# Patient Record
(demographics unavailable — no encounter records)

---

## 2017-02-17 NOTE — HP
ADMISSION HISTORY AND PHYSICAL:

 

DATE OF ADMISSION:  02/17/17

 

REASON FOR ADMISSION:  STEMI; status post 4-vessel bypass.

 

HISTORY OF PRESENT ILLNESS:  Carmelo Vazquez is an 84-year-old white male.  He came 
to [facility] acutely on 02/04/17, complaining of persistent chest 
pain. He had an EKG showing ST elevations in the inferior leads.  A STEMI alert 
was called.  The patient was given heparin and Brilinta and started on IV 
nitroglycerin.  He underwent emergent cardiac catheterization, which showed 
severe triple-vessel disease.  The patient was transferred to [facility]. The patient was taken to the operating room on 02/07/17 and underwent 
a coronary artery bypass graft.  He had a left internal mammary artery to the 
LAD, a left saphenous vein graft to the obtuse marginal 1, and saphenous vein 
graft to obtuse marginal 3.  The postop course was notable for atrial 
fibrillation.  He was anticoagulated with Coumadin.  He also had a persistently 
high white blood cell count.  He was started on doxycycline empirically for 
bronchitis.  Chest x-rays were done, which did not reveal pneumonia.  The 
patient, otherwise seemed to be doing well; however, he had physical therapy 
and occupational therapy needs.  He is now being admitted for inpatient rehab 
so that he might return to independent living.

 

PAST MEDICAL HISTORY:  Significant for arthritis as well as coronary artery 
disease and hypertension.  He had a right inguinal hernia repair.

 

CURRENT MEDICATIONS:  Include:

1.  Aspirin.

2.  Lipitor.

3.  Digoxin.

4.  Doxycycline.

5.  Lasix.

6.  Cozaar.

7.  Magnesium oxide.

8.  Lopressor.

9.  Flomax.

10.  Coumadin.

 

ALLERGIES:  The patient has no known drug allergies.

 

SOCIAL HISTORY:  He is a nonsmoker, nondrinker.  He lives with his wife in a [place] 
[address] [place].  He has a son living near by.

 

REVIEW OF SYSTEMS:  The patient reports no current shortness of breath or chest 
pain.

 

                               PHYSICAL EXAMINATION

 

VITAL SIGNS:  The patient's temperature is 99.6, blood pressure is 133/65, 
pulse is 89, respirations 18.

 

HEENT:  His extraocular movements were intact.  Tongue is midline.

 

NECK:  Supple.

 

LUNGS:  Sound clear to auscultation bilaterally.

 

HEART:  Sounds are irregular.  S1 and S2 are audible.

 

ABDOMEN:  Soft and nontender.

 

EXTREMITIES:  His left vein graft site looks clean.  He has 1+ edema 
bilaterally.

 

NEUROLOGIC:  He is awake, alert, oriented.  Muscle strength appears to be 5/5 
in both upper and lower extremities.

 

FUNCTIONAL EXAM:  He transfers with min assist.

 

 ASSESSMENT:  ST elevation myocardial infarction; status post 4-vessel CABG.

 

PLAN:  Integrate him into a comprehensive and therapeutic rehab program with 
the following goals:

 

1.  Physical Therapy will work with the patient.  They are going to work on 
functional transfer training, ambulation training with a walker.

2.  Occupational Therapy will see the patient.  They are going to work on his 
activities of daily living including toileting and toilet transfers.

3.  Coumadin for his atrial fibrillation and for DVT prophylaxis.

4.  Continue digoxin and Coumadin for atrial fibrillation.

5.  Continue aspirin, Lipitor, and beta blocker for coronary artery disease.

6.  For prostatic hypertrophy, continue Flomax.

7.  Continue Lasix and monitor diuresis.  Weights q. Monday, Wednesday, and 
Friday.

8.  Continue doxycycline empirically.  Follow white blood cell count.  Follow 
wound on left leg.

9.  Advance directives:  The patient is a full code.

10.   will be closely involved to make sure that any services 
and equipment that the patient requires are in place prior to discharge.

11.  Home with appropriate services.

 

ESTIMATED LENGTH OF STAY:  Ten days.

 

 

 

[id number]/[facility] #: [id number]

MARIO

## 2017-02-27 NOTE — RAD
Indication: Sternal pain post CABG.



Comparison: No relevant prior exams available [place] [facility] [place]



Technique: PA and lateral chest radiographs.



Report: Cardiomegaly. Median sternotomy wires and mediastinal vascular clips. Unremarkable

central pulmonary vasculature. Mild prominence of the interstitial markings. Obscuration

of the LEFT heart margin which may reflect atelectasis or pneumonia at the lingula. Small

bilateral pleural effusions. Negative for pneumothorax.



IMPRESSION: Cardiomegaly with probable mild interstitial edema with associated small

pleural effusions. Pericardial effusion not excluded. Obscuration of the LEFT heart margin

which may reflect atelectasis or pneumonia at the lingula. Correlate with clinical

assessment.

## 2017-02-28 NOTE — RAD
Indication: Right knee pain.



2 views of the right knee demonstrates joint space narrowing in the medial compartment of

the right knee with osteophyte formation and varus deformity. Degenerative changes of the

patellofemoral joint is noted.



IMPRESSION: Degenerative changes medial compartment of the right knee.

## 2017-02-28 NOTE — RAD
Indication: Left knee pain.



2 views left knee demonstrates joint space narrowing in the medial compartment with a

moderate degree of varus deformity of the left tibia. Complete loss of the joint space

medially with subchondral sclerosis and osteophyte formation is noted. Osteophyte

formation is noted in the lateral compartment as well as the patellofemoral joint.



IMPRESSION: Degenerative changes medial compartment of the left knee with moderate degree

of varus deformity. More moderate degenerative changes are noted in the lateral

compartment and patellofemoral joint.

## 2017-03-09 NOTE — DS
DISCHARGE SUMMARY:

 

DATE OF ADMISSION:  02/17/17

 

DATE OF DISCHARGE:  03/08/17

 

DISCHARGE DIAGNOSES:

1.  ST elevation myocardial infarction.

2.  Status post 4-vessel coronary artery bypass graft.

3.  Atrial fibrillation.

4.  Osteoarthritis, both knees.

 

HISTORY OF ILLNESS AND HOSPITAL COURSE:  For complete history of events leading 
up to his rehab stay, please see the history and physical dictated by me on 02/
17/17. While on the rehab unit, the patient initially converted back to sinus 
rhythm.  He was maintained on Coumadin as well as Lopressor, digoxin and 
aspirin. The patient was noted to be hypokalemic and potassium supplementation 
was ordered.  He was maintained on Lasix.  BUN and creatinine steadily improved 
while on the rehab unit but at the time of discharge still BUN was 21 and 
creatinine 1.24.  The patient's heart rate was noted to be irregular and 
followup EKG showed he had gone back into atrial fibrillation.  As he was 
anticoagulated and rate controlled, there was not much to be done.  The patient 
had some difficulty ambulating because of pain in his knees.  X-rays show 
significant osteoarthritis in both knees.  He could no longer take Naprosyn 
because of his recent open heart surgery.  He was having difficulty putting 
weight on his arms because of his recent sternotomy.  The patient had to limit 
his distance in ambulation as a result.  The patient was seen by Physical 
Therapy and Occupational Therapy and made good gains with all disciplines.  
With physical therapy at the time of admission, the patient required max assist 
for a transfer of 2 people.  He was max assist to ambulate about 3 feet.  With 
occupational therapy, he was max assist for toileting and toilet transfers.  By 
the time of discharge, he was independent in ambulating and he was able to 
ambulate about 50 feet.  He was able to go up and down 5 steps with contact 
guard.  He was independent in dressing, independent in toileting and toilet 
transfers.  He was discharged home on 03/08/17.

 

DISCHARGE DIET:  Cardiac.  Decaffeinated beverages like coffee were okay.

 

DISCHARGE MEDICATIONS:

1.  Aspirin 81 mg daily.

2.  Lipitor 10 mg daily.

3.  Digoxin 0.125 mg daily.

4.  Lasix 40 mg daily.

5.  Norco 5/325 one tablet every 6 hours as needed.

6.  Cozaar 50 mg daily.

7.  Lopressor 37.5 mg twice a day.

8.  Omeprazole 20 mg daily.

9.  Potassium chloride 20 mEq twice daily.

10.  Coumadin 1 mg daily at 5 p.m.

11.  Vitamin D 1000 units daily.

 

SERVICES AFTER DISCHARGE:  Through visiting nurse service, he will have home 
nursing, home physical therapy, and a home health aide.

 

FOLLOWUP:  The patient will follow up with Dr. Corcoran in 1 to 2 weeks.  He will 
also follow up with Dr. Cholo Boo, his primary care doctor.  He also will 
follow up with  ______, his cardiothoracic surgeon.

 

CC:  Cholo Boo MD*



 [id number]/[facility] #: [id number]

MTDD

## 2017-11-13 NOTE — PMRUTEAM
PMRU: Goals


Current Status: 


 Nursing: Current Status











Skin Deviations [R Posterior   Other





Forearm]                       


 


Skin Deviations [Bilateral     Other





Heel]                          


 


Skin Deviations [Coccyx]       Other


 


Skin Deviations [Right Groin]  Rash


 


Skin Deviations [Left Leg]     Incision


 


Skin Deviations [Midline Chest Incision





]                              


 


Skin Deviation Description [R  bump





Posterior Forearm]             


 


Skin Deviation Description [   lotion in place





Bilateral Heel]                


 


Skin Deviation Description [   redness- skin protectent applied





Coccyx]                        


 


Skin Deviation Description [   Nystatin powder applied





Right Groin]                   


 


Skin Deviation Description [   healing





Left Leg]                      


 


Skin Deviation Description [   Healing





Midline Chest]                 


 


Bladder Current Status         uses urinal and br to void


 


Bowel Current Status           metamucil.


 


Nutrition Current Status       appetite good


 


Medication Current Status      needs reinforcement











 Physical Therapy: Current Status











Bed Mobility Assistance        Supervision


 


Transfer Moblility Assistance  Supervision


 


Transfer/Bed Mobility          Rolling Walker





Recommended Devices            


 


Transfer Mobility Comment      Mod A x 1 sit to stand.


 


Ambulation Assistance          Supervision


 


Ambulation Assistive Devices   Rolling Walker


 


Number of Feet Patient         40'





Ambulated                      


 


Ambulation Comment             Slow and cautious short step reciprocal gait.


 


Stairs Assistance              Supervision,Contact Guard Assist


 


Stairs Recommended Devices     Two Rails


 


Number of Stairs               2


 


Curb                           Not Tested














 Occupational Therapy: Current Status











Upper Body Dressing            Independent


 


Lower Body Dressing            Supervision


 


Bathing                        Supervision


 


Toileting                      Supervision


 


Toilet Transfer                Supervision


 


Shower Transfer                Supervision


 


Eating                         Independent














 Rec Therapy: Current Status











Summary of Assessment and      RT assessment complete and pt. is aware of RT





Clinical Impression            services.  Pt. is open to continued leisure 

visits





 .


 


Treatment Goals                Pt. will engage in leisure activities while on 

the





 unit.


 


Treatment Plan                 Provide RT services and encourage involvement.














 Social Work: Current Status











Discharge Plan                 return home with home care svs and family support


 


Potential for Family Training  pt's wife is attending family training today


 


Anticipated Discharge          Home





Destination                    


 


Discharge With                 VNS and family support














 Nutrition: Current Status











Monitoring                     Continues to eat well/meeting needs.  Regular





 bowel pattern. Na improving (130 vs previous 127).





 BUN/Cr improved (21/1.24).  K 3.2 and KCl





 ordered by MD.  No specific further nutrition





 intervention anticipated.














Goals: 


 Physical Therapy: Initial Goals











Bed Mobility Assistance        Independent


 


Transfer Mobility Assistance   Independent


 


Transfer/Bed Mobility          Rolling Walker





Recommended Devices            


 


Ambulation                     Independent


 


Ambulation Recommended Devices Rolling Walker


 


Ambulation Distance            150


 


Stairs Assistance              Independent


 


Stair Recommended Devices      Two Rails


 


Number of Stairs               5














 Physical Therapy: Updated Goals











Bed Mobility Assistance        Independent


 


Transfer Mobility Assistance   Independent


 


Transfer/Bed Mobility          Rolling Walker





Recommended Devices            


 


Ambulation Assistance          Independent


 


Ambulation Assistive Devices   Rolling Walker


 


Ambulation Distance (ft)       150'


 


Stairs Assistance              Independent


 


Stairs Recommended Devices     One Rail,Two Rails


 


Number of Stairs               5














 Occupational Therapy: Initial Goals











Goals to be Completed in (Days 10-14





)                              


 


Upper Body Bathing Routine     Independent


 


Lower Body Bathing Routine     Modified Independent with


 


Upper Body Dressing Routine    Independent


 


Lower Body Dressing Routine    Modified Independent with


 


Toilet Hygeine and Clothing    Modified Independent with





Management Routine             


 


Toilet Transfer Routine        Modified Independent with


 


Step-In Shower Transfer        Modified Independent with





Routine                        


 


Functional Transfers for ADL   Modified Independent with


 


Grooming Routine               Independent


 


Feeding Routine                Independent














 Nursing: Goals











Bladder Goal                   independent


 


Bowel Goal                     independent


 


Nutrition Goal                 100% of all meals eaten


 


Medication Goal                independent














 Nutrition: Goals











Intervention Goals             1. Intake will remain adequate to maintain stable





 wt





 2. K+ will normalize with repletion; Na will





 return to WNL





 3. Pt will maintain regular bowel pattern without





 diarrhea/constipation





 4. Pt will choose appopriate items from menu to





 ensure ease chewing











 Social Work: Goals











Discharge Plan                 return home with home care svs and family support


 


Potential for Family Training  pt's wife is attending family training today


 


Anticipated Discharge          Home





Destination                    


 


Discharge With                 VNS and family support

















Care Plan: 


 Care Plan





ADL's - Improve/Maintain                Start:  02/18/17 12:12              


Freq:   DAILY                           Status: Active      Target:         








Activity Type Activity Date Activity User E-Sign Co-Sign Detail





Recorded Client Recorded Date Recorded By   


 


Document 03/06/17 15:00 [id number]   





[id number]-C04 03/06/17 15:00 [id number]   














  03/06/17





  15:00


 


PMRU Outcome: ADL's/ADL Transfers 


 


Orders/Interventions Occupational





 Therapy





 Evaluation &





 Treatment


 


Patient to receive OT 5x/wk for  Therex





min/day Self Care





 Management





 Group Therapy


 


UE/LE ADL's with Assist Yes


 


ADL Transfers with Assist Yes


 


Toileting: Transfers,Clothing Management Yes





,Hygeine w/Assist 


 


Progression Toward Outcome/Goals Progressing








Cardiovascular- Improve/Maintain        Start:  02/17/17 17:00              


Freq:   DAILY                           Status: Complete    Target:         








Activity Type Activity Date Activity User E-Sign Co-Sign Detail





Recorded Client Recorded Date Recorded By   


 


Document 02/27/17 18:56 [id number]   





[id number]-M01 02/27/17 18:57 [id number]   














  02/27/17





  18:56


 


PMRU Outcome: Cardiovascular 


 


Vital Signs q Shift for 48hrs Then BID Yes


 


Daily Weight Ordered No


 


Current Cardiovascular Outcome/Goal Maintain/





 Achieve





 Baseline HR, BP





 , Perfusion





 Maintain/





 Achieve





 Hemodynamic





 Stability





 Free of





 Abnormal





 Cardiac





 Symptoms


 


Outcomes/Goals Met Maintain/





 Achieve





 Baseline HR, BP





 , Perfusion





 Maintain/





 Improve





 Perfusion





 Free of





 Abnormal





 Cardiac





 Symptoms








Coping/Psych-Improve/Maintain           Start:  02/17/17 17:00              


Freq:   DAILY                           Status: Complete    Target:         








Activity Type Activity Date Activity User E-Sign Co-Sign Detail





Recorded Client Recorded Date Recorded By   


 


Document 02/27/17 18:56 [id number]   





[id number]-M01 02/27/17 18:57 [id number]   














  02/27/17





  18:56


 


PMRU Outcome: Coping/Psychosocial 


 


Coping Outcome/Goals Verbalization





 of Sense of





 Control Over





 Health Status





 Utilization of





 Appropriate





 Problem Solving





 Techniques





 Willingness to





 Participate in





 Treatment Plan





 and Basic Needs


 


Psychosocial Outcome/Goals Maintain/





 Improve





 Emotional





 Health





 Demonstrates





 Knowledge of





 Healthy Coping





 Mechanisms





 Available





 Cooperate/





 Participate in





 Plan


 


Coping Outcome/Goals Met Demonstrates





 Understanding





 of Rehab Admit





 and Goal





 Setting Process





 Performs





 Actions to





 Reduce Pain and





 Prevent





 Complications





 Recognizes/Uses





 Appropriate





 Resources That





 Can Assist With





 Adjustment








DVT Prophylaxis- Improve/Maintain       Start:  02/17/17 17:00              


Freq:   DAILY                           Status: Complete    Target:         








Activity Type Activity Date Activity User E-Sign Co-Sign Detail





Recorded Client Recorded Date Recorded By   


 


Document 03/02/17 19:43 [id number]   





PMRU-M10 03/02/17 19:43 [id number]   














  03/02/17





  19:43


 


PMRU Outcome: DVT Prophylaxis 


 


Outcome/Goals Remains Free of





 DVT





 Complies with





 DVT Prophylaxis





 /Treatment





 Demonstrates





 Knowledge of





 DVT Prevention/





 Treatment





 TEDS Stockings





 on Every AM,





 Off at HS


 


Other Outcome/Goals ACE wraps





 bilateral


 


Outcome/Goals Met Remains Free of





 DVT





 TEDS Stockings





 on Every AM,





 Off at HS








Discharge Planning - Improve/Maintain   Start:  02/17/17 17:00              


Freq:   DAILY                           Status: Active      Target:         








Activity Type Activity Date Activity User E-Sign Co-Sign Detail





Recorded Client Recorded Date Recorded By   


 


Document 03/07/17 10:03 [id number]   





PMRU-M01 03/07/17 10:07 [id number]   














  03/07/17





  10:03


 


PMRU Outcome: Discharge Planning 


 


Identify Patient Needs yes


 


Update Patient Family No


 


Outcome/Goals Demonstrates





 Understanding





 of Discharge





 Plan


 


Progression Toward Outcome/Goals Progressing








Education-Improve/Maintain              Start:  02/17/17 17:00              


Freq:   DAILY                           Status: Active      Target:         








Activity Type Activity Date Activity User E-Sign Co-Sign Detail





Recorded Client Recorded Date Recorded By   


 


Document 03/07/17 10:03 [id number]   





PMRU-M01 03/07/17 10:07 [id number]   














  03/07/17





  10:03


 


PMRU Outcome: Education 


 


Outcome/Goals Demonstrate/





 Verbalize





 Understanding





 of Written





 Discharge





 Instructions





 Demonstrates





 Skills





 Encourage





 Questions


 


Progression Toward Outcome/Goals Progressing








/GI-Improve/Maintain                  Start:  02/17/17 17:00              


Freq:   DAILY                           Status: Active      Target:         








Activity Type Activity Date Activity User E-Sign Co-Sign Detail





Recorded Client Recorded Date Recorded By   


 


Document 03/07/17 10:03 [id number]   





PMRU-M01 03/07/17 10:07 [id number]   














  03/07/17





  10:03


 


PMRU Outcome: Genitourinary/ 





Gastrointestinal 


 


Genitourinary- Outcome/Goals Maintain/





 Achieve Urinary





 Continence


 


Gastrointestinal-Outcome/Goals Maintain/





 Achieve Bowel





 Regularity in





 Accordance with





 Pt's Baseline





 Remain Free of





 Emesis





 Prevent





 Constipation





 Laxatives as





 Ordered


 


Progression Toward Outcome/Goals -  Progressing


 


Progression Toward Outcome/Goals - GI Progressing








Medication Administration               Start:  02/17/17 17:00              


Freq:   DAILY                           Status: Active      Target:         








Activity Type Activity Date Activity User E-Sign Co-Sign Detail





Recorded Client Recorded Date Recorded By   


 


Document 03/07/17 10:03 [id number]   





PMRU-M01 03/07/17 10:07 [id number]   














  03/07/17





  10:03


 


PMRU Outcome: Medication Administration 


 


Assess Patient Knowledge/Teach Med Yes





Education for all Meds 


 


Outcome/Goals Demonstrates





 Understanding


 


Progression Towards Outcome/Goals Progressing


 


Is Patient Going Home on Lovenox? No








Mobility- Improve/Maintain              Start:  02/24/17 12:37              


Freq:   DAILY                           Status: Active      Target:         








Activity Type Activity Date Activity User E-Sign Co-Sign Detail





Recorded Client Recorded Date Recorded By   


 


Document 03/07/17 12:15 [id number]   





PMRU-C08 03/07/17 12:15 [id number]   














  03/07/17





  12:15


 


PMRU Outcome: Mobility 


 


Physical Therapy Evaluation and Yes





Treatment 


 


Activity OOB with Assistance Yes


 


Assistance Yes


 


Patient to be seen 5x/wk for  min/ Therex





day for: Mobility





 Training





 Balance


 


Outcome/Goals Maintain/





 Achieve





 Baseline





 Mobility Status





 Improve





 Mobility Status





 Demonstrates





 Proper Use of





 Assistive





 Devices





 Free from





 Complications





 of Immobility


 


Progression Toward Outcome/Goals Progressing


 


Bed Mobility Yes:





 independent


 


Transfers Yes:





 independent





 with RW


 


Gait x ft Yes:





 independent 150





 ' with RW


 


Up/Down Stairs Yes:





 independent up/





 down 5 with





 Bilateral rails








Pain/Comfort- Improve/Maintain          Start:  02/17/17 17:00              


Freq:   DAILY                           Status: Active      Target:         








Activity Type Activity Date Activity User E-Sign Co-Sign Detail





Recorded Client Recorded Date Recorded By   


 


Document 03/07/17 10:03 [id number]   





PMRU-M01 03/07/17 10:07 [id number]   














  03/07/17





  10:03


 


PMRU Outcome: Pain/Comfort 


 


Outcome/Goals Demonstrates





 Knowledge and





 Use of





 Available





 Comfort





 Measures





 Achieves





 Acceptable





 Comfort/Pain





 Level as





 Determined by





 Patient/Condit





 Maintain





 Comfort Level





 Allowing





 Patient to





 Fully





 Participate in





 Rehab


 


Progression Toward Outcome/Goals Progressing


 


Outcome/Goals Met Demonstrates





 Knowledge and





 Use of





 Available





 Comfort





 Measures








Respiratory - Improve/Maintain          Start:  02/17/17 17:00              


Freq:   DAILY                           Status: Complete    Target:         








Activity Type Activity Date Activity User E-Sign Co-Sign Detail





Recorded Client Recorded Date Recorded By   


 


Document 03/03/17 19:21 [id number]   





PMRU-M08 03/03/17 19:21 [id number]   














  03/03/17





  19:21


 


PMRU Outcome: Respiratory 


 


Does Patient Have a Trach No


 


Outcome/Goals Maintain/





 Improve O2 Sat





 per MD Order


 


Other Outcome/Goals Pt encouragd to





 use IS Q1H





 while awake.





 Patient





 demonstrates





 effective use





 of IS.


 


Progression Toward Outcome/Goals Progressing


 


Outcome/Goals Met Maintain/





 Improve O2 Sat





 per MD Order





 Maintain/





 Improve





 Activity





 Tolerance








Safety- Improve/Maintain                Start:  02/17/17 17:00              


Freq:   DAILY                           Status: Complete    Target:         








Activity Type Activity Date Activity User E-Sign Co-Sign Detail





Recorded Client Recorded Date Recorded By   


 


Document 02/22/17 08:00 [id number]   





PMRU-C14 02/22/17 12:58 [id number]   














  02/22/17





  08:00


 


PMRU Outcome: Safety 


 


Outcome/Goals Remain Free of





 Injury or Harm





 Cooperates with





 Safety





 Measures for





 Least





 Restrictive





 Environment





 Prevent Falls/





 Injury


 


Progression Toward Outcome/Goals Progressing


 


Outcome/Goals Met Remain Free of





 Injury or Harm





 Cooperates with





 Safety





 Measures for





 Least





 Restrictive





 Environment








Skin- Improve/Maintain                  Start:  02/17/17 17:00              


Freq:   DAILY                           Status: Active      Target:         








Activity Type Activity Date Activity User E-Sign Co-Sign Detail





Recorded Client Recorded Date Recorded By   


 


Document 03/07/17 10:03 [id number]   





PMRU-M01 03/07/17 10:07 [id number]   














  03/07/17





  10:03


 


PMRU Outcome: Skin 


 


Skin Risk Level Medium


 


Skin Orders Air Mattress


 


Outcome/Goals Maintain/





 Improve Skin





 Intergrity





 Surgical





 Incisions





 Healing


 


Outcome/Goals Comment large rash on





 both sides of





 groin-nystatin


 


Progression Toward Outcome/Goals Progressing














Medicine Note: 








Length of Stay:  1 day





Anticipated Discharge Destination: Home





Tentative Discharge Date:  03/08/17





Discharged to:  home
PMRU: Goals


Current Status: 


 Nursing: Current Status











Skin Deviations [R Posterior   Other





Forearm]                       


 


Skin Deviations [Right Groin]  Rash


 


Skin Deviations [Left Leg]     Incision


 


Skin Deviations [Midline Chest Incision





]                              


 


Skin Deviation Description [R  bump





Posterior Forearm]             


 


Skin Deviation Description [   intact.





Right Groin]                   


 


Skin Deviation Description [   Steri strips in place.  Well approximated. No





Left Leg]                      redness or drainage noted.


 


Skin Deviation Description [   JOSE DANIEL





Midline Chest]                 


 


Bladder Current Status         uses urinal and br to void


 


Bowel Current Status           metamucil.


 


Nutrition Current Status       appetite good


 


Medication Current Status      needs reinforcement











 Physical Therapy: Current Status











Bed Mobility Assistance        Min Assist,Mod Assist,Not Tested


 


Transfer Moblility Assistance  Min Assist,Mod Assist


 


Transfer/Bed Mobility          Rolling Walker





Recommended Devices            


 


Transfer Mobility Comment      Mod A x 1 sit to stand.


 


Ambulation Assistance          Contact Guard Assist


 


Ambulation Assistive Devices   Rolling Walker


 


Number of Feet Patient         20' x 2





Ambulated                      


 


Ambulation Comment             Slow and cautious short step reciprocal gait.


 


Stairs Assistance              Not Tested


 


Stairs Recommended Devices     Two Rails


 


Number of Stairs               2  steps with effort


 


Curb                           Not Tested














 Occupational Therapy: Current Status











Upper Body Dressing            Supervision


 


Lower Body Dressing            Min Assist


 


Bathing                        Mod Assist


 


Toileting                      Min Assist,Mod Assist


 


Toilet Transfer                Min Assist


 


Shower Transfer                Min Assist


 


Eating                         Independent














 Rec Therapy: Current Status











Summary of Assessment and      RT assessment complete and pt. is aware of RT





Clinical Impression            services.  Pt. is open to continued leisure 

visits





 .


 


Treatment Goals                Pt. will engage in leisure activities while on 

the





 unit.


 


Treatment Plan                 Provide RT services and encourage involvement.














 Social Work: Current Status











Discharge Plan                 return home with home care svs and family support


 


Potential for Family Training  pt's family is involved and supportive


 


Anticipated Discharge          Home





Destination                    


 


Discharge With                 home care svs and family support














 Nutrition: Current Status











Monitoring                     pt continues to eat well (100% of most of the 

past





 few meals, though occ 25% or 60%) w/heart healthy





 diet; declines softer texture diet, but choosing





 menu appropriately for ease of chewing.  Pt eating





 independently at past few meals. BMs 2/26, 2/27,





 2/28. K+ 4.1 2/27; Na+ 127; LFTs stable. No





 changes to suggest at this time.














Goals: 


 Physical Therapy: Initial Goals











Bed Mobility Assistance        Independent


 


Transfer Mobility Assistance   Independent


 


Transfer/Bed Mobility          Rolling Walker





Recommended Devices            


 


Ambulation                     Independent


 


Ambulation Recommended Devices Rolling Walker


 


Ambulation Distance            150


 


Stairs Assistance              Independent


 


Stair Recommended Devices      Two Rails


 


Number of Stairs               5














 Physical Therapy: Updated Goals











Bed Mobility Assistance        Independent


 


Transfer Mobility Assistance   Independent


 


Transfer/Bed Mobility          Rolling Walker





Recommended Devices            


 


Ambulation Assistance          Independent


 


Ambulation Assistive Devices   Rolling Walker


 


Ambulation Distance (ft)       150'


 


Stairs Assistance              Independent


 


Stairs Recommended Devices     One Rail,Two Rails


 


Number of Stairs               5














 Occupational Therapy: Initial Goals











Goals to be Completed in (Days 10-14





)                              


 


Upper Body Bathing Routine     Independent


 


Lower Body Bathing Routine     Modified Independent with


 


Upper Body Dressing Routine    Independent


 


Lower Body Dressing Routine    Modified Independent with


 


Toilet Hygeine and Clothing    Modified Independent with





Management Routine             


 


Toilet Transfer Routine        Modified Independent with


 


Step-In Shower Transfer        Modified Independent with





Routine                        


 


Functional Transfers for ADL   Modified Independent with


 


Grooming Routine               Independent


 


Feeding Routine                Independent














 Nursing: Goals











Bladder Goal                   independent


 


Bowel Goal                     independent


 


Nutrition Goal                 100% of all meals eaten


 


Medication Goal                independent














 Nutrition: Goals











Intervention Goals             1. Intake will remain adequate to maintain stable





 wt





 2. K+ will normalize with repletion; na will





 remain wnl





 3. Pt will maintain regular bowel pattern without





 diarrhea/constipation





 4. Pt will choose appopriate items from menu to





 ensure ease chewing











 Social Work: Goals











Discharge Plan                 return home with home care svs and family support


 


Potential for Family Training  pt's family is involved and supportive


 


Anticipated Discharge          Home





Destination                    


 


Discharge With                 home care svs and family support

















Care Plan: 


 Care Plan





ADL's - Improve/Maintain                Start:  02/18/17 12:12              


Freq:   DAILY                           Status: Active      Target:         








Activity Type Activity Date Activity User E-Sign Co-Sign Detail





Recorded Client Recorded Date Recorded By   


 


Document 02/23/17 14:00 [id number]   





PMRU-C09 02/23/17 14:00 [id number]   














  02/23/17





  14:00


 


PMRU Outcome: ADL's/ADL Transfers 


 


Orders/Interventions Occupational





 Therapy





 Evaluation &





 Treatment


 


Patient to receive OT 5x/wk for  Therex





min/day Self Care





 Management





 Group Therapy


 


UE/LE ADL's with Assist Yes


 


ADL Transfers with Assist Yes


 


Toileting: Transfers,Clothing Management Yes





,Hygeine w/Assist 


 


Progression Toward Outcome/Goals Progressing








Cardiovascular- Improve/Maintain        Start:  02/17/17 17:00              


Freq:   DAILY                           Status: Complete    Target:         








Activity Type Activity Date Activity User E-Sign Co-Sign Detail





Recorded Client Recorded Date Recorded By   


 


Document 02/27/17 18:56 [id number]   





PMRU-M01 02/27/17 18:57 [id number]   














  02/27/17





  18:56


 


PMRU Outcome: Cardiovascular 


 


Vital Signs q Shift for 48hrs Then BID Yes


 


Daily Weight Ordered No


 


Current Cardiovascular Outcome/Goal Maintain/





 Achieve





 Baseline HR, BP





 , Perfusion





 Maintain/





 Achieve





 Hemodynamic





 Stability





 Free of





 Abnormal





 Cardiac





 Symptoms


 


Outcomes/Goals Met Maintain/





 Achieve





 Baseline HR, BP





 , Perfusion





 Maintain/





 Improve





 Perfusion





 Free of





 Abnormal





 Cardiac





 Symptoms








Coping/Psych-Improve/Maintain           Start:  02/17/17 17:00              


Freq:   DAILY                           Status: Complete    Target:         








Activity Type Activity Date Activity User E-Sign Co-Sign Detail





Recorded Client Recorded Date Recorded By   


 


Document 02/27/17 18:56 [id number]   





PMRU-M01 02/27/17 18:57 [id number]   














  02/27/17





  18:56


 


PMRU Outcome: Coping/Psychosocial 


 


Coping Outcome/Goals Verbalization





 of Sense of





 Control Over





 Health Status





 Utilization of





 Appropriate





 Problem Solving





 Techniques





 Willingness to





 Participate in





 Treatment Plan





 and Basic Needs


 


Psychosocial Outcome/Goals Maintain/





 Improve





 Emotional





 Health





 Demonstrates





 Knowledge of





 Healthy Coping





 Mechanisms





 Available





 Cooperate/





 Participate in





 Plan


 


Coping Outcome/Goals Met Demonstrates





 Understanding





 of Rehab Admit





 and Goal





 Setting Process





 Performs





 Actions to





 Reduce Pain and





 Prevent





 Complications





 Recognizes/Uses





 Appropriate





 Resources That





 Can Assist With





 Adjustment








DVT Prophylaxis- Improve/Maintain       Start:  02/17/17 17:00              


Freq:   DAILY                           Status: Active      Target:         








Activity Type Activity Date Activity User E-Sign Co-Sign Detail





Recorded Client Recorded Date Recorded By   


 


Document 02/28/17 00:20 [id number]   





PMRU-M10 02/28/17 00:58 [id number]   














  02/28/17





  00:20


 


PMRU Outcome: DVT Prophylaxis 


 


Outcome/Goals Remains Free of





 DVT





 Complies with





 DVT Prophylaxis





 /Treatment





 Demonstrates





 Knowledge of





 DVT Prevention/





 Treatment





 TEDS Stockings





 on Every AM,





 Off at HS


 


Other Outcome/Goals ACE wraps





 bilateral


 


Progression Toward Outcome/Goals Progressing








Discharge Planning - Improve/Maintain   Start:  02/17/17 17:00              


Freq:   DAILY                           Status: Active      Target:         








Activity Type Activity Date Activity User E-Sign Co-Sign Detail





Recorded Client Recorded Date Recorded By   


 


Document 02/28/17 00:20 [id number]   





PMRU-M10 02/28/17 00:58 [id number]   














  02/28/17





  00:20


 


PMRU Outcome: Discharge Planning 


 


Identify Patient Needs yes


 


Update Patient Family No


 


Outcome/Goals Demonstrates





 Understanding





 of Discharge





 Plan


 


Progression Toward Outcome/Goals Progressing








Education-Improve/Maintain              Start:  02/17/17 17:00              


Freq:   DAILY                           Status: Active      Target:         








Activity Type Activity Date Activity User E-Sign Co-Sign Detail





Recorded Client Recorded Date Recorded By   


 


Document 02/28/17 00:20 [id number]   





PMRU-M10 02/28/17 00:58 [id number]   














  02/28/17





  00:20


 


PMRU Outcome: Education 


 


Outcome/Goals Demonstrate/





 Verbalize





 Understanding





 of Written





 Discharge





 Instructions





 Demonstrates





 Skills





 Encourage





 Questions


 


Progression Toward Outcome/Goals Progressing








/GI-Improve/Maintain                  Start:  02/17/17 17:00              


Freq:   DAILY                           Status: Active      Target:         








Activity Type Activity Date Activity User E-Sign Co-Sign Detail





Recorded Client Recorded Date Recorded By   


 


Document 02/28/17 00:20 [id number]   





PMRU-M10 02/28/17 00:58 [id number]   














  02/28/17





  00:20


 


PMRU Outcome: Genitourinary/ 





Gastrointestinal 


 


Genitourinary- Outcome/Goals Maintain/





 Achieve Urinary





 Continence


 


Gastrointestinal-Outcome/Goals Maintain/





 Achieve Bowel





 Regularity in





 Accordance with





 Pt's Baseline


 


Progression Toward Outcome/Goals -  Progressing


 


Progression Toward Outcome/Goals - GI Progressing








Medication Administration               Start:  02/17/17 17:00              


Freq:   DAILY                           Status: Active      Target:         








Activity Type Activity Date Activity User E-Sign Co-Sign Detail





Recorded Client Recorded Date Recorded By   


 


Document 02/28/17 00:20 [id number]   





PMRU-M10 02/28/17 00:58 [id number]   














  02/28/17





  00:20


 


PMRU Outcome: Medication Administration 


 


Assess Patient Knowledge/Teach Med Yes





Education for all Meds 


 


Outcome/Goals Demonstrates





 Understanding


 


Progression Towards Outcome/Goals Progressing


 


Is Patient Going Home on Lovenox? No








Mobility- Improve/Maintain              Start:  02/24/17 12:37              


Freq:   DAILY                           Status: Active      Target:         








Activity Type Activity Date Activity User E-Sign Co-Sign Detail





Recorded Client Recorded Date Recorded By   


 


Document 02/24/17 14:17 [id number]   





PMRU-C08 02/24/17 14:17 [id number]   














  02/24/17





  14:17


 


PMRU Outcome: Mobility 


 


Physical Therapy Evaluation and Yes





Treatment 


 


Activity OOB with Assistance Yes


 


Assistance Yes


 


Patient to be seen 5x/wk for  min/ Therex





day for: Mobility





 Training





 Balance


 


Outcome/Goals Maintain/





 Achieve





 Baseline





 Mobility Status





 Improve





 Mobility Status





 Demonstrates





 Proper Use of





 Assistive





 Devices





 Free from





 Complications





 of Immobility


 


Progression Toward Outcome/Goals Progressing


 


Bed Mobility Yes:





 independent


 


Transfers Yes:





 independent





 with RW


 


Gait x ft Yes:





 independent 150





 ' with RW


 


Up/Down Stairs Yes:





 independent up/





 down 5 with





 Bilateral rails








Pain/Comfort- Improve/Maintain          Start:  02/17/17 17:00              


Freq:   DAILY                           Status: Active      Target:         








Activity Type Activity Date Activity User E-Sign Co-Sign Detail





Recorded Client Recorded Date Recorded By   


 


Document 02/28/17 00:20 [id number]   





PMRU-M10 02/28/17 00:58 [id number]   














  02/28/17





  00:20


 


PMRU Outcome: Pain/Comfort 


 


Outcome/Goals Demonstrates





 Knowledge and





 Use of





 Available





 Comfort





 Measures





 Achieves





 Acceptable





 Comfort/Pain





 Level as





 Determined by





 Patient/Condit


 


Progression Toward Outcome/Goals Progressing


 


Outcome/Goals Met Demonstrates





 Knowledge and





 Use of





 Available





 Comfort





 Measures


 


Outcome/Goals Met Comment Pt did not want





 any medication





 at this time.








Respiratory - Improve/Maintain          Start:  02/17/17 17:00              


Freq:   DAILY                           Status: Complete    Target:         








Activity Type Activity Date Activity User E-Sign Co-Sign Detail





Recorded Client Recorded Date Recorded By   


 


Document 02/27/17 18:56 [id number]   





PMRU-M01 02/27/17 18:57 [id number]   














  02/27/17





  18:56


 


PMRU Outcome: Respiratory 


 


Does Patient Have a Trach No


 


Outcome/Goals Maintain/





 Improve O2 Sat





 per MD Order


 


Outcome/Goals Met Maintain/





 Improve O2 Sat





 per MD Order





 Maintain/





 Improve





 Activity





 Tolerance








Safety- Improve/Maintain                Start:  02/17/17 17:00              


Freq:   DAILY                           Status: Complete    Target:         








Activity Type Activity Date Activity User E-Sign Co-Sign Detail





Recorded Client Recorded Date Recorded By   


 


Document 02/22/17 08:00 [id number]   





PMRU-C14 02/22/17 12:58 [id number]   














  02/22/17





  08:00


 


PMRU Outcome: Safety 


 


Outcome/Goals Remain Free of





 Injury or Harm





 Cooperates with





 Safety





 Measures for





 Least





 Restrictive





 Environment





 Prevent Falls/





 Injury


 


Progression Toward Outcome/Goals Progressing


 


Outcome/Goals Met Remain Free of





 Injury or Harm





 Cooperates with





 Safety





 Measures for





 Least





 Restrictive





 Environment








Skin- Improve/Maintain                  Start:  02/17/17 17:00              


Freq:   DAILY                           Status: Active      Target:         








Activity Type Activity Date Activity User E-Sign Co-Sign Detail





Recorded Client Recorded Date Recorded By   


 


Document 02/28/17 00:20 [id number]   





PMRU-M10 02/28/17 00:58 [id number]   














  02/28/17





  00:20


 


PMRU Outcome: Skin 


 


Skin Risk Level Medium


 


Skin Orders Air Mattress


 


Outcome/Goals Maintain/





 Improve Skin





 Intergrity





 Surgical





 Incisions





 Healing


 


Progression Toward Outcome/Goals Progressing














Medicine Note: 








Length of Stay:  8 days





Anticipated Discharge Destination: Home





Tentative Discharge Date:  3/8/17





Discharged to:  home
PMRU: Goals


Current Status: 


 Nursing: Current Status











Skin Deviations [Right Groin]  Bruise


 


Skin Deviations [Left Leg]     Incision


 


Skin Deviations [Midline Chest Incision





]                              


 


Skin Deviation Description [   intact.





Right Groin]                   


 


Skin Deviation Description [   steri strips in place.





Left Leg]                      


 


Skin Deviation Description [   dressing intact





Midline Chest]                 











 Physical Therapy: Current Status











Bed Mobility Assistance        Min Assist,Mod Assist


 


Transfer Moblility Assistance  Contact Guard Assist,Min Assist


 


Transfer/Bed Mobility          Rolling Walker





Recommended Devices            


 


Ambulation Assistance          Contact Guard Assist,Min Assist


 


Ambulation Assistive Devices   Platform Walker


 


Number of Feet Patient         90'





Ambulated                      


 


Ambulation Comment             Pt. continues to be flexed at hips, knees and





 ankles, but amb is improving.


 


Stairs Assistance              Supervision,Contact Guard Assist,Not Tested


 


Stairs Recommended Devices     Two Rails


 


Number of Stairs               2


 


Curb                           Not Tested














 Occupational Therapy: Current Status











Upper Body Dressing            Supervision


 


Lower Body Dressing            Min Assist


 


Bathing                        Min Assist,Mod Assist


 


Toileting                      Min Assist


 


Toilet Transfer                Min Assist


 


Shower Transfer                Min Assist


 


Eating                         Independent,Ind with Adaptive Equip














 Rec Therapy: Current Status











Summary of Assessment and      RT assessment complete and pt. is aware of RT





Clinical Impression            services.  Pt. is open to continued leisure 

visits





 .


 


Treatment Goals                Pt. will engage in leisure activities while on 

the





 unit.


 


Treatment Plan                 Provide RT services and encourage involvement.














 Social Work: Current Status











Discharge Plan                 Return home with home care svs and family support


 


Potential for Family Training  pt's family is involved and supportive


 


Anticipated Discharge          Home





Destination                    


 


Discharge With                 return home with home care svs and family support














 Nutrition: Current Status











Monitoring                     pt eating well (%) w/heart healthy diet;





 declines softer texture diet, but choosing menu





 appropriately for ease of chewing.  Heart healthy





 diet and Coumadin/Vit K education provided 2/20.





 K (3.1 on adm); KCl given daily (20mg).  Daily BMs





 per nsg documentation.  Appears to be meeting





 goals as outlined below.














Goals: 


 Physical Therapy: Initial Goals











Bed Mobility Assistance        Independent


 


Transfer Mobility Assistance   Independent


 


Transfer/Bed Mobility          Rolling Walker





Recommended Devices            


 


Ambulation                     Independent


 


Ambulation Recommended Devices Rolling Walker


 


Ambulation Distance            150


 


Stairs Assistance              Independent


 


Stair Recommended Devices      Two Rails


 


Number of Stairs               5














 Physical Therapy: Updated Goals











Bed Mobility Assistance        Independent


 


Transfer Mobility Assistance   Independent


 


Transfer/Bed Mobility          Rolling Walker





Recommended Devices            


 


Ambulation Assistance          Independent


 


Ambulation Assistive Devices   Rolling Walker


 


Ambulation Distance (ft)       150'


 


Stairs Assistance              Independent


 


Stairs Recommended Devices     One Rail,Two Rails


 


Number of Stairs               5














 Occupational Therapy: Initial Goals











Goals to be Completed in (Days 10-14





)                              


 


Upper Body Bathing Routine     Independent


 


Lower Body Bathing Routine     Modified Independent with


 


Upper Body Dressing Routine    Independent


 


Lower Body Dressing Routine    Modified Independent with


 


Toilet Hygeine and Clothing    Modified Independent with





Management Routine             


 


Toilet Transfer Routine        Modified Independent with


 


Step-In Shower Transfer        Modified Independent with





Routine                        


 


Functional Transfers for ADL   Modified Independent with


 


Grooming Routine               Independent


 


Feeding Routine                Independent














 Nutrition: Goals











Intervention Goals             1. Intake will remain adequate to maintain stable





 wt





 2. K+ will normalize with repletion





 3. Pt will maintain regular bowel pattern without





 diarrhea/constipation





 4. Pt will choose appopriate items from menu to





 ensure ease chewing











 Social Work: Goals











Discharge Plan                 Return home with home care svs and family support


 


Potential for Family Training  pt's family is involved and supportive


 


Anticipated Discharge          Home





Destination                    


 


Discharge With                 return home with home care svs and family support

















Care Plan: 


 Care Plan





ADL's - Improve/Maintain                Start:  02/18/17 12:12              


Freq:   DAILY                           Status: Active      Target:         








Activity Type Activity Date Activity User E-Sign Co-Sign Detail





Recorded Client Recorded Date Recorded By   


 


Document 02/18/17 12:12 [id number]   





PMRU-M10 02/18/17 12:12 [id number]   














  02/18/17





  12:12


 


PMRU Outcome: ADL's/ADL Transfers 


 


Orders/Interventions Occupational





 Therapy





 Evaluation &





 Treatment


 


Patient to receive OT 5x/wk for  Therex





min/day Self Care





 Management





 Group Therapy


 


UE/LE ADL's with Assist Yes


 


ADL Transfers with Assist Yes


 


Toileting: Transfers,Clothing Management Yes





,Hygeine w/Assist 


 


Progression Toward Outcome/Goals Progressing








Cardiovascular- Improve/Maintain        Start:  02/17/17 17:00              


Freq:   DAILY                           Status: Active      Target:         








Activity Type Activity Date Activity User E-Sign Co-Sign Detail





Recorded Client Recorded Date Recorded By   


 


Document 02/21/17 03:22 [id number]   





PMRU-M10 02/21/17 03:32 [id number]   














  02/21/17





  03:22


 


PMRU Outcome: Cardiovascular 


 


Vital Signs q Shift for 48hrs Then BID Yes


 


Daily Weight Ordered No


 


Current Cardiovascular Outcome/Goal Maintain/





 Achieve





 Baseline HR, BP





 , Perfusion





 Free of





 Abnormal





 Cardiac





 Symptoms


 


Progression Toward Outcome/Goal Progressing








Coping/Psych-Improve/Maintain           Start:  02/17/17 17:00              


Freq:   DAILY                           Status: Active      Target:         








Activity Type Activity Date Activity User E-Sign Co-Sign Detail





Recorded Client Recorded Date Recorded By   


 


Document 02/21/17 03:22 [id number]   





PMRU-M10 02/21/17 03:32 [id number] 02/21/17





  03:22


 


PMRU Outcome: Coping/Psychosocial 


 


Coping Outcome/Goals Verbalization





 of Acceptance





 of Rehab Admit





 Verbalization





 of Sense of





 Control Over





 Health Status





 Utilization of





 Appropriate





 Problem Solving





 Techniques





 Willingness to





 Participate in





 Treatment Plan





 and Basic Needs





 Utilization of





 Available





 Support Systems





 Absence of





 Destructive





 Behavior to





 Self/Others


 


Psychosocial Outcome/Goals Maintain/





 Improve





 Emotional





 Health





 Demonstrates





 Knowledge of





 Healthy Coping





 Mechanisms





 Available





 Cooperate/





 Participate in





 Plan


 


Progression Toward Outcome/Goals - Progressing





Coping 


 


Progression Toward Outcome/Goals - Progressing





Psychosocial 








DVT Prophylaxis- Improve/Maintain       Start:  02/17/17 17:00              


Freq:   DAILY                           Status: Active      Target:         








Activity Type Activity Date Activity User E-Sign Co-Sign Detail





Recorded Client Recorded Date Recorded By   


 


Document 02/21/17 03:22 [id number]   





PMRU-M10 02/21/17 03:32 [id number] 02/21/17





  03:22


 


PMRU Outcome: DVT Prophylaxis 


 


Outcome/Goals Remains Free of





 DVT





 Complies with





 DVT Prophylaxis





 /Treatment





 Demonstrates





 Knowledge of





 DVT Prevention/





 Treatment


 


Other Outcome/Goals ACE wraps


 


Progression Toward Outcome/Goals Progressing








Discharge Planning - Improve/Maintain   Start:  02/17/17 17:00              


Freq:   DAILY                           Status: Active      Target:         








Activity Type Activity Date Activity User E-Sign Co-Sign Detail





Recorded Client Recorded Date Recorded By   


 


Document 02/21/17 03:22 [id number]   





PMRU-M10 02/21/17 03:32 [id number]   














  02/21/17





  03:22


 


PMRU Outcome: Discharge Planning 


 


Identify Patient Needs yes


 


Update Patient Family No


 


Outcome/Goals Demonstrates





 Understanding





 of Discharge





 Plan


 


Progression Toward Outcome/Goals Progressing








Education-Improve/Maintain              Start:  02/17/17 17:00              


Freq:   DAILY                           Status: Active      Target:         








Activity Type Activity Date Activity User E-Sign Co-Sign Detail





Recorded Client Recorded Date Recorded By   


 


Document 02/21/17 03:22 [id number]   





PMRU-M10 02/21/17 03:32 [id number]   














  02/21/17





  03:22


 


PMRU Outcome: Education 


 


Outcome/Goals Demonstrate/





 Verbalize





 Understanding





 of Written





 Discharge





 Instructions





 Demonstrates





 Skills





 Encourage





 Questions


 


Progression Toward Outcome/Goals Progressing








/GI-Improve/Maintain                  Start:  02/17/17 17:00              


Freq:   DAILY                           Status: Active      Target:         








Activity Type Activity Date Activity User E-Sign Co-Sign Detail





Recorded Client Recorded Date Recorded By   


 


Document 02/21/17 03:22 [id number]   





PMRU-M10 02/21/17 03:32 [id number] 02/21/17





  03:22


 


PMRU Outcome: Genitourinary/ 





Gastrointestinal 


 


Genitourinary- Outcome/Goals Maintain/





 Achieve Urinary





 Continence





 Maintain/





 Achieve





 Adequate





 Urinary Output





 Remain Free of





 Hospital-





 Acquired UTI


 


Gastrointestinal-Outcome/Goals Remain Free of





 Emesis





 Prevent





 Constipation





 Bowel





 Regularity at





 Home





 Laxatives as





 Ordered


 


Progression Toward Outcome/Goals -  Progressing


 


Progression Toward Outcome/Goals - GI Progressing








Medication Administration               Start:  02/17/17 17:00              


Freq:   DAILY                           Status: Active      Target:         








Activity Type Activity Date Activity User E-Sign Co-Sign Detail





Recorded Client Recorded Date Recorded By   


 


Document 02/21/17 03:22 [id number]   





PMRU-M10 02/21/17 03:32 [id number] 02/21/17





  03:22


 


PMRU Outcome: Medication Administration 


 


Assess Patient Knowledge/Teach Med Yes





Education for all Meds 


 


Outcome/Goals Patient





 Independent





 with Medication





 Administration





 at Home





 Demonstrates





 Understanding


 


Progression Towards Outcome/Goals Progressing


 


Is Patient Going Home on Lovenox? No








Pain/Comfort- Improve/Maintain          Start:  02/17/17 17:00              


Freq:   DAILY                           Status: Active      Target:         








Activity Type Activity Date Activity User E-Sign Co-Sign Detail





Recorded Client Recorded Date Recorded By   


 


Document 02/21/17 03:22 [id number]   





PMRU-M10 02/21/17 03:32 [id number] 02/21/17





  03:22


 


PMRU Outcome: Pain/Comfort 


 


Outcome/Goals Demonstrates





 Knowledge and





 Use of





 Available





 Comfort





 Measures





 Achieves





 Acceptable





 Comfort/Pain





 Level as





 Determined by





 Patient/Condit





 Maintain





 Comfort Level





 Allowing





 Patient to





 Fully





 Participate in





 Rehab


 


Progression Toward Outcome/Goals Progressing


 


Outcome/Goals Met Comment Pt declined





 tylenol








Respiratory - Improve/Maintain          Start:  02/17/17 17:00              


Freq:   DAILY                           Status: Active      Target:         








Activity Type Activity Date Activity User E-Sign Co-Sign Detail





Recorded Client Recorded Date Recorded By   


 


Document 02/21/17 03:22 [id number]   





PMRU-M10 02/21/17 03:32 [id number] 02/21/17





  03:22


 


PMRU Outcome: Respiratory 


 


Does Patient Have a Trach No


 


Outcome/Goals Maintain/





 Improve O2 Sat





 per MD Order





 Maintain/





 Improve





 Baseline





 Respiratory





 Status





 Maintain/





 Improve





 Activity





 Tolerance





 Prevent





 Pneumonia/





 Atelectasis





 Remain





 Aspiration Free


 


Progression Toward Outcome/Goals Progressing








Safety- Improve/Maintain                Start:  02/17/17 17:00              


Freq:   DAILY                           Status: Active      Target:         








Activity Type Activity Date Activity User E-Sign Co-Sign Detail





Recorded Client Recorded Date Recorded By   


 


Document 02/21/17 03:22 [id number]   





PMRU-M10 02/21/17 03:32 [id number]   














  02/21/17





  03:22


 


PMRU Outcome: Safety 


 


Outcome/Goals Remain Free of





 Injury or Harm





 Cooperates with





 Safety





 Measures for





 Least





 Restrictive





 Environment





 Prevent Falls/





 Injury


 


Progression Toward Outcome/Goals Progressing








Skin- Improve/Maintain                  Start:  02/17/17 17:00              


Freq:   DAILY                           Status: Active      Target:         








Activity Type Activity Date Activity User E-Sign Co-Sign Detail





Recorded Client Recorded Date Recorded By   


 


Document 02/21/17 03:22 [id number]   





PMRU-M10 02/21/17 03:32 [id number]   














  02/21/17





  03:22


 


PMRU Outcome: Skin 


 


Skin Risk Level Medium


 


Skin Orders Dressing Change





 Heels Off Bed


 


Outcome/Goals Maintain/





 Improve Skin





 Intergrity





 Free from





 Decubitus





 Surgical





 Incisions





 Healing


 


Progression Toward Outcome/Goals Progressing














Medicine Note: 








Length of Stay:  10 days





Anticipated Discharge Destination: Home





Tentative Discharge Date:  03/03/17





Discharged to:  Home
PMRU: Goals


Current Status: 


 Nursing: Current Status











Skin Deviations [Right Groin]  Rash


 


Skin Deviations [Left Leg]     Incision


 


Skin Deviations [Midline Chest Incision





]                              


 


Skin Deviation Description [   intact.





Right Groin]                   


 


Skin Deviation Description [   steri strips in place.





Left Leg]                      


 


Skin Deviation Description [   dressing intact





Midline Chest]                 











 Physical Therapy: Current Status











Bed Mobility Assistance        Min Assist,Mod Assist


 


Transfer Moblility Assistance  Contact Guard Assist,Min Assist


 


Transfer/Bed Mobility          Rolling Walker





Recommended Devices            


 


Ambulation Assistance          Contact Guard Assist,Min Assist


 


Ambulation Assistive Devices   Platform Walker


 


Number of Feet Patient         18' and 90'





Ambulated                      


 


Stairs Assistance              Not Tested


 


Curb                           Not Tested














 Occupational Therapy: Current Status











Upper Body Dressing            Supervision


 


Lower Body Dressing            Supervision


 


Bathing                        Min Assist,Mod Assist


 


Toileting                      Min Assist,Mod Assist


 


Toilet Transfer                2 Person Assist


 


Shower Transfer                Min Assist


 


Eating                         Supervision














 Rec Therapy: Current Status











Summary of Assessment and      Pt. was open to conversation - interactive and





Clinical Impression            pleasant.  Pt. states he enjoys is life and is





 active in his leisure interests.  Pt. was open to





 continued leisure visits on the unit.


 


Treatment Goals                Pt. will engage in leisure activities while on 

the





 unit.


 


Treatment Plan                 Provide RT services and encourage involvement.














 Social Work: Current Status











Discharge Plan                 return home with home care svs and family support


 


Potential for Family Training  pt's wife is involved and supportive


 


Anticipated Discharge          Home





Destination                    


 


Discharge With                 home care svs and family support

















Goals: 


 Physical Therapy: Initial Goals











Bed Mobility Assistance        Independent


 


Transfer Mobility Assistance   Independent


 


Transfer/Bed Mobility          Rolling Walker





Recommended Devices            


 


Ambulation                     Independent


 


Ambulation Recommended Devices Rolling Walker


 


Ambulation Distance            150


 


Stairs Assistance              Independent


 


Stair Recommended Devices      Two Rails


 


Number of Stairs               5














 Physical Therapy: Updated Goals











Transfer/Bed Mobility          Rolling Walker





Recommended Devices            














 Occupational Therapy: Initial Goals











Goals to be Completed in (Days 10-14





)                              


 


Upper Body Bathing Routine     Independent


 


Lower Body Bathing Routine     Modified Independent with


 


Upper Body Dressing Routine    Independent


 


Lower Body Dressing Routine    Modified Independent with


 


Toilet Hygeine and Clothing    Modified Independent with





Management Routine             


 


Toilet Transfer Routine        Modified Independent with


 


Step-In Shower Transfer        Modified Independent with





Routine                        


 


Functional Transfers for ADL   Modified Independent with


 


Grooming Routine               Independent


 


Feeding Routine                Independent














 Social Work: Goals











Discharge Plan                 return home with home care svs and family support


 


Potential for Family Training  pt's wife is involved and supportive


 


Anticipated Discharge          Home





Destination                    


 


Discharge With                 home care svs and family support

















Care Plan: 


 Care Plan





ADL's - Improve/Maintain                Start:  02/18/17 12:12              


Freq:   DAILY                           Status: Active      Target:         








Activity Type Activity Date Activity User E-Sign Co-Sign Detail





Recorded Client Recorded Date Recorded By   


 


Document 02/18/17 12:12 [id number]   





PMRU-M10 02/18/17 12:12 [id number]   














  02/18/17





  12:12


 


PMRU Outcome: ADL's/ADL Transfers 


 


Orders/Interventions Occupational





 Therapy





 Evaluation &





 Treatment


 


Patient to receive OT 5x/wk for  Therex





min/day Self Care





 Management





 Group Therapy


 


UE/LE ADL's with Assist Yes


 


ADL Transfers with Assist Yes


 


Toileting: Transfers,Clothing Management Yes





,Hygeine w/Assist 


 


Progression Toward Outcome/Goals Progressing








Cardiovascular- Improve/Maintain        Start:  02/17/17 17:00              


Freq:   DAILY                           Status: Active      Target:         








Activity Type Activity Date Activity User E-Sign Co-Sign Detail





Recorded Client Recorded Date Recorded By   


 


Document 02/20/17 12:22 [id number]   





PMRU-C06 02/20/17 12:23 [id number]   














  02/20/17





  12:22


 


PMRU Outcome: Cardiovascular 


 


Vital Signs q Shift for 48hrs Then BID Yes


 


Daily Weight Ordered No


 


Current Cardiovascular Outcome/Goal Maintain/





 Achieve





 Baseline HR, BP





 , Perfusion





 Free of





 Abnormal





 Cardiac





 Symptoms


 


Progression Toward Outcome/Goal Progressing








Coping/Psych-Improve/Maintain           Start:  02/17/17 17:00              


Freq:   DAILY                           Status: Active      Target:         








Activity Type Activity Date Activity User E-Sign Co-Sign Detail





Recorded Client Recorded Date Recorded By   


 


Document 02/20/17 12:22 [id number]   





PMRU-C06 02/20/17 12:23 [id number]   














  02/20/17





  12:22


 


PMRU Outcome: Coping/Psychosocial 


 


Coping Outcome/Goals Verbalization





 of Acceptance





 of Rehab Admit





 Verbalization





 of Sense of





 Control Over





 Health Status





 Utilization of





 Appropriate





 Problem Solving





 Techniques





 Willingness to





 Participate in





 Treatment Plan





 and Basic Needs





 Utilization of





 Available





 Support Systems





 Absence of





 Destructive





 Behavior to





 Self/Others


 


Psychosocial Outcome/Goals Maintain/





 Improve





 Emotional





 Health





 Demonstrates





 Knowledge of





 Healthy Coping





 Mechanisms





 Available





 Cooperate/





 Participate in





 Plan


 


Progression Toward Outcome/Goals - Progressing





Coping 


 


Progression Toward Outcome/Goals - Progressing





Psychosocial 








DVT Prophylaxis- Improve/Maintain       Start:  02/17/17 17:00              


Freq:   DAILY                           Status: Active      Target:         








Activity Type Activity Date Activity User E-Sign Co-Sign Detail





Recorded Client Recorded Date Recorded By   


 


Document 02/20/17 12:22 [id number]   





PMRU-C06 02/20/17 12:23 [id number]   














  02/20/17





  12:22


 


PMRU Outcome: DVT Prophylaxis 


 


Outcome/Goals Remains Free of





 DVT





 Complies with





 DVT Prophylaxis





 /Treatment





 Demonstrates





 Knowledge of





 DVT Prevention/





 Treatment





 TEDS Stockings





 on Every AM,





 Off at HS


 


Other Outcome/Goals ACE wraps


 


Progression Toward Outcome/Goals Progressing








Discharge Planning - Improve/Maintain   Start:  02/17/17 17:00              


Freq:   DAILY                           Status: Active      Target:         








Activity Type Activity Date Activity User E-Sign Co-Sign Detail





Recorded Client Recorded Date Recorded By   


 


Document 02/20/17 00:14 [id number]   





PMRU-M01 02/20/17 00:14 [id number]   














  02/20/17





  00:14


 


PMRU Outcome: Discharge Planning 


 


Identify Patient Needs yes


 


Update Patient Family No


 


Outcome/Goals Demonstrates





 Understanding





 of Discharge





 Plan


 


Progression Toward Outcome/Goals Progressing








Education-Improve/Maintain              Start:  02/17/17 17:00              


Freq:   DAILY                           Status: Active      Target:         








Activity Type Activity Date Activity User E-Sign Co-Sign Detail





Recorded Client Recorded Date Recorded By   


 


Document 02/20/17 12:22 [id number]   





PMRU-C06 02/20/17 12:23 [id number]   














  02/20/17





  12:22


 


PMRU Outcome: Education 


 


Outcome/Goals Demonstrate/





 Verbalize





 Understanding





 of Written





 Discharge





 Instructions





 Demonstrates





 Skills





 Encourage





 Questions


 


Progression Toward Outcome/Goals Progressing








/GI-Improve/Maintain                  Start:  02/17/17 17:00              


Freq:   DAILY                           Status: Active      Target:         








Activity Type Activity Date Activity User E-Sign Co-Sign Detail





Recorded Client Recorded Date Recorded By   


 


Document 02/20/17 12:22 [id number]   





PMRU-C06 02/20/17 12:23 [id number]   














  02/20/17





  12:22


 


PMRU Outcome: Genitourinary/ 





Gastrointestinal 


 


Genitourinary- Outcome/Goals Maintain/





 Achieve Urinary





 Continence





 Maintain/





 Achieve





 Adequate





 Urinary Output





 Remain Free of





 Hospital-





 Acquired UTI


 


Gastrointestinal-Outcome/Goals Remain Free of





 Emesis





 Prevent





 Constipation





 Bowel





 Regularity at





 Home





 Laxatives as





 Ordered


 


Progression Toward Outcome/Goals -  Progressing


 


Progression Toward Outcome/Goals - GI Progressing


 


Outcome/Goals Met Comment pt used urinal








Medication Administration               Start:  02/17/17 17:00              


Freq:   DAILY                           Status: Active      Target:         








Activity Type Activity Date Activity User E-Sign Co-Sign Detail





Recorded Client Recorded Date Recorded By   


 


Document 02/20/17 12:22 [id number]   





PMRU-C06 02/20/17 12:23 [id number]   














  02/20/17





  12:22


 


PMRU Outcome: Medication Administration 


 


Assess Patient Knowledge/Teach Med Yes





Education for all Meds 


 


Outcome/Goals Patient





 Independent





 with Medication





 Administration





 at Home





 Demonstrates





 Understanding


 


Progression Towards Outcome/Goals Progressing


 


Is Patient Going Home on Lovenox? No








Pain/Comfort- Improve/Maintain          Start:  02/17/17 17:00              


Freq:   DAILY                           Status: Active      Target:         








Activity Type Activity Date Activity User E-Sign Co-Sign Detail





Recorded Client Recorded Date Recorded By   


 


Document 02/20/17 12:22 [id number]   





PMRU-C06 02/20/17 12:23 [id number]   














  02/20/17





  12:22


 


PMRU Outcome: Pain/Comfort 


 


Outcome/Goals Demonstrates





 Knowledge and





 Use of





 Available





 Comfort





 Measures





 Achieves





 Acceptable





 Comfort/Pain





 Level as





 Determined by





 Patient/Condit





 Maintain





 Comfort Level





 Allowing





 Patient to





 Fully





 Participate in





 Rehab


 


Progression Toward Outcome/Goals Progressing


 


Outcome/Goals Met Comment Pt declined





 tylenol








Respiratory - Improve/Maintain          Start:  02/17/17 17:00              


Freq:   DAILY                           Status: Active      Target:         








Activity Type Activity Date Activity User E-Sign Co-Sign Detail





Recorded Client Recorded Date Recorded By   


 


Document 02/20/17 12:22 [id number]   





PMRU-C06 02/20/17 12:23 [id number]   














  02/20/17





  12:22


 


PMRU Outcome: Respiratory 


 


Does Patient Have a Trach No


 


Outcome/Goals Maintain/





 Improve O2 Sat





 per MD Order





 Maintain/





 Improve





 Baseline





 Respiratory





 Status





 Maintain/





 Improve





 Activity





 Tolerance





 Prevent





 Pneumonia/





 Atelectasis





 Remain





 Aspiration Free


 


Progression Toward Outcome/Goals Progressing








Safety- Improve/Maintain                Start:  02/17/17 17:00              


Freq:   DAILY                           Status: Active      Target:         








Activity Type Activity Date Activity User E-Sign Co-Sign Detail





Recorded Client Recorded Date Recorded By   


 


Document 02/20/17 12:22 [id number]   





PMRU-C06 02/20/17 12:23 [id number]   














  02/20/17





  12:22


 


PMRU Outcome: Safety 


 


Outcome/Goals Remain Free of





 Injury or Harm





 Cooperates with





 Safety





 Measures for





 Least





 Restrictive





 Environment





 Prevent Falls/





 Injury


 


Progression Toward Outcome/Goals Progressing








Skin- Improve/Maintain                  Start:  02/17/17 17:00              


Freq:   DAILY                           Status: Active      Target:         








Activity Type Activity Date Activity User E-Sign Co-Sign Detail





Recorded Client Recorded Date Recorded By   


 


Document 02/20/17 12:22 [id number]   





PMRU-C06 02/20/17 12:23 [id number]   














  02/20/17





  12:22


 


PMRU Outcome: Skin 


 


Skin Risk Level Medium


 


Skin Orders Dressing Change





 Heels Off Bed


 


Outcome/Goals Maintain/





 Improve Skin





 Intergrity





 Free from





 Decubitus





 Surgical





 Incisions





 Healing


 


Progression Toward Outcome/Goals Progressing














Medicine Note: 








Length of Stay:  11 days





Anticipated Discharge Destination: Home





Tentative Discharge Date:  March 3, 2017





Discharged to:  Home
72 year old woman with a history of tobacco use, DM, HTN, poor historian presents for dyspnea and NSTEMI. 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  No PRBC given   hi flow o2- weaned off- now on NC  11/10 milrinone d/c- tx sdu   hypervolemia - diuresis  initiate low dose b-blocker this evening  discharge planning- rehab today - [facility]-discussed with DR. Mejia in interdisciplinary rounds

## 2018-01-15 NOTE — ED
Galina GAMBLE Gabriel, scribed for Fortunato Yoo MD on 01/15/18 at 2129 .





Shortness of Breath





- HPI Summary


HPI Summary: 





This patient is a 85 year old M presenting to [facility] accompanied by his wife 

with a chief complaint of SOB since 2 weeks ago. Patient reports cough and 

difficulty breathing. Patient denies CP. Patient was seen at his PCP who 

diagnosed him with acute bronchitis, pulmonary edema, and URI. Additionally he 

suggested the patient should drive himself him and the get a ride to the ED for 

pulmonary edema. Hx of coronary artery disease. 





- History of Current Complaint


Chief Complaint: EDShortnessOfBreath


Time Seen by Provider: 01/15/18 21:14


Hx Obtained From: Patient


Onset/Duration: Lasting Weeks - 2, Still Present


Timing: Constant


Current Severity: Moderate


Associated Signs & Symptoms: Cough (Nonproductive)





- Allergy/Home Medications


Allergies/Adverse Reactions: 


 Allergies











Allergy/AdvReac Type Severity Reaction Status Date / Time


 


No Known Allergies Allergy   Verified 01/15/18 19:16














PMH/Surg Hx/FS Hx/Imm Hx


Endocrine/Hematology History: 


   Denies: Hx Diabetes


Cardiovascular History: Reports: Hx Coronary Artery Disease, Hx 

Hypercholesterolemia, Hx Hypertension


   Denies: Hx Pacemaker/ICD


 History: 


   Denies: Hx Renal Disease


Musculoskeletal History: Reports: Hx Arthritis, Hx Back Problems - spinal 

stenosis


Sensory History: Reports: Hx Cataracts


   Denies: Hx Hearing Aid


Opthamlomology History: Reports: Hx Cataracts


Psychiatric History: 


   Denies: Hx Panic Disorder





- Surgical History


Surgery Procedure, Year, and Place: HERNIA SURGERY 1965-CATARACTS BILATERAL 

EYES 2011


Infectious Disease History: No


Infectious Disease History: 


   Denies: Traveled Outside the [place] in Last 30 Days





- Family History


Known Family History: 


   Negative: Cardiac Disease





- Social History


Alcohol Use: Rare


Substance Use Type: Reports: None


Smoking Status (MU): Never Smoked Tobacco


Have You Smoked in the Last Year: No





Review of Systems


Negative: Fever, Chills


Negative: Erythema


Negative: Sore Throat


Negative: Chest Pain


Positive: Shortness Of Breath, Cough


Negative: Abdominal Pain, Vomiting, Nausea


Negative: dysuria, hematuria


Negative: Myalgia, Edema


Negative: Rash


Neurological: Negative - dizziness


All Other Systems Reviewed And Are Negative: Yes





Physical Exam





- Summary


Physical Exam Summary: 





Constitutional: Well-developed, Well-nourished, Alert. (-) Distressed


Skin: Warm, Dry


HENT: Normocephalic; Atraumatic


Eyes: Conjunctiva normal


Neck: Musculoskeletal ROM normal neck. (-) JVD, (-) Stridor, (-) Tracheal 

deviation


Cardio: Rhythm regular, rate normal, Heart sounds normal; Intact distal pulses; 

The pedal pulses are 2+ and symmetric. Radial pulses are 2+ and symmetric. (-) 

Murmur


Pulmonary/Chest wall: Effort normal. (-) Respiratory distress, (-) Wheezes, (+) 

Rales


Abd: Soft, (-) Tenderness, (-) Distension, (-) Guarding, (-) Rebound


Musculoskeletal: (-) Edema


Lymph: (-) Cervical adenopathy


Neuro: Alert, Oriented x3


Psych: Mood and affect Normal


 





Triage Information Reviewed: Yes


Vital Signs On Initial Exam: 


 Initial Vitals











Temp Pulse Resp BP Pulse Ox


 


 99.6 F   85   20   138/74   96 


 


 01/15/18 19:05  01/15/18 19:05  01/15/18 19:05  01/15/18 19:05  01/15/18 19:05











Vital Signs Reviewed: Yes





- Shea Coma Scale


Coma Scale Total: 15





Diagnostics





- Vital Signs


 Vital Signs











  Temp Pulse Resp BP Pulse Ox


 


 01/15/18 19:05  99.6 F  85  20  138/74  96














- Laboratory


Lab Results: 


 Lab Results











  01/15/18 01/15/18 01/15/18 Range/Units





  21:39 21:39 21:39 


 


WBC    11.5 H  (3.5-10.8)  10^3/ul


 


RBC    3.55 L  (4.0-5.4)  10^6/ul


 


Hgb    11.3 L  (14.0-18.0)  g/dl


 


Hct    34 L  (42-52)  %


 


MCV    97 H  (80-94)  fL


 


MCH    32 H  (27-31)  pg


 


MCHC    33  (31-36)  g/dl


 


RDW    14  (10.5-15)  %


 


Plt Count    204  (150-450)  10^3/ul


 


MPV    10  (7.4-10.4)  um3


 


Neut % (Auto)    78.9  (38-83)  %


 


Lymph % (Auto)    11.2 L  (25-47)  %


 


Mono % (Auto)    8.9  (1-9)  %


 


Eos % (Auto)    0.5  (0-6)  %


 


Baso % (Auto)    0.5  (0-2)  %


 


Absolute Neuts (auto)    9.1 H  (1.5-7.7)  10^3/ul


 


Absolute Lymphs (auto)    1.3  (1.0-4.8)  10^3/ul


 


Absolute Monos (auto)    1.0 H  (0-0.8)  10^3/ul


 


Absolute Eos (auto)    0.1  (0-0.6)  10^3/ul


 


Absolute Basos (auto)    0.1  (0-0.2)  10^3/ul


 


Absolute Nucleated RBC    0  10^3/ul


 


Nucleated RBC %    0  


 


Sodium   134   (133-145)  mmol/L


 


Potassium   3.1 L   (3.5-5.0)  mmol/L


 


Chloride   102   (101-111)  mmol/L


 


Carbon Dioxide   26   (22-32)  mmol/L


 


Anion Gap   6   (2-11)  mmol/L


 


BUN   22   (6-24)  mg/dL


 


Creatinine   1.28 H   (0.67-1.17)  mg/dL


 


Est GFR ( Amer)   68.7   (>60)  


 


Est GFR (Non-Af Amer)   53.4   (>60)  


 


BUN/Creatinine Ratio   17.2   (8-20)  


 


Glucose   122 H   ()  mg/dL


 


Lactic Acid     (0.5-2.0)  mmol/L


 


Calcium   9.0   (8.6-10.3)  mg/dL


 


Total Bilirubin   0.50   (0.2-1.0)  mg/dL


 


AST   26   (13-39)  U/L


 


ALT   20   (7-52)  U/L


 


Alkaline Phosphatase   150 H   ()  U/L


 


C-Reactive Protein   28.35 H   (< 5.00)  mg/L


 


B-Natriuretic Peptide  1125 H   ( - 100) pg/mL


 


Total Protein   7.2   (6.4-8.9)  g/dL


 


Albumin   3.3   (3.2-5.2)  g/dL


 


Globulin   3.9   (2-4)  g/dL


 


Albumin/Globulin Ratio   0.8 L   (1-3)  


 


Lipase   54   (11.0-82.0)  U/L














  01/15/18 Range/Units





  21:39 


 


WBC   (3.5-10.8)  10^3/ul


 


RBC   (4.0-5.4)  10^6/ul


 


Hgb   (14.0-18.0)  g/dl


 


Hct   (42-52)  %


 


MCV   (80-94)  fL


 


MCH   (27-31)  pg


 


MCHC   (31-36)  g/dl


 


RDW   (10.5-15)  %


 


Plt Count   (150-450)  10^3/ul


 


MPV   (7.4-10.4)  um3


 


Neut % (Auto)   (38-83)  %


 


Lymph % (Auto)   (25-47)  %


 


Mono % (Auto)   (1-9)  %


 


Eos % (Auto)   (0-6)  %


 


Baso % (Auto)   (0-2)  %


 


Absolute Neuts (auto)   (1.5-7.7)  10^3/ul


 


Absolute Lymphs (auto)   (1.0-4.8)  10^3/ul


 


Absolute Monos (auto)   (0-0.8)  10^3/ul


 


Absolute Eos (auto)   (0-0.6)  10^3/ul


 


Absolute Basos (auto)   (0-0.2)  10^3/ul


 


Absolute Nucleated RBC   10^3/ul


 


Nucleated RBC %   


 


Sodium   (133-145)  mmol/L


 


Potassium   (3.5-5.0)  mmol/L


 


Chloride   (101-111)  mmol/L


 


Carbon Dioxide   (22-32)  mmol/L


 


Anion Gap   (2-11)  mmol/L


 


BUN   (6-24)  mg/dL


 


Creatinine   (0.67-1.17)  mg/dL


 


Est GFR ( Amer)   (>60)  


 


Est GFR (Non-Af Amer)   (>60)  


 


BUN/Creatinine Ratio   (8-20)  


 


Glucose   ()  mg/dL


 


Lactic Acid  0.9  (0.5-2.0)  mmol/L


 


Calcium   (8.6-10.3)  mg/dL


 


Total Bilirubin   (0.2-1.0)  mg/dL


 


AST   (13-39)  U/L


 


ALT   (7-52)  U/L


 


Alkaline Phosphatase   ()  U/L


 


C-Reactive Protein   (< 5.00)  mg/L


 


B-Natriuretic Peptide  ( - 100) pg/mL


 


Total Protein   (6.4-8.9)  g/dL


 


Albumin   (3.2-5.2)  g/dL


 


Globulin   (2-4)  g/dL


 


Albumin/Globulin Ratio   (1-3)  


 


Lipase   (11.0-82.0)  U/L











Result Diagrams: 


 01/15/18 21:39





 01/15/18 21:39


Lab Statement: Any lab studies that have been ordered have been reviewed, and 

results considered in the medical decision making process.





Re-Evaluation





- Re-Evaluation


  ** First Eval


Change: Unchanged





Course/Dx





- Course


Course Of Treatment: admitted for diuresis and topical nitrates to tx acute chf 

exacerbation and pulmonary edema





- Diagnoses


Provider Diagnoses: 


 CHF exacerbation, Pulmonary edema








- Critical Care Time


Critical Care Time: 30-74 min





Discharge





- Discharge Plan


Condition: Good


Disposition: ADMITTED TO [facility]


Referrals: 


Cholo Boo MD [Primary Care Provider] - 





The documentation as recorded by the Galina aleman Gabriel accurately reflects 

the service I personally performed and the decisions made by Naila brown Jerry, MD.

## 2018-01-16 NOTE — PN
Subjective


Date of Service: 01/16/18


Interval History: 





Pt stated that he uses low salt diet. not sure about his Lasix doses changed 

recently





Objective


Active Medications: 








Albuterol (Ventolin Hfa Inhaler*)  1 puff INH Q6H PRN


   PRN Reason: SOB/WHEEZING


Aspirin (Aspirin Low Dose Tab*)  81 mg PO DAILY [facility]


   Last Admin: 01/16/18 08:19 Dose:  81 mg


Atorvastatin Calcium (Lipitor*)  10 mg PO 1700 [facility]


Bumetanide (Bumex Tab*)  2 mg PO BID [facility]


   Last Admin: 01/16/18 08:19 Dose:  2 mg


Cholecalciferol (Vitamin D Tab*)  1,000 units PO DAILY [facility]


   Last Admin: 01/16/18 08:19 Dose:  1,000 units


Furosemide (Lasix Iv*)  40 mg IV 0800,1700 [facility]


Losartan Potassium (Cozaar Tab*)  25 mg PO BID [facility]


   Last Admin: 01/16/18 08:20 Dose:  25 mg


Metoprolol Tartrate (Lopressor Tab*)  37.5 mg PO BID [facility]


   Last Admin: 01/16/18 08:20 Dose:  37.5 mg


Omeprazole (Prilosec Cap*)  20 mg PO DAILY@0730 [facility]


   Last Admin: 01/16/18 08:20 Dose:  20 mg








Oxygen Devices in Use Now: None


Appearance: 84 yo M in nAD, aAOx3


Eyes: No Scleral Icterus, PERRLA


Ears/Nose/Mouth/Throat: NL Teeth, Lips, Gums, Mucous Membranes Moist


Neck: NL Appearance and Movements; NL JVP, Trachea Midline


Respiratory: Symmetrical Chest Expansion and Respiratory Effort, - - crackles 

at b/l bases


Cardiovascular: RRR, - - 2/6 ADRIENNE


Abdominal: NL Sounds; No Tenderness; No Distention, No Hepatosplenomegaly


Lymphatic: No Cervical Adenopathy


Extremities: No Clubbing, Cyanosis, - - +1 pitting edema b/l ankles


Skin: No Rash or Ulcers, No Nodules or Sclerosis


Neurological: Alert and Oriented x 3, NL Muscle Strength and Tone


Result Diagrams: 


 01/16/18 06:15





 01/16/18 06:15


Additional Lab and Data: 


 Lab Results











  01/15/18 01/15/18 01/15/18 Range/Units





  21:39 21:39 21:39 


 


WBC    11.5 H  (3.5-10.8)  10^3/ul


 


RBC    3.55 L  (4.0-5.4)  10^6/ul


 


Hgb    11.3 L  (14.0-18.0)  g/dl


 


Hct    34 L  (42-52)  %


 


MCV    97 H  (80-94)  fL


 


MCH    32 H  (27-31)  pg


 


MCHC    33  (31-36)  g/dl


 


RDW    14  (10.5-15)  %


 


Plt Count    204  (150-450)  10^3/ul


 


MPV    10  (7.4-10.4)  um3


 


Neut % (Auto)    78.9  (38-83)  %


 


Lymph % (Auto)    11.2 L  (25-47)  %


 


Mono % (Auto)    8.9  (1-9)  %


 


Eos % (Auto)    0.5  (0-6)  %


 


Baso % (Auto)    0.5  (0-2)  %


 


Absolute Neuts (auto)    9.1 H  (1.5-7.7)  10^3/ul


 


Absolute Lymphs (auto)    1.3  (1.0-4.8)  10^3/ul


 


Absolute Monos (auto)    1.0 H  (0-0.8)  10^3/ul


 


Absolute Eos (auto)    0.1  (0-0.6)  10^3/ul


 


Absolute Basos (auto)    0.1  (0-0.2)  10^3/ul


 


Absolute Nucleated RBC    0  10^3/ul


 


Nucleated RBC %    0  


 


Sodium   134   (133-145)  mmol/L


 


Potassium   3.1 L   (3.5-5.0)  mmol/L


 


Chloride   102   (101-111)  mmol/L


 


Carbon Dioxide   26   (22-32)  mmol/L


 


Anion Gap   6   (2-11)  mmol/L


 


BUN   22   (6-24)  mg/dL


 


Creatinine   1.28 H   (0.67-1.17)  mg/dL


 


Est GFR ( Amer)   68.7   (>60)  


 


Est GFR (Non-Af Amer)   53.4   (>60)  


 


BUN/Creatinine Ratio   17.2   (8-20)  


 


Glucose   122 H   ()  mg/dL


 


Lactic Acid     (0.5-2.0)  mmol/L


 


Calcium   9.0   (8.6-10.3)  mg/dL


 


Total Bilirubin   0.50   (0.2-1.0)  mg/dL


 


AST   26   (13-39)  U/L


 


ALT   20   (7-52)  U/L


 


Alkaline Phosphatase   150 H   ()  U/L


 


C-Reactive Protein   28.35 H   (< 5.00)  mg/L


 


B-Natriuretic Peptide  1125 H   ( - 100) pg/mL


 


Total Protein   7.2   (6.4-8.9)  g/dL


 


Albumin   3.3   (3.2-5.2)  g/dL


 


Globulin   3.9   (2-4)  g/dL


 


Albumin/Globulin Ratio   0.8 L   (1-3)  


 


Lipase   54   (11.0-82.0)  U/L














  01/15/18 Range/Units





  21:39 


 


WBC   (3.5-10.8)  10^3/ul


 


RBC   (4.0-5.4)  10^6/ul


 


Hgb   (14.0-18.0)  g/dl


 


Hct   (42-52)  %


 


MCV   (80-94)  fL


 


MCH   (27-31)  pg


 


MCHC   (31-36)  g/dl


 


RDW   (10.5-15)  %


 


Plt Count   (150-450)  10^3/ul


 


MPV   (7.4-10.4)  um3


 


Neut % (Auto)   (38-83)  %


 


Lymph % (Auto)   (25-47)  %


 


Mono % (Auto)   (1-9)  %


 


Eos % (Auto)   (0-6)  %


 


Baso % (Auto)   (0-2)  %


 


Absolute Neuts (auto)   (1.5-7.7)  10^3/ul


 


Absolute Lymphs (auto)   (1.0-4.8)  10^3/ul


 


Absolute Monos (auto)   (0-0.8)  10^3/ul


 


Absolute Eos (auto)   (0-0.6)  10^3/ul


 


Absolute Basos (auto)   (0-0.2)  10^3/ul


 


Absolute Nucleated RBC   10^3/ul


 


Nucleated RBC %   


 


Sodium   (133-145)  mmol/L


 


Potassium   (3.5-5.0)  mmol/L


 


Chloride   (101-111)  mmol/L


 


Carbon Dioxide   (22-32)  mmol/L


 


Anion Gap   (2-11)  mmol/L


 


BUN   (6-24)  mg/dL


 


Creatinine   (0.67-1.17)  mg/dL


 


Est GFR ( Amer)   (>60)  


 


Est GFR (Non-Af Amer)   (>60)  


 


BUN/Creatinine Ratio   (8-20)  


 


Glucose   ()  mg/dL


 


Lactic Acid  0.9  (0.5-2.0)  mmol/L


 


Calcium   (8.6-10.3)  mg/dL


 


Total Bilirubin   (0.2-1.0)  mg/dL


 


AST   (13-39)  U/L


 


ALT   (7-52)  U/L


 


Alkaline Phosphatase   ()  U/L


 


C-Reactive Protein   (< 5.00)  mg/L


 


B-Natriuretic Peptide  ( - 100) pg/mL


 


Total Protein   (6.4-8.9)  g/dL


 


Albumin   (3.2-5.2)  g/dL


 


Globulin   (2-4)  g/dL


 


Albumin/Globulin Ratio   (1-3)  


 


Lipase   (11.0-82.0)  U/L














Assess/Plan/Problems-Billing


Assessment: 84 yo M with h/o diastolic CHF, post CABG(02/2017) a. fib(stopped 

Coumadin 08/2017 no recurrence a. fib) presents in CHF











- Patient Problems


(1) Acute on chronic diastolic (congestive) heart failure


Comment: EF 55% and mod MR on Echo


cont Lasix IV, daily weights   





(2) CKD (chronic kidney disease) stage 3, GFR 30-59 ml/min


Comment: creat at baseline, monitor   





(3) Troponin level elevated


Comment: due to demand ischemia and CHF, trend   





(4) Dyslipidemia


Comment: cont statin   





(5) DVT prophylaxis


Comment: HSQ   


Status and Disposition: 


inpatient

## 2018-01-17 NOTE — PN
Subjective


Date of Service: 01/17/18


Interval History: 





Feels back to nl.  He and his wife did not think he took Lasix, furosemide, or 

any fluid pill. 





Objective


Active Medications: 








Albuterol (Ventolin Hfa Inhaler*)  1 puff INH Q6H PRN


   PRN Reason: SOB/WHEEZING


Aspirin (Aspirin Low Dose Tab*)  81 mg PO DAILY [facility]


   Last Admin: 01/17/18 08:29 Dose:  81 mg


Atorvastatin Calcium (Lipitor*)  10 mg PO 1700 [facility]


   Last Admin: 01/16/18 17:52 Dose:  10 mg


Cholecalciferol (Vitamin D Tab*)  1,000 units PO DAILY [facility]


   Last Admin: 01/17/18 08:29 Dose:  1,000 units


Heparin Sodium (Porcine) (Heparin Vial(*))  5,000 units SUBCUT Q8HR [facility]


   Last Admin: 01/17/18 13:47 Dose:  5,000 units


Losartan Potassium (Cozaar Tab*)  25 mg PO BID [facility]


   Last Admin: 01/17/18 08:29 Dose:  25 mg


Metoprolol Tartrate (Lopressor Tab*)  37.5 mg PO BID [facility]


   Last Admin: 01/17/18 08:29 Dose:  37.5 mg


Omeprazole (Prilosec Cap*)  20 mg PO DAILY@0730 [facility]


   Last Admin: 01/17/18 08:30 Dose:  20 mg








Oxygen Devices in Use Now: None


Appearance: Alert, in a chair.  In good spirits.  Looks comfortable.


Eyes: No Scleral Icterus


Neck: NL Appearance and Movements; NL JVP, No Thyroid Enlargement, Masses


Respiratory: Symmetrical Chest Expansion and Respiratory Effort, Clear to 

Auscultation, Clear to Percussion


Cardiovascular: NL Sounds; No Murmurs; No JVD, RRR, No Edema, -


Extremities: No Clubbing, Cyanosis, - - 2+ edema L leg, 1-2+ edema R leg


Skin: No Rash or Ulcers, No Nodules or Sclerosis, -


Neurological: Alert and Oriented x 3, NL Sensation


Result Diagrams: 


 01/17/18 08:27





 01/17/18 08:25


Additional Lab and Data: 


 Lab Results











  01/15/18 01/15/18 01/15/18 Range/Units





  21:39 21:39 21:39 


 


WBC    11.5 H  (3.5-10.8)  10^3/ul


 


RBC    3.55 L  (4.0-5.4)  10^6/ul


 


Hgb    11.3 L  (14.0-18.0)  g/dl


 


Hct    34 L  (42-52)  %


 


MCV    97 H  (80-94)  fL


 


MCH    32 H  (27-31)  pg


 


MCHC    33  (31-36)  g/dl


 


RDW    14  (10.5-15)  %


 


Plt Count    204  (150-450)  10^3/ul


 


MPV    10  (7.4-10.4)  um3


 


Neut % (Auto)    78.9  (38-83)  %


 


Lymph % (Auto)    11.2 L  (25-47)  %


 


Mono % (Auto)    8.9  (1-9)  %


 


Eos % (Auto)    0.5  (0-6)  %


 


Baso % (Auto)    0.5  (0-2)  %


 


Absolute Neuts (auto)    9.1 H  (1.5-7.7)  10^3/ul


 


Absolute Lymphs (auto)    1.3  (1.0-4.8)  10^3/ul


 


Absolute Monos (auto)    1.0 H  (0-0.8)  10^3/ul


 


Absolute Eos (auto)    0.1  (0-0.6)  10^3/ul


 


Absolute Basos (auto)    0.1  (0-0.2)  10^3/ul


 


Absolute Nucleated RBC    0  10^3/ul


 


Nucleated RBC %    0  


 


Sodium   134   (133-145)  mmol/L


 


Potassium   3.1 L   (3.5-5.0)  mmol/L


 


Chloride   102   (101-111)  mmol/L


 


Carbon Dioxide   26   (22-32)  mmol/L


 


Anion Gap   6   (2-11)  mmol/L


 


BUN   22   (6-24)  mg/dL


 


Creatinine   1.28 H   (0.67-1.17)  mg/dL


 


Est GFR ( Amer)   68.7   (>60)  


 


Est GFR (Non-Af Amer)   53.4   (>60)  


 


BUN/Creatinine Ratio   17.2   (8-20)  


 


Glucose   122 H   ()  mg/dL


 


Lactic Acid     (0.5-2.0)  mmol/L


 


Calcium   9.0   (8.6-10.3)  mg/dL


 


Total Bilirubin   0.50   (0.2-1.0)  mg/dL


 


AST   26   (13-39)  U/L


 


ALT   20   (7-52)  U/L


 


Alkaline Phosphatase   150 H   ()  U/L


 


C-Reactive Protein   28.35 H   (< 5.00)  mg/L


 


B-Natriuretic Peptide  1125 H   ( - 100) pg/mL


 


Total Protein   7.2   (6.4-8.9)  g/dL


 


Albumin   3.3   (3.2-5.2)  g/dL


 


Globulin   3.9   (2-4)  g/dL


 


Albumin/Globulin Ratio   0.8 L   (1-3)  


 


Lipase   54   (11.0-82.0)  U/L














  01/15/18 Range/Units





  21:39 


 


WBC   (3.5-10.8)  10^3/ul


 


RBC   (4.0-5.4)  10^6/ul


 


Hgb   (14.0-18.0)  g/dl


 


Hct   (42-52)  %


 


MCV   (80-94)  fL


 


MCH   (27-31)  pg


 


MCHC   (31-36)  g/dl


 


RDW   (10.5-15)  %


 


Plt Count   (150-450)  10^3/ul


 


MPV   (7.4-10.4)  um3


 


Neut % (Auto)   (38-83)  %


 


Lymph % (Auto)   (25-47)  %


 


Mono % (Auto)   (1-9)  %


 


Eos % (Auto)   (0-6)  %


 


Baso % (Auto)   (0-2)  %


 


Absolute Neuts (auto)   (1.5-7.7)  10^3/ul


 


Absolute Lymphs (auto)   (1.0-4.8)  10^3/ul


 


Absolute Monos (auto)   (0-0.8)  10^3/ul


 


Absolute Eos (auto)   (0-0.6)  10^3/ul


 


Absolute Basos (auto)   (0-0.2)  10^3/ul


 


Absolute Nucleated RBC   10^3/ul


 


Nucleated RBC %   


 


Sodium   (133-145)  mmol/L


 


Potassium   (3.5-5.0)  mmol/L


 


Chloride   (101-111)  mmol/L


 


Carbon Dioxide   (22-32)  mmol/L


 


Anion Gap   (2-11)  mmol/L


 


BUN   (6-24)  mg/dL


 


Creatinine   (0.67-1.17)  mg/dL


 


Est GFR ( Amer)   (>60)  


 


Est GFR (Non-Af Amer)   (>60)  


 


BUN/Creatinine Ratio   (8-20)  


 


Glucose   ()  mg/dL


 


Lactic Acid  0.9  (0.5-2.0)  mmol/L


 


Calcium   (8.6-10.3)  mg/dL


 


Total Bilirubin   (0.2-1.0)  mg/dL


 


AST   (13-39)  U/L


 


ALT   (7-52)  U/L


 


Alkaline Phosphatase   ()  U/L


 


C-Reactive Protein   (< 5.00)  mg/L


 


B-Natriuretic Peptide  ( - 100) pg/mL


 


Total Protein   (6.4-8.9)  g/dL


 


Albumin   (3.2-5.2)  g/dL


 


Globulin   (2-4)  g/dL


 


Albumin/Globulin Ratio   (1-3)  


 


Lipase   (11.0-82.0)  U/L














Assess/Plan/Problems-Billing


Assessment: 84 yo M with h/o diastolic CHF, post CABG(02/2017) a. fib(stopped 

Coumadin 08/2017 no recurrence a. fib) presents in CHF











- Patient Problems


(1) Acute on chronic diastolic (congestive) heart failure


Current Visit: Yes   Status: Acute   Code(s): I50.33 - ACUTE ON CHRONIC 

DIASTOLIC (CONGESTIVE) HEART FAILURE   SNOMED Code(s): 336428066


   Comment: EF 55% and mod MR on Echo


cont Lasix IV, daily weights


No record of outside pharmacy dispensing furosemide to him recently.  Try 

furosemide 20 mg dialy, nutrition consult, daily weights at home, close lab 

monitoring.   





(2) CKD (chronic kidney disease) stage 3, GFR 30-59 ml/min


Current Visit: Yes   Status: Acute   Code(s): N18.3 - CHRONIC KIDNEY DISEASE, 

STAGE 3 (MODERATE)   SNOMED Code(s): 818209131


   Comment: creat up somewhat 1/17/18.  BMP 1/18/18.   





(3) Dyslipidemia


Current Visit: Yes   Status: Acute   Code(s): E78.5 - HYPERLIPIDEMIA, 

UNSPECIFIED   SNOMED Code(s): 690804427


   Comment: cont statin   


Status and Disposition: 


inpatient

## 2018-01-18 NOTE — DS
CC:  Dr. Boo *

 

DATE OF ADMISSION:  01/15/2018.

 

DATE OF DISCHARGE:  01/18/2018.

 

HISTORY:  This 85-year-old man presented with shortness of breath.  He was 
thought to be in congestive heart failure.  He was given intravenous Furosemide 
40 mg. He received several doses of this during his hospital stay. All his 
symptoms resolved with this treatment.  He underwent a transthoracic 
echocardiogram which showed low normal ejection fraction at 50 to 55 percent.  
There was left ventricular hypertrophy.  It was thought he was technically 
suboptimal.

 

I note his BNP was 1,125.  In February of last year it had been 46.  His 
creatinine was 1.28 and on the day of discharge it had increased to 1.66.  I 
think most of the increase was related to his intravenous Furosemide.  He was 
changed to Furosemide 20 mg daily, a significantly smaller dose.  He will have 
a basic metabolic profile on 01/22/2018 with a report to Dr. Boo.  His 
potassium was low in the hospital. On the day of discharge it was 3.3.  He was 
not given potassium for much of this hospital stay, but potassium 20 mEq b.i.d. 
was restarted on the morning of discharge.

 

He is to stop his combination Losartan Hydrochlorothiazide.  He is on a smaller 
dose of the Losartan 25 mg b.i.d. for a total daily dose of 50 mg, half his 
previous daily dose.  Furosemide will replace the Hydrochlorothiazide.  He was 
also to have a nutrition consultation for a low salt diet before discharge, 
although I do not think he was very excessive in his salt intake at all.  He 
was instructed to weigh himself daily, write down the weights every day and 
call his PCP if his weight up or down by more than three pounds in any seven 
day period.

 

FINAL DIAGNOSES:

1.  Congestive heart failure.

2.  Chronic kidney disease.

3.  Dyslipidemia.

 

DISCHARGE MEDICATIONS:

1.  Furosemide 20 mg daily.

2.  Vitamin D 1,000 units daily.

3.  Atorvastatin 10 mg daily.

4.  Psyllium one tablespoon b.i.d.

5.  Aspirin 81 mg daily.

6.  Hydrocortisone/acetaminophen 5/325 one every 6 hours prn.

7.  Metoprolol 37.5 mg b.i.d.

8.  Omeprazole 20 mg daily.

9.  Potassium 20 mEq b.i.d.

10. Albuterol inhaler powder by [employer] one inhalation every 6 hours 
prn.

11. Losartan 25 mg daily.

 

 [id number]/CPS #: [id number]

MTDD

## 2018-01-18 NOTE — PN
Progress Note





- Progress Note


Date of Service: 01/18/18


Note: 





Time spent on discharge 45 minutes.

## 2018-01-18 NOTE — PN
Subjective


Date of Service: 01/18/18


Interval History: 





No c/o.





Objective


Active Medications: 








Albuterol (Ventolin Hfa Inhaler*)  1 puff INH Q6H PRN


   PRN Reason: SOB/WHEEZING


Aspirin (Aspirin Low Dose Tab*)  81 mg PO DAILY [facility]


   Last Admin: 01/17/18 08:29 Dose:  81 mg


Atorvastatin Calcium (Lipitor*)  10 mg PO 1700 [facility]


   Last Admin: 01/17/18 16:47 Dose:  10 mg


Cholecalciferol (Vitamin D Tab*)  1,000 units PO DAILY [facility]


   Last Admin: 01/17/18 08:29 Dose:  1,000 units


Furosemide (Lasix Tab*)  20 mg PO DAILY [facility]


Heparin Sodium (Porcine) (Heparin Vial(*))  5,000 units SUBCUT Q8HR [facility]


   Last Admin: 01/18/18 05:40 Dose:  5,000 units


Losartan Potassium (Cozaar Tab*)  25 mg PO BID [facility]


   Last Admin: 01/17/18 22:09 Dose:  25 mg


Metoprolol Tartrate (Lopressor Tab*)  37.5 mg PO BID [facility]


   Last Admin: 01/17/18 22:09 Dose:  37.5 mg


Omeprazole (Prilosec Cap*)  20 mg PO DAILY@0730 [facility]


   Last Admin: 01/17/18 08:30 Dose:  20 mg








 Vital Signs - 8 hr











  01/18/18





  03:43


 


Temperature 98.5 F


 


Pulse Rate 78


 


Respiratory 17





Rate 


 


Blood Pressure 125/60





(mmHg) 


 


O2 Sat by Pulse 99





Oximetry 











Oxygen Devices in Use Now: None


Appearance: Alert, partly up in bed.  In good spirits, looks comfortable.


Eyes: No Scleral Icterus


Respiratory: Symmetrical Chest Expansion and Respiratory Effort, Clear to 

Auscultation, Clear to Percussion


Cardiovascular: NL Sounds; No Murmurs; No JVD, RRR, No Edema, -


Extremities: No Clubbing, Cyanosis, - - 1+ edema BL


Skin: No Rash or Ulcers, No Nodules or Sclerosis, -


Neurological: Alert and Oriented x 3, NL Sensation


Result Diagrams: 


 01/17/18 08:27





 01/18/18 05:52


Additional Lab and Data: 


 Lab Results











  01/15/18 01/15/18 01/15/18 Range/Units





  21:39 21:39 21:39 


 


WBC    11.5 H  (3.5-10.8)  10^3/ul


 


RBC    3.55 L  (4.0-5.4)  10^6/ul


 


Hgb    11.3 L  (14.0-18.0)  g/dl


 


Hct    34 L  (42-52)  %


 


MCV    97 H  (80-94)  fL


 


MCH    32 H  (27-31)  pg


 


MCHC    33  (31-36)  g/dl


 


RDW    14  (10.5-15)  %


 


Plt Count    204  (150-450)  10^3/ul


 


MPV    10  (7.4-10.4)  um3


 


Neut % (Auto)    78.9  (38-83)  %


 


Lymph % (Auto)    11.2 L  (25-47)  %


 


Mono % (Auto)    8.9  (1-9)  %


 


Eos % (Auto)    0.5  (0-6)  %


 


Baso % (Auto)    0.5  (0-2)  %


 


Absolute Neuts (auto)    9.1 H  (1.5-7.7)  10^3/ul


 


Absolute Lymphs (auto)    1.3  (1.0-4.8)  10^3/ul


 


Absolute Monos (auto)    1.0 H  (0-0.8)  10^3/ul


 


Absolute Eos (auto)    0.1  (0-0.6)  10^3/ul


 


Absolute Basos (auto)    0.1  (0-0.2)  10^3/ul


 


Absolute Nucleated RBC    0  10^3/ul


 


Nucleated RBC %    0  


 


Sodium   134   (133-145)  mmol/L


 


Potassium   3.1 L   (3.5-5.0)  mmol/L


 


Chloride   102   (101-111)  mmol/L


 


Carbon Dioxide   26   (22-32)  mmol/L


 


Anion Gap   6   (2-11)  mmol/L


 


BUN   22   (6-24)  mg/dL


 


Creatinine   1.28 H   (0.67-1.17)  mg/dL


 


Est GFR ( Amer)   68.7   (>60)  


 


Est GFR (Non-Af Amer)   53.4   (>60)  


 


BUN/Creatinine Ratio   17.2   (8-20)  


 


Glucose   122 H   ()  mg/dL


 


Lactic Acid     (0.5-2.0)  mmol/L


 


Calcium   9.0   (8.6-10.3)  mg/dL


 


Total Bilirubin   0.50   (0.2-1.0)  mg/dL


 


AST   26   (13-39)  U/L


 


ALT   20   (7-52)  U/L


 


Alkaline Phosphatase   150 H   ()  U/L


 


C-Reactive Protein   28.35 H   (< 5.00)  mg/L


 


B-Natriuretic Peptide  1125 H   ( - 100) pg/mL


 


Total Protein   7.2   (6.4-8.9)  g/dL


 


Albumin   3.3   (3.2-5.2)  g/dL


 


Globulin   3.9   (2-4)  g/dL


 


Albumin/Globulin Ratio   0.8 L   (1-3)  


 


Lipase   54   (11.0-82.0)  U/L














  01/15/18 Range/Units





  21:39 


 


WBC   (3.5-10.8)  10^3/ul


 


RBC   (4.0-5.4)  10^6/ul


 


Hgb   (14.0-18.0)  g/dl


 


Hct   (42-52)  %


 


MCV   (80-94)  fL


 


MCH   (27-31)  pg


 


MCHC   (31-36)  g/dl


 


RDW   (10.5-15)  %


 


Plt Count   (150-450)  10^3/ul


 


MPV   (7.4-10.4)  um3


 


Neut % (Auto)   (38-83)  %


 


Lymph % (Auto)   (25-47)  %


 


Mono % (Auto)   (1-9)  %


 


Eos % (Auto)   (0-6)  %


 


Baso % (Auto)   (0-2)  %


 


Absolute Neuts (auto)   (1.5-7.7)  10^3/ul


 


Absolute Lymphs (auto)   (1.0-4.8)  10^3/ul


 


Absolute Monos (auto)   (0-0.8)  10^3/ul


 


Absolute Eos (auto)   (0-0.6)  10^3/ul


 


Absolute Basos (auto)   (0-0.2)  10^3/ul


 


Absolute Nucleated RBC   10^3/ul


 


Nucleated RBC %   


 


Sodium   (133-145)  mmol/L


 


Potassium   (3.5-5.0)  mmol/L


 


Chloride   (101-111)  mmol/L


 


Carbon Dioxide   (22-32)  mmol/L


 


Anion Gap   (2-11)  mmol/L


 


BUN   (6-24)  mg/dL


 


Creatinine   (0.67-1.17)  mg/dL


 


Est GFR ( Amer)   (>60)  


 


Est GFR (Non-Af Amer)   (>60)  


 


BUN/Creatinine Ratio   (8-20)  


 


Glucose   ()  mg/dL


 


Lactic Acid  0.9  (0.5-2.0)  mmol/L


 


Calcium   (8.6-10.3)  mg/dL


 


Total Bilirubin   (0.2-1.0)  mg/dL


 


AST   (13-39)  U/L


 


ALT   (7-52)  U/L


 


Alkaline Phosphatase   ()  U/L


 


C-Reactive Protein   (< 5.00)  mg/L


 


B-Natriuretic Peptide  ( - 100) pg/mL


 


Total Protein   (6.4-8.9)  g/dL


 


Albumin   (3.2-5.2)  g/dL


 


Globulin   (2-4)  g/dL


 


Albumin/Globulin Ratio   (1-3)  


 


Lipase   (11.0-82.0)  U/L














Assess/Plan/Problems-Billing


Assessment: 84 yo M with h/o diastolic CHF, post CABG(02/2017) a. fib(stopped 

Coumadin 08/2017 no recurrence a. fib) presents in CHF











- Patient Problems


(1) Acute on chronic diastolic (congestive) heart failure


Current Visit: Yes   Status: Acute   Code(s): I50.33 - ACUTE ON CHRONIC 

DIASTOLIC (CONGESTIVE) HEART FAILURE   SNOMED Code(s): 573986059


   Comment: EF 55% and mod MR on Echo


cont daily weights


No record of outside pharmacy dispensing furosemide to him recently.  Started 

furosemide 20 mg daily 1/18, nutrition consult, daily weights at home.  BMP 1/22 /18, fup Dr. Boo.   





(2) CKD (chronic kidney disease) stage 3, GFR 30-59 ml/min


Current Visit: Yes   Status: Acute   Code(s): N18.3 - CHRONIC KIDNEY DISEASE, 

STAGE 3 (MODERATE)   SNOMED Code(s): 982880584


   Comment: creat up slightly 1/18/18.  BMP 1/22/18, fup Dr. Boo.   





(3) Dyslipidemia


Current Visit: Yes   Status: Acute   Code(s): E78.5 - HYPERLIPIDEMIA, 

UNSPECIFIED   SNOMED Code(s): 549754026


   Comment: cont statin   


Status and Disposition: 


inpatient

## 2018-02-02 NOTE — CONS
CC:  Hospitalist Service; Dr. Bran; ER physician

 

CARDIOLOGY CONSULTATION:

 

DATE OF CONSULTATION:  02/02/18

 

HISTORY OF PRESENT ILLNESS:  I was asked by ER physician to see this 85-year-old male patient who fol
lowed up from cardiac standpoint with Dr. Corcoran, known history of extensive coronary artery disease 
with coronary artery bypass grafting that was done in February 2017.  The patient was in the hospital
 about 2 weeks ago for treatment for pneumonia and some element of congestive heart failure.  Since zeynep everett left the hospital, he had been having neck pain, arm pains bilaterally.  His main concern, he is fe
eling fatigued, tired, lack of energy.  He had no chest pain, no shortness of breath actually.  He ha
d some pain in his rib cage and tender, and also when he moves up and down his both arms he feels catalina
n.  In the emergency room, he presented, he was found to have an EKG that showed right bundle-branch 
block. It was borderline sinus tachycardia and some element of concern of ST elevation by the ER phys
jaspreetan; however, in comparison to EKGs from 2 weeks ago and from last year, there are no overt signifi
cant changes in the ST elevations; however, the patient is chest pain-free, and he never had actually
 symptoms of chest pain.  He does have known history of systemic arterial hypertension, chronic kidne
y disease, paroxysmal atrial fibrillation, hyperlipidemia, and right bundle-branch block.  He gives n
o fever, no chills, although his temperature is 100.4 in the emergency room. No nausea.  No vomiting.
  No hematochezia.  No skin rash.  No abdominal pain.  No syncope.  No orthopnea.  No tachycardia is 
appreciated.  His review of all other systems essentially is negative.

 

PAST MEDICAL HISTORY:  Includes, coronary artery disease; acute coronary syndrome, 02/04/17; urgent c
ath by Dr. Corcoran; multivessel disease; CABG, Dr. Butterfield" on 02/09/17, LIMA to LAD, saphenous venous
 graft to right PDA, and sequential saphenous venous grafts to OM1 and OM2.  He had an echo 2 weeks a
go that showed his EF to be 50% to 55% that was read and reported by Dr. Corcoran and there is left atr
ium dilated moderately, right ventricle dilated mildly.  There is mild to moderate aortic insufficien
cy, borderline aortic stenosis, moderate mitral insufficiency appreciated.  Trace tricuspid insuffici
ency.

 

MEDICATIONS:  His medications as an outpatient include:

 

1.  Lipitor 10 mg at night.

2.  Cozaar 50 mg 2 of them daily.

3.  Metoprolol 37.5 mg b.i.d.

4.  Aspirin 81 mg daily.

5.  Lasix 40 mg daily.

6.  Omeprazole 20 mg daily.

7.  Coumadin adjusted to PT and INR.

8.  Vitamin D 1000 units daily.

 

ALLERGIES:  No known drug allergies.

 

SOCIAL HISTORY:  He is .  He is retired.  He never smoked.  He drinks alcohol rarely.  No hist
ory of illicit drug use.

 

REVIEW OF SYSTEMS:  His review of all other systems essentially is negative.

 

PHYSICAL EXAMINATION:  On exam, he is awake, alert, and oriented.  He is not in acute distress other 
than the rib pain on the left side and weak, tired, malaise, and lack of energy.  Vitals:  Temperatur
e is 100.4, his blood pressure is 127/52, pulse is 110, sinus tachycardia.  Today on exam, normocepha
lic, atraumatic head. Ears, Nose, and Throat:  Essentially benign.  Neck:  Supple.  JVP is not elevat
ed. No carotid bruits.  No masses in the neck are appreciated.  Chest:  Clear to auscultation.  No ra
les, no wheeze, no added sounds appreciated.  Actually diminished air entry at the bases.  Heart:  No
rmal and regular but tachycardic. S1, S2.  No added sounds.  No gallops, no rubs are appreciated.  Ab
domen:  Benign, soft.  Positive bowel sounds.  Extremities:  No edema, no cyanosis, and no clubbing. 
 Skin exam is normal.  Psych:  Normal affect and mood.  CNS:  No focal deficit is appreciated.

 

DIAGNOSTIC STUDIES/LAB DATA:  His EKG from today showed him to be in sinus tachycardia.  There is bor
derline concave STs in leads V2, V3, V4, V5, V6.  There is right bundle-branch block, left atrial enl
argement, PACs, possible old inferior wall MI is appreciated, left anterior fascicular block.

 

His labs, white blood cell 20.6, hemoglobin 11.1, hematocrit 33, and platelets 168. Chemistry, sodium
 132, potassium 3, chloride 98, BUN 26, creatinine 1.40. Magnesium 1.4, which is low.  LFTs normal.  
CK 80, CK-MB 3.2.  Troponin I 0.07. .  TSH 1.36, T4 9.  Urine is positive for protein, blood, 
rbc's.

 

His chest x-ray that was done today, interstitial edema appears to be improved since the previous exa
m.

 

Of note, his BNP was 1125 on 01/15/18.

 

IMPRESSION:  The patient is an 85-year-old with:

 

1.  Presentation with malaise, fatigue, fever, leukocytosis, concerns for pulmonary infection with pn
eumonia either flu symptoms, to be further evaluated as per ER physician and hospitalist.

2.  Abnormal EKG and tachycardia secondary to his fever, leukocytosis, acute illness.

3.  Chronic right bundle-branch block.

4.  Known history of coronary artery disease and CABG in the past, a year ago as outlined above.

5.  Hyperlipidemia, on medical treatment.

6.  Systemic arterial hypertension.

7.  Hypokalemia and hypomagnesemia.

8.  Chronic kidney disease, stage 3.

9.  History of paroxysmal atrial fibrillation.

10.  Moderate mitral insufficiency.

11.  Low normal ejection fraction, 50% to 55%.

12.  Anemia to be further evaluated.

13.  Mild to moderate aortic insufficiency.

 

PLAN:  The patient is complex with comorbidities, high risk as outlined above with multiorgan abnorma
lities including his kidneys, heart disease, and anemia.  At the present time, I discussed him with marinaa owen ER physician as well as with Dr. Corcoran, his primary cardiologist.  I think attention should be gi
jojo to his acute illness with his fever, leukocytosis, anemia, low potassium, low magnesium should be
 addressed and treated aggressively.  The troponin is 0.07 and in reviewing his previous troponins, t
hey were at the baseline of 0.07 from a year ago.  His BNP actually was much more than the current le
yuval 2 weeks ago.  So, it is going down.  He is not acutely, on physical exam, in congestive heart hector
lure.  He has no chest pain.  He is tachycardic.  This would be helpful to compare when his heart rat
e is less than 100.  Overall, he did have at least anteriorly the same morphology of V1, V2, V3 from 
before.  I answered all his concerns and questions up to his satisfaction.

 

TIME SPENT:  More than at least half of 65+ minutes face-to-face in the education, counseling mode di
scussing him further with ER physician and making a plan for his hospitalization as per the [facility] services at the present time.

 

Thank you very much for asking us to participate in the care of this patient.

 

 [id number]/[facility] #: [id number]

## 2018-02-02 NOTE — RAD
Indication: Chest pain.



Single frontal view of the chest performed at 0932 hours was reviewed.



Comparison is made with previous exam dated January 15, 2018.



No mediastinal shift is noted. Cardiomegaly is noted. Patient is status post transsternal

thoracotomy. Interstitial edema appears to be improved.. Lung fields appear clear.



IMPRESSION: INTERSTITIAL EDEMA APPEARS TO BE IMPROVED SINCE PREVIOUS EXAM.

## 2018-02-02 NOTE — HP
*** AMENDED REPORT NOW INCLUDES COSIGNER DESIGNATION - ESIGNED BEFORE 
ADJUSTMENT ***



ADMISSION HISTORY AND PHYSICAL:

 

DATE OF ADMISSION:  02/02/18

 

PRIMARY CARE PHYSICIAN:  Dr. Boo.

 

PRIMARY CARDIOLOGIST:  Dr. Corcoran.

 

ATTENDING PROVIDER: Dr. David* (DICTATED BY DIVINA OLIVA NP)



CHIEF COMPLAINT:  Shortness of breath and left arm pain.

 

HISTORY OF PRESENT ILLNESS:  This is an 85-year-old male patient who was just 
recently admitted to the hospital on the 15th of January for acute congestive 
heart failure.  The patient is presenting also with some acute shortness of 
breath, but primarily complaining more about some left arm pain.  The patient 
was seen by the emergency physician who notes that he had some numbness also 
and pain in his hand with range of motion, which started this morning.  He said 
he has weakness and cough and right-sided rib pain as well.  Although, he is 
clinically short of breath he said.  When he is at rest, he is not short of 
breath.  He denies any chest pain. He denies any nausea, vomiting or abdominal 
pain at the present time and he is currently on room air.

 

PAST MEDICAL HISTORY:  Significant for:

1.  Coronary artery disease.

2.  Hypertension.

3.  Hyperlipidemia.

 

PAST SURGICAL HISTORY:  Significant for:

1.  Coronary artery bypass graft in 2017.

2.  Cataract surgery.

3.  Hernia repair.

 

MEDICATIONS AT HOME:  Include:

1.  Albuterol 1 to 2 puffs 4 times a day as needed.

2.  Omeprazole 40 mg daily.

3.  Metoprolol tartrate 37.5 mg 2 times a day.

4.  Losartan 25 mg 2 times a day.

5.  Atorvastatin 10 mg daily.

6.  Metamucil daily.

7.  Furosemide 20 mg daily.

8.  Multivitamin/vitamin D 1000 units daily.

9.  Vitamin C 1 tablet daily 500 mg.

10.  Aspirin 81 mg daily.

11.  Voltaren gel topically as needed.

 

ALLERGIES:  He does not have any known drug allergies.

 

FAMILY HISTORY:  Mother with renal disease.

 

SOCIAL HISTORY:  The patient denies any tobacco use or abuse.  Denies any 
alcohol use and denies any illicit drug use.  He lives at home with his wife, 
Javier.  Javier is his surrogate decision maker.  He is a full code.

 

REVIEW OF SYSTEMS:  A 10-point review of systems is negative except as noted in 
HPI.

 

                               PHYSICAL EXAMINATION

 

GENERAL:  The patient is alert, no acute distress.

 

VITAL SIGNS:  Currently, temperature 98.3, heart rate 105, respiratory rate 20, 
satting at 99% on room air, blood pressure is 137/61.

 

HEENT:  The patient is atraumatic, normocephalic.  PERRLA with nonicteric 
sclerae. He has extremely poor dentition.  Dry oral mucosa.

 

NECK:  Supple, nontender.  No carotid bruit auscultated and no JVD noted.

 

LUNGS:  Diminished at the bases with some scattered rhonchi and bibasilar rales.

 

CARDIOVASCULAR:  S1, S2 are present.  He is tachycardic on the monitor.  Sinus 
rhythm, S1 and S2 present.  No murmurs, gallops, or rubs noted.

 

MUSCULOSKELETAL:  There is no clubbing and no cyanosis.  He has bipedal edema, +
2 pitting on the lower extremities.

 

NEUROLOGIC:  He is grossly intact with no focal deficits.

 

PSYCHIATRIC:  He is cooperative and appropriate.

 

 DIAGNOSTIC STUDIES/LAB DATA:  WBCs 20.6, RBCs 3.57, hemoglobin 11.1, 
hematocrit 33, platelets 168.  Sodium 132, potassium 3.0, chloride 98, CO2 24, 
BUN 26, creatinine 1.4, GFR 48.2, glucose 130, magnesium 1.4.  AST 25, ALT 24, 
alk phos 150.  Troponin 0.07 and 0.10.  BNP is 950.  Albumin 3.1, globulin 4.9, 
TSH 1.36 and T4 is 9.03. Urinalysis is 3+ proteins, 1+ blood, negative for 
nitrites, 1+ rbc's.  He has hyaline casts and 1+ urine glucose.  Influenza A 
and B are negative.  APTT is 28.6.

 

Chest x-ray shows interstitial edema which appears to be improving from his 
previous exam, which was January 15th.

 

EKG shows sinus rhythm with some depressions in leads 2, 3 and aVF.  This 
appears to be the same as his EKG from the 15th.

 

IMPRESSION AND PLAN:  This is a frail appearing 85-year-old male patient with a 
history significant for coronary artery disease and probably underlying 
interstitial lung disease likely renal disease as well based on his proteinuria 
and BUN and creatinine today, who presented with some arm pain.  The patient 
also states he has some underlying osteoarthritis.  I had conversation with Dr. Bran.  The patient has been admitted to medical service.  He will get a 
repeat transthoracic echocardiogram to evaluate if there are any changes and 
continue to follow his troponins; however, I evaluated his BNP from his last 
admission, so his BNP is actually a little lower today than it was 2 weeks ago 
and the rest of his labs seem to be at his baseline.  I think with the elevated 
white count at this point, his issues with his lung and his pain are probably 
more likely infectious in nature either a viral or bacterial pneumonia rather 
than a cardiac issue at this time.  Dr. Bran said as much and concurred 
that the patient likely is not having a cardiac event because he is pretty much 
at his baseline and there were not any acute changes from his last workup 2 
weeks ago.  The patient will continue to be diuresed, so it appears his heart 
failure is chronic now, but it does not appear to be in an acute flare based on 
his presentation.  We will continue to give him antibiotics.  He has received 
ceftriaxone in the ER, this will be continued.  He will also have repletion of 
his lytes, his potassium, magnesium.  His sodium is low too.  He will be on 
free water restriction at this point.  We will check his lytes again in the 
morning, follow his daily labs, trend his troponins and continue his home 
medications.  He will remain on telemetry for now and we will follow the 
results of his transthoracic echocardiogram.  The rest of the patient's course 
will be determined by further diagnostics, laboratories and any other input 
from other providers as warranted during this admission.  We will continue to 
monitor the patient closely.  I have discussed this plan of care with Dr. Gerald David who is the attending on this admission and he is in agreement with the 
plan.

 

 ____________________________________ DIVINA OLIVA NP

 

[id number]/CPS #: [id number]

MARIO

## 2018-02-02 NOTE — ED
Julia GAMBLE Nilda, scribed for Cesar Alcantar MD on 02/02/18 at 0925 .





Upper Extremity Pain





- HPI Summary


HPI Summary: 


This patient is an 85 year old M BIBA with a chief complaint of left arm and 

hand numbness and pain with ROM since waking up this morning. The patient rates 

the pain 1/10 in severity. Symptoms aggravated and alleviated by nothing. 

Patient reports weakness, cough, and right side rib pain. Patient denies SOB, CP

, nausea, and abd pain. PMHx includes CAD and HTN and PSHx CABG. No PMHx MI. 





- History of Current Complaint


Chief Complaint: EDGeneral


Stated Complaint: WEAKNESS


Time Seen by Provider: 02/02/18 09:15


Hx Obtained From: Patient


Mechanism Of Injury: Unknown


Onset/Duration: Started Hours Ago


Timing: Constant


Pain Location: Arm - left arm and hand


Aggravating Factor(s): Nothing


Alleviating Factor(s): Nothing


Associated Signs & Symptoms: Positive: Other - weakness, cough, and right side 

rib pain. Patient denies SOB, CP, nausea, and abd pain





- Allergies/Home Medications


Allergies/Adverse Reactions: 


 Allergies











Allergy/AdvReac Type Severity Reaction Status Date / Time


 


No Known Allergies Allergy   Verified 02/02/18 09:01











Home Medications: 


 Home Medications





Benzonatate CAP* [Tessalon 100 MG CAP*] 100 mg PO TID PRN 02/02/18 [History 

Confirmed 02/02/18]


Omeprazole CAP* [Prilosec CAP* 20 MG] 20 mg PO QAM 02/02/18 [History Confirmed 

02/02/18]


Psyllium STEVENSON* [Metamucil STEVENSON*] 1 pkt PO BID 02/02/18 [History Confirmed 02/02/18

]











PMH/Surg Hx/FS Hx/Imm Hx


Endocrine/Hematology History: 


   Denies: Hx Diabetes


Cardiovascular History: Reports: Hx Coronary Artery Disease, Hx 

Hypercholesterolemia, Hx Hypertension


   Denies: Hx Pacemaker/ICD


 History: Reports: Hx Renal Disease - abnormal


   Denies: Hx Dialysis


Musculoskeletal History: Reports: Hx Arthritis, Hx Back Problems - spinal 

stenosis


Sensory History: 


   Denies: Hx Cataracts, Hx Contacts or Glasses, Hx Hearing Aid


Opthamlomology History: 


   Denies: Hx Cataracts, Hx Contacts or Glasses


Psychiatric History: 


   Denies: Hx Panic Disorder





- Surgical History


Surgery Procedure, Year, and Place: HERNIA SURGERY 1965-CATARACTS BILATERAL 

EYES 2011,cardiac bypass


Infectious Disease History: No


Infectious Disease History: 


   Denies: Traveled Outside the [place] in Last 30 Days





- Family History


Known Family History: 


   Negative: Cardiac Disease





- Social History


Alcohol Use: None


Substance Use Type: Reports: None


Smoking Status (MU): Never Smoked Tobacco


Have You Smoked in the Last Year: No





Review of Systems


Negative: Chest Pain


Positive: Cough.  Negative: Shortness Of Breath


Negative: Abdominal Pain, Nausea


Positive: Other - rib pain, left UE and hand pain and numbness with ROM


Positive: Weakness


All Other Systems Reviewed And Are Negative: Yes





Physical Exam





- Summary


Physical Exam Summary: 


VITAL SIGNS: Reviewed.


GENERAL:  Patient is an elderly male who is seems fragile sick, ill-appearing 

and with poor dentition.  Patient is not in any acute respiratory distress.


HEAD AND FACE: No signs of trauma.  No ecchymosis, hematomas or skull 

depressions. No sinus tenderness.


EYES: PERRLA, EOMI x 2, No injected conjunctiva, no nystagmus.


EARS: Hearing grossly intact. Ear canals and tympanic membranes are within 

normal limits.


MOUTH: Oropharynx within normal limits.


NECK: Supple, trachea is midline, no adenopathy, no JVD, no carotid bruit, no c-

spine tenderness, neck with full ROM.


CHEST: Symmetric, no tenderness at palpation


LUNGS: Clear to auscultation bilaterally. No wheezing or crackles.


CVS: Regular rate and rhythm, S1 and S2 present, systolic murmur 2/6


ABDOMEN: Soft, non-tender. No signs of distention. No rebound no guarding, and 

no masses palpated. Bowel sounds are normal.


EXTREMITIES: FROM in all major joints, no edema, no cyanosis or clubbing. 

Multiple bruising in upper and lower extremities.


NEURO: Alert and oriented x 3. No acute neurological deficits. Speech is normal 

and follows commands.


SKIN: Dry and warm


Triage Information Reviewed: Yes


Vital Signs On Initial Exam: 


 Initial Vitals











Temp Pulse Resp BP Pulse Ox


 


 100.4 F   115   24   162/71   96 


 


 02/02/18 08:55  02/02/18 08:55  02/02/18 08:55  02/02/18 08:55  02/02/18 08:55











Vital Signs Reviewed: Yes





Diagnostics





- Vital Signs


 Vital Signs











  Temp Pulse Resp BP Pulse Ox


 


 02/02/18 08:55  100.4 F  115  24  162/71  96














- Laboratory


Lab Results: 


 Lab Results











  02/02/18 02/02/18 02/02/18 Range/Units





  09:22 09:22 09:22 


 


WBC     (3.5-10.8)  10^3/ul


 


RBC     (4.0-5.4)  10^6/ul


 


Hgb     (14.0-18.0)  g/dl


 


Hct     (42-52)  %


 


MCV     (80-94)  fL


 


MCH     (27-31)  pg


 


MCHC     (31-36)  g/dl


 


RDW     (10.5-15)  %


 


Plt Count     (150-450)  10^3/ul


 


MPV     (7.4-10.4)  um3


 


Neut % (Auto)     (38-83)  %


 


Lymph % (Auto)     (25-47)  %


 


Mono % (Auto)     (1-9)  %


 


Eos % (Auto)     (0-6)  %


 


Baso % (Auto)     (0-2)  %


 


Absolute Neuts (auto)     (1.5-7.7)  10^3/ul


 


Absolute Lymphs (auto)     (1.0-4.8)  10^3/ul


 


Absolute Monos (auto)     (0-0.8)  10^3/ul


 


Absolute Eos (auto)     (0-0.6)  10^3/ul


 


Absolute Basos (auto)     (0-0.2)  10^3/ul


 


Absolute Nucleated RBC     10^3/ul


 


Nucleated RBC %     


 


APTT    28.6  (26.0-36.3)  seconds


 


Sodium   132 L   (133-145)  mmol/L


 


Potassium   3.0 L   (3.5-5.0)  mmol/L


 


Chloride   98 L   (101-111)  mmol/L


 


Carbon Dioxide   24   (22-32)  mmol/L


 


Anion Gap   10   (2-11)  mmol/L


 


BUN   26 H   (6-24)  mg/dL


 


Creatinine   1.40 H   (0.67-1.17)  mg/dL


 


Est GFR ( Amer)   61.9   (>60)  


 


Est GFR (Non-Af Amer)   48.2   (>60)  


 


BUN/Creatinine Ratio   18.6   (8-20)  


 


Glucose   130 H   ()  mg/dL


 


Calcium   8.9   (8.6-10.3)  mg/dL


 


Magnesium   1.4 L   (1.9-2.7)  mg/dL


 


Total Bilirubin   0.80   (0.2-1.0)  mg/dL


 


AST   25   (13-39)  U/L


 


ALT   24   (7-52)  U/L


 


Alkaline Phosphatase   150 H   ()  U/L


 


Total Creatine Kinase   80   ()  U/L


 


CK-MB (CK-2)   3.2   (0.6-6.3)  ng/mL


 


Troponin I   0.07 H*   (<0.04)  ng/mL


 


B-Natriuretic Peptide  950 H   ( - 100) pg/mL


 


Total Protein   8.0   (6.4-8.9)  g/dL


 


Albumin   3.1 L   (3.2-5.2)  g/dL


 


Globulin   4.9 H   (2-4)  g/dL


 


Albumin/Globulin Ratio   0.6 L   (1-3)  


 


TSH   1.36   (0.34-5.60)  mcIU/mL


 


Thyroxine (T4)   9.03   (6.09-12.23)  mcg/mL


 


Urine Color     


 


Urine Appearance     


 


Urine pH     (5-9)  


 


Ur Specific Gravity     (1.010-1.030)  


 


Urine Protein     (Negative)  


 


Urine Ketones     (Negative)  


 


Urine Blood     (Negative)  


 


Urine Nitrate     (Negative)  


 


Urine Bilirubin     (Negative)  


 


Urine Urobilinogen     (Negative)  


 


Ur Leukocyte Esterase     (Negative)  


 


Urine WBC (Auto)     (Absent)  


 


Urine RBC (Auto)     (Absent)  


 


Ur Squamous Epith Cells     (Absent)  


 


Urine Bacteria     (Absent)  


 


Hyaline Casts     (Absent)  


 


Urine Glucose     (Negative)  














  02/02/18 02/02/18 Range/Units





  09:22 09:30 


 


WBC  20.6 H   (3.5-10.8)  10^3/ul


 


RBC  3.57 L   (4.0-5.4)  10^6/ul


 


Hgb  11.1 L   (14.0-18.0)  g/dl


 


Hct  33 L   (42-52)  %


 


MCV  92   (80-94)  fL


 


MCH  31   (27-31)  pg


 


MCHC  34   (31-36)  g/dl


 


RDW  14   (10.5-15)  %


 


Plt Count  168   (150-450)  10^3/ul


 


MPV  10   (7.4-10.4)  um3


 


Neut % (Auto)  83.4 H   (38-83)  %


 


Lymph % (Auto)  5.2 L   (25-47)  %


 


Mono % (Auto)  11.0 H   (1-9)  %


 


Eos % (Auto)  0.1   (0-6)  %


 


Baso % (Auto)  0.3   (0-2)  %


 


Absolute Neuts (auto)  17.2 H   (1.5-7.7)  10^3/ul


 


Absolute Lymphs (auto)  1.1   (1.0-4.8)  10^3/ul


 


Absolute Monos (auto)  2.3 H   (0-0.8)  10^3/ul


 


Absolute Eos (auto)  0   (0-0.6)  10^3/ul


 


Absolute Basos (auto)  0.1   (0-0.2)  10^3/ul


 


Absolute Nucleated RBC  0   10^3/ul


 


Nucleated RBC %  0   


 


APTT    (26.0-36.3)  seconds


 


Sodium    (133-145)  mmol/L


 


Potassium    (3.5-5.0)  mmol/L


 


Chloride    (101-111)  mmol/L


 


Carbon Dioxide    (22-32)  mmol/L


 


Anion Gap    (2-11)  mmol/L


 


BUN    (6-24)  mg/dL


 


Creatinine    (0.67-1.17)  mg/dL


 


Est GFR ( Amer)    (>60)  


 


Est GFR (Non-Af Amer)    (>60)  


 


BUN/Creatinine Ratio    (8-20)  


 


Glucose    ()  mg/dL


 


Calcium    (8.6-10.3)  mg/dL


 


Magnesium    (1.9-2.7)  mg/dL


 


Total Bilirubin    (0.2-1.0)  mg/dL


 


AST    (13-39)  U/L


 


ALT    (7-52)  U/L


 


Alkaline Phosphatase    ()  U/L


 


Total Creatine Kinase    ()  U/L


 


CK-MB (CK-2)    (0.6-6.3)  ng/mL


 


Troponin I    (<0.04)  ng/mL


 


B-Natriuretic Peptide   ( - 100) pg/mL


 


Total Protein    (6.4-8.9)  g/dL


 


Albumin    (3.2-5.2)  g/dL


 


Globulin    (2-4)  g/dL


 


Albumin/Globulin Ratio    (1-3)  


 


TSH    (0.34-5.60)  mcIU/mL


 


Thyroxine (T4)    (6.09-12.23)  mcg/mL


 


Urine Color   Yellow  


 


Urine Appearance   Clear  


 


Urine pH   5.0  (5-9)  


 


Ur Specific Gravity   1.018  (1.010-1.030)  


 


Urine Protein   3+(>=500 mg/dl) H  (Negative)  


 


Urine Ketones   Negative  (Negative)  


 


Urine Blood   1+ H  (Negative)  


 


Urine Nitrate   Negative  (Negative)  


 


Urine Bilirubin   Negative  (Negative)  


 


Urine Urobilinogen   Negative  (Negative)  


 


Ur Leukocyte Esterase   Negative  (Negative)  


 


Urine WBC (Auto)   Absent  (Absent)  


 


Urine RBC (Auto)   1+(3-5/hpf) H  (Absent)  


 


Ur Squamous Epith Cells   Present H  (Absent)  


 


Urine Bacteria   Absent  (Absent)  


 


Hyaline Casts   Present H  (Absent)  


 


Urine Glucose   1+(50 mg/dl) H  (Negative)  











Result Diagrams: 


 02/02/18 09:22





 02/02/18 09:22


Lab Statement: Any lab studies that have been ordered have been reviewed, and 

results considered in the medical decision making process.





- EKG


  ** 907


Cardiac Rate: Tachycardia


EKG Rhythm: Sinus Tachycardia - 125 bpm


EKG Interpretation: RBBB


EKG Comparison: No Significant Change - 1/15/18





  ** 920


Cardiac Rate: Tachycardia


EKG Rhythm: Sinus Tachycardia - 110 bpm


EKG Interpretation: RBBB


EKG Comparison: No Significant Change - previous EKG





Course/Dx





- Course


Assessment/Plan: This patient is an 85 year old M BIBA with a chief complaint 

of left arm and hand numbness and pain with ROM since waking up this morning. 

The patient rates the pain 1/10 in severity. Symptoms aggravated and alleviated 

by nothing. Patient reports weakness, cough, and right side rib pain. Patient 

denies SOB, CP, nausea, and abd pain.  PMHx includes CAD and HTN and PSHx CABG. 

No PMHx MI.  Lab tests reveal WBC 20.6, K+3. , Trop I 0.07, Trop II 0.10

, Trop III 0.12, UA Protein 3+, UA blood 1+, UA RBC 1+, Squamous epithelial 

cells, and UA glucose 1+. Influenza A Negative. Influenza B Negative.  An EKG 

reveals sinus tachy, 125 bpm, RBBB, similar to 1/15/18.  A second EKG revealed 

sinus tachy, 110bpm, RBBB, similar to previous EKG.  In the ED course, the 

patient was given acetaminophen, aspirin, Lasix, Magnesium Sulfate, Potassium 

Chloride, and Rocephin.  The pt is hemodynamically stable, alert and oriented 

x3.  [0958] Dr. Bran (cardiologist) recommends admission.  I discuss my 

physical exam, findings and test results with Dr. David  from the hospitalist 

services and she agrees to admit patient to his services. Patient is 

hemodynamically stable alert and oriented x 3.  [1005] Dr. David (hospitalist) 

agrees to admit pt.





- Diagnoses


Provider Diagnoses: 


 CHF (congestive heart failure), Clinical PNA, Increased troponin r/o ACS, 

Chronic renal failure








- Physician Notifications


Discussed Care of Patient With: Qutaybeh Maghaydah - cardiologist


Time Discussed With Above Provider: 09:58


Instructed by Provider To: Other - recommends admission





Discharge





- Discharge Plan


Condition: Stable


Disposition: ADMITTED TO [facility]





The documentation as recorded by the Julia aleman Nilda accurately 

reflects the service I personally performed and the decisions made by me, Cesar Alcantar MD.

## 2018-02-03 NOTE — PN
Subjective


Date of Service: 02/03/18


Interval History: 





Patient c/o difficulty walking, also pain in his knees.  





Objective


Active Medications: 








Aspirin (Aspirin Ec Low Dose*)  81 mg PO DAILY [facility]


   Last Admin: 02/03/18 08:02 Dose:  81 mg


Atorvastatin Calcium (Lipitor*)  10 mg PO DAILY [facility]


   Last Admin: 02/03/18 08:02 Dose:  10 mg


Cholecalciferol (Vitamin D Tab*)  1,000 units PO DAILY [facility]


   Last Admin: 02/03/18 08:01 Dose:  1,000 units


Heparin Sodium (Porcine) (Heparin Vial(*))  5,000 units SUBCUT Q8HR [facility]


   Last Admin: 02/03/18 14:33 Dose:  5,000 units


Ceftriaxone Sodium 1 gm/ (Sodium Chloride)  50 mls @ 200 mls/hr IVPB Q24H [facility]


   Last Admin: 02/03/18 12:52 Dose:  200 mls/hr


Metoprolol Tartrate (Lopressor Tab*)  37.5 mg PO BID [facility]


   Last Admin: 02/03/18 08:01 Dose:  37.5 mg


Omeprazole (Prilosec Cap*)  20 mg PO QAM [facility]


   Last Admin: 02/03/18 08:01 Dose:  20 mg


Psyllium Hydrophilic Mucilloid (Metamucil Oscar*)  1 pkt PO BID [facility]


   Last Admin: 02/03/18 08:02 Dose:  1 pkt


Tramadol HCl (Ultram*)  50 mg PO Q6H PRN


   PRN Reason: PAIN








 Vital Signs - 8 hr











  02/03/18 02/03/18





  12:15 16:07


 


Temperature 97.5 F 97.5 F


 


Pulse Rate 81 82


 


Respiratory 20 20





Rate  


 


Blood Pressure 102/48 124/53





(mmHg)  


 


O2 Sat by Pulse 95 100





Oximetry  











Oxygen Devices in Use Now: None


Appearance: Alert, partly up in bed.  In good spirits.  Looks comfortable.


Eyes: No Scleral Icterus


Respiratory: Symmetrical Chest Expansion and Respiratory Effort, Clear to 

Auscultation, Clear to Percussion


Cardiovascular: RRR, No Edema, - - 2/6 systolic murmur across precordium


Extremities: No Clubbing, Cyanosis, - - Tr edema BL


Skin: No Rash or Ulcers, No Nodules or Sclerosis, -


Neurological: Alert and Oriented x 3, NL Sensation, - - He can lift each leg 

off the bed briefly.  No tremor. 


Result Diagrams: 


 02/03/18 06:49





 02/03/18 06:49


Additional Lab and Data: 


 Lab Results











  02/02/18 02/02/18 02/02/18 Range/Units





  09:22 09:22 09:22 


 


WBC     (3.5-10.8)  10^3/ul


 


RBC     (4.0-5.4)  10^6/ul


 


Hgb     (14.0-18.0)  g/dl


 


Hct     (42-52)  %


 


MCV     (80-94)  fL


 


MCH     (27-31)  pg


 


MCHC     (31-36)  g/dl


 


RDW     (10.5-15)  %


 


Plt Count     (150-450)  10^3/ul


 


MPV     (7.4-10.4)  um3


 


Neut % (Auto)     (38-83)  %


 


Lymph % (Auto)     (25-47)  %


 


Mono % (Auto)     (1-9)  %


 


Eos % (Auto)     (0-6)  %


 


Baso % (Auto)     (0-2)  %


 


Absolute Neuts (auto)     (1.5-7.7)  10^3/ul


 


Absolute Lymphs (auto)     (1.0-4.8)  10^3/ul


 


Absolute Monos (auto)     (0-0.8)  10^3/ul


 


Absolute Eos (auto)     (0-0.6)  10^3/ul


 


Absolute Basos (auto)     (0-0.2)  10^3/ul


 


Absolute Nucleated RBC     10^3/ul


 


Nucleated RBC %     


 


APTT    28.6  (26.0-36.3)  seconds


 


Sodium   132 L   (133-145)  mmol/L


 


Potassium   3.0 L   (3.5-5.0)  mmol/L


 


Chloride   98 L   (101-111)  mmol/L


 


Carbon Dioxide   24   (22-32)  mmol/L


 


Anion Gap   10   (2-11)  mmol/L


 


BUN   26 H   (6-24)  mg/dL


 


Creatinine   1.40 H   (0.67-1.17)  mg/dL


 


Est GFR ( Amer)   61.9   (>60)  


 


Est GFR (Non-Af Amer)   48.2   (>60)  


 


BUN/Creatinine Ratio   18.6   (8-20)  


 


Glucose   130 H   ()  mg/dL


 


Calcium   8.9   (8.6-10.3)  mg/dL


 


Magnesium   1.4 L   (1.9-2.7)  mg/dL


 


Total Bilirubin   0.80   (0.2-1.0)  mg/dL


 


AST   25   (13-39)  U/L


 


ALT   24   (7-52)  U/L


 


Alkaline Phosphatase   150 H   ()  U/L


 


Total Creatine Kinase   80   ()  U/L


 


CK-MB (CK-2)   3.2   (0.6-6.3)  ng/mL


 


Troponin I   0.07 H*   (<0.04)  ng/mL


 


B-Natriuretic Peptide  950 H   ( - 100) pg/mL


 


Total Protein   8.0   (6.4-8.9)  g/dL


 


Albumin   3.1 L   (3.2-5.2)  g/dL


 


Globulin   4.9 H   (2-4)  g/dL


 


Albumin/Globulin Ratio   0.6 L   (1-3)  


 


TSH   1.36   (0.34-5.60)  mcIU/mL


 


Thyroxine (T4)   9.03   (6.09-12.23)  mcg/mL


 


Urine Color     


 


Urine Appearance     


 


Urine pH     (5-9)  


 


Ur Specific Gravity     (1.010-1.030)  


 


Urine Protein     (Negative)  


 


Urine Ketones     (Negative)  


 


Urine Blood     (Negative)  


 


Urine Nitrate     (Negative)  


 


Urine Bilirubin     (Negative)  


 


Urine Urobilinogen     (Negative)  


 


Ur Leukocyte Esterase     (Negative)  


 


Urine WBC (Auto)     (Absent)  


 


Urine RBC (Auto)     (Absent)  


 


Ur Squamous Epith Cells     (Absent)  


 


Urine Bacteria     (Absent)  


 


Hyaline Casts     (Absent)  


 


Urine Glucose     (Negative)  














  02/02/18 02/02/18 Range/Units





  09:22 09:30 


 


WBC  20.6 H   (3.5-10.8)  10^3/ul


 


RBC  3.57 L   (4.0-5.4)  10^6/ul


 


Hgb  11.1 L   (14.0-18.0)  g/dl


 


Hct  33 L   (42-52)  %


 


MCV  92   (80-94)  fL


 


MCH  31   (27-31)  pg


 


MCHC  34   (31-36)  g/dl


 


RDW  14   (10.5-15)  %


 


Plt Count  168   (150-450)  10^3/ul


 


MPV  10   (7.4-10.4)  um3


 


Neut % (Auto)  83.4 H   (38-83)  %


 


Lymph % (Auto)  5.2 L   (25-47)  %


 


Mono % (Auto)  11.0 H   (1-9)  %


 


Eos % (Auto)  0.1   (0-6)  %


 


Baso % (Auto)  0.3   (0-2)  %


 


Absolute Neuts (auto)  17.2 H   (1.5-7.7)  10^3/ul


 


Absolute Lymphs (auto)  1.1   (1.0-4.8)  10^3/ul


 


Absolute Monos (auto)  2.3 H   (0-0.8)  10^3/ul


 


Absolute Eos (auto)  0   (0-0.6)  10^3/ul


 


Absolute Basos (auto)  0.1   (0-0.2)  10^3/ul


 


Absolute Nucleated RBC  0   10^3/ul


 


Nucleated RBC %  0   


 


APTT    (26.0-36.3)  seconds


 


Sodium    (133-145)  mmol/L


 


Potassium    (3.5-5.0)  mmol/L


 


Chloride    (101-111)  mmol/L


 


Carbon Dioxide    (22-32)  mmol/L


 


Anion Gap    (2-11)  mmol/L


 


BUN    (6-24)  mg/dL


 


Creatinine    (0.67-1.17)  mg/dL


 


Est GFR ( Amer)    (>60)  


 


Est GFR (Non-Af Amer)    (>60)  


 


BUN/Creatinine Ratio    (8-20)  


 


Glucose    ()  mg/dL


 


Calcium    (8.6-10.3)  mg/dL


 


Magnesium    (1.9-2.7)  mg/dL


 


Total Bilirubin    (0.2-1.0)  mg/dL


 


AST    (13-39)  U/L


 


ALT    (7-52)  U/L


 


Alkaline Phosphatase    ()  U/L


 


Total Creatine Kinase    ()  U/L


 


CK-MB (CK-2)    (0.6-6.3)  ng/mL


 


Troponin I    (<0.04)  ng/mL


 


B-Natriuretic Peptide   ( - 100) pg/mL


 


Total Protein    (6.4-8.9)  g/dL


 


Albumin    (3.2-5.2)  g/dL


 


Globulin    (2-4)  g/dL


 


Albumin/Globulin Ratio    (1-3)  


 


TSH    (0.34-5.60)  mcIU/mL


 


Thyroxine (T4)    (6.09-12.23)  mcg/mL


 


Urine Color   Yellow  


 


Urine Appearance   Clear  


 


Urine pH   5.0  (5-9)  


 


Ur Specific Gravity   1.018  (1.010-1.030)  


 


Urine Protein   3+(>=500 mg/dl) H  (Negative)  


 


Urine Ketones   Negative  (Negative)  


 


Urine Blood   1+ H  (Negative)  


 


Urine Nitrate   Negative  (Negative)  


 


Urine Bilirubin   Negative  (Negative)  


 


Urine Urobilinogen   Negative  (Negative)  


 


Ur Leukocyte Esterase   Negative  (Negative)  


 


Urine WBC (Auto)   Absent  (Absent)  


 


Urine RBC (Auto)   1+(3-5/hpf) H  (Absent)  


 


Ur Squamous Epith Cells   Present H  (Absent)  


 


Urine Bacteria   Absent  (Absent)  


 


Hyaline Casts   Present H  (Absent)  


 


Urine Glucose   1+(50 mg/dl) H  (Negative)  














Assess/Plan/Problems-Billing


Assessment: 











- Patient Problems


(1) Acute on chronic diastolic (congestive) heart failure


Current Visit: No   Status: Acute   Code(s): I50.33 - ACUTE ON CHRONIC 

DIASTOLIC (CONGESTIVE) HEART FAILURE   SNOMED Code(s): 314190622


   Comment: EF 55% and mod MR on Echo


Daily weights.   KCL tid.  


BMP 2/5/18.  Resume daily furosemide PM 2/3.   





(2) Weakness


Current Visit: Yes   Status: Acute   Code(s): R53.1 - WEAKNESS   SNOMED Code(s)

: 36746817


   Comment: PT/OT eval.  Consider neuro consult.    





(3) CKD (chronic kidney disease) stage 3, GFR 30-59 ml/min


Current Visit: No   Status: Acute   Code(s): N18.3 - CHRONIC KIDNEY DISEASE, 

STAGE 3 (MODERATE)   SNOMED Code(s): 301339331


   Comment: Stable.  BMP 2/5/18.

## 2018-02-04 NOTE — PN
Subjective


Date of Service: 02/04/18


Interval History: 





C/O pain both knees.  This is what is limiting his walking. 





Objective


Active Medications: 








Aspirin (Aspirin Ec Low Dose*)  81 mg PO DAILY [facility]


   Last Admin: 02/04/18 08:28 Dose:  81 mg


Atorvastatin Calcium (Lipitor*)  10 mg PO DAILY [facility]


   Last Admin: 02/04/18 08:28 Dose:  10 mg


Cholecalciferol (Vitamin D Tab*)  1,000 units PO DAILY [facility]


   Last Admin: 02/04/18 08:28 Dose:  1,000 units


Furosemide (Lasix Tab*)  20 mg PO DAILY [facility]


   Last Admin: 02/04/18 08:28 Dose:  20 mg


Heparin Sodium (Porcine) (Heparin Vial(*))  5,000 units SUBCUT Q8HR [facility]


   Last Admin: 02/04/18 04:50 Dose:  5,000 units


Ceftriaxone Sodium 1 gm/ (Sodium Chloride)  50 mls @ 200 mls/hr IVPB Q24H [facility]


   Last Admin: 02/03/18 12:52 Dose:  200 mls/hr


Metoprolol Tartrate (Lopressor Tab*)  25 mg PO BID [facility]


   Last Admin: 02/04/18 08:35 Dose:  25 mg


Omeprazole (Prilosec Cap*)  20 mg PO QAM [facility]


   Last Admin: 02/04/18 08:28 Dose:  20 mg


Potassium Chloride (Klor Con Er Tab*)  20 meq PO TID [facility]


   Last Admin: 02/04/18 08:28 Dose:  20 meq


Psyllium Hydrophilic Mucilloid (Metamucil Oscar*)  1 pkt PO BID [facility]


   Last Admin: 02/04/18 08:28 Dose:  1 pkt


Tramadol HCl (Ultram*)  50 mg PO Q6H PRN


   PRN Reason: PAIN


   Last Admin: 02/03/18 20:18 Dose:  50 mg








 Vital Signs - 8 hr











  02/04/18 02/04/18 02/04/18





  03:28 03:29 07:52


 


Temperature 98.2 F  97.8 F


 


Pulse Rate 84  85


 


Respiratory 20  20





Rate   


 


Blood Pressure 90/42 112/76 112/40





(mmHg)   


 


O2 Sat by Pulse 93  97





Oximetry   














  02/04/18 02/04/18





  08:00 08:17


 


Temperature  


 


Pulse Rate  


 


Respiratory 20 





Rate  


 


Blood Pressure  110/50





(mmHg)  


 


O2 Sat by Pulse  





Oximetry  











Oxygen Devices in Use Now: None


Appearance: Alert, partly up in bed.  In good spirits.  Looks comfortable.


Eyes: No Scleral Icterus


Extremities: No Edema, No Clubbing, Cyanosis, -


Skin: No Rash or Ulcers, No Nodules or Sclerosis, -


Neurological: Alert and Oriented x 3, NL Sensation, - - Gets OOB to walker 

independently but slowly, walked a few steps very slowly, steady.


Result Diagrams: 


 02/04/18 05:22





 02/04/18 05:22


Additional Lab and Data: 


 Lab Results











  02/02/18 02/02/18 02/02/18 Range/Units





  09:22 09:22 09:22 


 


WBC     (3.5-10.8)  10^3/ul


 


RBC     (4.0-5.4)  10^6/ul


 


Hgb     (14.0-18.0)  g/dl


 


Hct     (42-52)  %


 


MCV     (80-94)  fL


 


MCH     (27-31)  pg


 


MCHC     (31-36)  g/dl


 


RDW     (10.5-15)  %


 


Plt Count     (150-450)  10^3/ul


 


MPV     (7.4-10.4)  um3


 


Neut % (Auto)     (38-83)  %


 


Lymph % (Auto)     (25-47)  %


 


Mono % (Auto)     (1-9)  %


 


Eos % (Auto)     (0-6)  %


 


Baso % (Auto)     (0-2)  %


 


Absolute Neuts (auto)     (1.5-7.7)  10^3/ul


 


Absolute Lymphs (auto)     (1.0-4.8)  10^3/ul


 


Absolute Monos (auto)     (0-0.8)  10^3/ul


 


Absolute Eos (auto)     (0-0.6)  10^3/ul


 


Absolute Basos (auto)     (0-0.2)  10^3/ul


 


Absolute Nucleated RBC     10^3/ul


 


Nucleated RBC %     


 


APTT    28.6  (26.0-36.3)  seconds


 


Sodium   132 L   (133-145)  mmol/L


 


Potassium   3.0 L   (3.5-5.0)  mmol/L


 


Chloride   98 L   (101-111)  mmol/L


 


Carbon Dioxide   24   (22-32)  mmol/L


 


Anion Gap   10   (2-11)  mmol/L


 


BUN   26 H   (6-24)  mg/dL


 


Creatinine   1.40 H   (0.67-1.17)  mg/dL


 


Est GFR ( Amer)   61.9   (>60)  


 


Est GFR (Non-Af Amer)   48.2   (>60)  


 


BUN/Creatinine Ratio   18.6   (8-20)  


 


Glucose   130 H   ()  mg/dL


 


Calcium   8.9   (8.6-10.3)  mg/dL


 


Magnesium   1.4 L   (1.9-2.7)  mg/dL


 


Total Bilirubin   0.80   (0.2-1.0)  mg/dL


 


AST   25   (13-39)  U/L


 


ALT   24   (7-52)  U/L


 


Alkaline Phosphatase   150 H   ()  U/L


 


Total Creatine Kinase   80   ()  U/L


 


CK-MB (CK-2)   3.2   (0.6-6.3)  ng/mL


 


Troponin I   0.07 H*   (<0.04)  ng/mL


 


B-Natriuretic Peptide  950 H   ( - 100) pg/mL


 


Total Protein   8.0   (6.4-8.9)  g/dL


 


Albumin   3.1 L   (3.2-5.2)  g/dL


 


Globulin   4.9 H   (2-4)  g/dL


 


Albumin/Globulin Ratio   0.6 L   (1-3)  


 


TSH   1.36   (0.34-5.60)  mcIU/mL


 


Thyroxine (T4)   9.03   (6.09-12.23)  mcg/mL


 


Urine Color     


 


Urine Appearance     


 


Urine pH     (5-9)  


 


Ur Specific Gravity     (1.010-1.030)  


 


Urine Protein     (Negative)  


 


Urine Ketones     (Negative)  


 


Urine Blood     (Negative)  


 


Urine Nitrate     (Negative)  


 


Urine Bilirubin     (Negative)  


 


Urine Urobilinogen     (Negative)  


 


Ur Leukocyte Esterase     (Negative)  


 


Urine WBC (Auto)     (Absent)  


 


Urine RBC (Auto)     (Absent)  


 


Ur Squamous Epith Cells     (Absent)  


 


Urine Bacteria     (Absent)  


 


Hyaline Casts     (Absent)  


 


Urine Glucose     (Negative)  














  02/02/18 02/02/18 Range/Units





  09:22 09:30 


 


WBC  20.6 H   (3.5-10.8)  10^3/ul


 


RBC  3.57 L   (4.0-5.4)  10^6/ul


 


Hgb  11.1 L   (14.0-18.0)  g/dl


 


Hct  33 L   (42-52)  %


 


MCV  92   (80-94)  fL


 


MCH  31   (27-31)  pg


 


MCHC  34   (31-36)  g/dl


 


RDW  14   (10.5-15)  %


 


Plt Count  168   (150-450)  10^3/ul


 


MPV  10   (7.4-10.4)  um3


 


Neut % (Auto)  83.4 H   (38-83)  %


 


Lymph % (Auto)  5.2 L   (25-47)  %


 


Mono % (Auto)  11.0 H   (1-9)  %


 


Eos % (Auto)  0.1   (0-6)  %


 


Baso % (Auto)  0.3   (0-2)  %


 


Absolute Neuts (auto)  17.2 H   (1.5-7.7)  10^3/ul


 


Absolute Lymphs (auto)  1.1   (1.0-4.8)  10^3/ul


 


Absolute Monos (auto)  2.3 H   (0-0.8)  10^3/ul


 


Absolute Eos (auto)  0   (0-0.6)  10^3/ul


 


Absolute Basos (auto)  0.1   (0-0.2)  10^3/ul


 


Absolute Nucleated RBC  0   10^3/ul


 


Nucleated RBC %  0   


 


APTT    (26.0-36.3)  seconds


 


Sodium    (133-145)  mmol/L


 


Potassium    (3.5-5.0)  mmol/L


 


Chloride    (101-111)  mmol/L


 


Carbon Dioxide    (22-32)  mmol/L


 


Anion Gap    (2-11)  mmol/L


 


BUN    (6-24)  mg/dL


 


Creatinine    (0.67-1.17)  mg/dL


 


Est GFR ( Amer)    (>60)  


 


Est GFR (Non-Af Amer)    (>60)  


 


BUN/Creatinine Ratio    (8-20)  


 


Glucose    ()  mg/dL


 


Calcium    (8.6-10.3)  mg/dL


 


Magnesium    (1.9-2.7)  mg/dL


 


Total Bilirubin    (0.2-1.0)  mg/dL


 


AST    (13-39)  U/L


 


ALT    (7-52)  U/L


 


Alkaline Phosphatase    ()  U/L


 


Total Creatine Kinase    ()  U/L


 


CK-MB (CK-2)    (0.6-6.3)  ng/mL


 


Troponin I    (<0.04)  ng/mL


 


B-Natriuretic Peptide   ( - 100) pg/mL


 


Total Protein    (6.4-8.9)  g/dL


 


Albumin    (3.2-5.2)  g/dL


 


Globulin    (2-4)  g/dL


 


Albumin/Globulin Ratio    (1-3)  


 


TSH    (0.34-5.60)  mcIU/mL


 


Thyroxine (T4)    (6.09-12.23)  mcg/mL


 


Urine Color   Yellow  


 


Urine Appearance   Clear  


 


Urine pH   5.0  (5-9)  


 


Ur Specific Gravity   1.018  (1.010-1.030)  


 


Urine Protein   3+(>=500 mg/dl) H  (Negative)  


 


Urine Ketones   Negative  (Negative)  


 


Urine Blood   1+ H  (Negative)  


 


Urine Nitrate   Negative  (Negative)  


 


Urine Bilirubin   Negative  (Negative)  


 


Urine Urobilinogen   Negative  (Negative)  


 


Ur Leukocyte Esterase   Negative  (Negative)  


 


Urine WBC (Auto)   Absent  (Absent)  


 


Urine RBC (Auto)   1+(3-5/hpf) H  (Absent)  


 


Ur Squamous Epith Cells   Present H  (Absent)  


 


Urine Bacteria   Absent  (Absent)  


 


Hyaline Casts   Present H  (Absent)  


 


Urine Glucose   1+(50 mg/dl) H  (Negative)  














Assess/Plan/Problems-Billing


Assessment: 











- Patient Problems


(1) Acute on chronic diastolic (congestive) heart failure


Current Visit: No   Status: Acute   Code(s): I50.33 - ACUTE ON CHRONIC 

DIASTOLIC (CONGESTIVE) HEART FAILURE   SNOMED Code(s): 281818175


   Comment: EF 55% and mod MR on Echo


Daily weights.   KCL tid.  


BMP 2/5/18.  Resumed daily furosemide PM 2/3.   





(2) Weakness


Current Visit: Yes   Status: Acute   Code(s): R53.1 - WEAKNESS   SNOMED Code(s)

: [id number]


   Comment: PT/OT eval.  Start QID APAP 2/4.   





(3) CKD (chronic kidney disease) stage 3, GFR 30-59 ml/min


Current Visit: No   Status: Acute   Code(s): N18.3 - CHRONIC KIDNEY DISEASE, 

STAGE 3 (MODERATE)   SNOMED Code(s): 408038457


   Comment: Stable.  BMP 2/5/18.

## 2018-02-05 NOTE — PN
Subjective


Date of Service: 02/05/18


Interval History: 





Pt states that the APAP "takes the edge off" of his arthritic pain.  Needs 2 

assist to walk.  No new c/o.





Objective


Active Medications: 








Acetaminophen (Tylenol Tab*)  650 mg PO QID [facility]


   Last Admin: 02/05/18 09:23 Dose:  650 mg


Aspirin (Aspirin Ec Low Dose*)  81 mg PO DAILY [facility]


   Last Admin: 02/05/18 09:24 Dose:  81 mg


Atorvastatin Calcium (Lipitor*)  10 mg PO DAILY [facility]


   Last Admin: 02/05/18 09:24 Dose:  10 mg


Cholecalciferol (Vitamin D Tab*)  1,000 units PO DAILY [facility]


   Last Admin: 02/05/18 09:24 Dose:  1,000 units


Furosemide (Lasix Tab*)  20 mg PO DAILY [facility]


   Last Admin: 02/05/18 09:24 Dose:  20 mg


Heparin Sodium (Porcine) (Heparin Vial(*))  5,000 units SUBCUT Q8HR [facility]


   Last Admin: 02/05/18 05:17 Dose:  Not Given


Ceftriaxone Sodium 1 gm/ (Sodium Chloride)  50 mls @ 200 mls/hr IVPB Q24H [facility]


   Last Admin: 02/04/18 15:00 Dose:  200 mls/hr


Metoprolol Tartrate (Lopressor Tab*)  25 mg PO BID [facility]


   Last Admin: 02/05/18 09:25 Dose:  25 mg


Omeprazole (Prilosec Cap*)  20 mg PO QAM [facility]


   Last Admin: 02/05/18 09:24 Dose:  20 mg


Potassium Chloride (Klor Con Er Tab*)  20 meq PO TID [facility]


   Last Admin: 02/05/18 09:22 Dose:  20 meq


Psyllium Hydrophilic Mucilloid (Metamucil Oscar*)  1 pkt PO BID [facility]


   Last Admin: 02/05/18 09:22 Dose:  1 pkt


Throat Lozenges (Chloraseptic Neo*)  1 neo PO Q6H PRN


   PRN Reason: SORE THROAT


   Last Admin: 02/04/18 16:54 Dose:  1 neo


Tramadol HCl (Ultram*)  50 mg PO Q6H PRN


   PRN Reason: PAIN


   Last Admin: 02/03/18 20:18 Dose:  50 mg








 Vital Signs - 8 hr











  02/05/18 02/05/18 02/05/18





  03:45 08:00 08:43


 


Temperature 97.4 F  97.0 F


 


Pulse Rate 70  76


 


Respiratory 18 18 16





Rate   


 


Blood Pressure 115/56  144/59





(mmHg)   


 


O2 Sat by Pulse 98  100





Oximetry   











Oxygen Devices in Use Now: None


Appearance: Alert, partly up in bed.  In good spirits.  Looks comfortable.


Eyes: No Scleral Icterus


Extremities: No Edema, No Clubbing, Cyanosis, -


Skin: No Rash or Ulcers, No Nodules or Sclerosis, -


Neurological: Alert and Oriented x 3, NL Sensation


Result Diagrams: 


 02/04/18 05:22





 02/05/18 05:28


Additional Lab and Data: 


 Lab Results











  02/02/18 02/02/18 02/02/18 Range/Units





  09:22 09:22 09:22 


 


WBC     (3.5-10.8)  10^3/ul


 


RBC     (4.0-5.4)  10^6/ul


 


Hgb     (14.0-18.0)  g/dl


 


Hct     (42-52)  %


 


MCV     (80-94)  fL


 


MCH     (27-31)  pg


 


MCHC     (31-36)  g/dl


 


RDW     (10.5-15)  %


 


Plt Count     (150-450)  10^3/ul


 


MPV     (7.4-10.4)  um3


 


Neut % (Auto)     (38-83)  %


 


Lymph % (Auto)     (25-47)  %


 


Mono % (Auto)     (1-9)  %


 


Eos % (Auto)     (0-6)  %


 


Baso % (Auto)     (0-2)  %


 


Absolute Neuts (auto)     (1.5-7.7)  10^3/ul


 


Absolute Lymphs (auto)     (1.0-4.8)  10^3/ul


 


Absolute Monos (auto)     (0-0.8)  10^3/ul


 


Absolute Eos (auto)     (0-0.6)  10^3/ul


 


Absolute Basos (auto)     (0-0.2)  10^3/ul


 


Absolute Nucleated RBC     10^3/ul


 


Nucleated RBC %     


 


APTT    28.6  (26.0-36.3)  seconds


 


Sodium   132 L   (133-145)  mmol/L


 


Potassium   3.0 L   (3.5-5.0)  mmol/L


 


Chloride   98 L   (101-111)  mmol/L


 


Carbon Dioxide   24   (22-32)  mmol/L


 


Anion Gap   10   (2-11)  mmol/L


 


BUN   26 H   (6-24)  mg/dL


 


Creatinine   1.40 H   (0.67-1.17)  mg/dL


 


Est GFR ( Amer)   61.9   (>60)  


 


Est GFR (Non-Af Amer)   48.2   (>60)  


 


BUN/Creatinine Ratio   18.6   (8-20)  


 


Glucose   130 H   ()  mg/dL


 


Calcium   8.9   (8.6-10.3)  mg/dL


 


Magnesium   1.4 L   (1.9-2.7)  mg/dL


 


Total Bilirubin   0.80   (0.2-1.0)  mg/dL


 


AST   25   (13-39)  U/L


 


ALT   24   (7-52)  U/L


 


Alkaline Phosphatase   150 H   ()  U/L


 


Total Creatine Kinase   80   ()  U/L


 


CK-MB (CK-2)   3.2   (0.6-6.3)  ng/mL


 


Troponin I   0.07 H*   (<0.04)  ng/mL


 


B-Natriuretic Peptide  950 H   ( - 100) pg/mL


 


Total Protein   8.0   (6.4-8.9)  g/dL


 


Albumin   3.1 L   (3.2-5.2)  g/dL


 


Globulin   4.9 H   (2-4)  g/dL


 


Albumin/Globulin Ratio   0.6 L   (1-3)  


 


TSH   1.36   (0.34-5.60)  mcIU/mL


 


Thyroxine (T4)   9.03   (6.09-12.23)  mcg/mL


 


Urine Color     


 


Urine Appearance     


 


Urine pH     (5-9)  


 


Ur Specific Gravity     (1.010-1.030)  


 


Urine Protein     (Negative)  


 


Urine Ketones     (Negative)  


 


Urine Blood     (Negative)  


 


Urine Nitrate     (Negative)  


 


Urine Bilirubin     (Negative)  


 


Urine Urobilinogen     (Negative)  


 


Ur Leukocyte Esterase     (Negative)  


 


Urine WBC (Auto)     (Absent)  


 


Urine RBC (Auto)     (Absent)  


 


Ur Squamous Epith Cells     (Absent)  


 


Urine Bacteria     (Absent)  


 


Hyaline Casts     (Absent)  


 


Urine Glucose     (Negative)  














  02/02/18 02/02/18 Range/Units





  09:22 09:30 


 


WBC  20.6 H   (3.5-10.8)  10^3/ul


 


RBC  3.57 L   (4.0-5.4)  10^6/ul


 


Hgb  11.1 L   (14.0-18.0)  g/dl


 


Hct  33 L   (42-52)  %


 


MCV  92   (80-94)  fL


 


MCH  31   (27-31)  pg


 


MCHC  34   (31-36)  g/dl


 


RDW  14   (10.5-15)  %


 


Plt Count  168   (150-450)  10^3/ul


 


MPV  10   (7.4-10.4)  um3


 


Neut % (Auto)  83.4 H   (38-83)  %


 


Lymph % (Auto)  5.2 L   (25-47)  %


 


Mono % (Auto)  11.0 H   (1-9)  %


 


Eos % (Auto)  0.1   (0-6)  %


 


Baso % (Auto)  0.3   (0-2)  %


 


Absolute Neuts (auto)  17.2 H   (1.5-7.7)  10^3/ul


 


Absolute Lymphs (auto)  1.1   (1.0-4.8)  10^3/ul


 


Absolute Monos (auto)  2.3 H   (0-0.8)  10^3/ul


 


Absolute Eos (auto)  0   (0-0.6)  10^3/ul


 


Absolute Basos (auto)  0.1   (0-0.2)  10^3/ul


 


Absolute Nucleated RBC  0   10^3/ul


 


Nucleated RBC %  0   


 


APTT    (26.0-36.3)  seconds


 


Sodium    (133-145)  mmol/L


 


Potassium    (3.5-5.0)  mmol/L


 


Chloride    (101-111)  mmol/L


 


Carbon Dioxide    (22-32)  mmol/L


 


Anion Gap    (2-11)  mmol/L


 


BUN    (6-24)  mg/dL


 


Creatinine    (0.67-1.17)  mg/dL


 


Est GFR ( Amer)    (>60)  


 


Est GFR (Non-Af Amer)    (>60)  


 


BUN/Creatinine Ratio    (8-20)  


 


Glucose    ()  mg/dL


 


Calcium    (8.6-10.3)  mg/dL


 


Magnesium    (1.9-2.7)  mg/dL


 


Total Bilirubin    (0.2-1.0)  mg/dL


 


AST    (13-39)  U/L


 


ALT    (7-52)  U/L


 


Alkaline Phosphatase    ()  U/L


 


Total Creatine Kinase    ()  U/L


 


CK-MB (CK-2)    (0.6-6.3)  ng/mL


 


Troponin I    (<0.04)  ng/mL


 


B-Natriuretic Peptide   ( - 100) pg/mL


 


Total Protein    (6.4-8.9)  g/dL


 


Albumin    (3.2-5.2)  g/dL


 


Globulin    (2-4)  g/dL


 


Albumin/Globulin Ratio    (1-3)  


 


TSH    (0.34-5.60)  mcIU/mL


 


Thyroxine (T4)    (6.09-12.23)  mcg/mL


 


Urine Color   Yellow  


 


Urine Appearance   Clear  


 


Urine pH   5.0  (5-9)  


 


Ur Specific Gravity   1.018  (1.010-1.030)  


 


Urine Protein   3+(>=500 mg/dl) H  (Negative)  


 


Urine Ketones   Negative  (Negative)  


 


Urine Blood   1+ H  (Negative)  


 


Urine Nitrate   Negative  (Negative)  


 


Urine Bilirubin   Negative  (Negative)  


 


Urine Urobilinogen   Negative  (Negative)  


 


Ur Leukocyte Esterase   Negative  (Negative)  


 


Urine WBC (Auto)   Absent  (Absent)  


 


Urine RBC (Auto)   1+(3-5/hpf) H  (Absent)  


 


Ur Squamous Epith Cells   Present H  (Absent)  


 


Urine Bacteria   Absent  (Absent)  


 


Hyaline Casts   Present H  (Absent)  


 


Urine Glucose   1+(50 mg/dl) H  (Negative)  














Assess/Plan/Problems-Billing


Assessment: 











- Patient Problems


(1) Acute on chronic diastolic (congestive) heart failure


Current Visit: No   Status: Acute   Code(s): I50.33 - ACUTE ON CHRONIC 

DIASTOLIC (CONGESTIVE) HEART FAILURE   SNOMED Code(s): 920991149


   Comment: EF 55% and mod MR on Echo


Daily weights.   KCL tid.  


Resumed daily furosemide PM 2/3. BUN 47 2/5. Repeat BMP in a few days.   





(2) Weakness


Current Visit: Yes   Status: Acute   Code(s): R53.1 - WEAKNESS   SNOMED Code(s)

: 78039054


   Comment: PT/OT eval.  Start QID APAP 2/4.   





(3) CKD (chronic kidney disease) stage 3, GFR 30-59 ml/min


Current Visit: No   Status: Acute   Code(s): N18.3 - CHRONIC KIDNEY DISEASE, 

STAGE 3 (MODERATE)   SNOMED Code(s): 542018141


   Comment: GFR 41.3 2/5/18.  BMP in a few days.

## 2018-02-06 NOTE — PN
Subjective


Date of Service: 02/06/18


Interval History: 





still feeling short of breath, unable to lie flat.  has walked the hallways 

with his walker, has knee pain but otherwise was not limited by sob. 


Family History: Unchanged from Admission


Social History: Unchanged from Admission


Past Medical History: Unchanged from Admission





Objective


Active Medications: 








Acetaminophen (Tylenol Tab*)  650 mg PO QID [facility]


   Last Admin: 02/06/18 17:50 Dose:  650 mg


Aspirin (Aspirin Ec Low Dose*)  81 mg PO DAILY [facility]


   Last Admin: 02/06/18 08:25 Dose:  81 mg


Atorvastatin Calcium (Lipitor*)  10 mg PO DAILY [facility]


   Last Admin: 02/06/18 08:25 Dose:  10 mg


Cholecalciferol (Vitamin D Tab*)  1,000 units PO DAILY [facility]


   Last Admin: 02/06/18 08:25 Dose:  1,000 units


Heparin Sodium (Porcine) (Heparin Vial(*))  5,000 units SUBCUT Q8HR [facility]


   Last Admin: 02/06/18 15:12 Dose:  5,000 units


Ceftriaxone Sodium 1 gm/ (Dextrose)  50 mls @ 200 mls/hr IVPB Q24H [facility]


   Last Admin: 02/06/18 13:17 Dose:  200 mls/hr


Metoprolol Tartrate (Lopressor Tab*)  25 mg PO BID [facility]


   Last Admin: 02/06/18 08:25 Dose:  25 mg


Omeprazole (Prilosec Cap*)  20 mg PO QAM [facility]


   Last Admin: 02/06/18 08:25 Dose:  20 mg


Potassium Chloride (Klor Con Er Tab*)  20 meq PO TID [facility]


   Last Admin: 02/06/18 15:13 Dose:  20 meq


Psyllium Hydrophilic Mucilloid (Metamucil Oscar*)  1 pkt PO BID [facility]


   Last Admin: 02/06/18 08:24 Dose:  1 pkt


Throat Lozenges (Chloraseptic Neo*)  1 neo PO Q6H PRN


   PRN Reason: SORE THROAT


   Last Admin: 02/05/18 14:19 Dose:  1 neo


Tramadol HCl (Ultram*)  50 mg PO Q6H PRN


   PRN Reason: PAIN


   Last Admin: 02/03/18 20:18 Dose:  50 mg








 Vital Signs - 8 hr











  02/06/18 02/06/18 02/06/18





  11:22 15:18 15:20


 


Temperature 98.2 F  97.1 F


 


Pulse Rate 66 71 69


 


Respiratory 16  18





Rate   


 


Blood Pressure 112/50  122/54





(mmHg)   


 


O2 Sat by Pulse 98 98 99





Oximetry   











Oxygen Devices in Use Now: None


Appearance: sitting up in chair, no distress


Eyes: No Scleral Icterus


Ears/Nose/Mouth/Throat: - - poor dentition


Neck: - - no  JVP, sitting up at 90degrees


Respiratory: - - few crackles at the bases


Cardiovascular: NL Sounds; No Murmurs; No JVD


Abdominal: NL Sounds; No Tenderness; No Distention


Lymphatic: No Cervical Adenopathy


Extremities: - - trace le edema


Skin: No Rash or Ulcers


Neurological: Alert and Oriented x 3


Result Diagrams: 


 02/04/18 05:22





 02/05/18 05:28


Additional Lab and Data: 


 Lab Results











  02/02/18 02/02/18 02/02/18 Range/Units





  09:22 09:22 09:22 


 


WBC     (3.5-10.8)  10^3/ul


 


RBC     (4.0-5.4)  10^6/ul


 


Hgb     (14.0-18.0)  g/dl


 


Hct     (42-52)  %


 


MCV     (80-94)  fL


 


MCH     (27-31)  pg


 


MCHC     (31-36)  g/dl


 


RDW     (10.5-15)  %


 


Plt Count     (150-450)  10^3/ul


 


MPV     (7.4-10.4)  um3


 


Neut % (Auto)     (38-83)  %


 


Lymph % (Auto)     (25-47)  %


 


Mono % (Auto)     (1-9)  %


 


Eos % (Auto)     (0-6)  %


 


Baso % (Auto)     (0-2)  %


 


Absolute Neuts (auto)     (1.5-7.7)  10^3/ul


 


Absolute Lymphs (auto)     (1.0-4.8)  10^3/ul


 


Absolute Monos (auto)     (0-0.8)  10^3/ul


 


Absolute Eos (auto)     (0-0.6)  10^3/ul


 


Absolute Basos (auto)     (0-0.2)  10^3/ul


 


Absolute Nucleated RBC     10^3/ul


 


Nucleated RBC %     


 


APTT    28.6  (26.0-36.3)  seconds


 


Sodium   132 L   (133-145)  mmol/L


 


Potassium   3.0 L   (3.5-5.0)  mmol/L


 


Chloride   98 L   (101-111)  mmol/L


 


Carbon Dioxide   24   (22-32)  mmol/L


 


Anion Gap   10   (2-11)  mmol/L


 


BUN   26 H   (6-24)  mg/dL


 


Creatinine   1.40 H   (0.67-1.17)  mg/dL


 


Est GFR ( Amer)   61.9   (>60)  


 


Est GFR (Non-Af Amer)   48.2   (>60)  


 


BUN/Creatinine Ratio   18.6   (8-20)  


 


Glucose   130 H   ()  mg/dL


 


Calcium   8.9   (8.6-10.3)  mg/dL


 


Magnesium   1.4 L   (1.9-2.7)  mg/dL


 


Total Bilirubin   0.80   (0.2-1.0)  mg/dL


 


AST   25   (13-39)  U/L


 


ALT   24   (7-52)  U/L


 


Alkaline Phosphatase   150 H   ()  U/L


 


Total Creatine Kinase   80   ()  U/L


 


CK-MB (CK-2)   3.2   (0.6-6.3)  ng/mL


 


Troponin I   0.07 H*   (<0.04)  ng/mL


 


B-Natriuretic Peptide  950 H   ( - 100) pg/mL


 


Total Protein   8.0   (6.4-8.9)  g/dL


 


Albumin   3.1 L   (3.2-5.2)  g/dL


 


Globulin   4.9 H   (2-4)  g/dL


 


Albumin/Globulin Ratio   0.6 L   (1-3)  


 


TSH   1.36   (0.34-5.60)  mcIU/mL


 


Thyroxine (T4)   9.03   (6.09-12.23)  mcg/mL


 


Urine Color     


 


Urine Appearance     


 


Urine pH     (5-9)  


 


Ur Specific Gravity     (1.010-1.030)  


 


Urine Protein     (Negative)  


 


Urine Ketones     (Negative)  


 


Urine Blood     (Negative)  


 


Urine Nitrate     (Negative)  


 


Urine Bilirubin     (Negative)  


 


Urine Urobilinogen     (Negative)  


 


Ur Leukocyte Esterase     (Negative)  


 


Urine WBC (Auto)     (Absent)  


 


Urine RBC (Auto)     (Absent)  


 


Ur Squamous Epith Cells     (Absent)  


 


Urine Bacteria     (Absent)  


 


Hyaline Casts     (Absent)  


 


Urine Glucose     (Negative)  














  02/02/18 02/02/18 Range/Units





  09:22 09:30 


 


WBC  20.6 H   (3.5-10.8)  10^3/ul


 


RBC  3.57 L   (4.0-5.4)  10^6/ul


 


Hgb  11.1 L   (14.0-18.0)  g/dl


 


Hct  33 L   (42-52)  %


 


MCV  92   (80-94)  fL


 


MCH  31   (27-31)  pg


 


MCHC  34   (31-36)  g/dl


 


RDW  14   (10.5-15)  %


 


Plt Count  168   (150-450)  10^3/ul


 


MPV  10   (7.4-10.4)  um3


 


Neut % (Auto)  83.4 H   (38-83)  %


 


Lymph % (Auto)  5.2 L   (25-47)  %


 


Mono % (Auto)  11.0 H   (1-9)  %


 


Eos % (Auto)  0.1   (0-6)  %


 


Baso % (Auto)  0.3   (0-2)  %


 


Absolute Neuts (auto)  17.2 H   (1.5-7.7)  10^3/ul


 


Absolute Lymphs (auto)  1.1   (1.0-4.8)  10^3/ul


 


Absolute Monos (auto)  2.3 H   (0-0.8)  10^3/ul


 


Absolute Eos (auto)  0   (0-0.6)  10^3/ul


 


Absolute Basos (auto)  0.1   (0-0.2)  10^3/ul


 


Absolute Nucleated RBC  0   10^3/ul


 


Nucleated RBC %  0   


 


APTT    (26.0-36.3)  seconds


 


Sodium    (133-145)  mmol/L


 


Potassium    (3.5-5.0)  mmol/L


 


Chloride    (101-111)  mmol/L


 


Carbon Dioxide    (22-32)  mmol/L


 


Anion Gap    (2-11)  mmol/L


 


BUN    (6-24)  mg/dL


 


Creatinine    (0.67-1.17)  mg/dL


 


Est GFR ( Amer)    (>60)  


 


Est GFR (Non-Af Amer)    (>60)  


 


BUN/Creatinine Ratio    (8-20)  


 


Glucose    ()  mg/dL


 


Calcium    (8.6-10.3)  mg/dL


 


Magnesium    (1.9-2.7)  mg/dL


 


Total Bilirubin    (0.2-1.0)  mg/dL


 


AST    (13-39)  U/L


 


ALT    (7-52)  U/L


 


Alkaline Phosphatase    ()  U/L


 


Total Creatine Kinase    ()  U/L


 


CK-MB (CK-2)    (0.6-6.3)  ng/mL


 


Troponin I    (<0.04)  ng/mL


 


B-Natriuretic Peptide   ( - 100) pg/mL


 


Total Protein    (6.4-8.9)  g/dL


 


Albumin    (3.2-5.2)  g/dL


 


Globulin    (2-4)  g/dL


 


Albumin/Globulin Ratio    (1-3)  


 


TSH    (0.34-5.60)  mcIU/mL


 


Thyroxine (T4)    (6.09-12.23)  mcg/mL


 


Urine Color   Yellow  


 


Urine Appearance   Clear  


 


Urine pH   5.0  (5-9)  


 


Ur Specific Gravity   1.018  (1.010-1.030)  


 


Urine Protein   3+(>=500 mg/dl) H  (Negative)  


 


Urine Ketones   Negative  (Negative)  


 


Urine Blood   1+ H  (Negative)  


 


Urine Nitrate   Negative  (Negative)  


 


Urine Bilirubin   Negative  (Negative)  


 


Urine Urobilinogen   Negative  (Negative)  


 


Ur Leukocyte Esterase   Negative  (Negative)  


 


Urine WBC (Auto)   Absent  (Absent)  


 


Urine RBC (Auto)   1+(3-5/hpf) H  (Absent)  


 


Ur Squamous Epith Cells   Present H  (Absent)  


 


Urine Bacteria   Absent  (Absent)  


 


Hyaline Casts   Present H  (Absent)  


 


Urine Glucose   1+(50 mg/dl) H  (Negative)  














Assess/Plan/Problems-Billing


Assessment: 











- Patient Problems


(1) Acute on chronic diastolic (congestive) heart failure


Current Visit: No   Status: Acute   Code(s): I50.33 - ACUTE ON CHRONIC 

DIASTOLIC (CONGESTIVE) HEART FAILURE   SNOMED Code(s): 469671774


   Comment: EF 55% and mod MR on Echo


Suspect dietary indiscretion. 


Resumed daily furosemide PM 2/3 but I am switching to IV; i believe he still 

needs diuresis   





(2) CKD (chronic kidney disease) stage 3, GFR 30-59 ml/min


Current Visit: No   Status: Acute   Code(s): N18.3 - CHRONIC KIDNEY DISEASE, 

STAGE 3 (MODERATE)   SNOMED Code(s): 368055349


   Comment: stable.    





(3) Troponin level elevated


Current Visit: No   Status: Acute   Code(s): R74.8 - ABNORMAL LEVELS OF OTHER 

SERUM ENZYMES   SNOMED Code(s): 618649051


   Comment: unfortunately troponins were not trended at admission and are 

unlikely to be of use at this point.  he denies chest pain at any time. likely 

demand

## 2018-02-07 NOTE — PN
Subjective


Date of Service: 02/07/18


Interval History: 





Patient seen and examined. No acute overnight events. States SOB improving, 

laying flat is better, still has cough, non-productive. Arthritis pains at 

baseline. No further complaints.


Family History: Unchanged from Admission


Social History: Unchanged from Admission


Past Medical History: Unchanged from Admission





Objective


Active Medications: 








Acetaminophen (Tylenol Tab*)  650 mg PO QID [facility]


   Last Admin: 02/07/18 08:43 Dose:  650 mg


Aspirin (Aspirin Ec Low Dose*)  81 mg PO DAILY [facility]


   Last Admin: 02/07/18 08:44 Dose:  81 mg


Atorvastatin Calcium (Lipitor*)  10 mg PO DAILY [facility]


   Last Admin: 02/07/18 08:44 Dose:  10 mg


Cholecalciferol (Vitamin D Tab*)  1,000 units PO DAILY [facility]


   Last Admin: 02/07/18 08:44 Dose:  1,000 units


Furosemide (Lasix Iv*)  20 mg IV BID [facility]


   Last Admin: 02/07/18 08:44 Dose:  20 mg


Heparin Sodium (Porcine) (Heparin Vial(*))  5,000 units SUBCUT Q8HR [facility]


   Last Admin: 02/07/18 05:42 Dose:  5,000 units


Ceftriaxone Sodium 1 gm/ (Dextrose)  50 mls @ 200 mls/hr IVPB Q24H [facility]


   Last Admin: 02/06/18 13:17 Dose:  200 mls/hr


Metoprolol Tartrate (Lopressor Tab*)  25 mg PO BID [facility]


   Last Admin: 02/07/18 08:44 Dose:  25 mg


Omeprazole (Prilosec Cap*)  20 mg PO QAM [facility]


   Last Admin: 02/07/18 08:44 Dose:  20 mg


Potassium Chloride (Klor Con Er Tab*)  20 meq PO TID [facility]


   Last Admin: 02/07/18 08:44 Dose:  20 meq


Psyllium Hydrophilic Mucilloid (Metamucil Oscar*)  1 pkt PO BID [facility]


   Last Admin: 02/07/18 08:44 Dose:  1 pkt


Throat Lozenges (Chloraseptic Neo*)  1 neo PO Q6H PRN


   PRN Reason: SORE THROAT


   Last Admin: 02/05/18 14:19 Dose:  1 neo


Tramadol HCl (Ultram*)  50 mg PO Q6H PRN


   PRN Reason: PAIN


   Last Admin: 02/03/18 20:18 Dose:  50 mg








 Vital Signs - 8 hr











  02/07/18 02/07/18 02/07/18





  07:16 08:00 11:07


 


Temperature 98.3 F  97.9 F


 


Pulse Rate 79  81


 


Respiratory 18 20 20





Rate   


 


Blood Pressure 130/58  119/59





(mmHg)   


 


O2 Sat by Pulse 99  97





Oximetry   











Oxygen Devices in Use Now: None


Appearance: Alert, NAD


Ears/Nose/Mouth/Throat: - - poor dentition


Neck: NL Appearance and Movements; NL JVP, Trachea Midline


Respiratory: Symmetrical Chest Expansion and Respiratory Effort, - - bilateral 

eixpratory wheeze, fine crackles at the bases


Cardiovascular: NL Sounds; No Murmurs; No JVD, RRR


Lymphatic: No Cervical Adenopathy


Extremities: - - Bi-pedal edema


Neurological: Alert and Oriented x 3


Nutrition: Taking PO's


Result Diagrams: 


 02/04/18 05:22





 02/05/18 05:28


Additional Lab and Data: 


 Lab Results











  02/02/18 02/02/18 02/02/18 Range/Units





  09:22 09:22 09:22 


 


WBC     (3.5-10.8)  10^3/ul


 


RBC     (4.0-5.4)  10^6/ul


 


Hgb     (14.0-18.0)  g/dl


 


Hct     (42-52)  %


 


MCV     (80-94)  fL


 


MCH     (27-31)  pg


 


MCHC     (31-36)  g/dl


 


RDW     (10.5-15)  %


 


Plt Count     (150-450)  10^3/ul


 


MPV     (7.4-10.4)  um3


 


Neut % (Auto)     (38-83)  %


 


Lymph % (Auto)     (25-47)  %


 


Mono % (Auto)     (1-9)  %


 


Eos % (Auto)     (0-6)  %


 


Baso % (Auto)     (0-2)  %


 


Absolute Neuts (auto)     (1.5-7.7)  10^3/ul


 


Absolute Lymphs (auto)     (1.0-4.8)  10^3/ul


 


Absolute Monos (auto)     (0-0.8)  10^3/ul


 


Absolute Eos (auto)     (0-0.6)  10^3/ul


 


Absolute Basos (auto)     (0-0.2)  10^3/ul


 


Absolute Nucleated RBC     10^3/ul


 


Nucleated RBC %     


 


APTT    28.6  (26.0-36.3)  seconds


 


Sodium   132 L   (133-145)  mmol/L


 


Potassium   3.0 L   (3.5-5.0)  mmol/L


 


Chloride   98 L   (101-111)  mmol/L


 


Carbon Dioxide   24   (22-32)  mmol/L


 


Anion Gap   10   (2-11)  mmol/L


 


BUN   26 H   (6-24)  mg/dL


 


Creatinine   1.40 H   (0.67-1.17)  mg/dL


 


Est GFR ( Amer)   61.9   (>60)  


 


Est GFR (Non-Af Amer)   48.2   (>60)  


 


BUN/Creatinine Ratio   18.6   (8-20)  


 


Glucose   130 H   ()  mg/dL


 


Calcium   8.9   (8.6-10.3)  mg/dL


 


Magnesium   1.4 L   (1.9-2.7)  mg/dL


 


Total Bilirubin   0.80   (0.2-1.0)  mg/dL


 


AST   25   (13-39)  U/L


 


ALT   24   (7-52)  U/L


 


Alkaline Phosphatase   150 H   ()  U/L


 


Total Creatine Kinase   80   ()  U/L


 


CK-MB (CK-2)   3.2   (0.6-6.3)  ng/mL


 


Troponin I   0.07 H*   (<0.04)  ng/mL


 


B-Natriuretic Peptide  950 H   ( - 100) pg/mL


 


Total Protein   8.0   (6.4-8.9)  g/dL


 


Albumin   3.1 L   (3.2-5.2)  g/dL


 


Globulin   4.9 H   (2-4)  g/dL


 


Albumin/Globulin Ratio   0.6 L   (1-3)  


 


TSH   1.36   (0.34-5.60)  mcIU/mL


 


Thyroxine (T4)   9.03   (6.09-12.23)  mcg/mL


 


Urine Color     


 


Urine Appearance     


 


Urine pH     (5-9)  


 


Ur Specific Gravity     (1.010-1.030)  


 


Urine Protein     (Negative)  


 


Urine Ketones     (Negative)  


 


Urine Blood     (Negative)  


 


Urine Nitrate     (Negative)  


 


Urine Bilirubin     (Negative)  


 


Urine Urobilinogen     (Negative)  


 


Ur Leukocyte Esterase     (Negative)  


 


Urine WBC (Auto)     (Absent)  


 


Urine RBC (Auto)     (Absent)  


 


Ur Squamous Epith Cells     (Absent)  


 


Urine Bacteria     (Absent)  


 


Hyaline Casts     (Absent)  


 


Urine Glucose     (Negative)  














  02/02/18 02/02/18 Range/Units





  09:22 09:30 


 


WBC  20.6 H   (3.5-10.8)  10^3/ul


 


RBC  3.57 L   (4.0-5.4)  10^6/ul


 


Hgb  11.1 L   (14.0-18.0)  g/dl


 


Hct  33 L   (42-52)  %


 


MCV  92   (80-94)  fL


 


MCH  31   (27-31)  pg


 


MCHC  34   (31-36)  g/dl


 


RDW  14   (10.5-15)  %


 


Plt Count  168   (150-450)  10^3/ul


 


MPV  10   (7.4-10.4)  um3


 


Neut % (Auto)  83.4 H   (38-83)  %


 


Lymph % (Auto)  5.2 L   (25-47)  %


 


Mono % (Auto)  11.0 H   (1-9)  %


 


Eos % (Auto)  0.1   (0-6)  %


 


Baso % (Auto)  0.3   (0-2)  %


 


Absolute Neuts (auto)  17.2 H   (1.5-7.7)  10^3/ul


 


Absolute Lymphs (auto)  1.1   (1.0-4.8)  10^3/ul


 


Absolute Monos (auto)  2.3 H   (0-0.8)  10^3/ul


 


Absolute Eos (auto)  0   (0-0.6)  10^3/ul


 


Absolute Basos (auto)  0.1   (0-0.2)  10^3/ul


 


Absolute Nucleated RBC  0   10^3/ul


 


Nucleated RBC %  0   


 


APTT    (26.0-36.3)  seconds


 


Sodium    (133-145)  mmol/L


 


Potassium    (3.5-5.0)  mmol/L


 


Chloride    (101-111)  mmol/L


 


Carbon Dioxide    (22-32)  mmol/L


 


Anion Gap    (2-11)  mmol/L


 


BUN    (6-24)  mg/dL


 


Creatinine    (0.67-1.17)  mg/dL


 


Est GFR ( Amer)    (>60)  


 


Est GFR (Non-Af Amer)    (>60)  


 


BUN/Creatinine Ratio    (8-20)  


 


Glucose    ()  mg/dL


 


Calcium    (8.6-10.3)  mg/dL


 


Magnesium    (1.9-2.7)  mg/dL


 


Total Bilirubin    (0.2-1.0)  mg/dL


 


AST    (13-39)  U/L


 


ALT    (7-52)  U/L


 


Alkaline Phosphatase    ()  U/L


 


Total Creatine Kinase    ()  U/L


 


CK-MB (CK-2)    (0.6-6.3)  ng/mL


 


Troponin I    (<0.04)  ng/mL


 


B-Natriuretic Peptide   ( - 100) pg/mL


 


Total Protein    (6.4-8.9)  g/dL


 


Albumin    (3.2-5.2)  g/dL


 


Globulin    (2-4)  g/dL


 


Albumin/Globulin Ratio    (1-3)  


 


TSH    (0.34-5.60)  mcIU/mL


 


Thyroxine (T4)    (6.09-12.23)  mcg/mL


 


Urine Color   Yellow  


 


Urine Appearance   Clear  


 


Urine pH   5.0  (5-9)  


 


Ur Specific Gravity   1.018  (1.010-1.030)  


 


Urine Protein   3+(>=500 mg/dl) H  (Negative)  


 


Urine Ketones   Negative  (Negative)  


 


Urine Blood   1+ H  (Negative)  


 


Urine Nitrate   Negative  (Negative)  


 


Urine Bilirubin   Negative  (Negative)  


 


Urine Urobilinogen   Negative  (Negative)  


 


Ur Leukocyte Esterase   Negative  (Negative)  


 


Urine WBC (Auto)   Absent  (Absent)  


 


Urine RBC (Auto)   1+(3-5/hpf) H  (Absent)  


 


Ur Squamous Epith Cells   Present H  (Absent)  


 


Urine Bacteria   Absent  (Absent)  


 


Hyaline Casts   Present H  (Absent)  


 


Urine Glucose   1+(50 mg/dl) H  (Negative)  














Assess/Plan/Problems-Billing


Assessment: 





This is an 85 year old male with significant cardiac hx, admitted for PNA and 

chronic HF.





- Patient Problems


(1) Pneumonia


Code(s): J18.9 - PNEUMONIA, UNSPECIFIED ORGANISM   SNOMED Code(s): 046724489


   Comment: 


 - Continue ceftriaxone one more day then DC


 - WBCs down from 20 to 13 on the 5th


 - WOB Clinically improving   





(2) Chronic diastolic (congestive) heart failure


Code(s): I50.32 - CHRONIC DIASTOLIC (CONGESTIVE) HEART FAILURE   SNOMED Code(s)

: 786081620


   Comment: 


 - BNP trending down


 - Had lasix IV this AM


 - Trending towards euvolemic status, weight down to 150lbs today


 - Monitor I&O  and daily weights


 - Continue lasix PO at home when DC'd   





(3) Weakness


Code(s): R53.1 - WEAKNESS   SNOMED Code(s): 05502950


   Comment: 


 - 2/2 deconditioning, arthritis and PNA


 - Continue PT and tylenol PRN.





   





(4) Electrolyte and fluid disorder


Code(s): E87.8 - OTH DISORDERS OF ELECTROLYTE AND FLUID BALANCE, NEC   SNOMED 

Code(s): 57186561


   Comment: 


 - Sodium was 128 on 2/5


 - Fluid restrict next 24 hours


 - Recheck lytes in AM


   





(5) Troponin level elevated


Current Visit: No   Status: Acute   Code(s): R74.8 - ABNORMAL LEVELS OF OTHER 

SERUM ENZYMES   SNOMED Code(s): 584820735


   Comment: 


 - Initial trops elevated 0.07/0.10/0.12/0.12 with no chest pain and no acute 

EKG changes


 - Discussed with cardiology at admission, no chest pain, likely demand 

ischemia in the setting of pneumonia/CKD   





(6) CKD (chronic kidney disease) stage 3, GFR 30-59 ml/min


Code(s): N18.3 - CHRONIC KIDNEY DISEASE, STAGE 3 (MODERATE)   SNOMED Code(s): 

199582330


   Comment: 


 - At baseline


 - Check labs in AM before DC   





(7) Dyslipidemia


Code(s): E78.5 - HYPERLIPIDEMIA, UNSPECIFIED   SNOMED Code(s): 311642643


   Comment: 


 - Continue statin    





(8) DVT prophylaxis


Code(s): GSZ9733 -    SNOMED Code(s): 797424532


   Comment: 


 - Continue HSQ   


Status and Disposition: 





Remain inpatient for last dose IV rocephin, recheck lytes in AM after fluid 

restriction. If improved, may be discharged to home tomorrow.


Counseling and/or Coordination of Care Minutes: Coordinated with patient and 

staff.

## 2018-03-18 NOTE — RAD
INDICATION: Syncope



COMPARISON: Chest x-ray dated February 02, 2018

 

TECHNIQUE: Single AP portable view of the chest was obtained.



FINDINGS: 



Image quality is compromised due to the relative inferiority of a portable chest x-ray.



Sternotomy wires are again noted as well as surgical clips overlying the mediastinum.



There is a mild degree of cardiomegaly similar in appearance to the previous chest x-ray.

The mediastinal contours are otherwise normal.



Lungs are grossly clear. Pulmonary vasculature is indistinct and mildly engorged similar

in appearance to the previous chest x-ray.



Visualized bones are normal for the patient's age.



IMPRESSION:  Chest x-ray findings are compatible with chronic CHF.

## 2018-03-18 NOTE — UC
Head Injury HPI





- HPI Summary


HPI Summary: 


Mr. Connor 85-year-old gentleman came to the urgent care tonight with his wife 

driving after falling this morning and hitting his head on the bed headboard in 

his room he believes he lost consciousness.  His wife attempting to  get 

medical care for the day and Mr. Vazquez continues to refuse he finally agreed to 

come to the urgent care. Mr. Vazquez has a 12 cm long laceration on the top right 

side of his head that is it reasons both centimeter deep he has some minor 

oozing from wound








- History Of Current Complaint


Chief Complaint: UCLaceration


Stated Complaint: FELL CUT HEAD


Time Seen by Provider: 03/18/18 15:43


Hx Obtained From: Patient, Family/Caretaker


Onset/Duration: Sudden Onset, Other - Happened about 8 hours ago


Severity Currently: Moderate


Severity Initially: Moderate


Pain Intensity: 6


Pain Scale Used: 0-10 Numeric


Aggravating Factor(s): Nothing


Alleviating Factor(s): Nothing


Associated Signs And Symptoms: Positive: LOC Duration Unknown.  Negative: 

Confusion, Memory Loss, Seizure, Dental Malocclusion, Neck Pain, Nausea


Anticoagulant Therapy: ASA





- Allergies/Home Medications


Allergies/Adverse Reactions: 


 Allergies











Allergy/AdvReac Type Severity Reaction Status Date / Time


 


No Known Allergies Allergy   Verified 03/18/18 16:08














PMH/Surg Hx/FS Hx/Imm Hx


Previously Healthy: Yes


Endocrine History: Dyslipidemia


Cardiovascular History: Cardiac Disease, Hypertension


GI/ History: Gastroesophageal Reflux





- Surgical History


Surgical History: Yes


Surgery Procedure, Year, and Place: HERNIA SURGERY 1965-CATARACTS BILATERAL 

EYES 2011,cardiac bypass





- Family History


Known Family History: 


   Negative: Cardiac Disease





- Social History


Occupation: Retired


Lives: With Family


Alcohol Use: None


Substance Use Type: None


Smoking Status (MU): Never Smoked Tobacco


Have You Smoked in the Last Year: No





- Immunization History


Most Recent Influenza Vaccination: 2017


Most Recent Tetanus Shot: 2017


Most Recent Pneumonia Vaccination: 2015





Review of Systems


Constitutional: Negative


Skin: Other - laceration on top of head


Eyes: Negative


ENT: Negative


Respiratory: Negative


Cardiovascular: Negative


Gastrointestinal: Negative


Genitourinary: Negative


Motor: Negative


Neurovascular: Negative


Musculoskeletal: Negative


Neurological: Negative


Psychological: Negative


Is Patient Immunocompromised?: No


All Other Systems Reviewed And Are Negative: Yes





Physical Exam


Triage Information Reviewed: Yes


Appearance: Well-Appearing, No Pain Distress, Well-Nourished


Vital Signs: 


 Initial Vital Signs











Temp  98.4 F   03/18/18 16:03


 


Pulse  60   03/18/18 16:03


 


Resp  16   03/18/18 16:03


 


BP  132/66   03/18/18 16:03


 


Pulse Ox  100   03/18/18 16:03











Vital Signs Reviewed: Yes


Eye Exam: Normal


Eyes: Positive: Conjunctiva Clear


ENT Exam: Normal


ENT: Positive: Normal ENT inspection, Hearing grossly normal, Uvula midline.  

Negative: Nasal congestion, Trismus, Muffled voice, Hoarse voice, Dental 

tenderness


Dental Exam: Other


Dental: Positive: Other: - Poor dentition


Neck exam: Normal


Neck: Positive: Supple, Nontender


Respiratory Exam: Normal


Respiratory: Positive: Chest non-tender, No respiratory distress, No accessory 

muscle use


Cardiovascular Exam: Normal


Cardiovascular: Positive: RRR, Pulses Normal - His R, Brisk Capillary Refill


Musculoskeletal Exam: Normal


Musculoskeletal: Positive: Strength Intact, ROM Intact, No Edema


Neurological Exam: Normal


Neurological: Positive: Alert, Muscle Tone Normal


Psychological Exam: Normal


Psychological: Positive: Normal Response To Family, Age Appropriate Behavior


Skin Exam: Normal


Skin: Positive: Other - 12 cm laceration on top of his head about 15 mm deep 

gaping at its widest about 15 mm





Diagnostics





- EKG


Cardiac Rate: NL


Cardiac Rhythm: Sinus: Normal - Q waves in inferior leads right and left bundle-

branch block


Ectopy: None


ST Segment: Normal





Head Injury Course/Dx





- Course


Course Of Treatment: Wound was clean dressing was applied EKG was no different 

than his last EKG on file sinus rhythm right left bundle branch block rate of 

65 Q waves in the inferior leads again are not new patient is steady on his 

feet with his walker patient adamantly refuses EMS transport to the hospital 

patient discharged to go to the hospital with his wife driving





- Differential Dx/Diagnosis


Provider Diagnoses: Head injury with loss of consciousness





- Physician Notification/Consults


Time Discussed With Above Provider: 16:15 - Shanthi HANKINS





Discharge





- Discharge Plan


Condition: Stable


Disposition: OTHER


Discharge Disposition Comment: to [facility] by private car


Patient Education Materials:  Head Injury (ED)


Referrals: 


Cholo Boo MD [Primary Care Provider] - If Needed


()

## 2018-03-18 NOTE — RAD
INDICATION:  Trauma to occiput after syncopal episode.



COMPARISON: None.



TECHNIQUE: Contiguous axial sections of the brain were obtained from the skull base to the

vertex without contrast.



FINDINGS: 



The ventricles, cisterns and sulci are within normal limits.  



There is mild periventricular and subcortical white matter hypoattenuation most consistent

with mild microvascular disease. Otherwise the gray-white matter differentiation is

adequately maintained and there is no sulcal effacement. No significant focal abnormality

or mass effect is present. 



There is no evidence for intracranial hemorrhage.



There is coarse atherosclerotic calcification of the left vertebral artery. The right

vertebral artery is either extremely diminutive or absent. There is coarse calcification

of the bilateral petrous carotid arteries.



Skin staples overlying the right posterior parietal lobe and occipital. There is

thickening of the subcutaneous tissue associated with the site of laceration. There is no

underlying calvarial fracture.



The visualized portion of the paranasal sinuses appear clear.



The mastoid air cells are well aerated bilaterally.



IMPRESSION:  

1. Laceration overlying the right posterior parietal bone with surgical skin staples and

subcutaneous thickening without underlying calvarial fracture or acute intracranial

hemorrhage.

2. Additional age-appropriate chronic intracranial findings described in the body the

report

## 2018-03-19 NOTE — ED
Carolee GAMBLE Thomas, scribed for Chris Simpson MD on 03/18/18 at 1740 .





Syncope/Near Syncope





- HPI Summary


HPI Summary: 





The patient is a 85 year old male who did not sleep well last night due to 

indigestion. This morning, when he was weighing himself, he fell backward, 

striking the back of his head. The patient is unsure the cause of this fall. 

The fall came suddenly and he did not feel it coming. He has a laceration to 

the top of his head. The patient also complains of tailbone pain. The patient 

denies headache. He is on ASA 81. 





- History Of Current Complaint


Chief Complaint: EDSyncope


Time Seen by Provider: 03/18/18 16:56


Hx Obtained From: Patient


Onset/Duration: Lasting Hours, Still Present, Resolved


Timing: Constant


Context: Unwitnessed


Activity At Onset: Other - Weighing self


Associated Head Trauma: No


Alleviating Factor(s): Nothing


Associated Signs And Symptoms: Other - Laceration, tailbone pain, head injury; 

NEGATIVE: headache





- Allergies/Home Medications


Allergies/Adverse Reactions: 


 Allergies











Allergy/AdvReac Type Severity Reaction Status Date / Time


 


No Known Allergies Allergy   Verified 03/18/18 16:08











Home Medications: 


 Home Medications





Benzonatate CAP* [Tessalon 100 MG CAP*] 100 mg PO TID PRN 03/18/18 [History 

Confirmed 03/18/18]











PMH/Surg Hx/FS Hx/Imm Hx


Endocrine/Hematology History: 


   Denies: Hx Diabetes


Cardiovascular History: Reports: Hx Coronary Artery Disease, Hx 

Hypercholesterolemia, Hx Hypertension


   Denies: Hx Pacemaker/ICD


 History: Reports: Hx Renal Disease - abnormal


   Denies: Hx Dialysis


Musculoskeletal History: Reports: Hx Arthritis, Hx Back Problems - spinal 

stenosis


Sensory History: Reports: Hx Cataracts, Hx Contacts or Glasses


   Denies: Hx Hearing Aid


Opthamlomology History: Reports: Hx Cataracts, Hx Contacts or Glasses


Psychiatric History: 


   Denies: Hx Panic Disorder





- Surgical History


Surgery Procedure, Year, and Place: HERNIA SURGERY 1965-CATARACTS BILATERAL 

EYES 2011,cardiac bypass


Infectious Disease History: No


Infectious Disease History: 


   Denies: Traveled Outside the [place] in Last 30 Days





- Family History


Known Family History: 


   Negative: Cardiac Disease





- Social History


Alcohol Use: None


Substance Use Type: Reports: None


Smoking Status (MU): Never Smoked Tobacco


Have You Smoked in the Last Year: No





Review of Systems


Positive: Other - Tailbone pain


Positive: Other - Laceration


Neurological: Other - Head injury


Negative: Headache


All Other Systems Reviewed And Are Negative: Yes





Physical Exam





- Summary


Physical Exam Summary: 





Appearance: The patient is well-nourished in no acute distress and in no acute 

pain.


 


Skin: The skin is warm and dry and skin color reflects adequate perfusion. 

There is a 10 cm scalp laceration. 


 


HEENT: The head is normocephalic. There is a 10 cm scalp laceration. The pupils 

are equal and reactive. The conjunctivae are clear and without drainage.~Nares 

are patent and without drainage. Mouth reveals moist mucous membranes and the 

throat is without erythema and exudate.  The external ears are intact. The ear 

canals are patent and without drainage. The tympanic membranes are intact.


 


Neck: the neck is supple with full range of motion and non-tender. There are no 

carotid bruits. There is no neck vein distension.


 


Respiratory: Chest is non-tender. Lungs are clear to auscultation and breath 

sounds are symmetrical and equal.


 


Cardiovascular: Heart is regular rate and rhythm.~There is no murmur or rub 

auscultated. There is no peripheral edema and pulses are symmetrical and equal.


 


Abdomen: The abdomen is soft and non-tender. There are normal bowel sounds 

heard in all four quadrants and there is no organomegaly palpated.


 


Musculoskeletal: There is no back tenderness noted. Extremities are non-tender 

with full range of motion. There is good capillary refill. There is no 

peripheral edema or calf tenderness elicited. 


 


Neurological: Patient is alert and oriented to person, place and time. The 

patient has symmetrical motor strength in all four extremities.~Cranial nerves 

are grossly intact. Deep tendon reflexes are symmetrical and equal in all four 

extremities.


 


Psychiatric: The patient has an appropriate affect and does not exhibit any 

anxiety or depression.


Triage Information Reviewed: Yes


Vital Signs On Initial Exam: 


 Initial Vitals











Temp Pulse Resp BP Pulse Ox


 


 98.7 F   69   16   102/59   98 


 


 03/18/18 16:43  03/18/18 16:43  03/18/18 16:43  03/18/18 16:43  03/18/18 16:43











Vital Signs Reviewed: Yes





Procedures





- Laceration/Wound Repair


  ** 1


Location: head


Description: Linear


Anesthesia: Local, 1.0%


Length, Depth and Shape: about 10 CM long and full thickness to scalp.  The 

galea appears to be intact.


Betadine Prep?: Yes


Laceration/Wound Explored: clean


Closure: Staples #__ - 10


Layer Closure?: No


Sterile Dressing Applied?: Yes





Diagnostics





- Vital Signs


 Vital Signs











  Temp Pulse Resp BP Pulse Ox


 


 03/18/18 17:06   66    97


 


 03/18/18 17:04     140/52 


 


 03/18/18 16:43  98.7 F  69  16  102/59  98














- Laboratory


Lab Results: 


 Lab Results











  03/18/18 03/18/18 03/18/18 Range/Units





  18:03 18:03 18:03 


 


WBC  15.0 H    (3.5-10.8)  10^3/ul


 


RBC  3.68 L    (4.0-5.4)  10^6/ul


 


Hgb  11.2 L    (14.0-18.0)  g/dl


 


Hct  34 L    (42-52)  %


 


MCV  93    (80-94)  fL


 


MCH  31    (27-31)  pg


 


MCHC  33    (31-36)  g/dl


 


RDW  19 H    (10.5-15)  %


 


Plt Count  108 L    (150-450)  10^3/ul


 


MPV  10    (7.4-10.4)  um3


 


Neut % (Auto)  73.8    (38-83)  %


 


Lymph % (Auto)  14.4 L    (25-47)  %


 


Mono % (Auto)  10.2 H    (0-7)  %


 


Eos % (Auto)  1.2    (0-6)  %


 


Baso % (Auto)  0.4    (0-2)  %


 


Absolute Neuts (auto)  11.0 H    (1.5-7.7)  10^3/ul


 


Absolute Lymphs (auto)  2.1    (1.0-4.8)  10^3/ul


 


Absolute Monos (auto)  1.5 H    (0-0.8)  10^3/ul


 


Absolute Eos (auto)  0.2    (0-0.6)  10^3/ul


 


Absolute Basos (auto)  0.1    (0-0.2)  10^3/ul


 


Absolute Nucleated RBC  0    10^3/ul


 


Nucleated RBC %  0    


 


INR (Anticoag Therapy)     (0.77-1.02)  


 


Sodium   133   (133-145)  mmol/L


 


Potassium   4.6   (3.5-5.0)  mmol/L


 


Chloride   107   (101-111)  mmol/L


 


Carbon Dioxide   20 L   (22-32)  mmol/L


 


Anion Gap   6   (2-11)  mmol/L


 


BUN   36 H   (6-24)  mg/dL


 


Creatinine   1.97 H   (0.67-1.17)  mg/dL


 


Est GFR ( Amer)   41.8   (>60)  


 


Est GFR (Non-Af Amer)   32.5   (>60)  


 


BUN/Creatinine Ratio   18.3   (8-20)  


 


Glucose   91   ()  mg/dL


 


Lactic Acid    2.1 H*  (0.5-2.0)  mmol/L


 


Calcium   9.4   (8.6-10.3)  mg/dL


 


Magnesium   1.6 L   (1.9-2.7)  mg/dL


 


Total Bilirubin   0.20   (0.2-1.0)  mg/dL


 


AST   31   (13-39)  U/L


 


ALT   32   (7-52)  U/L


 


Alkaline Phosphatase   155 H   ()  U/L


 


Troponin I   0.06 H*   (<0.04)  ng/mL


 


Total Protein   7.6   (6.4-8.9)  g/dL


 


Albumin   3.5   (3.2-5.2)  g/dL


 


Globulin   4.1 H   (2-4)  g/dL


 


Albumin/Globulin Ratio   0.9 L   (1-3)  


 


TSH   2.08   (0.34-5.60)  mcIU/mL


 


Urine Color     


 


Urine Appearance     


 


Urine pH     (5-9)  


 


Ur Specific Gravity     (1.010-1.030)  


 


Urine Protein     (Negative)  


 


Urine Ketones     (Negative)  


 


Urine Blood     (Negative)  


 


Urine Nitrate     (Negative)  


 


Urine Bilirubin     (Negative)  


 


Urine Urobilinogen     (Negative)  


 


Ur Leukocyte Esterase     (Negative)  


 


Urine WBC (Auto)     (Absent)  


 


Urine RBC (Auto)     (Absent)  


 


Urine Bacteria     (Absent)  


 


Urine Glucose     (Negative)  














  03/18/18 03/18/18 Range/Units





  18:03 20:47 


 


WBC    (3.5-10.8)  10^3/ul


 


RBC    (4.0-5.4)  10^6/ul


 


Hgb    (14.0-18.0)  g/dl


 


Hct    (42-52)  %


 


MCV    (80-94)  fL


 


MCH    (27-31)  pg


 


MCHC    (31-36)  g/dl


 


RDW    (10.5-15)  %


 


Plt Count    (150-450)  10^3/ul


 


MPV    (7.4-10.4)  um3


 


Neut % (Auto)    (38-83)  %


 


Lymph % (Auto)    (25-47)  %


 


Mono % (Auto)    (0-7)  %


 


Eos % (Auto)    (0-6)  %


 


Baso % (Auto)    (0-2)  %


 


Absolute Neuts (auto)    (1.5-7.7)  10^3/ul


 


Absolute Lymphs (auto)    (1.0-4.8)  10^3/ul


 


Absolute Monos (auto)    (0-0.8)  10^3/ul


 


Absolute Eos (auto)    (0-0.6)  10^3/ul


 


Absolute Basos (auto)    (0-0.2)  10^3/ul


 


Absolute Nucleated RBC    10^3/ul


 


Nucleated RBC %    


 


INR (Anticoag Therapy)  0.94   (0.77-1.02)  


 


Sodium    (133-145)  mmol/L


 


Potassium    (3.5-5.0)  mmol/L


 


Chloride    (101-111)  mmol/L


 


Carbon Dioxide    (22-32)  mmol/L


 


Anion Gap    (2-11)  mmol/L


 


BUN    (6-24)  mg/dL


 


Creatinine    (0.67-1.17)  mg/dL


 


Est GFR ( Amer)    (>60)  


 


Est GFR (Non-Af Amer)    (>60)  


 


BUN/Creatinine Ratio    (8-20)  


 


Glucose    ()  mg/dL


 


Lactic Acid    (0.5-2.0)  mmol/L


 


Calcium    (8.6-10.3)  mg/dL


 


Magnesium    (1.9-2.7)  mg/dL


 


Total Bilirubin    (0.2-1.0)  mg/dL


 


AST    (13-39)  U/L


 


ALT    (7-52)  U/L


 


Alkaline Phosphatase    ()  U/L


 


Troponin I    (<0.04)  ng/mL


 


Total Protein    (6.4-8.9)  g/dL


 


Albumin    (3.2-5.2)  g/dL


 


Globulin    (2-4)  g/dL


 


Albumin/Globulin Ratio    (1-3)  


 


TSH    (0.34-5.60)  mcIU/mL


 


Urine Color   Straw  


 


Urine Appearance   Clear  


 


Urine pH   5.0  (5-9)  


 


Ur Specific Gravity   1.009 L  (1.010-1.030)  


 


Urine Protein   Negative  (Negative)  


 


Urine Ketones   Negative  (Negative)  


 


Urine Blood   1+ A  (Negative)  


 


Urine Nitrate   Negative  (Negative)  


 


Urine Bilirubin   Negative  (Negative)  


 


Urine Urobilinogen   Negative  (Negative)  


 


Ur Leukocyte Esterase   Negative  (Negative)  


 


Urine WBC (Auto)   Absent  (Absent)  


 


Urine RBC (Auto)   Trace(0-2/hpf)  (Absent)  


 


Urine Bacteria   Absent  (Absent)  


 


Urine Glucose   Negative  (Negative)  











Result Diagrams: 


 03/19/18 05:04





 03/19/18 05:04


Lab Statement: Any lab studies that have been ordered have been reviewed, and 

results considered in the medical decision making process.





- Radiology


  ** CXR


Xray Interpretation: No Acute Changes - Chest x-ray findings are compatible 

with chronic CHF. Dr. Simpson has reviewed this report.


Radiology Interpretation Completed By: Radiologist





- EKG


  ** 18:02


Cardiac Rate: NL


EKG Rhythm: Sinus Rhythm - at 67 BPM


EKG Interpretation: RBBB. First degree block, unchanged from prior EKG on 2/02/ 18. 





Re-Evaluation





- Re-Evaluation


  ** First Eval


Re-Evaluation Time: 20:56


Comment: Discussed results. Patient will be admitted.





Course/Dx


Course Of Treatment: Mr. Vazquez presented after a nasty fall this AM after he had 

gotten up and was in the BR to weigh himself.  He didn't feel well when he woke 

but that passed and he felt fine when he got out of bed.  He had no warning and 

found himself on the floor of the BR.  His only C/O at this time is the 

laceration on the top of his head.  He was W/U'd for syncope and I asked the 

hospitalist service to admit him as he appears to have had some chest 

discomfort during the night and possibly true syncope this AM.  I repaired the 

laceration on his head.





- Diagnoses


Provider Diagnoses: 


 Syncope, Scalp laceration








- Physician Notifications


Discussed Care of Patient With: Ramonita Hidalgo


Time Discussed With Above Provider: 21:00


Instructed by Provider To: Admit As Inpatient





Discharge





- Discharge Plan


Condition: Stable


Disposition: ADMITTED TO [facility]





The documentation as recorded by the Carolee aleman Thomas accurately reflects 

the service I personally performed and the decisions made by me, Chris Simpson MD.

## 2018-03-19 NOTE — CARD
CC:  Dr. Tay Corcoran; Dr. Boo *

 

EVENT MONITOR IMPLANTATION:

 

DATE OF PROCEDURE:  03/19/18

 

PROCEDURE:  Event monitor implantation.

 

INDICATION:  Syncope, coronary artery disease.

 

The patient is an 85-year-old gentleman with a history of coronary artery 
disease, history of coronary bypass surgery, history of a right bundle branch 
block, who had an unexplained syncope.  Event monitor implantation was 
recommended for cardiac monitoring.

 

DESCRIPTION OF PROCEDURE:  The patient was brought to the procedure room.  His 
anterior chest was prepped and draped in the usual fashion.  1% lidocaine was 
used for local anesthesia.  An event monitor was injected subcutaneously 
without difficulty.  The event monitor is a [employer] LINQ, serial number 
[id number] that had an R-wave sensitivity of 0.8 millivolts.  Dressing was 
applied.  The patient was returned to his room in stable condition.

 

 [id number]/CPS #: [id number]

MTDD

## 2018-03-19 NOTE — CONS
CC:  Dr. Corcoran; Dr. Boo *

 

CARDIOLOGY CONSULTATION:

 

DATE OF CONSULT:  03/19/18

 

INDICATION FOR CONSULTATION:  Syncope.

 

HISTORY OF PRESENT ILLNESS:  The patient is an 85-year-old gentleman with a 
history of coronary artery disease, history of coronary artery bypass surgery, 
history of congestive heart failure, who was admitted to the hospital after 
having a syncopal episode at home.  The patient states that he got up in the 
night, he went to use the bathroom.  He was feeling slightly nauseous when he 
got up.  He said he took 5 steps to get to the bathroom and 2 steps to get into 
the bathroom and then wound up on the floor.  He had no prodrome.  He had no 
palpitations.  He had no chest pain. He just instantly woke up on the floor.  
He did strike his head on the bathtub and was brought to the emergency room.  
Since being in the hospital, he has been just fine, his CAT scan was 
unremarkable.  His telemetry has shown no significant arrhythmias.

 

The patient was in the hospital in February for congestive heart failure.  He 
was treated appropriately and discharged.

 

PAST MEDICAL HISTORY:  Significant for coronary artery disease, coronary artery 
bypass surgery as detailed by Dr. Bran's consultation on 02/02/18, also 
history of hypertension.  Patient is on Coumadin, I am not exactly sure why, 
but it may be due to history of atrial fibrillation.

 

OUTPATIENT MEDICATIONS:

1.  Omeprazole 40 mg a day.

2.  Metoprolol tartrate 37.5 mg b.i.d.

3.  Losartan 25 mg b.i.d.

4.  Atorvastatin 10 mg a day.

5.  Lasix 40 mg a day.

6.  Aspirin 81 mg a day.

7.  Potassium 20 mEq a day.

 

ALLERGIES:  No known drug allergies.

 

SOCIAL HISTORY:  He is retired.  He lives with his wife.  He denies tobacco or 
alcohol use.

 

REVIEW OF SYSTEMS:  Negative for fevers and chills.  Negative for changes of 
bowel and bladder habit.  Negative for changes in weight.  Other 10-point 
review is unremarkable.

 

PHYSICAL EXAM:  Height is 5 feet 5 inches, weight is 148 pounds.  Heart rate is 
82, blood pressure 155/64, respiratory rate is 16, oxygen saturation 95% on 
room air, temperature 98.7 degrees Fahrenheit.  Sclerae anicteric.  Oropharynx 
is pink without erythema.  Carotids are 2+ without bruits.  JVD is normal.  
Thyroid is normal.  Cardiac Exam:  S1, S2 without any murmurs, rubs or gallops.
  PMI is normal.  Lungs are clear to auscultation bilaterally.  There is no 
dullness to percussion.  Abdomen is soft, nontender, nondistended, with 
normoactive bowel sounds.  Extremities:  Show no edema.  He has 2+ pulses 
throughout.  The patient is awake, alert, and oriented.  He moves all 4 
extremities equally.

 

DIAGNOSTIC STUDIES/LAB DATA:  White count 10.4, hemoglobin 10, hematocrit 31, 
platelet count 86,000.  Chemistries within normal limits.  BUN 35, creatinine 
1.7 which is at his baseline.  Troponin 0.06.  TSH is normal.

 

EKG shows normal sinus rhythm with a right bundle-branch block, unchanged from 
previous EKGs.

 

IMPRESSION:  This is an 85-year-old gentleman with a history of coronary artery 
disease, history of coronary artery bypass surgery a year ago, who came into 
the hospital after a syncopal episode at home.

 

In listening to his story, it sounds more like orthostatic hypotension after 
getting out of bed too quickly.  However, given his history of coronary artery 
disease, history of right bundle-branch block, I cannot rule out arrhythmia as 
a cause for the syncope.  Because of his high risk for arrhythmias, my 
recommendation is the patient undergo event monitor implantation for continuous 
evaluation.  The patient has accepted this recommendation.  The patient will 
undergo event monitor implantation today.  I will see the patient in followup 
in 1 week and then he will continue to follow up with Dr. Corcoran.

 

 [id number]/CPS #: [id number]

MARIO

## 2018-03-19 NOTE — HP
CC:  Dr. Boo *

 

ADMISSION HISTORY AND PHYSICAL:

 

DATE OF ADMISSION:  03/18/18

 

PRIMARY CARE PROVIDER:  Dr. Boo.

 

CARDIOLOGIST:  Dr. Bran.

 

ATTENDING PHYSICIAN FOR TODAY:  Dr. Ramonita Hidalgo.* (DICTATED BY DIVINA OLIVA NP)

 

CHIEF COMPLAINT:  Syncope and fall.

 

HISTORY OF PRESENT ILLNESS:  This is an 85-year-old male patient well known to 
our service from previous hospitalizations that reports he got up this morning, 
walked into the bathroom to weigh himself as he does every morning because 
patient the has a chronic heart failure and as he stepped on to the scale, he 
said he does not remember anything after that, woke up on the floor with his 
wife trying to wake him up and move him and asked him what happened.  He fell 
backwards and struck his head.  He does have a substantial laceration to the 
scalp, which has been stapled in the emergency department.  The wound is clean 
and dry, not bleeding at this point.  The patient is saying that again he has 
no recollection of the event. There were no predicating factors, states he did 
not feel any chest pain or shortness of breath.  He said the only thing he did 
feel was a bit of wooziness, but not overt dizziness prior to the event.

 

PAST MEDICAL HISTORY:  Significant for chronic heart failure, GERD, hypertension
, hyperlipidemia, and coronary artery disease.

 

PAST SURGICAL HISTORY:  Significant for hernia repair with multiple 
complications, several surgeries for adhesions, cataract surgery, and CABG in 
2017.

 

MEDICATIONS:  At home, include:

1.  Omeprazole 40 mg daily.

2.  Metoprolol tartrate 37.5 mg 2 times a day.

3.  Losartan 25 mg 2 times a day.

4.  Atorvastatin 10 mg daily.

5.  Metamucil 1 packet daily.

6.  Furosemide 40 mg daily.

7.  Vitamin D 1000 units daily.

8.  Low-dose aspirin 81 mg daily.

9.  Potassium chloride 20 mEq.

 

ALLERGIES:  The patient has no known drug allergies.

 

SOCIAL HISTORY:  The patient denies tobacco use.  Denies alcohol use.  Denies 
any illicit drug use.  He is retired and lives at home with his wife, Javier.  
His wife, Javier, is his surrogate decision maker.  The patient is a full code.  
This has been clarified with the patient.

 

REVIEW OF SYSTEMS:  A 10-point review of systems is negative, except what is 
noted in the HPI.

 

                               PHYSICAL EXAMINATION

 

GENERAL:  The patient is alert, in no acute distress.

 

VITAL SIGNS:  Currently, blood pressure 165/70, heart rate 79, respiratory rate 
19, satting 99% on room air, temperature is 98.7.

 

HEENT:  The patient is normocephalic with traumatic laceration at the top of 
his head.  It is stapled, dry, and intact.

 

NECK:  Supple, nontender.  No JVD noted.  No carotid bruits auscultated.

 

LUNGS:  Clear bilaterally at the apices to auscultation.  He is diminished at 
the bases with no rhonchi or rales noted.

 

CARDIOVASCULAR:  S1 and S2 are present.  He does have a grade II/VI systolic 
murmur.  Rate and rhythm are currently regular on telemetry.  He has no ectopy 
at this time.

 

ABDOMEN:  Soft, nontender, nondistended.  Positive bowel sounds in all 4 
quadrants.

 

:  Deferred.

 

MUSCULOSKELETAL:  There is no clubbing and no cyanosis.  He has some bipedal 
edema at his baseline.

 

NEUROLOGIC:  He is grossly intact.  Extraocular movements are intact with no 
focal deficits.

 

PSYCHIATRIC:  He is cooperative and appropriate.

 

 DIAGNOSTIC STUDIES/LAB DATA:  WBCs 15.0, RBCs 3.68, hemoglobin 11.2, 
hematocrit 34, platelets are 108.  Sodium 133, potassium 4.6, chloride 107, CO2 
20, BUN 36, creatinine 1.97, GFR 32.5, lactic acid 2.1, calcium 9.4, magnesium 
1.6.  Bilirubin 0.2, AST 31, ALT 32, alk phos 155.  Troponin is 0.06.  Albumin 
3.5, globulin 4.1. TSH 2.08 and protein is 7.6.

 

Imaging:  Chest x-ray shows chronic heart failure.

 

EKG:  Shows sinus rhythm with some premature atrial complexes and a right 
bundle branch block and an old inferior infarct with no acute changes from 
previous his EKG.

 

IMPRESSION:  This is an 85-year-old male patient with complicated cardiac 
history, who syncopized today with no known precipitating events.

 

PLAN:  The patient will be admitted to medical service.

 

DIAGNOSES:

1.  Syncope and fall.

2.  Laceration of the scalp.

3.  History of chronic congestive heart failure.

4.  Hypertension.

5.  Gastroesophageal reflux disease.

6.  Chronically elevated troponins.

7.  History of chronic kidney disease at baseline.

 

Current plan is to get a CAT scan on the patient's head, this has not happened 
in the ER yet, just to make sure the patient does not have any further 
pathology or brain bleeding.  I have reached out to Dr. Davian Severino of the 
cardiology service to have Cardiology consult on the patient tomorrow.  His 
recent echocardiograms he had in January and in February shows his ejection 
fraction in approximately 50% with no acute changes between his January and 
February hospitalizations.  I do not feel it is warranted to do another echo on 
him now, so as such he will be on telemetry for the night tonight, have 
Cardiology see him tomorrow and determine if the patient should have Holter 
monitor as an outpatient.

 

For his chronically elevated troponin, given his kidney disease and chronic 
heart failure, I have evaluated his numbers, his troponin is usually around 0.06
, so this is not new for him and the patient is not having chest pain.

 

For his chronic heart failure, we will continue his home medications of his 
aspirin, his losartan, furosemide, and his metoprolol.

 

For his GERD, we will continue his Prilosec and his psyllium.

 

Also with his heart failure given he is on diuretics, we will continue to 
replete his potassium with his K-Clor daily.

 

For his hyperlipidemia, continue his Lipitor.

 

We will also give him 1 g of magnesium right now for his low mag, it could be 
considered in the differential that the patient had a ventricular arrhythmia, 
so we will replete his magnesium and again keep him on telemetry and look for 
any further ventricular ectopy.  We looked to Cardiology for guidance on the 
rest of the patient's course.  If there is any additional testing that 
Cardiology would like, we will do that tomorrow. In the meantime, we will 
follow the results for the head CT. The patient will go to the telemetry unit 
when bed is available.  The rest of the patient's course will be determined by 
further diagnostic laboratories and any other input from other providers 
warranted during this admission.  We will continue to follow with the patient 
closely.  This plan of care has been discussed with Dr. Ramonita Hidalgo, the 
attending on this case, and she is in agreement with plan.

 

 ____________________________________ DIVINABHAVYA OLIVA NP

 

[id number] #: [id number]

[facility]

## 2018-03-20 NOTE — DS
CC:  Dr. Boo *

 

DISCHARGE SUMMARY:

 

DATE OF ADMISSION:  03/18/18

 

DATE OF DISCHARGE:  03/19/18

 

PROVIDER:  Karen Kolb NP

 

ATTENDING PHYSICIAN:  Bebo David MD * (as dictated by Karen Kolb NP)
.

 

CONSULTING PHYSICIAN:  Davy Faria MD

 

PRIMARY CARDIOLOGIST:  Dr. Bran.

 

PRIMARY CARE PROVIDER:  Dr. Boo.

 

PRIMARY DISCHARGE DIAGNOSES:

1.  Syncope with fall.

2.  Laceration of his scalp.

 

SECONDARY DISCHARGE DIAGNOSES:

1.  Chronic heart failure.

2.  Gastroesophageal reflux disease.

3.  Hypertension.

4.  Hyperlipidemia.

5.  Coronary artery disease.

 

HOME MEDICATIONS AT DISCHARGE:

1.  Omeprazole 20 mg q.a.m.

2.  Metoprolol 37.5 mg b.i.d.

3.  Losartan 25 mg b.i.d.

4.  Atorvastatin 10 mg daily.

5.  Metamucil 1 packet daily.

6.  Furosemide 40 mg daily.

7.  Vitamin D 1000 units daily.

8.  Aspirin 81 mg daily.

9.  Potassium chloride 20 mEq daily.

10.  Benzonatate 100 mg t.i.d. p.r.n.

 

HOSPITAL COURSE OF STAY:  For full details, please refer to the H and P 
provided by nurse practitioner, Austyn.

 

HISTORY OF PRESENT ILLNESS:  Mr. Vazquez is an 85-year-old gentleman who 
unfortunately had a fall following a syncopal episode.  He apparently got up to 
weigh himself in the morning and walked into his bathroom.  He then does not 
recall what had happened.  He sustained laceration to the scalp, which was 
stapled in the emergency department.  He was then admitted for further 
observation for syncope.  He was seen in the morning by Dr. Faria of Cardiology
, who felt that this could represent syncope or orthostatic hypotension; however
, he did recommend placement of an event monitor, which was done today.  The 
patient tolerated it well and he was given the okay for discharge to home with 
followup with Cardiology in 1 week.  No notable events noted on telemetry while 
he was here.  The patient denies any chest pain, shortness of breath, any 
further dizziness.  He was monitored, ambulating, was able to do so without 
incident.  He feels at his baseline.  He denies any headache, changes in vision
, nausea, vomiting, weakness, or other complaints.  His CT scan was 
unremarkable for any acute pathology.

 

PHYSICAL EXAMINATION:  Vital Signs:  Temperature 97.7, pulse rate 68, 
respiratory rate 20, blood pressure 101/45, O2 saturation 99% on room air.  
HEENT:  Head is with a significant scalp laceration, requiring 10 staples.  
Pupils are equal and round, reactive to light.  Extraocular movements are 
intact.  Oral mucosa is moist. Neck is supple, nontender, with full range of 
motion.  No JVD noted.  Cardiac:  S1, S2 heart sounds.  The patient has a grade 
2 systolic murmur.  Regular rate and rhythm.  He does have some trace pretibial 
edema bilaterally.  Lungs are clear to auscultation, but diminished.  Abdomen 
is soft, nontender, nondistended.  Bowel sounds normoactive.  Musculoskeletal:  
There is no clubbing or cyanosis.  Neuro: No focal deficits noted.  Speech is 
clear.  Cranial nerves II through XII are grossly intact.

 

DIET:  Low sodium diet.

 

ACTIVITY:  As tolerated.

 

CONDITION:  Improved, stable.

 

DISPOSITION:  To home with close followup.

 

TIME SPENT:  Time spent on this discharge was approximately 40 minutes. Again, 
this is only a brief summary of the patient's hospital course of stay.  For 
full details, please refer to the full medical record. If you have any further 
questions or need further assistance, please feel to contact me at [phone].

 

____________________________________ 

KAREN KOLB NP

 

[id number]/CPS #: [id number]

MARIO

## 2018-05-01 NOTE — RAD
HISTORY: Syncope



COMPARISONS: March 18, 2018



VIEWS: 1: frontal portable view of the chest at 6:00 PM



FINDINGS:

LINES AND TUBES: None.

CARDIOMEDIASTINAL SILHOUETTE: The cardiomediastinal silhouette is normal for portable

technique.

PLEURA: The costophrenic angles are sharp. No pleural abnormalities are noted.

LUNG PARENCHYMA: The lung volumes are low.

ABDOMEN: The upper abdomen is clear. There is no subphrenic gas.

BONES AND SOFT TISSUES: The patient is status post median sternotomy.



IMPRESSION: LOW LUNG VOLUMES. LUNGS ARE CLEAR FOR THE PHASE OF RESPIRATION.

## 2018-05-01 NOTE — XMS REPORT
Carmelo Vazquez JR

 Created on:April 3, 2018



Patient:JR Vazquez Lloyd R

Sex:Male

:1932

External Reference #:[id number]





Demographics







 Address  [address]



   [place] [address]

 

 Home Phone  [phone]

 

 Preferred Language  English

 

 Marital Status  Not  Or 

 

 Druze Affiliation  Unknown

 

 Race  White

 

 Ethnic Group  Not  Or 









Author







 Organization  [facility]

 

 Address  [address]



   [place] [address]

 

 Phone  [phone]









Support







 Name  Relationship  Address  Phone

 

 Catherine Vazquez  Unavailable  Unavailable  Unavailable









Care Team Providers







 Name  Role  Phone

 

 Cholo Boo MD  Primary Care Physician  Unavailable









Payers







 Type  Date  Identification Numbers  Payment Provider  Subscriber

 

 Medicare Primary  Effective:  Policy Number:  Medicare  Carmelo Vazquez JR



   1999  [id number]    









 PayID: [id number]  [address]









 [place] [address]









 Medigap Part B  Effective:  Policy Number:  [employer]  Carmelo Vazquez,



   1991  [id number]    









 PayID: [id number]  [address]









 [place] [address]









 Medigap Part B  Effective: 1998  Policy Number:   Paris MENDEZ  Catherine Vazquez



     [id number]    









 Expires: 2017  Group Number: [id number]  [address] [address]









 PayID: [id number]  [place] [address]









 Commercial  Effective: 12/15/2016  Policy Number:  [employer]  Carmelo Vazquez JR



     [id number]    









 Expires: 10/23/2018  Group Number: [id number]%  [address] [facility] [facility]









 PayID: [id number]  [address] [address]







Problems







 Date  Description  Provider  Status

 

 Onset: 2017  Aortic valve disorder  Tay Corcoran M.D., KIMBER,  Active



     [facility]  

 

 Onset: 2017  Athscl heart disease of native  Tay Corcoran M.D., KIMBER,  
Active



   coronary artery w/o ang pctrs  [facility]  

 

 Onset: 2017  Essential hypertension  Tay Corcoran M.D., KIMBER,  Active



     [facility]  

 

 Onset: 2017  Hyperlipidemia  Tay Corcoran M.D., [facility],  Active



     [facility]  

 

 Onset: 2017  Right bundle branch block  Tay Corcoran M.D., [facility],  Active



     [facility]  

 

 Onset: 2017  Paroxysmal atrial fibrillation  Tay Corcoran M.D., [facility],  
Active



     [facility]  

 

 Onset: 2017  Chronic kidney disease stage 3  Tay Corcoran M.D., [facility],  
Active



     [facility]  







Social History







 Type  Date  Description  Comments

 

 Marital Status      

 

 Occupation    Retired  

 

 ETOH Use    Drinks Alcoholic Beverages Rarely  once a year

 

 Smoking    Patient has never smoked  

 

 Recreational Drug Use    Denies Drug Use  

 

 Daily Caffeine    Does Not Consume Caffeine  

 

 Exercise Type/Frequency    Exercises rarely  







Allergies, Adverse Reactions, Alerts







 Date  Description  Reaction  Status  Severity  Comments

 

 2017  NKDA    active    







Medications







 Medication  Date  Status  Form  Strength  Qnty  SIG  Indications  Ordering



                 Provider

 

 Benzonatate  /  Active  Capsules  100mg  45caps  1 capsule    Davy PARKINSON



   2018          3 times    Brand,



             daily as    M.D.



             needed    

 

 Cozaar  /  Active  Tablets  25mg    1 by mouth    Tay



             twice    Stefek,



             daily    M.D.,



                 [facility]

 

 Multi Complete  /  Active  Capsules      daily    Unknown



                 

 

 Metamucil  /  Active  Capsules  0.52gm    1 cap by    Unknown



             mouth    



             twice a    



             day with    



             water    

 

 Lipitor  /  Active  Tablets  10mg  90tabs  1 by mouth    Tay



             every    Stefek,



             night at    M.D.,



             bedtime    [facility]

 

 Metoprolol  /  Active  Tablets  37.5mg    one ta bid    Tay



 Tartrate                BEHZAD Corcoran,



                 [facility]

 

 Aspirin Adult  /  Active  Tablets DR  81mg    1 by mouth    Unknown



 Low Dose  0000          every day    

 

 Lasix  /  Active  Tablets  40mg    1 by mouth    Unknown



   0000          every day    

 

 Omeprazole  /  Active  Capsules  20mg    1 by mouth    Unknown



   0000    DR      every day    

 

 Vitamin D  /  Active  Tablets  1000Unit    by mouth    Unknown



   0000          everyday    

 

 Potassium  /  Active  Tablets ER  20Meq    1 by mouth    Unknown



 Chloride ER  0000          every day    

 

                 

 

 Potassium  /  Hx  Solution  20Meq/50ML    twice    Unknown



 Chloride  2017 -          daily    



   2017              

 

 Digoxin  /  Hx  Tablets  125mcg  30tabs  1 by mouth    Tay



    -          every day    Stefek,



   /              MTIFFANY,



                 [facility]

 

 Cozaar  /  Hx  Tablets  50mg  30tabs  2 by mouth    Tay



   2017 -          every day    Nilo



   /              M.D.,



   2018              [facility],



                 [facility]

 

 Norco  /  Hx  Tablets  5-325mg    one to two    Unknown



   0000 -          tabs by    



   08/01/          mouth    



   2017          every 4-6    



             hours as    



             needed    



             pain    

 

 Coumadin  /  Hx  Tablets  1mg    take as    Unknown



   0000 -          directed    



   2018              

 

 Digoxin  /  Hx  Tablets  125mcg    1 by mouth    Unknown



   0000 -          every day    



   2017              







Vital Signs







 Date  Vital  Result  Comment

 

 2018  Height  66 inches  5'6"









 Weight  156.00 lb  w/ shoes

 

 Heart Rate  56 /min  

 

 BP Systolic Standing  115 mmHg  lue reg cuff

 

 BP Diastolic Standing  50 mmHg  lue reg cuff

 

 Respiratory Rate  18 /min  

 

 BMI (Body Mass Index)  25.2 kg/m2  

 

 Ejection Fraction  50-55%  echo 2017  Height  66 inches  5'6"









 Weight  165.00 lb  with shoes

 

 Heart Rate  60 /min  sit and 68 stand HR

 

 BP Systolic Sitting  146 mmHg  Rue reg cuff

 

 BP Diastolic Sitting  60 mmHg  Rue reg cuff

 

 BP Systolic Standing  152 mmHg  Rue reg cuff

 

 BP Diastolic Standing  80 mmHg  Rue reg cuff

 

 Respiratory Rate  17 /min  

 

 BMI (Body Mass Index)  26.6 kg/m2  









 2017  Height  66 inches  5'6"









 Weight  164.50 lb  with shoes

 

 Heart Rate  62 /min  

 

 BP Systolic Sitting  108 mmHg  LA reg cuff

 

 BP Diastolic Sitting  58 mmHg  LA reg cuff

 

 BP Systolic Standing  112 mmHg  LA reg cuff

 

 BP Diastolic Standing  62 mmHg  LA reg cuff

 

 BMI (Body Mass Index)  26.5 kg/m2  

 

 Ejection Fraction  60%  echo 17







Results







 Test  Date  Test  Result  H/L  Range  Note

 

 Laboratory test finding  2017  Alt (SGPT)  31 U/L    7-52  









 Ast (Sgot)  34 U/L    13-39  









 Lipid Profile (Trig/Chol/HDL)  2017  Triglycerides  99 mg/dL      1









 Cholesterol  112 mg/dL      2

 

 HDL Cholesterol  20.2 mg/dL      3

 

 LDL Cholesterol  72 mg/dL      4









 Basic Metabolic Panel  2017  Sodium  136 mmol/L    133-145  









 Potassium  4.0 mmol/L    3.5-5.0  

 

 Chloride  104 mmol/L    101-111  

 

 Co2 Carbon Dioxide  23 mmol/L    22-32  

 

 Anion Gap  9 mmol/L    2-11  

 

 Glucose  101 mg/dL  High    

 

 Blood Urea Nitrogen  19 mg/dL    6-24  

 

 Creatinine  1.41 mg/dL  High  0.67-1.17  

 

 BUN/Creatinine Ratio  13.5    8-20  

 

 Calcium  8.6 mg/dL    8.6-10.3  

 

 Egfr Non-  47.8    &gt;60  

 

 Egfr   61.4    &gt;60  5









 1  Desirable &lt;150



   Borderline high 150-199



   High 200-499



   Very High &gt;500

 

 2  Desirable &lt;200



   Borderline high 200-239



   High &gt;239

 

 3  Low &lt;40



   Desirable: 40-60



   High: &gt;60

 

 4  Desirable: &lt;100 mg/dL



   Near Optimal: 100-129 mg/dL



   Borderline High: 130-159 mg/dL



   High: 160-189 mg/dL



   Very High: &gt;189 mg/dL

 

 5  *******Because ethnic data is not always readily available,



   this report includes an eGFR for both -Americans and



   non- Americans.****



   The National Kidney Disease Education Program (NKDEP) does



   not endorse the use of the MDRD equation for patients that



   are not between the ages of 18 and 70, are pregnant, have



   extremes of body size, muscle mass, or nutritional status,



   or are non- or non-.



   According to the National Kidney Foundation, irrespective of



   diagnosis, the stage of the disease is based on the level of



   kidney function:



   Stage Description                      GFR(mL/min/1.73 m(2))



   1     Kidney damage with normal or decreased GFR       90



   2     Kidney damage with mild decrease in GFR          60-89



   3     Moderate decrease in GFR                         30-59



   4     Severe decrease in GFR                           15-29



   5     Kidney failure                       &lt;15 (or dialysis)







Procedures







 Date  CPT Code  Description  Status

 

 2018  73496  Implant Cardiac Loop Recorder  Completed

 

 2018  90171  Echocardiogram, Limited Study  Completed

 

 2018  72964  ECHO Transthorasic Realtime 2D W Doppler &amp; Color  
Completed



     [facility]  

 

 2017  17630  EKG Tracing &amp; Interpretation  Completed

 

 2017  11636  EKG Tracing &amp; Interpretation  Completed

 

 2017  43759  EKG, Interpretation Only  Completed

 

 2017  98092  Left Heart Cath. Incl S/I Coronaries, Angio S/I V Gram  
Completed



     If Done  

 

 2006  12816  Color Doppler  Completed

 

 2006  27808  Pulse Doppler &amp; Continuous Wave  Completed

 

 2006  81364  Pulse Doppler &amp; Continuous Wave  Completed

 

 2006  97031  Echocardiogram  Completed







Encounters







 Type  Date  Location  Provider  CPT E/M  Dx

 

 Office Visit  2018  [facility],  Tatyana Caldwell NP  [address]  
R55



   9:03a  Hospitalists      









 S01.01xA

 

 I50.32

 

 I10









 Office Visit  2018  9:02a  [facility]  [facility]  
[address]  R55



     Hospitalists  SURYA Zaman    









 S01.01xA

 

 I50.32

 

 I10









 Office Visit  2018  9:29a  [facility],  Eze Baltazar MD  [address]
  I50.9



     Hospitalists      









 J18.9

 

 E87.8

 

 N18.3









 Office Visit  2018  9:28a  [facility],  [facility]  
[address]  I50.9



     Hospitalists  SURYA Zaman    









 J18.9

 

 E87.8

 

 N18.3









 Office Visit  2018  9:27a  [facility],  Natividad Camacho DO  
[address]  I50.9



     Hospitalists      









 J18.9

 

 M79.602









 Office Visit  2018  9:26a  [facility],  Lakhwinder David,  
[address]  M79.602



     Hospitalists  M.DMaureen    









 I50.9

 

 J18.9

 

 E87.8









 Office Visit  2018  9:25a  [facility],  Lakhwinder David  
[address]  M79.602



     Hospitalists  M.D.    









 I50.9

 

 J18.9

 

 E87.8









 Office Visit  2018  9:24a  [facility],  Lakhwinder David  
[address]  M79.602



     Hospitalists  M.D.    









 I50.9

 

 J18.9

 

 E87.8









 Office Visit  2018  9:56a  [facility]  Qutaybeh S. Maghaydah,  
[address]  R50.9



       M.TESHA    









 R53.83

 

 D72.829

 

 R94.31

 

 I45.19

 

 I44.4

 

 I25.10

 

 I10

 

 R07.9









 Office Visit  2018  9:22a  [facility]  [address]  
M79.602



     [facility]  SURYA Zaman    









 I50.9

 

 J18.9

 

 E87.8









 Office Visit  2018  7:10a  [facility]  Lakhwinder David,  
[address]  I50.9



     Hospitalists  M.D.    









 R06.09

 

 I25.10









 Office Visit  2018  7:09a  [facility]  Lakhwinder David,  
[address]  I50.9



     Hospitalists  M.D.    









 R06.09

 

 I25.10









 Office Visit  2018  7:09a  [facility]  Anita Bassett,  
[address]  I50.9



     Hospitalists  M.D.    









 I25.10

 

 R06.09









 Office Visit  01/15/2018  7:08a  [facility]  Eze Baltazar MD  [address]
  I50.9



     Hospitalists      









 R06.09

 

 I25.10









 Office Visit  2017 11:20a  [facility]  Tay Corcoran M.D.,  
[address]  I25.10



     [facility], [facility]    









 I35.0

 

 I12.9

 

 E78.5

 

 N18.3

 

 I48.0









 Office Visit  2017 11:20a  [facility]  Tay Corcoran M.D.,  
[address]  I25.10



     [facility], [facility]    









 I35.0

 

 I35.1

 

 I10

 

 E78.5

 

 I45.10

 

 I48.0









 Office Visit  2017 10:16a  [facility] Rashel Corcoran M.D.,  
15863  R07.9



     [facility], [facility]    









 R94.31







Plan of Care

Future Appointment(s):2018  1:20 pm - Tay Corcoran M.D., [facility] 
[facility] - Davy Faria M.D.R55 Syncope 
and collapseFollow up:6 weeks with Dr Celis10 Essential (primary) 
ximlkgeflswaA37.19 Other right bundle-branch block

## 2018-05-01 NOTE — HP
CC:  Dr. Boo *

 

HISTORY AND PHYSICAL:

 

DATE OF ADMISSION:  18

 

PRIMARY CARE PROVIDER:  Dr. Boo.

 

CARDIOLOGIST:  Dr. Bran.

 

CHIEF COMPLAINT:  Syncope.

 

HISTORY OF PRESENT ILLNESS:  Mr. Vazquez is an 85-year-old male, who has a history 
of chronic congestive heart failure, hypertension, hyperlipidemia, coronary 
artery disease and past episodes of syncope, who presents to the emergency room 
after a syncopal episode on the day of admission.  The patient was out working 
in his yard with his wife.  He was sitting on his tractor, trying to move some 
wires around when he asked his wife to do something.  She turned around for a 
second and when she turned back towards her , she found him slumped over 
on his tractor seat.  He came to very quickly.  His daughter was contacted and 
ultimately the patient was brought to the emergency room.  The patient does 
state that he felt lightheaded prior to going out.  He denied any palpitations.
  He did state that he felt mildly sweaty.  He denied any nausea.  The patient 
had an episode of syncope approximately 1 month ago for which he was admitted.  
At that time, it sounds as if he had a LINQ monitor placed.  The episode 
occurred at approximately 3:30 to 4:00 p.m. today.

 

The patient also states that he has been having a significant issue with 
diffuse rash.  He describes the itching is being quite severe and almost 
incapacitating. He states that he has had this rash off and on since his bypass 
surgery approximately 1 year ago.  Over the last 1 month, however, it has 
spread more distally and has worsened.

 

PAST MEDICAL HISTORY:

1.  Coronary artery disease.

2.  Chronic diastolic congestive heart failure.

3.  GERD.

4.  Hypertension.

5.  Hyperlipidemia.

 

PAST SURGICAL HISTORY:

1.  Bilateral inguinal hernia repairs.

2.  Bilateral cataract extraction.

3.  CABG.

4.  Tonsillectomy.

5.  Several surgeries for adhesions.

 

MEDICATIONS:

1.  Losartan 25 mg p.o. b.i.d.

2.  Lasix 40 mg p.o. daily.

3.  Vitamin D 1000 units p.o. daily.

4.  Lipitor 10 mg p.o. daily.

5.  Aspirin 81 mg p.o. daily.

6.  Potassium chloride 20 mEq p.o. daily.

7.  Omeprazole 20 mg p.o. daily.

8.  Metoprolol tartrate 37.5 mg p.o. twice daily.

9.  Psyllium 1 packet p.o. b.i.d.

 

ALLERGIES:  No known drug allergies.

 

FAMILY HISTORY:  Mom  in her 70s of renal failure.  Dad  at the age of 
72, his cause of death is unclear.

 

SOCIAL HISTORY:  He denies any smoking.  He drinks alcohol on occasion.  He has 
worked numerous jobs including being a farmer.  He worked at [employer] and at 
[employer].  He is .  He has 2 children.  His wife, Javier is his healthcare 
proxy.

 

REVIEW OF SYSTEMS:  A complete 11-system review of systems is obtained.  
Pertinent positives and negative are as per HPI and otherwise negative.

 

                               PHYSICAL EXAMINATION

 

GENERAL:  The patient is a well-developed elderly male, sitting up in a 
stretcher, in no acute distress.

 

VITAL SIGNS:  Blood pressure 118/62, pulse 70, respirations 23, temp 97.5, and 
O2 sat 98% on room air.

 

HEENT:  Pupils are equal and round.  Extraocular muscles intact.  Oropharynx is 
clear.  Oral mucosa is moist.  The patient does complain of an area of what he 
describes as a same rash on his body on the right side of his posterior tongue.
  I am not able to clearly identify this on exam.  There is no submandibular, 
cervical or supraclavicular adenopathy.  Thyroid is not enlarged.  No thyroid 
nodules are noted.

 

PULMONARY:  Lungs are clear to auscultation bilaterally.

 

CARDIAC:  Normal S1, S2.  Regular rate and rhythm.  There is trace left lower 
extremity edema.

 

ABDOMEN:  Bowel sounds are present.  Abdomen is soft, nontender, nondistended.

 

MUSCULOSKELETAL:  There is no cyanosis or clubbing of the digits.  There is 
full active range of motion of all 4 extremities.

 

SKIN:  Warm and dry.  There is a diffuse plaque-like/hive-like rash on the 
patient's back, abdomen, and upper thighs.  There are numerous scabs all over 
his back most notably from scratching.

 

NEUROLOGIC:  Cranial nerves II through XII are grossly intact.  Sensation is 
intact to light touch throughout.  Strength is 5/5 and symmetric both upper and 
lower extremities bilaterally.

 

PSYCH:  The patient is alert.  He is oriented x3.  Affect appears appropriate.

 

 DIAGNOSTIC STUDIES/LAB DATA:  WBC 20.0, hemoglobin 11.7, hematocrit 36, 
platelets 54.  Sodium 135, potassium 4.2, chloride is 112, CO2 15, BUN 59, 
creatinine 2.60, glucose 79, lactic acid 1.2, calcium 8.7.  Bilirubin 0.3, AST 
17, ALT 11, alk phos 153.  Troponin 0.03.  Albumin 3.3.  Albumin to globulin 
ratio is 0.9.  Urinalysis reveals a specific gravity of 1.011, 1+ protein, 1+ 
bacteria, present hyaline casts.

 

EKG reveals normal sinus rhythm with a right bundle-branch block.  This is 
unchanged from prior.  Chest x-ray reveals low lung volumes, though appeared to 
be clear.

 

ASSESSMENT AND PLAN:  Mr. Vazquez is an 85-year-old male with a history of 
coronary disease, diastolic congestive heart failure, hypertension, 
hyperlipidemia, who presents to the emergency room with complaints of syncopal 
episode.

 

1.  Syncope.  I suspect the syncope is related to dehydration.  The patient's 
Lasix dosing was increased within the last 1 to 2 months.  His creatinine is up 
from a baseline of 1.6 to 1.8 in early April to 2.6 today.  The patient 
receiving normal saline 1 L bolus in the emergency followed by 100 mL per hour 
x1 L.  Followup basic metabolic panel will be obtained tomorrow morning.  I 
suspect the elevated creatinine is secondary to dehydration at increased Lasix 
dose.  I will not check a FENa as the Lasix use will impair the ability of this 
to be accurate.  Also of concern is the fact that this patient is anemic, has 
elevated creatinine.  I will send an SPEP.  His albumin to globulin ratio was 
also low making multiple myeloma possibility.

2.  Elevated creatinine.  The patient's creatinine is not at the point of 2 
times greater than his baseline at this point.  Again, I feel that this is 
likely secondary to dehydration.  He will be receiving fluids.  A repeat 
creatinine will be obtained tomorrow morning.  His Lasix and losartan will be 
held tonight.

3.  Leukocytosis.  The patient has a significant leukocytosis of 20,000.  He 
gets no clear signs of infection.  The patient had a leukocytosis almost 
persistently dating back to 2017, which is the time we first have labs 
on him.  This in conjunction with new thrombocytopenia has been concerned for a 
possible bone marrow process.  The patient's platelet count had been normal as 
of 2018; however, 2018, he was down to 108 followed by 86, but 
then dipped to a low of 24.  His platelet count today is 54,000.  I will 
request a Hematology consultation tomorrow morning for evaluation of the 
leukocytosis, anemia, and thrombocytopenia.

4.  Coronary artery disease.  The patient will be maintained on his usual doses 
of aspirin and metoprolol.

5.  Diastolic congestive heart failure.  As above, I am holding the Lasix.  We 
will monitor for fluid overload with the administration of IV fluids.

6.  Hyperlipidemia.  We will continue Lipitor.

7.  Rash.  The patient has a hive-like rash mainly across his torso.  This is 
quite significant and the patient cannot stop itching this.  Punch biopsy I 
believe is warranted.  He will have Benadryl for the itching.  He states the 
rash worsened when his Lasix dose was increased.  I do question if perhaps he 
could have a sulfa allergy or an allergy to another medication.  For now, we 
will monitor the rash with holding Lasix.

8.  DVT prophylaxis:  According to the Adult Thrombosis Prophylaxis Risk Factor 
Assessment Guide, the patient has a total risk factor score of 4, making him 
high risk.  SCDs alone will be utilized as DVT prophylaxis given his 
thrombocytopenia.

9.  Code status is full.

 

TIME SPENT:  Sixty-five minutes was spent admitting this patient.

 

 

 

[id number]/[facility] #: [id number]

MARIO

## 2018-05-01 NOTE — XMS REPORT
Carmelo Vazquez 

 Created on:2018



Patient:Carmelo Vazquez JR

Sex:Male

:1932

External Reference #:[id number]





Demographics







 Address  [address]



   [place] [address]

 

 Home Phone  [phone]

 

 Mobile Phone  [phone]

 

 Preferred Language  English

 

 Marital Status  Declined to Specify/Unknown

 

 Buddhism Affiliation  Unknown

 

 Race  White

 

 Ethnic Group  Declined to Specify/Unknown









Author







 Organization  [facility]

 

 Address  [address] [place] [address]

 

 Phone  [phone]









Support







 Name  Relationship  Address  Phone

 

 Catherine Vazquez  Wife  [address]  [phone]



     [place] [address]  









Care Team Providers







 Name  Role  Phone

 

 HCP given  Primary Care Physician  Unavailable









Payers







 Type  Date  Identification Numbers  Payment Provider  Subscriber

 

 Medicare Primary  Effective:  Policy Number:  National Govt  Carmelo Vazquez 



   1999  [id number]  Services  









 PayID: [id number]  [address]









 [place] [address]









 Medigap Part B    Policy Number: [id number]  [facility]  Carmelo Vazquez 









 Group Number: [id number]  [address]

 

 PayID: [id number]  [place] [address]







Problems







 Date  Description  Provider  Status

 

 Onset: 2004  Benign essential hypertension  Cholo Boo M.D.  Active

 

 Onset: 2004  Disorder of lipid metabolism  Cholo Boo M.D.  Active

 

 Onset: 2011  Impaired fasting glycaemia  Cholo Boo M.D.  Active

 

 Onset: 2016  Low back pain  Cholo Boo M.D.  Active

 

 Onset: 2016  Hypo-osmolality and or hyponatremia  Cholo Boo M.D.  
Active

 

 Onset: 2016  Disorder of fatty acid metabolism  Cholo Boo M.D.  
Active

 

 Onset: 2017  Essential hypertension  Cholo Boo M.D.  Active







Family History







 Date  Family Member(s)  Problem(s)  Comments

 

   General  No CAD, Colon Or Prostate  



     Cancer In 1St Degree  



     Relatives.  

 

   Father   due to Lung Disease  () - at age of 72.

 

   Mother   due to Kidney  () - 73



     Disease  

 

   Mother  High Blood Pressure  

 

   First Son  Sujit  

 

   First Son  52  

 

   First Daughter  Lilibeth  

 

   First Daughter  56  

 

 Onset: ()  First Sister  Heart Problems  had open heart surgery at



       age 81, for valve problem



       (pt unsure if she also had



       CAD/CABG)

 

   First Sister  Nadege  

 

   First Sister  193  







Social History







 Type  Date  Description  Comments

 

 Education    Highest level of education completed is  



     12th grade  

 

 Marital Status    Patient is   

 

 Occupation    Junior  

 

 Employment    Currently working  

 

 Cigarette Use  2015  Never Smoked Cigarettes  

 

 ETOH Use    Rare Alcohol Use  

 

 Smoking    Patient has never smoked  

 

 Daily Caffeine    Consumes on average 1 soda per day  

 

 Sun Exposure    Moderate amount of sun exposure. Uses  



     sunscreen  

 

 Seat Belt/Car Seat    Always uses a seat belt  

 

 Age 1st Bernice    First intercourse was at age 18  

 

 # Partners in a Lifetime    The patient has had 2 sexual partners  







Allergies, Adverse Reactions, Alerts







 Date  Description  Reaction  Status  Severity  Comments

 

 2007  Simvastatin    active    Myalgias (normal CPK)







Medications







 Medication  Date  Status  Form  Strength  Qnty  SIG  Indications  Ordering



                 Provider

 

 Metamucil    Active  Powder  28.3%    1 packet    Unknown



   /2018          daily    

 

 Tessalon Perles    Active  Capsules  100mg    1 cap by    Unknown



             mouth    



             three    



             times a    



             day as    



             needed for    



             cough    

 

 Furosemide    Active  Tablets  40mg    1 by mouth    Unknown



   /2018          in the    



             morning;    



             for    



             congestive    



             heart    



             failure.    

 

 Potassium    Active  Tablets ER  20Meq  30tab  1 tab by  E87.6  Silcoff,



 Chloride ER          s  mouth once    Cholo,



             a day; for    M.D.



             potassium    



             replacemen    



             t    

 

 Omeprazole    Active  Capsules DR  20mg    1 by mouth    Unknown



   /2018          every day    

 

 Proair HFA    Active  Aerosol  108(90Bas  8.500  1-2 puffs  J90  Silcoff,



         e)  gm  four times    Cholo,



         mcg/Act    a day as    M.D.



             needed    

 

 Metoprolol    Active  Tablets  25mg  270ta  1.5  I25.10  Silcoff,



 Tartrate          bs  tablets by    Cholo,



             mouth    M.D.



             twice a    



             day, for    



             heart    

 

 Voltaren    Active  Gel  1%  300un  apply up    Silcoff,



           its  to 2grams    Cholo,



             to each    M.D.



             knee    



             4x/day;    



             for knee    



             arthritis    

 

 Multivitamin    Active  Tablets      1 by mouth    Unknown



 Adult  /2017          every day    

 

 Losartan    Active  Tablets  25mg  180ta  1 tablet  I10  Silcoff,



 Potassium          bs  by mouth    Cholo,



             twice    M.D.



             daily for    



             high blood    



             pressure    

 

 Vitamin D    Active  Tablets  1000Unit    1 by mouth    Unknown



   /2017          every day    

 

 Vitamin C    Active  Capsules      one po    Unknown



   /2013          daily    



             during    



             winter    



             months    

 

 Aspirin Ec    Active  Tablets DR  81mg    1 pill    Unknown



   /2013          daily for    



             heart    



             disease    



             prevention    

 

 Atorvastatin    Active  Tablets  10mg  90tab  take 1  E71.30  Ema,



 Calcium          s  tablet    Cholo,



             once daily    M.D.



             for    



             cholestero    



             l    

 

                 

 

 Benzonatate    Hx  Capsules  200mg  30cap  1 by mouth  R05  Silcosuly,



           s  three    Cholo,



   -          times a    M.D.



             day as    



             needed for    



             cough    

 

 Potassium    Hx  Tablets ER  20Meq    1 tab by  E87.6  Unknown



 Chloride ER            mouth    



   -          twice a    



             day; for    



   /2018          potassium    



             replacemen    



             t    

 

 Azithromycin    Hx  Tablets  250mg  6tabs  2 tabs by  J90  Ema,



             mouth    Cholo,



   -          daily x1    M.D.



             day then           tab by    



             mouth    



             daily x4    



             days    

 

 Omeprazole    Hx  Capsules DR  40mg  90cap  take one    Silcosuly        s  capsule by    Cholo,



   -          mouth    M.D.



             every day    



             for acid    



             reflux    

 

 Furosemide    Hx  Tablets  40mg  90tab  1 tablet    Kathyacosuly,



           s  by mouth    Cholo,



   -          daily    M.D.



             (diuretic)    



                 

 

 Warfarin Sodium    Hx  Tablets  5mg  100ta  use as    Kathyacosuly        bs  directed;    Cholo,



   -          take 1/day    M.D.



             for now    



             then    



             adjust as    



             directed    

 

 Potassium    Hx  Tablets ER  20Meq  180ta  1 tablet    Silcoff,



 Chloride Gloria         bs  by mouth    Cholo,



   -          twice    M.D.



             daily    



                 

 

 Digitek    Hx  Tablets  125mcg    1 tablet    Unknown



   /2017          po daily    



   -              



                 

 

 Furosemide    Hx  Tablets  40mg  90tab  1 tablet    Silcoff,



           s  by mouth    Cholo,



   -          daily    M.D.



             (diuretic)    



                 

 

 Omeprazole    Hx  Capsules DR  20mg  90cap  1 capsule    Ema,



           s  by mouth    Cholo,



   -          daily (for    M.D.



             acid    



             suppressio    



             n)    

 

 Metoprolol    Hx  Tablets  25mg  90tab  1 tablet  I25.10  Unknown



 Tartrate          s  by mouth    



   -          three    



             times           day for    



             heart    

 

 Warfarin Sodium    Hx  Tablets  1mg  100ta  use 1    Sopchak,



           bs  every day    Nigel,



   -          or as    D.O.



             directed    



                 

 

 Warfarin Sodium    Hx  Tablets  2mg  100ta  1 tablet            bs  by mouth    A.



   -          daily or    Leatha,



             as    M.D.



             directed    

 

 Hydrocodone-Aceta    Hx  Tablets  5-325mg    take 1    Unknown



 minophen            tablet by    



   -          mouth    



             every           hours if    



             needed    

 

 Oxycodone-Acetami    Hx  Tablets  5-325mg  30tab  take 1  M54.5  Unknown



 nophen          s  tablet by    



   -          mouth    



             every           6 hours if    



             needed for    



             pain    

 

 Losartan    Hx  Tablets  100-25mg  30tab  take 1    Amador



 Potassium/Hydroch          s  tablet    A.



 lorothiazide  -          daily for    ,



             high blood    M.D.



             pressure    

 

 Atorvastatin    Hx  Tablets  10mg  90tab  1 every at    Silcoff,



 Calcium          s  night    Ben Emmanuel              M.DMaureen



                 

 

 Losartan    Hx  Tablets  100-25mg  90tab  Take 1  I10  Silcoff,



 Potassium/Hydroch          s  Tablet By    gaurang Emmanuelothiazide  -          Mouth    M.D.



   04/03          Every    



   /2017          Morning    



             For High    



             Blood    



             Pressure    

 

 Prilosec OTC    Hx  Tablets DR  20mg    1 by mouth    Unknown



   /2016          once daily    



   -          generic    



             brand    



                 

 

 Lidocaine    Hx  Patches  5%  60uni  apply up  724.2  Silcoff,



           ts  to 3    Cholo



   -          patches    M.D.



             for up to    



             12hrs/d    

 

 Lisinopril-Hydroc    Hx  Tablets  20-12.5mg  180ta  Take 2  I10  Silcoff,



 hlorothiazide  /        bs  Tablets    Cholo,



   -          Every    M.D.



             Morning    



             For High    



             Blood    



             Pressure    

 

 Vitamin D    Hx  Tablets      1 po prn    Silcoff,



                 Cohlo,



   -              M.DMaureen



                 

 

 Benzonatate  10/06  Hx  Capsules  100mg  30cap  1-2 po tid  786.2  Silcoff,



           s  prn for    Cholo,



   -          cough    M.D.



   10/16              



   /2010              

 

 Combivent  10/06  Hx  Aerosol  103-18mcg  14.70  2 puffs  786.2  Silcoff,



         /Act  0gm  q4h prn    Cholo,



   -          for cough    M.D.



                 

 

 Hydrochlorothiazi    Hx  Capsules  12.5mg  90cap  Take 1  401.1  Silcoff,



         s  Capsule By    Cholo,



   -          Mouth Once    M.D.



             Daily For    



             Blood    



             Pressure    

 

 Fexofenadine HCL    Hx  Tabs  180mg  90tab  take 1  477.9  Silcoff,



           s  tablet by    Cholo,



   -          mouth once    M.D.



             daily for    



             allergies    

 

 Fexofenadine HCL    Hx  Tablets  180mg  90tab  take 1  477.9  Silcoff,



           s  tablet by    Cholo,



   -          mouth once    M.D.



             daily for    



             allergies    

 

 Fexofenadine HCL    Hx  Tablets  180mg  30tab  1 po qd  477.9  Silcoff,



           s  prn for    Cholo,



   -          allergies    M.D.



                 

 

 Veramyst    Hx  Susp  27.5mcg/S  10uni  Instill 2  477.9  Silcoff,



         pray  ts  Sprays    Cholo,



   -          Into Each    M.D.



             Nostril    



             Once Daily    



             For Nasal    



             Congestion    

 

 HCTZ    Hx  Tablets  12.5mg  90tab  1 PO qAM  401.1  Silcoff,



           s  For High    Cholo,



   -          BP    M.D.



                 

 

 Lisinopril    Hx  Tablets  40mg  90tab  take 1  401.1  Silcoff,



           s  tablet by    Cholo,



   -          mouth    M.D.



   02/19          every    



   /2013          morning    



             for high    



             blood    



             pressure    

 

 Metamucil    Hx  Capsules  0.52gm    qd    Silcoff,



                 Cholo,



   -              M.D.



                 

 

 Lisinopril/Hydroc    Hx  Tablets  20-12.5  180ta  2 po qam  401.1  Silcoff
,



 hlorothiazide  /        bs  for high    Cholo,



   -          blood    M.D.



             pressure    



   /              

 

 Alavert    Hx  Tablets  10mg  30tab  1 tab po  477.9  ,



           s  daily x    Loyda TORRES



   -          #12 given.    



             can    



             replace    



             with    



             claritin/z    



             yrtec if    



             helps your    



             congestion    

 

 Rhinocort Aqua    Hx  Suspension  32mcg/Act  1unit  2 squirts  477.9  
        s  once    Loyda TORRES



   -          daily.    



             rinse    



             mouth post    



              sample    



             given.    



             call for    



             script if    



             needed    

 

 Augmentin    Hx  Tablets  875mg  20tab  one tablet  473.9          s  p.O. twice    Loyda TORRES



   -          daily x 10    



             days for    



             your sinus    



             congestion    



             if fail    



             rhinocort    



             and    



             alavert    



             over next    



             72 hours    

 

 Aleve    Hx  Tablets  220mg    1 PO  715.16  Silcoff,



             2-3X/D prn    Cholo,



   -          For Knee    M.D.



             Pain    



   /              

 

 Zithromax Z-Oscar    Hx  Tablets  250mg  1Pack  2 PO On  466.0  Silcoff,



   /          Day 1, 1    Cholo,



   -          PO On Days    M.D.



             2-              

 

 Combivent    Hx  Aerosol  18mcg;103  1unit  2 puffs  466.0  Silcoff,



 Inhalation        mcg/Actua  s  q4h prn    Cholo,



   -      ti    for cough,    M.D.



             wheezing    



                 

 

 Lipitor    Hx  Tablets  10mg  90tab  Take 1  272.8  Silcoff,



           s  Tablet By    Cholo,



   -          Mouth Once    M.D.



             Daily For    



             Cholestero    



             l    

 

 Simvastatin    Hx  Tablets  20mg  60tab  1 po qd t  272.8  Silcoff,



   /        s  kiara Emmanuel,



   -          cholestero    M.D.



             l (to    



             replace    



             gemfibrozi    



             l)    

 

 Lisinopril &amp;    Hx  Tablets  20mg;12.5  90tab  1 po qd  401.1  Silcoff
,



 HCTZ  /2005      mg  s  for high    Cholo,



   -          blood    M.D.



             pressure    



                 

 

 HCTZ    Hx  Capsules  12.5mg  90cap  1 po qd  401.1  Silcoff,



           s  for high    Ben Emmanuel          blood    M.DMaureen



             pressure    



                 

 

 Trinsicon    Hx  Capsules  240mg;15M  60cap  1 po qd on  285.9  Silco      cG;110McG  s  an empty    Cholo,



   -      ;7    stomsch,    M.D.



             increase    



             to 1 pill    



             twice a    



             day after    



             1 week    

 

 Permethrin    Hx  Cream  5%  QS  apply to  133.0            whole body    A.



   -          and leave    Leatha



             on for 8    M.D.



             to 12    



             hours and    



             again 7    



             days later    

 

 Zestril    Hx  Tablets  20mg  90tab  1 po qd  401.1  Silcoff,



           s      Ben Emmanuel M.D.



                 

 

 Gemfibrozil    Hx  Tablets  600mg  180ta  1 bid for  272.8  Silco,



           bs  cholestero    Ben Emmanuel          l    M.DMaureen



                 

 

 Amoxil    Hx  Tablets  500mg  30tab  1 po tid  461.1  Silcosuly,



           s  for 10    Ben Emmanuel          days for    M.D.



             sinusitis    



                 









 461.0









 Ferrous Sulfate   - 2004  Hx  Tablets  200mg    1 po qd  285.9
  Unknown







Medications Administered in Office







 Medication  Date  Status  Form  Strength  Qnty  SIG  Indications  Ordering



                 Provider

 

 H1N1 Swine Flu    Administered  Injection          Kathyacoff,



 Vaccine  010              BEHZAD Emmanuel







Immunizations







 CPT Code  Status  Date  Vaccine  Lot #

 

 [id number]  Given  10/03/2017  Influenza Virus Vaccine, Quadrivalent, Split,  



       Preservative Free  

 

 66467  Given  2016  Influenza Vaccine Split Virus Preservative Free Im  



       Use  

 

 14003  Given  2015  Influenza Vaccine Split Virus Preservative Free Im  



       Use  

 

 62259  Given  2015  Prevnar 13  [id number]

 

 21978  Given  10/03/2013  Flu, Split Virus 3Yrs  

 

 26081  Given  2012  Flu, Split Virus 3Yrs  

 

 53494  Given  2012  Adacel or Boostrix, TDaP  [id number]

 

 59953  Given  2010  Flu, Split Virus 3Yrs  [id number]

 

 21683  Given  2009  Flu, Split Virus 3Yrs  [id number]

 

 18372  Given  2008  Flu, Split Virus 3Yrs  [id number]

 

 35135  Given  2007  Flu, Split Virus 3Yrs  [id number]

 

 53607  Given  10/21/2005  Pneumococcal Immunization  

 

 87763  Given  2002  Td Immunization  









 









 08880  Refused  2007  Zostavax  







Vital Signs







 Date  Vital  Result  Comment

 

 2018  BP Systolic  112 mmHg  









 BP Diastolic  70 mmHg  

 

 Heart Rate  76 /min  reg

 

 Respiratory Rate  16 /min  not laboured

 

 Weight  157.00 lb  









 2018  BP Systolic  112 mmHg  









 BP Diastolic  64 mmHg  

 

 Respiratory Rate  18 /min  not laboured

 

 Height  64.5 inches  5'4.50"

 

 Weight  154.00 lb  

 

 BMI (Body Mass Index)  26.0 kg/m2  









 2018  BP Systolic  142 mmHg  









 BP Diastolic  82 mmHg  

 

 Heart Rate  72 /min  reg

 

 Respiratory Rate  16 /min  not laboured

 

 Weight  160.00 lb  









 2018  BP Systolic  126 mmHg  









 BP Diastolic  68 mmHg  

 

 Heart Rate  66 /min  

 

 O2 % BldC Oximetry  95 %  

 

 Weight  164.00 lb  









 01/15/2018  BP Systolic  142 mmHg  









 BP Diastolic  72 mmHg  

 

 O2 % BldC Oximetry  97 %  

 

 Body Temperature  97.4 F  









 2018  BP Systolic  128 mmHg  









 BP Diastolic  70 mmHg  

 

 Body Temperature  97.5 F  

 

 Weight  172.00 lb  with shoes









 2017  BP Systolic  132 mmHg  









 BP Diastolic  72 mmHg  

 

 Heart Rate  68 /min  reg

 

 Respiratory Rate  16 /min  not laboured

 

 Height  65 inches  5'5"

 

 Weight  164.00 lb  

 

 BMI (Body Mass Index)  27.3 kg/m2  









 2017  BP Systolic  124 mmHg  









 BP Diastolic  64 mmHg  

 

 Heart Rate  72 /min  reg

 

 Respiratory Rate  16 /min  not laboured

 

 Weight  158.00 lb  









 2017  BP Systolic  140 mmHg  









 BP Diastolic  58 mmHg  

 

 Height  65.50 inches  5'5.50" w/shoes

 

 Weight  166.00 lb  w/shoes

 

 BMI (Body Mass Index)  27.2 kg/m2  









 2016  BP Systolic  132 mmHg  









 BP Diastolic  65 mmHg  

 

 Weight  170.00 lb  with sneakers









 2016  BP Systolic  142 mmHg  









 BP Diastolic  60 mmHg  

 

 Height  65.50 inches  5'5.50" w/shoes

 

 Weight  174.00 lb  w/shoes

 

 BMI (Body Mass Index)  28.5 kg/m2  









 2015  BP Systolic  144 mmHg  









 BP Diastolic  58 mmHg  

 

 Height  66.25 inches  5'6.25"

 

 Weight  177.00 lb  shoes on

 

 BMI (Body Mass Index)  28.4 kg/m2  









 2015  BP Systolic  150 mmHg  









 BP Diastolic  68 mmHg  

 

 Height  66.25 inches  5'6.25"

 

 Weight  176.00 lb  

 

 BMI (Body Mass Index)  28.2 kg/m2  









 2014  BP Systolic  140 mmHg  









 BP Diastolic  72 mmHg  

 

 BP Systolic Recheck  130 mmHg  R arm sitting

 

 BP Diastolic Recheck  68 mmHg  R arm sitting

 

 Heart Rate  74 /min  reg

 

 Height  66.50 inches  5'6.50"

 

 Weight  172.00 lb  

 

 BMI (Body Mass Index)  27.3 kg/m2  









 01/15/2014  BP Systolic  150 mmHg  









 BP Diastolic  62 mmHg  

 

 BP Systolic Recheck  126 mmHg  R arm sitting

 

 BP Diastolic Recheck  60 mmHg  R arm sitting

 

 Heart Rate  80 /min  reg

 

 Height  66.5 inches  5'6.50"

 

 Weight  176.00 lb  shoes on

 

 BMI (Body Mass Index)  28.0 kg/m2  









 2013  BP Systolic  118 mmHg  









 BP Diastolic  62 mmHg  

 

 BP Systolic Recheck  120 mmHg  R arm sitting

 

 BP Diastolic Recheck  54 mmHg  R arm sitting

 

 Heart Rate  76 /min  reg

 

 Weight  168.00 lb  









 2013  BP Systolic  140 mmHg  









 BP Diastolic  66 mmHg  

 

 BP Systolic Recheck  152 mmHg  R arm sitting

 

 BP Diastolic Recheck  70 mmHg  R arm sitting

 

 Heart Rate  76 /min  reg

 

 Weight  173.00 lb  









 2013  BP Systolic  160 mmHg  









 BP Diastolic  78 mmHg  

 

 BP Systolic Recheck  170 mmHg  R arm sitting

 

 BP Diastolic Recheck  80 mmHg  R arm sitting

 

 Heart Rate  68 /min  reg

 

 Height  66 inches  5'6"

 

 Weight  170.00 lb  

 

 BMI (Body Mass Index)  27.4 kg/m2  









 2012  BP Systolic  138 mmHg  









 BP Diastolic  80 mmHg  

 

 BP Systolic Recheck  150 mmHg  R arm sitting

 

 BP Diastolic Recheck  66 mmHg  R arm sitting

 

 Heart Rate  68 /min  reg

 

 Height  66 inches  5'6"

 

 Weight  173.00 lb  

 

 BMI (Body Mass Index)  27.9 kg/m2  









 2012  BP Systolic  130 mmHg  









 BP Diastolic  68 mmHg  

 

 BP Systolic Recheck  134 mmHg  R arm sitting

 

 BP Diastolic Recheck  62 mmHg  R arm sitting

 

 Heart Rate  82 /min  reg

 

 Height  66 inches  5'6"

 

 Weight  172.00 lb  

 

 BMI (Body Mass Index)  27.8 kg/m2  

 

 Last Menstrual Period  0  









 2011  BP Systolic  130 mmHg  









 BP Diastolic  74 mmHg  

 

 Heart Rate  80 /min  reg

 

 Respiratory Rate  16 /min  not laboured

 

 Weight  180.00 lb  









 2011  BP Systolic  112 mmHg  









 BP Diastolic  84 mmHg  

 

 Height  66.50 inches  5'6.50"

 

 Weight  179.00 lb  

 

 BMI (Body Mass Index)  28.5 kg/m2  

 

 Last Menstrual Period  0  









 10/06/2010  BP Systolic  150 mmHg  









 BP Diastolic  72 mmHg  

 

 Heart Rate  80 /min  reg

 

 Respiratory Rate  16 /min  not laboured

 

 Body Temperature  98.0 F  

 

 Weight  184.00 lb  









 2010  BP Systolic  140 mmHg  









 BP Diastolic  70 mmHg  

 

 BP Systolic Recheck  138 mmHg  R arm sitting

 

 BP Diastolic Recheck  70 mmHg  R arm sitting

 

 Heart Rate  88 /min  reg

 

 Height  66 inches  5'6"

 

 Weight  177.00 lb  w/out shoes

 

 BMI (Body Mass Index)  28.6 kg/m2  









 2010  BP Systolic  148 mmHg  









 BP Diastolic  60 mmHg  

 

 BP Systolic Recheck  144 mmHg  R arm sitting

 

 BP Diastolic Recheck  62 mmHg  R arm sitting

 

 Heart Rate  76 /min  reg

 

 Weight  181.00 lb  

 

 Last Menstrual Period  0  









 2009  BP Systolic  140 mmHg  









 BP Diastolic  70 mmHg  

 

 Weight  175.00 lb  

 

 Last Menstrual Period  0  









 2009  BP Systolic  140 mmHg  









 BP Diastolic  72 mmHg  

 

 BP Systolic Recheck  130 mmHg  R arm sitting

 

 BP Diastolic Recheck  66 mmHg  R arm sitting

 

 Heart Rate  86 /min  reg

 

 Weight  170.00 lb  

 

 Last Menstrual Period  0  









 2008  BP Systolic  142 mmHg  









 BP Diastolic  80 mmHg  

 

 BP Systolic Recheck  156 mmHg  R arm sitting

 

 BP Diastolic Recheck  70 mmHg  R arm sitting

 

 Height  66.5 inches  5'6.50"

 

 Weight  174.00 lb  

 

 BMI (Body Mass Index)  27.7 kg/m2  









 2008  BP Systolic  118 mmHg  









 BP Diastolic  60 mmHg  

 

 Height  66.6 inches  5'6.60"

 

 Weight  178.00 lb  

 

 BMI (Body Mass Index)  28.2 kg/m2  









 2008  BP Systolic  162 mmHg  









 BP Diastolic  80 mmHg  

 

 Height  66.6 inches  5'6.60"

 

 Weight  175.00 lb  

 

 BMI (Body Mass Index)  27.7 kg/m2  









 2008  BP Systolic  124 mmHg  









 BP Diastolic  70 mmHg  

 

 Body Temperature  98.1 F  

 

 Height  66.6 inches  5'6.60"

 

 Weight  177.00 lb  

 

 BMI (Body Mass Index)  28.1 kg/m2  









 2007  BP Systolic  140 mmHg  









 BP Diastolic  60 mmHg  

 

 BP Systolic Recheck  146 mmHg  R arm sitting

 

 BP Diastolic Recheck  64 mmHg  R arm sitting

 

 Heart Rate  68 /min  reg

 

 Height  66.6 inches  5'6.60"

 

 Weight  172.00 lb  

 

 BMI (Body Mass Index)  27.3 kg/m2  

 

 Last Menstrual Period  0  









 2007  BP Systolic  106 mmHg  









 BP Diastolic  74 mmHg  

 

 BP Systolic Recheck  118 mmHg  R arm sitting

 

 BP Diastolic Recheck  64 mmHg  R arm sitting

 

 Heart Rate  72 /min  reg

 

 Height  66.6 inches  5'6.60"

 

 Weight  168.00 lb  

 

 BMI (Body Mass Index)  26.6 kg/m2  

 

 Last Menstrual Period  0  









 2007  BP Systolic  160 mmHg  









 BP Diastolic  84 mmHg  

 

 Heart Rate  76 /min  reg

 

 Respiratory Rate  16 /min  not laboured

 

 Body Temperature  99.1 F  

 

 Height  66.6 inches  5'6.60"

 

 Weight  170.00 lb  

 

 BMI (Body Mass Index)  26.9 kg/m2  

 

 Last Menstrual Period  0  









 2006  BP Systolic  141 mmHg  R arm w/ his cuff









 BP Diastolic  67 mmHg  R arm w/ his cuff

 

 BP Systolic Recheck  138 mmHg  R arm w/ our cuff

 

 BP Diastolic Recheck  60 mmHg  R arm w/ our cuff

 

 Heart Rate  90 /min  reg

 

 Height  66.6 inches  5'6.60"

 

 Weight  164.00 lb  

 

 BMI (Body Mass Index)  26.0 kg/m2  









 2006  BP Systolic  162 mmHg  Office Machine









 BP Diastolic  70 mmHg  Office Machine

 

 BP Systolic Recheck  137 mmHg  Home Machine

 

 BP Diastolic Recheck  74 mmHg  Home Machine

 

 Height  66.6 inches  5'6.60"

 

 Weight  164.00 lb  

 

 BMI (Body Mass Index)  26.0 kg/m2  









 10/23/2006  BP Systolic  164 mmHg  









 BP Diastolic  72 mmHg  

 

 BP Systolic Recheck  152 mmHg  R arm sitting

 

 BP Diastolic Recheck  66 mmHg  R arm sitting

 

 Heart Rate  72 /min  reg

 

 Height  66.6 inches  5'6.60"

 

 Weight  163.00 lb  

 

 BMI (Body Mass Index)  25.8 kg/m2  









 2006  BP Systolic  148 mmHg  R arm sitting









 BP Diastolic  80 mmHg  R arm sitting

 

 BP Systolic Recheck  136 mmHg  R arm after lying in dark 5min

 

 BP Diastolic Recheck  72 mmHg  R arm after lying in dark 5min

 

 Height  66.6 inches  5'6.60"









 2006  BP Systolic  122 mmHg  









 BP Diastolic  68 mmHg  

 

 Height  66.6 inches  5'6.60"

 

 Weight  164.00 lb  

 

 BMI (Body Mass Index)  26.0 kg/m2  









 10/21/2005  BP Systolic  132 mmHg  101/56 at home 10/20/05.









 BP Diastolic  70 mmHg  101/56 at home 10/20/05.

 

 BP Systolic Recheck  132 mmHg  R arm sitting

 

 BP Diastolic Recheck  58 mmHg  R arm sitting

 

 Height  67 inches  5'7"

 

 Weight  163.00 lb  

 

 BMI (Body Mass Index)  25.5 kg/m2  









 2005  BP Systolic  126 mmHg  









 BP Diastolic  66 mmHg  

 

 BP Systolic Recheck  122 mmHg  R arm sitting

 

 BP Diastolic Recheck  60 mmHg  R arm sitting

 

 Heart Rate  68 /min  reg w/ frequent skipped beats

 

 Respiratory Rate  16 /min  not laboured

 

 Height  67 inches  5'7"

 

 Weight  160.00 lb  

 

 BMI (Body Mass Index)  25.1 kg/m2  









 2005  BP Systolic  138 mmHg  142/77 pt's mach.









 BP Diastolic  70 mmHg  142/77 pt's mach.

 

 BP Systolic Recheck  160 mmHg  R arm our cuff; 145/81 R arm his cuff

 

 BP Diastolic Recheck  78 mmHg  R arm our cuff; 145/81 R arm his cuff

 

 Heart Rate  64 /min  reg

 

 Height  67 inches  5'7"

 

 Weight  161.00 lb  

 

 BMI (Body Mass Index)  25.2 kg/m2  









 2005  BP Systolic  156 mmHg  lg cuff









 BP Diastolic  70 mmHg  lg cuff

 

 BP Systolic Recheck  170 mmHg  R arm sitting

 

 BP Diastolic Recheck  76 mmHg  R arm sitting

 

 Heart Rate  60 /min  reg

 

 Respiratory Rate  12 /min  not laboured

 

 Height  67 inches  5'7"

 

 Weight  162.00 lb  

 

 BMI (Body Mass Index)  25.4 kg/m2  









 2004  BP Systolic  152 mmHg  









 BP Diastolic  74 mmHg  

 

 BP Systolic Recheck  140 mmHg  R arm sitting

 

 BP Diastolic Recheck  76 mmHg  R arm sitting

 

 Weight  162.00 lb  









 2003  BP Systolic  140 mmHg  









 BP Diastolic  80 mmHg  

 

 Body Temperature  100.1 F  PO

 

 Weight  160.00 lb  







Results







 Test  Date  Test  Result  H/L  Range  Note

 

 Urinalysis Profile  2018  Urine Color  Straw      









 Urine Appearance  Clear      

 

 Urine Specific Gravity  1.009  Low  1.010-1.030  

 

 Urine pH  5.0    5-9  

 

 Urine Urobilinogen  Negative    Negative  

 

 Urine Ketones  Negative    Negative  

 

 Urine Protein  Negative    Negative  

 

 Urine Leukocytes  Negative    Negative  

 

 Urine Blood  1+    Negative  

 

 Urine Nitrite  Negative    Negative  

 

 Urine Bilirubin  Negative    Negative  

 

 Urine Glucose  Negative    Negative  

 

 Urine White Blood Cell  Absent    Absent  

 

 Urine Red Blood Cell  Trace(0-2/hpf)    Absent  

 

 Urine Bacteria  Absent    Absent  









 CBC Auto Diff  2018  White Blood Count  15.0 10^3/uL  High  3.5-10.8  









 Red Blood Count  3.68 10^6/uL  Low  4.0-5.4  

 

 Hemoglobin  11.2 g/dL  Low  14.0-18.0  

 

 Hematocrit  34 %  Low  42-52  

 

 Mean Corpuscular Volume  93 fL    80-94  

 

 Mean Corpuscular Hemoglobin  31 pg    27-31  

 

 Mean Corpuscular HGB Conc  33 g/dL    31-36  

 

 Red Cell Distribution Width  19 %  High  10.5-15  

 

 Platelet Count  108 10^3/uL  Low  150-450  

 

 Mean Platelet Volume  10 um3    7.4-10.4  

 

 Abs Neutrophils  11.0 10^3/uL  High  1.5-7.7  

 

 Abs Lymphocytes  2.1 10^3/uL    1.0-4.8  

 

 Abs Monocytes  1.5 10^3/uL  High  0-0.8  

 

 Abs Eosinophils  0.2 10^3/uL    0-0.6  

 

 Abs Basophils  0.1 10^3/uL    0-0.2  

 

 Abs Nucleated RBC  0 10^3/uL      

 

 Granulocyte %  73.8 %    38-83  

 

 Lymphocyte %  14.4 %  Low  25-47  

 

 Monocyte %  10.2 %  High  0-7  

 

 Eosinophil %  1.2 %    0-6  

 

 Basophil %  0.4 %    0-2  

 

 Nucleated Red Blood Cells %  0      









 Inr/Protime  2018  Inr  0.94    0.77-1.02  

 

 Laboratory test finding  2018  Lactic Acid  2.1 mmol/L  High  0.5-2.0  1

 

 Comp Metabolic Panel  2018  Sodium  133 mmol/L    133-145  









 Potassium  4.6 mmol/L    3.5-5.0  

 

 Chloride  107 mmol/L    101-111  

 

 Co2 Carbon Dioxide  20 mmol/L  Low  22-32  

 

 Anion Gap  6 mmol/L    2-11  

 

 Glucose  91 mg/dL      

 

 Blood Urea Nitrogen  36 mg/dL  High  6-24  

 

 Creatinine  1.97 mg/dL  High  0.67-1.17  

 

 BUN/Creatinine Ratio  18.3    8-20  

 

 Calcium  9.4 mg/dL    8.6-10.3  

 

 Total Protein  7.6 g/dL    6.4-8.9  

 

 Albumin  3.5 g/dL    3.2-5.2  

 

 Globulin  4.1 g/dL  High  2-4  

 

 Albumin/Globulin Ratio  0.9  Low  1-3  

 

 Total Bilirubin  0.20 mg/dL    0.2-1.0  

 

 Alkaline Phosphatase  155 U/L  High    

 

 Alt  32 U/L    7-52  

 

 Ast  31 U/L    13-39  

 

 Egfr Non-  32.5    &gt;60  

 

 Egfr   41.8    &gt;60  2









 Laboratory test finding  2018  Magnesium  1.6 mg/dL  Low  1.9-2.7  









 Troponin-I (TnI)  0.06 ng/mL  High  &lt;0.04  3

 

 TSH (Thyroid Stim Horm)  2.08 mcIU/mL    0.34-5.60  









 Basic Metabolic Panel  2018  Sodium  134 mmol/L    133-145  









 Potassium  4.4 mmol/L    3.5-5.0  

 

 Chloride  108 mmol/L    101-111  

 

 Co2 Carbon Dioxide  19 mmol/L  Low  22-32  

 

 Anion Gap  7 mmol/L    2-11  

 

 Glucose  109 mg/dL  High    

 

 Blood Urea Nitrogen  42 mg/dL  High  6-24  

 

 Creatinine  1.66 mg/dL  High  0.67-1.17  

 

 BUN/Creatinine Ratio  25.3  High  8-20  

 

 Calcium  9.0 mg/dL    8.6-10.3  

 

 Egfr Non-  39.6    &gt;60  

 

 Egfr   50.9    &gt;60  4









 Laboratory test  2018  Rapid Influenza A B  SEE RESULT BELOW      5



 finding    Antigen        

 

 Rapid Influenza A  2018  Influenza A Molecular  NEGATIVE    Negative  6



 &amp; B Molecular            









 Influenza B Molecular  NEGATIVE    Negative  









 Urine Micro Inhouse  2018  Ua WBC  -      7









 Ua RBC  packed      7

 

 Ua Casts  -      7

 

 Ua Epi  -      7

 

 Ua Other  -      7

 

 Ua Glucose  -      7

 

 Ua Bilirubin  -      7

 

 Ua Ketones  -      7

 

 Ua Specific [place]  1.015      7

 

 Ua Blood  lg      7

 

 Ua PH  5.0      7

 

 Ua Protein  3+      7

 

 Ua Urobilinogen  -      7

 

 Ua Nitrite  -      7

 

 Ua Leukocytes  tr      7









 Inr/Protime  2018  Inr  1.06  High  0.77-1.02  

 

 CBC Auto Diff  2018  White Blood Count  7.8 10^3/uL    3.5-10.8  









 Red Blood Count  3.58 10^6/uL  Low  4.0-5.4  

 

 Hemoglobin  11.3 g/dL  Low  14.0-18.0  

 

 Hematocrit  35 %  Low  42-52  

 

 Mean Corpuscular Volume  96 fL  High  80-94  

 

 Mean Corpuscular Hemoglobin  32 pg  High  27-31  

 

 Mean Corpuscular HGB Conc  33 g/dL    31-36  

 

 Red Cell Distribution Width  14 %    10.5-15  

 

 Platelet Count  161 10^3/uL    150-450  

 

 Mean Platelet Volume  11 um3  High  7.4-10.4  

 

 Abs Neutrophils  5.3 10^3/uL    1.5-7.7  

 

 Abs Lymphocytes  1.2 10^3/uL    1.0-4.8  

 

 Abs Monocytes  1.1 10^3/uL  High  0-0.8  

 

 Abs Eosinophils  0.2 10^3/uL    0-0.6  

 

 Abs Basophils  0 10^3/uL    0-0.2  

 

 Abs Nucleated RBC  0 10^3/uL      

 

 Granulocyte %  68.0 %    38-83  

 

 Lymphocyte %  15.4 %  Low  25-47  

 

 Monocyte %  14.2 %  High  1-9  

 

 Eosinophil %  2.0 %    0-6  

 

 Basophil %  0.4 %    0-2  

 

 Nucleated Red Blood Cells %  0.1      









 Basic Metabolic Panel  2018  Sodium  134 mmol/L    133-145  









 Potassium  4.1 mmol/L    3.5-5.0  

 

 Chloride  101 mmol/L    101-111  

 

 Co2 Carbon Dioxide  27 mmol/L    22-32  

 

 Anion Gap  6 mmol/L    2-11  

 

 Glucose  88 mg/dL      

 

 Blood Urea Nitrogen  23 mg/dL    6-24  

 

 Creatinine  1.53 mg/dL  High  0.67-1.17  

 

 BUN/Creatinine Ratio  15.0    8-20  

 

 Calcium  8.8 mg/dL    8.6-10.3  

 

 Egfr Non-  43.5    &gt;60  

 

 Egfr   55.9    &gt;60  8









 CBC Auto Diff  01/15/2018  White Blood Count  11.5 10^3/uL  High  3.5-10.8  









 Red Blood Count  3.55 10^6/uL  Low  4.0-5.4  

 

 Hemoglobin  11.3 g/dL  Low  14.0-18.0  

 

 Hematocrit  34 %  Low  42-52  

 

 Mean Corpuscular Volume  97 fL  High  80-94  

 

 Mean Corpuscular Hemoglobin  32 pg  High  27-31  

 

 Mean Corpuscular HGB Conc  33 g/dL    31-36  

 

 Red Cell Distribution Width  14 %    10.5-15  

 

 Platelet Count  204 10^3/uL    150-450  

 

 Mean Platelet Volume  10 um3    7.4-10.4  

 

 Abs Neutrophils  9.1 10^3/uL  High  1.5-7.7  

 

 Abs Lymphocytes  1.3 10^3/uL    1.0-4.8  

 

 Abs Monocytes  1.0 10^3/uL  High  0-0.8  

 

 Abs Eosinophils  0.1 10^3/uL    0-0.6  

 

 Abs Basophils  0.1 10^3/uL    0-0.2  

 

 Abs Nucleated RBC  0 10^3/uL      

 

 Granulocyte %  78.9 %    38-83  

 

 Lymphocyte %  11.2 %  Low  25-47  

 

 Monocyte %  8.9 %    1-9  

 

 Eosinophil %  0.5 %    0-6  

 

 Basophil %  0.5 %    0-2  

 

 Nucleated Red Blood Cells %  0      









 Laboratory test finding  01/15/2018  Lactic Acid  0.9 mmol/L    0.5-2.0  9

 

 Comp Metabolic Panel  01/15/2018  Sodium  134 mmol/L    133-145  









 Potassium  3.1 mmol/L  Low  3.5-5.0  

 

 Chloride  102 mmol/L    101-111  

 

 Co2 Carbon Dioxide  26 mmol/L    22-32  

 

 Anion Gap  6 mmol/L    2-11  

 

 Glucose  122 mg/dL  High    

 

 Blood Urea Nitrogen  22 mg/dL    6-24  

 

 Creatinine  1.28 mg/dL  High  0.67-1.17  

 

 BUN/Creatinine Ratio  17.2    8-20  

 

 Calcium  9.0 mg/dL    8.6-10.3  

 

 Total Protein  7.2 g/dL    6.4-8.9  

 

 Albumin  3.3 g/dL    3.2-5.2  

 

 Globulin  3.9 g/dL    2-4  

 

 Albumin/Globulin Ratio  0.8  Low  1-3  

 

 Total Bilirubin  0.50 mg/dL    0.2-1.0  

 

 Alkaline Phosphatase  150 U/L  High    

 

 Alt  20 U/L    7-52  

 

 Ast  26 U/L    13-39  

 

 Egfr Non-  53.4    &gt;60  

 

 Egfr   68.7    &gt;60  10









 Laboratory test finding  01/15/2018  Lipase  54 U/L    11.0-82.0  









 C Reactive Protein  28.35 mg/L  High  &lt; 5.00  11

 

 B-Type Natriuretic Peptide BNP  1125 pg/mL  High    12









 Basic Metabolic Panel  2017  Sodium  136 mmol/L    133-145  









 Potassium  4.0 mmol/L    3.5-5.0  

 

 Chloride  104 mmol/L    101-111  

 

 Co2 Carbon Dioxide  23 mmol/L    22-32  

 

 Anion Gap  9 mmol/L    2-11  

 

 Glucose  101 mg/dL  High    

 

 Blood Urea Nitrogen  19 mg/dL    6-24  

 

 Creatinine  1.41 mg/dL  High  0.67-1.17  

 

 BUN/Creatinine Ratio  13.5    8-20  

 

 Calcium  8.6 mg/dL    8.6-10.3  

 

 Egfr Non-  47.8    &gt;60  

 

 Egfr   61.4    &gt;60  13









 Laboratory test finding  2017  Alt (SGPT)  31 U/L    7-52  









 Ast (Sgot)  34 U/L    13-39  









 Lipid Profile (Trig/Chol/HDL)  2017  Triglycerides  99 mg/dL      14









 Cholesterol  112 mg/dL      15

 

 HDL Cholesterol  20.2 mg/dL      16

 

 LDL Cholesterol  72 mg/dL      17









 Inr/Protime  2017  Inr  1.77  High  0.89-1.11  18

 

 Basic Metabolic Panel  2017  Sodium  132 mmol/L  Low  133-145  18









 Potassium  5.5 mmol/L  High  3.5-5.0  18

 

 Chloride  105 mmol/L    101-111  18

 

 Co2 Carbon Dioxide  23 mmol/L    22-32  18

 

 Anion Gap  4 mmol/L    2-11  18

 

 Glucose  93 mg/dL      18

 

 Blood Urea Nitrogen  29 mg/dL  High  6-24  18

 

 Creatinine  1.82 mg/dL  High  0.67-1.17  18

 

 BUN/Creatinine Ratio  15.9    8-20  18

 

 Calcium  9.0 mg/dL    8.6-10.3  18

 

 Egfr Non-  35.6    &gt;60  18

 

 Egfr   45.8    &gt;60  18, 19









 Inr/Protime  2017  Inr  3.06  High  0.89-1.11  

 

 Inr/Protime  2017  Inr  2.88  High  0.89-1.11  

 

 Laboratory test finding  2017  Inr/Protime  1.63  High  0.89-1.11  

 

 Inr/Protime  2017  Inr  1.40  High  0.89-1.11  

 

 Inr/Protime  2017  Inr  2.47  High  0.89-1.11  

 

 Inr/Protime  2017  Inr  2.48  High  0.89-1.11  

 

 Inr/Protime  2017  Inr  2.05  High  0.89-1.11  

 

 Protime W/ Inr(Add V58.61)  2017  Inr  1.72  High  0.89-1.11  20

 

 Basic Metabolic Panel  2017  Sodium  137 mmol/L    133-145  









 Potassium  4.6 mmol/L    3.5-5.0  

 

 Chloride  111 mmol/L    101-111  

 

 Co2 Carbon Dioxide  21 mmol/L  Low  22-32  

 

 Anion Gap  5 mmol/L    2-11  

 

 Glucose  111 mg/dL  High    

 

 Blood Urea Nitrogen  21 mg/dL    6-24  

 

 Creatinine  1.38 mg/dL  High  0.67-1.17  

 

 BUN/Creatinine Ratio  15.2    8-20  

 

 Calcium  8.9 mg/dL    8.6-10.3  

 

 Egfr Non-  49.1    &gt;60  

 

 Egfr   63.1    &gt;60  21









 Protime W/ Inr(Add  2017  Inr  3.70  High  0.89-1.11  



 V58.61)            

 

 Protime W/ Inr(Add  2017  Inr  4.64  High  0.89-1.11  



 V58.61)            

 

 Protime W/ Inr(Add  2017  #International Normalized  2.8      



 V58.61)            

 

 Protime W/ Inr(Add  2017  #International Normalized  1.6      



 V58.61)            

 

 Inr/Protime  2017  Inr  1.14  High  0.89-1.11  

 

 Protime W/ Inr(Add  2017  #International Normalized  1.3      22



 V58.61)            

 

 Laboratory test finding  2017  Inr/Protime  0.93    0.89-1.11  









 Partial Thrombo Time PTT  28.7 seconds    26.0-36.3  

 

 B-Type Natriuretic Peptide BNP  46 pg/mL      23









 Laboratory test finding  2017  Magnesium  1.7 mg/dL  Low  1.9-2.7  









 LDL Cholesterol Direct  69 mg/dL      24

 

 Creatine Kinase(CK)  159 U/L      

 

 Troponin-I (TnI)  0.06 ng/mL  High  &lt;0.04  25









 Comp Metabolic Panel  2017  Sodium  130 mmol/L  Low  133-145  









 Potassium  4.0 mmol/L    3.5-5.0  

 

 Chloride  101 mmol/L    101-111  

 

 Co2 Carbon Dioxide  21 mmol/L  Low  22-32  

 

 Anion Gap  8 mmol/L    2-11  

 

 Glucose  155 mg/dL  High    

 

 Blood Urea Nitrogen  31 mg/dL  High  6-24  

 

 Creatinine  1.51 mg/dL  High  0.67-1.17  

 

 BUN/Creatinine Ratio  20.5  High  8-20  

 

 Calcium  9.5 mg/dL    8.6-10.3  

 

 Total Protein  8.3 g/dL    6.4-8.9  

 

 Albumin  4.1 g/dL    3.2-5.2  

 

 Globulin  4.2 g/dL  High  2-4  

 

 Albumin/Globulin Ratio  1.0    1-3  

 

 Total Bilirubin  0.50 mg/dL    0.2-1.0  

 

 Alkaline Phosphatase  127 U/L  High    

 

 Alt  34 U/L    7-52  

 

 Ast  34 U/L    13-39  

 

 Egfr Non-  44.2    &gt;60  

 

 Egfr   56.9    &gt;60  26









 Laboratory test finding  2017  Lactic Acid  2.2 mmol/L  High  0.5-2.0  27

 

 CBC Auto Diff  2017  White Blood Count  11.6 10^3/uL  High  3.5-10.8  









 Red Blood Count  3.93 10^6/uL  Low  4.0-5.4  

 

 Hemoglobin  13.2 g/dL  Low  14.0-18.0  

 

 Hematocrit  40 %  Low  42-52  

 

 Mean Corpuscular Volume  101 fL  High  80-94  

 

 Mean Corpuscular Hemoglobin  34 pg  High  27-31  

 

 Mean Corpuscular HGB Conc  33 g/dL    31-36  

 

 Red Cell Distribution Width  13 %    10.5-15  

 

 Platelet Count  220 10^3/uL    150-450  

 

 Mean Platelet Volume  10 um3    7.4-10.4  

 

 Abs Neutrophils  8.9 10^3/uL  High  1.5-7.7  

 

 Abs Lymphocytes  1.5 10^3/uL    1.0-4.8  

 

 Abs Monocytes  1.0 10^3/uL  High  0-0.8  

 

 Abs Eosinophils  0.1 10^3/uL    0-0.6  

 

 Abs Basophils  0 10^3/uL    0-0.2  

 

 Abs Nucleated RBC  0 10^3/uL      

 

 Granulocyte %  76.9 %    38-83  

 

 Lymphocyte %  13.4 %  Low  25-47  

 

 Monocyte %  8.6 %    1-9  

 

 Eosinophil %  0.8 %    0-6  

 

 Basophil %  0.3 %    0-2  

 

 Nucleated Red Blood Cells %  0      









 Laboratory test finding  2017  Hemoglobin A1c  5.7      

 

 Urine Micro Inhouse  2017  Ua WBC  -      28









 Ua RBC  2-3      28

 

 Ua Casts  -      28

 

 Ua Epi  -      28

 

 Ua Other  -      28

 

 Ua Glucose  -      28

 

 Ua Bilirubin  -      28

 

 Ua Ketones  -      28

 

 Ua Specific Gravity  1.015      28

 

 Ua Blood  NH Tr      28

 

 Ua PH  5.0      28

 

 Ua Protein  2+      28

 

 Ua Urobilinogen  -      28

 

 Ua Nitrite  -      28

 

 Ua Leukocytes  -      28









 Lipid Profile (Trig/Chol/HDL)  2017  Triglycerides  123 mg/dL      29









 Cholesterol  122 mg/dL      30

 

 HDL Cholesterol  22.1 mg/dL      31

 

 LDL Cholesterol  75 mg/dL      32









 Basic Metabolic Panel  2017  Sodium  133 mmol/L    133-145  









 Potassium  4.6 mmol/L    3.5-5.0  

 

 Chloride  102 mmol/L    101-111  

 

 Co2 Carbon Dioxide  25 mmol/L    22-32  

 

 Anion Gap  6 mmol/L    2-11  

 

 Glucose  100 mg/dL      

 

 Blood Urea Nitrogen  31 mg/dL  High  6-24  

 

 Creatinine  1.59 mg/dL  High  0.67-1.17  

 

 BUN/Creatinine Ratio  19.5    8-20  

 

 Calcium  9.2 mg/dL    8.6-10.3  

 

 Egfr Non-  41.7    &gt;60  

 

 Egfr   53.6    &gt;60  33









 Urine Micro Inhouse  2016  Ua WBC  -      









 Ua RBC  -      

 

 Ua Casts  -      

 

 Ua Epi  -      

 

 Ua Other  -      

 

 Ua Glucose  -      

 

 Ua Bilirubin  -      

 

 Ua Ketones  -      

 

 Ua Specific Gravity  1.010      

 

 Ua Blood  -      

 

 Ua PH  6.0      

 

 Ua Protein  1+      

 

 Ua Urobilinogen  -      

 

 Ua Nitrite  -      

 

 Ua Leukocytes  -      









 Urine Micro Inhouse  2016  Ua WBC  -      34









 Ua RBC  ~100 per field      34

 

 Ua Casts  -      34

 

 Ua Epi  -      34

 

 Ua Other  -      34

 

 Ua Glucose  -      34

 

 Ua Bilirubin  -      34

 

 Ua Ketones  -      34

 

 Ua Specific Gravity  1.015      34

 

 Ua Blood  lg      34

 

 Ua PH  5.0      34

 

 Ua Protein  1+      34

 

 Ua Urobilinogen  -      34

 

 Ua Nitrite  -      34

 

 Ua Leukocytes  -      34









 Laboratory test  2016  Surgical Pathology  SEE RESULT BELOW      35



 finding            

 

 Laboratory test  2016  Hemoglobin A1c  6.0      



 finding            

 

 Basic Metabolic Panel  2016  Sodium  130 mmol/L  Low  133-145  









 Potassium  4.5 mmol/L    3.5-5.0  

 

 Chloride  95 mmol/L  Low  101-111  

 

 Co2 Carbon Dioxide  28 mmol/L    22-32  

 

 Anion Gap  7 mmol/L    2-11  

 

 Glucose  98 mg/dL      

 

 Blood Urea Nitrogen  24 mg/dL    6-24  

 

 Creatinine  1.47 mg/dL  High  0.67-1.17  

 

 BUN/Creatinine Ratio  16.3    8-20  

 

 Calcium  8.9 mg/dL    8.6-10.3  

 

 Egfr Non-  45.7    &gt;60  

 

 Egfr   58.8    &gt;60  36









 Laboratory test finding  2016  Alt (SGPT)  25 U/L    7-52  

 

 Lipid Profile (Trig/Chol/HDL)  2016  Triglycerides  116 mg/dL      37









 Cholesterol  108 mg/dL      38

 

 HDL Cholesterol  21.3 mg/dL      39

 

 LDL Cholesterol  64 mg/dL      40









 Laboratory test finding  2015  Sodium  131 mmol/L  Low  133-145  

 

 Urine Micro Inhouse  2015  Ua WBC  6-10      









 Ua RBC  -      

 

 Ua Casts  -      

 

 Ua Epi  -      

 

 Ua Other  occ clump WBC's      

 

 Ua Glucose  -      

 

 Ua Bilirubin  -      

 

 Ua Ketones  -      

 

 Ua Specific Gravity  1.010      

 

 Ua Blood  -      

 

 Ua PH  5.0      

 

 Ua Protein  tr      

 

 Ua Urobilinogen  -      

 

 Ua Nitrite  -      

 

 Ua Leukocytes  tr      









 Laboratory test finding  2015  Hemoglobin A1c  6.1      

 

 Basic Metabolic Panel  2015  Sodium  127 mmol/L  Low  133-145  41









 Potassium  5.0 mmol/L    3.5-5.0  41

 

 Chloride  94 mmol/L  Low  101-111  41

 

 Co2 Carbon Dioxide  27 mmol/L    22-32  41

 

 Anion Gap  6 mmol/L    2-11  41

 

 Glucose  102 mg/dL  High    41

 

 Blood Urea Nitrogen  35 mg/dL  High  6-24  41

 

 Creatinine  1.56 mg/dL  High  0.67-1.17  41

 

 BUN/Creatinine Ratio  22.4  High  8-20  41

 

 Calcium  9.5 mg/dL    8.6-10.3  41

 

 Egfr Non-  42.8    &gt;60  41

 

 Egfr   55.1    &gt;60  41, 42









 Laboratory test finding  2015  Alt  33 U/L    7-52  41, 43

 

 Lipid Profile (Trig/Chol/HDL)  2015  Triglycerides  153 mg/dL      41, 44









 Cholesterol  118 mg/dL      41, 45

 

 HDL Cholesterol  20.7 mg/dL      41, 46

 

 LDL Cholesterol  67 mg/dL      41, 47









 Urine Micro Inhouse  01/15/2014  Ua WBC  2-4      









 Ua RBC  0-1      

 

 Ua Casts  -      

 

 Ua Epi  0-2      

 

 Ua Other  -      

 

 Ua Glucose  -      

 

 Ua Bilirubin  -      

 

 Ua Ketones  -      

 

 Ua Specific Gravity  1.020      

 

 Ua Blood  -      

 

 Ua PH  5.0      

 

 Ua Protein  -      

 

 Ua Urobilinogen  -      

 

 Ua Nitrite  -      

 

 Ua Leukocytes  tr      









 Laboratory test finding  01/15/2014  Hemoglobin A1c  5.6      

 

 Lipid Profile (Trig/Chol/HDL)  01/10/2014  Triglycerides  77 mg/dL      









 Cholesterol  129 mg/dL    Less than 200  

 

 HDL Cholesterol  25 mg/dL  Low  40-60  48

 

 Cholesterol/HDL Ratio  5.2 Average  High  1-4.44  

 

 LDL Cholesterol  88.6    Less Than 100  49









 Laboratory test finding  01/10/2014  Alt  41 U/L    14-54  50

 

 Basic Metabolic Panel  01/10/2014  Sodium  134 mmol/L    133-145  









 Potassium  4.4 mmol/L    3.5-5.0  

 

 Chloride  98 mmol/L  Low  101-111  

 

 Co2 Carbon Dioxide  29.0 mmol/L    22-32  

 

 Anion Gap  7.0 mmol/L    2-11  

 

 Glucose  101 mg/dL  High    

 

 Blood Urea Nitrogen  21 mg/dL    6-24  

 

 Creatinine  1.40 mg/dL    0.50-1.40  

 

 BUN/Creatinine Ratio  15.0    8-20  

 

 Calcium  9.0 mg/dL    8.1-9.9  

 

 Egfr Non-  48.6    &gt;60  

 

 Egfr   62.6    &gt;60  51









 Basic Metabolic Panel  2013  Sodium  134 mmol/L    133-145  









 Potassium  4.5 mmol/L    3.5-5.0  

 

 Chloride  101 mmol/L    101-111  

 

 Co2 Carbon Dioxide  27.0 mmol/L    22-32  

 

 Anion Gap  6.0 mmol/L    2-11  

 

 Glucose  108 mg/dL  High    

 

 Blood Urea Nitrogen  34 mg/dL  High  6-24  

 

 Creatinine  1.40 mg/dL    0.50-1.40  

 

 BUN/Creatinine Ratio  24.3  High  8-20  

 

 Calcium  9.6 mg/dL    8.1-9.9  

 

 Egfr Non-  48.6    &gt;60  

 

 Egfr   62.6    &gt;60  52









 Basic Metabolic Panel  2013  Sodium  130 mmol/L  Low  133-145  









 Potassium  4.9 mmol/L    3.5-5.0  

 

 Chloride  97 mmol/L  Low  101-111  

 

 Co2 Carbon Dioxide  25.0 mmol/L    22-32  

 

 Anion Gap  8.0 mmol/L    2-11  

 

 Glucose  104 mg/dL  High    

 

 Blood Urea Nitrogen  31 mg/dL  High  6-24  

 

 Creatinine  1.40 mg/dL    0.50-1.40  

 

 BUN/Creatinine Ratio  22.1  High  8-20  

 

 Calcium  9.0 mg/dL    8.1-9.9  

 

 Egfr Non-  48.8    &gt;60  

 

 Egfr   62.7    &gt;60  53









 Laboratory test finding  2013  Hemoglobin A1c  6.0      

 

 Urine Micro Inhouse  2013  Ua WBC  2-5      









 Ua RBC  0-1      

 

 Ua Casts  -      

 

 Ua Epi  -      

 

 Ua Other  -      

 

 Ua Glucose  -      

 

 Ua Bilirubin  -      

 

 Ua Ketones  -      

 

 Ua Specific Gravity  1.010      

 

 Ua Blood  -      

 

 Ua PH  6.0      

 

 Ua Protein  tr      

 

 Ua Urobilinogen  -      

 

 Ua Nitrite  -      

 

 Ua Leukocytes  tr      









 Basic Metabolic Panel  2013  Sodium  133 mmol/L    133-145  









 Potassium  4.9 mmol/L    3.5-5.0  

 

 Chloride  102 mmol/L    101-111  

 

 Co2 Carbon Dioxide  26.0 mmol/L    22-32  

 

 Anion Gap  5.0 mmol/L    2-11  

 

 Glucose  105 mg/dL  High    

 

 Blood Urea Nitrogen  35 mg/dL  High  6-24  

 

 Creatinine  1.60 mg/dL  High  0.50-1.40  

 

 BUN/Creatinine Ratio  21.9  High  8-20  

 

 Calcium  9.6 mg/dL    8.1-9.9  

 

 Egfr Non-  41.8    &gt;60  

 

 Egfr   53.8    &gt;60  54









 Lipid Profile (Trig/Chol/HDL)  2013  Triglycerides  76 mg/dL      









 Cholesterol  111 mg/dL    Less than 200  

 

 HDL Cholesterol  22 mg/dL  Low  40-60  55

 

 Cholesterol/HDL Ratio  5.1 Average  High  1-4.44  

 

 LDL Cholesterol  73.8 mg/dL    Less Than 100  56









 Laboratory test finding  2013  Alt  44 U/L    14-54  

 

 Basic Metabolic Panel  08/15/2012  Sodium  130 mmol/L  Low  135-145  









 Potassium  4.8 mmol/L    3.5-5.0  

 

 Chloride  98 mmol/L  Low  101-111  

 

 Co2 (Carbon Dioxide)  26.0 mmol/L    22-32  

 

 Anion Gap  6.0 mmol/L    2-11  57

 

 Glucose  99 mg/dL      

 

 BUN  20 mg/dL    6-24  

 

 Creatinine  1.3 mg/dL    0.50-1.40  

 

 One Over Creatinine  0.76      

 

 BUN/Creatinine Ratio  15.4    8-20  

 

 Calcium  9.0 mg/dL    8.1-9.9  

 

 eGFR Non-  53.1    &gt; 60  

 

 eGFR   68.3    &gt; 60  58









 Laboratory test finding  2012  Hemoglobin A1c  5.7      

 

 Basic Metabolic Panel  2012  Sodium  127 mmol/L  Low  135-145  









 Potassium  4.5 mmol/L    3.5-5.0  

 

 Chloride  92 mmol/L  Low  101-111  

 

 Co2 (Carbon Dioxide)  26.0 mmol/L    22-32  

 

 Anion Gap  9.0 mmol/L    2-11  59

 

 Glucose  119 mg/dL  High    

 

 BUN  22 mg/dL    6-24  

 

 Creatinine  1.4 mg/dL    0.50-1.40  

 

 One Over Creatinine  0.71      

 

 BUN/Creatinine Ratio  15.7    8-20  

 

 Calcium  8.9 mg/dL    8.1-9.9  

 

 eGFR Non-  48.9    &gt; 60  

 

 eGFR   62.9    &gt; 60  60









 Lipid Profile (Trig/Chol/HDL)  2012  Triglyceride  110 mg/dL      









 Cholesterol  122 mg/dL    Less Than 200  61

 

 High Density Lipoprotein  22 mg/dL  Low  40-60  62

 

 Cholesterol/HDL Ratio  5.55 AVERAGE  High  1-4.97  

 

 Low Density Lipoprotein  78 mg/dL    Less Than 100  63









 Laboratory test finding  2011  Hemoglobin A1c  6.4      

 

 Basic Metabolic Panel  2011  Sodium  133 mmol/L  Low  135-145  









 Potassium  5.0 mmol/L    3.5-5.0  

 

 Chloride  104 mmol/L    101-111  

 

 Co2 (Carbon Dioxide)  24.0 mmol/L    22-32  

 

 Anion Gap  5.0 mmol/L    2-11  64

 

 Glucose  109 mg/dL  High    

 

 BUN  28 mg/dL  High  6-24  

 

 Creatinine  1.20 mg/dL    0.50-1.40  

 

 One Over Creatinine  0.80      

 

 BUN/Creatinine Ratio  23.3  High  8-20  

 

 Calcium  9.0 mg/dL    8.1-9.9  

 

 eGFR Non-  58.6    &gt; 60  

 

 eGFR   75.3    &gt; 60  65









 Laboratory test finding  2011  Alt (SGPT)  71 U/L  High  17-63  

 

 Lipid Profile (Trig/Chol/HDL)  2011  Triglyceride  134 mg/dL      









 Cholesterol  124 mg/dL    Less Than 200  66

 

 High Density Lipoprotein  21 mg/dL  Low  40-60  67

 

 Cholesterol/HDL Ratio  5.90 AVERAGE  High  1-4.97  

 

 Low Density Lipoprotein  76 mg/dL    Less Than 100  68









 Basic Metabolic Panel  2010  Sodium  131 mmol/L  Low  135-145  









 Potassium  4.6 mmol/L    3.5-5.0  

 

 Chloride  103 mmol/L    101-111  

 

 Co2 (Carbon Dioxide)  21.0 mmol/L  Low  22-32  

 

 Anion Gap  7.0 mmol/L    2-11  69

 

 Glucose  146 mg/dL  High    70

 

 BUN  27 mg/dL  High  6-24  

 

 Creatinine  1.70 mg/dL  High  0.50-1.40  

 

 One Over Creatinine  0.50      

 

 BUN/Creatinine Ratio  15.9    8-20  

 

 Calcium  9.0 mg/dL    8.1-9.9  

 

 eGFR Non-  41.6    &gt; 60  

 

 eGFR   50.4    &gt; 60  71









 Laboratory test finding  2010  Sodium  130 mmol/L  Low  135-145  

 

 Basic Metabolic Panel  2010  Sodium  127 mmol/L  Low  135-145  









 Potassium  4.7 mmol/L    3.5-5.0  

 

 Chloride  93 mmol/L  Low  101-111  

 

 Co2 (Carbon Dioxide)  26.0 mmol/L    22-32  

 

 Anion Gap  8.0 mmol/L    2-11  72

 

 Glucose  105 mg/dL  High    73

 

 BUN  17 mg/dL    6-24  

 

 Creatinine  1.20 mg/dL    0.50-1.40  

 

 One Over Creatinine  0.80      

 

 BUN/Creatinine Ratio  14.2    8-20  

 

 Calcium  9.1 mg/dL    8.1-9.9  74

 

 eGFR Non-  62.4    &gt; 60  

 

 eGFR   75.5    &gt; 60  75









 Lipid Profile (Trig/Chol/HDL)  2010  Triglyceride  125 mg/dL      









 Cholesterol  135 mg/dL    Less Than 200  76

 

 High Density Lipoprotein  25 mg/dL  Low  40-60  77

 

 Cholesterol/HDL Ratio  5.40 AVERAGE  High  1-4.97  

 

 Low Density Lipoprotein  85 mg/dL    Less Than 100  78









 Laboratory test finding  2010  Alt (SGPT)  72 U/L  High  17-63  

 

 Basic Metabolic Panel  2009  Sodium  131 mmol/L  Low  135-145  









 Potassium  4.3 mmol/L    3.5-5.0  

 

 Chloride  99 mmol/L  Low  101-111  

 

 Co2 (Carbon Dioxide)  27.0 mmol/L    22-32  

 

 Anion Gap  5.0 mmol/L    2-11  79

 

 Glucose  117 mg/dL  High    80

 

 BUN  14 mg/dL    6-24  

 

 Creatinine  1.20 mg/dL    0.50-1.40  

 

 One Over Creatinine  0.80      

 

 BUN/Creatinine Ratio  11.7    8-20  

 

 Calcium  9.1 mg/dL    8.1-9.9  81

 

 eGFR Non-  62.4    &gt; 60  

 

 eGFR   75.5    &gt; 60  82









 Urine Micro Inhouse  2009  Urine Microscopic Inhouse  -      

 

 Ua Inhouse  2009  Ua Glucose  -      









 Ua Bilirubin  -      

 

 Ua Ketones  -      

 

 Ua Specific Gravity  1.005      

 

 Ua Blood  -      

 

 Ua PH  5.0      

 

 Ua Protein  -      

 

 Ua Urobilinogen  -      

 

 Ua Nitrite  -      

 

 Ua Leukocytes  -      









 Urine Micro Inhouse  2009  Urine Microscopic Inhouse  see result note   
   83

 

 Ua Inhouse  2009  Ua Glucose  -      83









 Ua Bilirubin  -      83

 

 Ua Ketones  -      83

 

 Ua Specific Gravity  1.015      83

 

 Ua Blood  -      83

 

 Ua PH  6.0      83

 

 Ua Protein  ++      83

 

 Ua Urobilinogen  -      83

 

 Ua Nitrite  -      83

 

 Ua Leukocytes  -      83









 Basic Metabolic Panel  2009  Sodium  131 mmol/L  Low  135-145  









 Potassium  4.5 mmol/L    3.5-5.0  

 

 Chloride  97 mmol/L  Low  101-111  

 

 Co2 (Carbon Dioxide)  28.0 mmol/L    22-32  

 

 Anion Gap  6.0 mmol/L    2-11  84

 

 Glucose  106 mg/dL  High    85

 

 BUN  13 mg/dL    6-24  

 

 Creatinine  1.10 mg/dL    0.50-1.40  

 

 One Over Creatinine  0.90      

 

 BUN/Creatinine Ratio  11.8    8-20  

 

 Calcium  9.3 mg/dL    8.1-9.9  86









 Laboratory test finding  2009  Alt (SGPT)  42 U/L    17-63  

 

 Lipid Profile (Trig/Chol/HDL)  2009  Triglyceride  86 mg/dL      









 Cholesterol  112 mg/dL    Less Than 200  87

 

 High Density Lipoprotein  21 mg/dL  Low  40-60  88

 

 Cholesterol/HDL Ratio  5.33 AVERAGE  High  1-4.97  

 

 Low Density Lipoprotein  74 mg/dL    Less Than 100  89









 Laboratory test finding  2009  CPK (Creatine Kinase)  257 U/L  High  0-
200  

 

 Basic Metabolic Panel  2008  Sodium  129 mmol/L  Low  135-145  









 Potassium  4.0 mmol/L    3.5-5.0  

 

 Chloride  98 mmol/L  Low  101-111  

 

 Co2 (Carbon Dioxide)  25.0 mmol/L    22-32  

 

 Anion Gap  6.0 mmol/L    2-11  90

 

 Glucose  95 mg/dL      91

 

 BUN  15 mg/dL    6-24  

 

 Creatinine  1.2 mg/dL    0.5-1.4  

 

 One Over Creatinine  0.83      

 

 BUN/Creatinine Ratio  12.5    8-20  

 

 Calcium  8.2 mg/dL    8.1-9.9  92









 Laboratory test finding  2008  CPK (Creatine Kinase)  300 U/L  High  0-
200  

 

 Basic Metabolic Panel  2008  Sodium  126 mmol/L  Low  135-145  









 Potassium  3.8 mmol/L    3.5-5.0  

 

 Chloride  94 mmol/L  Low  101-111  

 

 Co2 (Carbon Dioxide)  26.0 mmol/L    22-32  

 

 Anion Gap  6.0 mmol/L    2-11  93

 

 Glucose  86 mg/dL      

 

 BUN  16 mg/dL    6-24  

 

 Creatinine  1.3 mg/dL    0.5-1.4  

 

 One Over Creatinine  0.76      

 

 BUN/Creatinine Ratio  12.3    8-20  

 

 Calcium  8.1 mg/dL    8.1-9.9  94









 Lipid Profile (Trig/Chol/HDL)  2008  Triglyceride  139 mg/dL      
95









 Cholesterol  106 mg/dL    Less Than 200  95, 96

 

 High Density Lipoprotein  16 mg/dL  Low  40-60  95, 97

 

 Cholesterol/HDL Ratio  6.63 AVERAGE  High  1-4.97  95

 

 Low Density Lipoprotein  62 mg/dL    Less Than 100  95, 98









 Liver Function Panel  2008  Total Protein  7.2 GM/DL    6.2-8.1  95









 Albumin  3.9 GM/DL    3.2-5.2  95

 

 Globulin  3.3 GM/DL    2-4  95

 

 Albumin/Globulin Ratio  1.2    1-3  95

 

 Bilirubin Total  0.8 mg/dL    0.4-1.5  95

 

 Bilirubin Direct  0.2 mg/dL    0.1-0.5  95

 

 Indirect Bilirubin  0.6 mg/dL    0.1-0.75  95

 

 Alkaline Phosphatase  103 U/L      95

 

 Alt (SGPT)  49 U/L    17-63  95

 

 Ast (Sgot)  48 U/L  High  12-42  95









 Laboratory test finding  2008  CPK (Creatine Kinase)  302 U/L  High  0-
200  95

 

 Basic Metabolic Panel  2008  Sodium  125 mmol/L  Low  135-145  95









 Potassium  4.4 mmol/L    3.5-5.0  95

 

 Chloride  91 mmol/L  Low  101-111  95

 

 Co2 (Carbon Dioxide)  27.0 mmol/L    22-32  95

 

 Anion Gap  7.0 mmol/L    2-11  95, 99

 

 Glucose  100 mg/dL      95

 

 BUN  18 mg/dL    6-24  95

 

 Creatinine  1.4 mg/dL    0.5-1.4  95

 

 One Over Creatinine  0.71      95

 

 BUN/Creatinine Ratio  12.9    8-20  95

 

 Calcium  8.3 mg/dL    8.1-9.9  95, 100









 Lipid Profile  2007  Cholesterol/HDL Ratio  7.25 AVERAGE  High  1-4.97  



 (Trig/Chol/HDL)            









 Cholesterol  116 mg/dL    Less Than 200  101

 

 Triglyceride  125 mg/dL      

 

 High Density Lipoprotein  16 mg/dL  Low  40-60  102

 

 Low Density Lipoprotein  75 mg/dL    Less Than 100  103









 Laboratory test finding  2007  Alt (SGPT)  70 U/L  High  17-63  









 CPK (Creatine Kinase)  252 U/L  High  0-200  









 Basic Metabolic Panel  2007  One Over Creatinine  0.76      









 Anion Gap  9.0 mmol/L    2-11  104

 

 BUN  22 mg/dL    6-24  

 

 Calcium  9.0 mg/dL    8.7-10.2  

 

 Chloride  98 mmol/L  Low  101-111  

 

 Co2 (Carbon Dioxide)  25.0 mmol/L    22-32  

 

 Glucose  83 mg/dL      

 

 Potassium  5.0 mmol/L    3.5-5.0  

 

 Sodium  132 mmol/L  Low  135-145  

 

 BUN/Creatinine Ratio  16.9    8-20  

 

 Creatinine  1.3 mg/dL    0.5-1.4  









 Liver Function Panel  2007  Albumin/Globulin Ratio  1.1    1-3  









 Albumin  4.0 GM/DL    3.2-5.2  

 

 Alkaline Phosphatase  103 U/L      

 

 Ast (Sgot)  59 U/L  High  12-42  

 

 Bilirubin Direct  0.2 mg/dL    0.1-0.5  

 

 Globulin  3.6 GM/DL    2-4  

 

 Indirect Bilirubin  0.6 mg/dL    0.1-0.75  

 

 Bilirubin Total  0.8 mg/dL    0.4-1.5  

 

 Total Protein  7.6 GM/DL    6.2-8.1  









 Laboratory test finding  2007  Alt (SGPT)  67 U/L  High  17-63  









 CPK (Creatine Kinase)  195 U/L    0-200  









 Lipid Profile  2007  Cholesterol/HDL Ratio  6.94 AVERAGE  High  1-4.97  



 (Trig/Chol/HDL)            









 Cholesterol  125 mg/dL    Less Than 200  105

 

 Triglyceride  84 mg/dL      

 

 High Density Lipoprotein  18 mg/dL  Low  40-60  106

 

 Low Density Lipoprotein  90 mg/dL    Less Than 100  107









 Lipid Profile  2007  Cholesterol/HDL Ratio  6.58 AVERAGE  High  1-4.97  



 (Trig/Chol/HDL)            









 Cholesterol  125 mg/dL    Less Than 200  108

 

 Triglyceride  76 mg/dL      

 

 High Density Lipoprotein  19 mg/dL  Low  40-60  109

 

 Low Density Lipoprotein  91 mg/dL    Less Than 100  110









 Laboratory test finding  2007  Alt (SGPT)  45 U/L    17-63  









 CPK (Creatine Kinase)  194 U/L    0-200  









 Basic Metabolic Panel  10/23/2006  One Over Creatinine  0.76      95









 Anion Gap  10.0 mmol/L    2-11  95, 111

 

 BUN  28 mg/dL  High  6-24  95

 

 Calcium  9.6 mg/dL    8.7-10.2  95

 

 Chloride  101 mmol/L    101-111  95

 

 Co2 (Carbon Dioxide)  22.0 mmol/L    22-32  95

 

 Glucose  91 mg/dL      95

 

 Potassium  5.1 mmol/L  High  3.5-5.0  95

 

 Sodium  133 mmol/L  Low  135-145  95

 

 BUN/Creatinine Ratio  21.5  High  8-20  95

 

 Creatinine  1.3 mg/dL    0.5-1.4  95









 Lipid Profile  10/23/2006  Cholesterol  199 mg/dL    Less Than 200  95, 112



 (Trig/Chol/HDL)            









 Triglyceride  62 mg/dL      95

 

 High Density Lipoprotein  28 mg/dL  Low  40-60  95, 113

 

 Low Density Lipoprotein  159 mg/dL  High  Less Than 100  95, 114

 

 Cholesterol/HDL Ratio  7.11 AVERAGE  High  1-4.97  95









 Laboratory test finding  10/23/2006  Alt (SGPT)  37 U/L    17-63  95

 

 CBC With Electronic Diff  2006  White Blood Count  9.2 CUMM    4.8-10.8  









 Abs Basophils  0    0-0.2  

 

 Abs Eosinophils  0.2    0-0.6  

 

 Absolute Neutrophil Count  6.4    1.5-7.7  

 

 Abs Lymphs  1.6    1.0-4.8  

 

 Abs Mononuclear  0.9  High  0-0.8  

 

 Basophil %  0.4 %    0-2  

 

 Hematocrit  34 %  Low  42-52  

 

 Hemoglobin  11.7 g/dL  Low  14.0-18.0  

 

 Eosinophil %  2.4 %    0-6  

 

 Gran %  70.0 %    38-83  

 

 Lymph %  17.7 %  Low  20-45  

 

 Mean Corpuscular HGB Cone  34 g/dL    32-36  

 

 Mean Corpuscular Hemoglob  34 pg  High  27-31  

 

 Mean Corpuscular Volume  98 um3  High  80-94  

 

 Mean Platelet Volume  9.7 um3    7.4-10.4  

 

 Mononuclear %  9.5 %  High  1-9  

 

 Platelet Count  326 CUMM    150-450  

 

 Red Cell Count  3.49 CUMM  Low  4.6-6.2  

 

 Redcell Distribution WDTH  13 %    10.5-15  









 Basic Metabolic Panel  2006  One Over Creatinine  0.90      









 Anion Gap  7.0 mmol/L    2-11  115

 

 BUN  38 mg/dL  High  6-24  

 

 Calcium  9.5 mg/dL    8.7-10.2  

 

 Chloride  109 mmol/L    101-111  

 

 Co2 (Carbon Dioxide)  21.0 mmol/L  Low  22-32  

 

 Glucose  101 mg/dL      

 

 Potassium  5.1 mmol/L  High  3.5-5.0  

 

 Sodium  137 mmol/L    135-145  

 

 BUN/Creatinine Ratio  34.5  High  8-20  

 

 Creatinine  1.1 mg/dL    0.5-1.4  









 Basic Metabolic Panel  2005  Anion Gap  7.0 mmol/L    2-11  116









 BUN  25 mg/dL  High  6-24  

 

 Calcium  9.3 mg/dL    8.7-10.2  

 

 Chloride  106 mmol/L    101-111  

 

 Co2 (Carbon Dioxide)  25.0 mmol/L    22-32  

 

 Creatinine  1.3 mg/dL    0.5-1.4  

 

 Glucose  110 mg/dL  High    

 

 Potassium  4.7 mmol/L    3.5-5.0  

 

 Sodium  138 mmol/L    135-145  

 

 BUN/Creatinine Ratio  19.2    8-20  









 CBC With Electronic Diff  2005  White Blood Count  8.2 CUMM    4.8-10.8  









 Abs Basophils  0    0-0.2  

 

 Abs Eosinophils  0.4    0-0.6  

 

 Abs Grans  5.2    1.5-7.7  

 

 Abs Lymphs  1.8    1.0-4.8  

 

 Abs Mononuclear  0.8    0-0.8  

 

 Basophil %  0.3 %    0-2  

 

 Hematocrit  36 %  Low  42-52  

 

 Hemoglobin  12.3 g/dL  Low  14.0-18.0  

 

 Eosinophil %  4.4 %    0-6  

 

 Gran %  63.0 %    38-83  

 

 Lymph %  22.5 %    20-45  

 

 Mean Corpuscular HGB Cone  34 g/dL    32-36  

 

 Mean Corpuscular Hemoglob  34 pg  High  27-31  

 

 Mean Corpuscular Volume  99 um3  High  80-94  

 

 Mean Platelet Volume  10.4 um3    7.4-10.4  

 

 Mononuclear %  9.8 %  High  1-9  

 

 Platelet Count  248 CUMM    150-450  

 

 Red Cell Count  3.62 CUMM  Low  4.6-6.2  

 

 Redcell Distribution WDTH  13 %    10.5-15  









 Laboratory test finding  2005  TSH  1.90 MIU/ML    0.34-5.60  

 

 Basic Metabolic Panel  2005  Anion Gap  7.0 mmol/L    2-11  117









 BUN  18 mg/dL    6-24  

 

 Calcium  9.8 mg/dL    8.7-10.2  

 

 Chloride  104 mmol/L    101-111  

 

 Co2 (Carbon Dioxide)  27.0 mmol/L    22-32  

 

 Creatinine  0.9 mg/dL    0.5-1.4  

 

 Glucose  106 mg/dL  High    

 

 Potassium  4.8 mmol/L    3.5-5.0  

 

 Sodium  138 mmol/L    135-145  

 

 BUN/Creatinine Ratio  20.0    8-20  









 Lipid Profile (Trig/Chol/HDL)  2005  Cholesterol  181 mg/dL    Less Than 
200  118









 Triglyceride  85 mg/dL      

 

 High Density Lipoprotein  27 mg/dL  Low  40-60  119

 

 Low Density Lipoprotein  137 mg/dL  High  Less Than 100  120

 

 Cholesterol/HDL Ratio  6.70 AVERAGE  High  1-4.97  









 Basic Metabolic Panel  2004  Anion Gap  7.0 mmol/L    2-11  121









 BUN  23 mg/dL    6-24  

 

 Calcium  9.3 mg/dL    8.7-10.2  

 

 Chloride  106 mmol/L    101-111  

 

 Co2 (Carbon Dioxide)  26.0 mmol/L    22-32  

 

 Creatinine  1.2 mg/dL    0.5-1.4  

 

 Glucose  99 mg/dL      

 

 Potassium  5.3 mmol/L  High  3.5-5.0  

 

 Sodium  139 mmol/L    135-145  

 

 BUN/Creatinine Ratio  19.2    8-20  









 CBC With Electronic Diff  2004  White Blood Count  6.8 CUMM    4.8-10.8  









 Abs Basophils  0    0-0.2  

 

 Abs Eosinophils  0.4    0-0.6  

 

 Abs Grans  4.0    1.5-7.7  

 

 Abs Lymphs  1.8    1.0-4.8  

 

 Abs Mononuclear  0.5    0-0.8  

 

 Basophil %  0.6 %    0-2  

 

 Hematocrit  37 %  Low  42-52  

 

 Hemoglobin  12.3 g/dL  Low  14.0-18.0  

 

 Eosinophil %  6.3 %  High  0-6  

 

 Gran %  59.5 %    38-83  

 

 Lymph %  25.9 %    20-45  

 

 Mean Corpuscular HGB Cone  34 g/dL    32-36  

 

 Mean Corpuscular Hemoglob  33 pg  High  27-31  

 

 Mean Corpuscular Volume  99 um3  High  80-94  

 

 Mean Platelet Volume  10.7 um3  High  7.4-10.4  

 

 Mononuclear %  7.7 %    1-9  

 

 Platelet Count  238 CUMM    150-450  

 

 Red Cell Count  3.70 CUMM  Low  4.6-6.2  

 

 Redcell Distribution WDTH  13 %    10.5-15  









 Basic Metabolic Panel  2004  Anion Gap  7.0 mmol/L    2-11  122









 BUN  23 mg/dL    6-24  

 

 Calcium  9.3 mg/dL    8.7-10.2  

 

 Chloride  106 mmol/L    101-111  

 

 Co2 (Carbon Dioxide)  26.0 mmol/L    22-32  

 

 Creatinine  1.2 mg/dL    0.5-1.4  

 

 Glucose  99 mg/dL      

 

 Potassium  5.3 mmol/L  High  3.5-5.0  

 

 Sodium  139 mmol/L    135-145  

 

 BUN/Creatinine Ratio  19.2    8-20  









 Laboratory test finding  2004  Alt (SGPT)  31 U/L    17-63  

 

 Lipid Profile  2004  Cholesterol/HDL Ratio  7.91 AVERAGE  High  1-4.97  



 (Trig/Chol/HDL)            









 Cholesterol  182 mg/dL    Less Than 200  123

 

 Triglyceride  83 mg/dL      

 

 High Density Lipoprotein  23 mg/dL  Low  40-60  124

 

 Low Density Lipoprotein  142 mg/dL  High  Less Than 100  125









 Laboratory test finding  2004  TSH  1.53 MIU/ML    0.34-5.60  









 Vitamin B12  392 pg/mL    180-914  









 Iron &amp; Iron Binding Capacity  2004  Iron Total  92 g/dL      









 Unsaturated Iron Binding  317 g/dL      

 

 Total Iron Binding Capacity  409 g/dL    250-450  

 

 % Iron Saturation  22 %    15-55  









 Laboratory test finding  2004  Ferritin  382 NG/ML  High    









 Folic Acid  15.6 NG/ML    2-16  









 1  Critical Result LACT:2.1 Called to RIGOBERTO at: 18:37:22 by:[id number] Read back



   by:[email or web address]



   [facility] Severe Sepsis and Septic Shock Management Bundle Measure



   requires all lactic acids initially measuring &gt;2.0 mmol/L be



   repeated.

 

 2  *******Because ethnic data is not always readily available,



   this report includes an eGFR for both -Americans and



   non- Americans.****



   The National Kidney Disease Education Program (NKDEP) does



   not endorse the use of the MDRD equation for patients that



   are not between the ages of 18 and 70, are pregnant, have



   extremes of body size, muscle mass, or nutritional status,



   or are non- or non-.



   According to the National Kidney Foundation, irrespective of



   diagnosis, the stage of the disease is based on the level of



   kidney function:



   Stage Description                      GFR(mL/min/1.73 m(2))



   1     Kidney damage with normal or decreased GFR       90



   2     Kidney damage with mild decrease in GFR          60-89



   3     Moderate decrease in GFR                         30-59



   4     Severe decrease in GFR                           15-29



   5     Kidney failure                       &lt;15 (or dialysis)

 

 3  Result TnIDx:0.06 Called to [id number] at: 18:42:44 by:[id number] Read back by:
[id number]

 

 4  *******Because ethnic data is not always readily available,



   this report includes an eGFR for both -Americans and



   non- Americans.****



   The National Kidney Disease Education Program (NKDEP) does



   not endorse the use of the MDRD equation for patients that



   are not between the ages of 18 and 70, are pregnant, have



   extremes of body size, muscle mass, or nutritional status,



   or are non- or non-.



   According to the National Kidney Foundation, irrespective of



   diagnosis, the stage of the disease is based on the level of



   kidney function:



   Stage Description                      GFR(mL/min/1.73 m(2))



   1     Kidney damage with normal or decreased GFR       90



   2     Kidney damage with mild decrease in GFR          60-89



   3     Moderate decrease in GFR                         30-59



   4     Severe decrease in GFR                           15-29



   5     Kidney failure                       &lt;15 (or dialysis)

 

 5  SEE RESULT BELOW



   -----------------------------------------------------------------------------
---------------



   Name:  CARMELO VAZQUEZ JR: 1932    Attend Dr: Cesar Alcantar MD



   Acct:  [id number]  Unit: [id number]  AGE: 85            Location:  [facility]



   Re18                        SEX: M             Status:    REG ER



   -----------------------------------------------------------------------------
---------------



   



   SPEC: [id number]         ALBERTO:   18-    SUBM DR: Cesar Alcantar MD



   REQ:  [id number]              RECD:   



   STATUS: RASTA BARRIOS DR: Cholo Boo MD



   _



   SOURCE: NASAL          SPDESC:



   ORDERED:  Flu A B Request



   



   -----------------------------------------------------------------------------
---------------



   Procedure                         Result                         Reported   
        Site



   -----------------------------------------------------------------------------
---------------



   Rapid Influenza A   B Request  Final                             18-
      ML



   Specimen received for Influenza A/B Molecular testing



   



   -----------------------------------------------------------------------------
---------------



   * ML - MAIN LAB (PSC1)



   .



   



   



   



   



   



   



   



   



   



   



   



   



   



   



   



   



   



   



   



   



   



   



   



   



   



   



   



   ** END OF REPORT **



   



   * ML=Testing performed at Main Lab



   DEPARTMENT OF PATHOLOGY,  [address] [place] [address]



   Phone # [phone]      Fax #[phone]



   Brendan Tran M.D. Director     [facility] # [id number]

 

 6  : [id number]

 

 7  void, hazy, color red/vanita.

 

 8  *******Because ethnic data is not always readily available,



   this report includes an eGFR for both -Americans and



   non- Americans.****



   The National Kidney Disease Education Program (NKDEP) does



   not endorse the use of the MDRD equation for patients that



   are not between the ages of 18 and 70, are pregnant, have



   extremes of body size, muscle mass, or nutritional status,



   or are non- or non-.



   According to the National Kidney Foundation, irrespective of



   diagnosis, the stage of the disease is based on the level of



   kidney function:



   Stage Description                      GFR(mL/min/1.73 m(2))



   1     Kidney damage with normal or decreased GFR       90



   2     Kidney damage with mild decrease in GFR          60-89



   3     Moderate decrease in GFR                         30-59



   4     Severe decrease in GFR                           15-29



   5     Kidney failure                       &lt;15 (or dialysis)

 

 9  [facility] Severe Sepsis and Septic Shock Management Bundle Measure



   requires all lactic acids initially measuring &gt;2.0 mmol/L be



   repeated.

 

 10  *******Because ethnic data is not always readily available,



   this report includes an eGFR for both -Americans and



   non- Americans.****



   The National Kidney Disease Education Program (NKDEP) does



   not endorse the use of the MDRD equation for patients that



   are not between the ages of 18 and 70, are pregnant, have



   extremes of body size, muscle mass, or nutritional status,



   or are non- or non-.



   According to the [employer], irrespective of



   diagnosis, the stage of the disease is based on the level of



   kidney function:



   Stage Description                      GFR(mL/min/1.73 m(2))



   1     Kidney damage with normal or decreased GFR       90



   2     Kidney damage with mild decrease in GFR          60-89



   3     Moderate decrease in GFR                         30-59



   4     Severe decrease in GFR                           15-29



   5     Kidney failure                       &lt;15 (or dialysis)

 

 11  Acute inflammation:  &gt;10.00

 

 12  &gt;100 to &lt;200 pg/mL: likely compensated congestive heart



   failure (CHF)



   200 to 400 pg/mL: likely moderate CHF



   &gt;400 pg/mL: likely moderate to severe CHF

 

 13  *******Because ethnic data is not always readily available,



   this report includes an eGFR for both -Americans and



   non- Americans.****



   The National Kidney Disease Education Program (NKDEP) does



   not endorse the use of the MDRD equation for patients that



   are not between the ages of 18 and 70, are pregnant, have



   extremes of body size, muscle mass, or nutritional status,



   or are non- or non-.



   According to the [employer], irrespective of



   diagnosis, the stage of the disease is based on the level of



   kidney function:



   Stage Description                      GFR(mL/min/1.73 m(2))



   1     Kidney damage with normal or decreased GFR       90



   2     Kidney damage with mild decrease in GFR          60-89



   3     Moderate decrease in GFR                         30-59



   4     Severe decrease in GFR                           15-29



   5     Kidney failure                       &lt;15 (or dialysis)

 

 14  Desirable &lt;150



   Borderline high 150-199



   High 200-499



   Very High &gt;500

 

 15  Desirable &lt;200



   Borderline high 200-239



   High &gt;239

 

 16  Low &lt;40



   Desirable: 40-60



   High: &gt;60

 

 17  Desirable: &lt;100 mg/dL



   Near Optimal: 100-129 mg/dL



   Borderline High: 130-159 mg/dL



   High: 160-189 mg/dL



   Very High: &gt;189 mg/dL

 

 18  INR taken care of in separate triage

 

 19  *******Because ethnic data is not always readily available,



   this report includes an eGFR for both -Americans and



   non- Americans.****



   The National Kidney Disease Education Program (NKDEP) does



   not endorse the use of the MDRD equation for patients that



   are not between the ages of 18 and 70, are pregnant, have



   extremes of body size, muscle mass, or nutritional status,



   or are non- or non-.



   According to the National Kid[employer] Foundation, irrespective of



   diagnosis, the stage of the disease is based on the level of



   kidney function:



   Stage Description                      GFR(mL/min/1.73 m(2))



   1     Kidney damage with normal or decreased GFR       90



   2     Kidney damage with mild decrease in GFR          60-89



   3     Moderate decrease in GFR                         30-59



   4     Severe decrease in GFR                           15-29



   5     Kidney failure                       &lt;15 (or dialysis)

 

 20  Pt. can have INR drawn on Wed.17 or Thurs.17 next week.

 

 21  *******Because ethnic data is not always readily available,



   this report includes an eGFR for both -Americans and



   non- Americans.****



   The National Kidney Disease Education Program (NKDEP) does



   not endorse the use of the MDRD equation for patients that



   are not between the ages of 18 and 70, are pregnant, have



   extremes of body size, muscle mass, or nutritional status,



   or are non- or non-.



   According to the National Kidney Foundation, irrespective of



   diagnosis, the stage of the disease is based on the level of



   kidney function:



   Stage Description                      GFR(mL/min/1.73 m(2))



   1     Kidney damage with normal or decreased GFR       90



   2     Kidney damage with mild decrease in GFR          60-89



   3     Moderate decrease in GFR                         30-59



   4     Severe decrease in GFR                           15-29



   5     Kidney failure                       &lt;15 (or dialysis)

 

 22  Per [facility], fingerstick INR is 1.3

 

 23  &gt;100 to &lt;200 pg/mL: likely compensated congestive heart



   failure (CHF)



   200 to 400 pg/mL: likely moderate CHF



   &gt;400 pg/mL: likely moderate to severe CHF

 

 24  Desirable: &lt;100 mg/dL



   Near Optimal: 100-129 mg/dL



   Borderline High: 130-159 mg/dL



   High: 160-189 mg/dL



   Very High: &gt;189 mg/dL

 

 25  Result TnIDx:0.06 Called to VINEET AMBROSE at: 11:42:17 by:[id number] Read 
back



   by:VINEET AMBROSE



   99th percentile=0.04 ng/mL



   



   Troponin results at [facility] and [facility] are not interchangeable.

 

 26  *******Because ethnic data is not always readily available,



   this report includes an eGFR for both -Americans and



   non- Americans.****



   The National Kidney Disease Education Program (NKDEP) does



   not endorse the use of the MDRD equation for patients that



   are not between the ages of 18 and 70, are pregnant, have



   extremes of body size, muscle mass, or nutritional status,



   or are non- or non-.



   According to the National Kid[employer] Foundation, irrespective of



   diagnosis, the stage of the disease is based on the level of



   kidney function:



   Stage Description                      GFR(mL/min/1.73 m(2))



   1     Kidney damage with normal or decreased GFR       90



   2     Kidney damage with mild decrease in GFR          60-89



   3     Moderate decrease in GFR                         30-59



   4     Severe decrease in GFR                           15-29



   5     Kidney failure                       &lt;15 (or dialysis)

 

 27  Critical Result LACT:2.2 Called to VINEET AMBROSE at: 11:43:11 by: 
Read



   back by:VINEET DEGROOT Severe Sepsis and Septic Shock Management Bundle Measure



   requires all lactic acids initially measuring &gt;2.0 mmol/L be



   repeated.

 

 28  void, clear, yellow

 

 29  Desirable &lt;150



   Borderline high 150-199



   High 200-499



   Very High &gt;500

 

 30  Desirable &lt;200



   Borderline high 200-239



   High &gt;239

 

 31  Low &lt;40



   Desirable: 40-60



   High: &gt;60

 

 32  Desirable: &lt;100 mg/dL



   Near Optimal: 100-129 mg/dL



   Borderline High: 130-159 mg/dL



   High: 160-189 mg/dL



   Very High: &gt;189 mg/dL

 

 33  *******Because ethnic data is not always readily available,



   this report includes an eGFR for both -Americans and



   non- Americans.****



   The National Kidney Disease Education Program (NKDEP) does



   not endorse the use of the MDRD equation for patients that



   are not between the ages of 18 and 70, are pregnant, have



   extremes of body size, muscle mass, or nutritional status,



   or are non- or non-.



   According to the National Kid[employer] Foundation, irrespective of



   diagnosis, the stage of the disease is based on the level of



   kidney function:



   Stage Description                      GFR(mL/min/1.73 m(2))



   1     Kidney damage with normal or decreased GFR       90



   2     Kidney damage with mild decrease in GFR          60-89



   3     Moderate decrease in GFR                         30-59



   4     Severe decrease in GFR                           15-29



   5     Kidney failure                       &lt;15 (or dialysis)

 

 34  void, clear, gold

 

 35  SEE RESULT BELOW



   -----------------------------------------------------------------------------
---------------



   Name:  CARMELO VAZQUEZ                  : 1932    Attend Dr: Cholo Boo MD



   Acct:  [id number]  Unit: [id number]  AGE: 84            Location:  [facility]



   Re16                        SEX: M             Status:    REG REF



   -----------------------------------------------------------------------------
---------------



   



   SPEC: [id number]             ALBERTO:       SUBM DR: Cholo Boo MD



   REQ:  [id number]             RECD: 



   STATUS: SOUT



   _



   ORDERED:  LEVEL IV



   COMMENTS: [id number]



   



   FINAL DIAGNOSIS



   



   



   Skin, back, shave excision:



   -- Hyper parakeratotic actinic keratosis



   



   



   



   CLINICAL HISTORY



   



   Bothering patient for approximately six months; hurts to sit back in chair, 
when he gets



   sweaty his close stick to it



   



   PRE-OPERATIVE DIAGNOSIS



   



   Neoplasm of uncertain behavior of skin



   



   GROSS DESCRIPTION



   



   The specimen is received in formalin labeled, Shave Excision Back, and 
consists of a 1.0 x



   0.8 cm tan-grey irregular skin shave with a central 0.7 x 0.6 x 0.3 cm tan 
keratotic



   protuberance.  The specimen is inked, trisected and submitted entirely in 
one cassette.



   



   Signed __________(signature on file)___________ Brendan Tran MD  [id number]



   



   -----------------------------------------------------------------------------
---------------



   



   



   



   



   



   



   



   



   



   



   ** END OF REPORT **



   



   * ML=Testing performed at Main Lab



   DEPARTMENT OF PATHOLOGY,  [address] [place] [address]



   Phone # [phone]      Fax #[phone]



   Brendan Tran M.D. Director     [facility] # [id number]

 

 36  *******Because ethnic data is not always readily available,



   this report includes an eGFR for both -Americans and



   non- Americans.****



   The National Kidney Disease Education Program (NKDEP) does



   not endorse the use of the MDRD equation for patients that



   are not between the ages of 18 and 70, are pregnant, have



   extremes of body size, muscle mass, or nutritional status,



   or are non- or non-.



   According to the National Kidney Foundation, irrespective of



   diagnosis, the stage of the disease is based on the level of



   kidney function:



   Stage Description                      GFR(mL/min/1.73 m(2))



   1     Kidney damage with normal or decreased GFR       90



   2     Kidney damage with mild decrease in GFR          60-89



   3     Moderate decrease in GFR                         30-59



   4     Severe decrease in GFR                           15-29



   5     Kidney failure                       &lt;15 (or dialysis)

 

 37  Desirable &lt;150



   Borderline high 150-199



   High 200-499



   Very High &gt;500

 

 38  Desirable &lt;200



   Borderline high 200-239



   High &gt;239

 

 39  Low &lt;40



   Desirable: 40-60



   High: &gt;60

 

 40  Desirable: &lt;100 mg/dL



   Near Optimal: 100-129 mg/dL



   Borderline High: 130-159 mg/dL



   High: 160-189 mg/dL



   Very High: &gt;189 mg/dL

 

 41  FASTING 12 HOUR

 

 42  *******Because ethnic data is not always readily available,



   this report includes an eGFR for both -Americans and



   non- Americans.****



   The National Kidney Disease Education Program (NKDEP) does



   not endorse the use of the MDRD equation for patients that



   are not between the ages of 18 and 70, are pregnant, have



   extremes of body size, muscle mass, or nutritional status,



   or are non- or non-.



   According to the National Kidney Foundation, irrespective of



   diagnosis, the stage of the disease is based on the level of



   kidney function:



   Stage Description                      GFR(mL/min/1.73 m(2))



   1     Kidney damage with normal or decreased GFR       90



   2     Kidney damage with mild decrease in GFR          60-89



   3     Moderate decrease in GFR                         30-59



   4     Severe decrease in GFR                           15-29



   5     Kidney failure                       &lt;15 (or dialysis)

 

 43  FASTING 12 HOUR

 

 44  Desirable &lt;150



   Borderline high 150-199



   High 200-499



   Very High &gt;500

 

 45  Desirable &lt;200



   Borderline high 200-239



   High &gt;239

 

 46  Low &lt;40



   Desirable: 40-60



   High: &gt;60

 

 47  Desirable &lt;100



   Near Optimal 100-129



   Borderline high 130-159



   High 160-189



   Very High &gt;189

 

 48  HDL Interpretation:



   Undesirable: High Risk:  Less than 40 mg/dL



   Desirable:  Low Risk:  Greater than 60 mg/dL

 

 49  LDL Interpretation:



   Low Risk Optimal Level:  LDL Less than 100 mg/dL



   Near or Above Optimal:  -129 mg/dL



   Borderline High Risk:  -159 mg/dL



   High Risk:  -189 mg/dL



   Very High Risk:  LDL Greater than 189 mg/dL

 

 50  FASTING 12 HOUR

 

 51  *******Because ethnic data is not always readily available,



   this report includes an eGFR for both -Americans and



   non- Americans.****



   The National Kidney Disease Education Program (NKDEP) does



   not endorse the use of the MDRD equation for patients that



   are not between the ages of 18 and 70, are pregnant, have



   extremes of body size, muscle mass, or nutritional status,



   or are non- or non-.



   According to the National Kidney Foundation, irrespective of



   diagnosis, the stage of the disease is based on the level of



   kidney function:



   Stage Description                      GFR(mL/min/1.73 m(2))



   1     Kidney damage with normal or decreased GFR       90



   2     Kidney damage with mild decrease in GFR          60-89



   3     Moderate decrease in GFR                         30-59



   4     Severe decrease in GFR                           15-29



   5     Kidney failure                       &lt;15 (or dialysis)

 

 52  *******Because ethnic data is not always readily available,



   this report includes an eGFR for both -Americans and



   non- Americans.****



   The National Kidney Disease Education Program (NKDEP) does



   not endorse the use of the MDRD equation for patients that



   are not between the ages of 18 and 70, are pregnant, have



   extremes of body size, muscle mass, or nutritional status,



   or are non- or non-.



   According to the National Kidney Foundation, irrespective of



   diagnosis, the stage of the disease is based on the level of



   kidney function:



   Stage Description                      GFR(mL/min/1.73 m(2))



   1     Kidney damage with normal or decreased GFR       90



   2     Kidney damage with mild decrease in GFR          60-89



   3     Moderate decrease in GFR                         30-59



   4     Severe decrease in GFR                           15-29



   5     Kidney failure                       &lt;15 (or dialysis)

 

 53  *******Because ethnic data is not always readily available,



   this report includes an eGFR for both -Americans and



   non- Americans.****



   The National Kidney Disease Education Program (NKDEP) does



   not endorse the use of the MDRD equation for patients that



   are not between the ages of 18 and 70, are pregnant, have



   extremes of body size, muscle mass, or nutritional status,



   or are non- or non-.



   According to the National Kid[employer] Foundation, irrespective of



   diagnosis, the stage of the disease is based on the level of



   kidney function:



   Stage Description                      GFR(mL/min/1.73 m(2))



   1     Kidney damage with normal or decreased GFR       90



   2     Kidney damage with mild decrease in GFR          60-89



   3     Moderate decrease in GFR                         30-59



   4     Severe decrease in GFR                           15-29



   5     Kidney failure                       &lt;15 (or dialysis)

 

 54  *******Because ethnic data is not always readily available,



   this report includes an eGFR for both -Americans and



   non- Americans.****



   The National Kidney Disease Education Program (NKDEP) does



   not endorse the use of the MDRD equation for patients that



   are not between the ages of 18 and 70, are pregnant, have



   extremes of body size, muscle mass, or nutritional status,



   or are non- or non-.



   According to the National Kid[employer] Foundation, irrespective of



   diagnosis, the stage of the disease is based on the level of



   kidney function:



   Stage Description                      GFR(mL/min/1.73 m(2))



   1     Kidney damage with normal or decreased GFR       90



   2     Kidney damage with mild decrease in GFR          60-89



   3     Moderate decrease in GFR                         30-59



   4     Severe decrease in GFR                           15-29



   5     Kidney failure                       &lt;15 (or dialysis)

 

 55  HDL Interpretation:



   Undesirable: High Risk:  Less than 40 MG/DL



   Desirable:  Low Risk:  Greater than 60 MG/DL

 

 56  LDL Interpretation:



   Low Risk Optimal Level:  LDL Less than 100 MG/DL



   Near or Above Optimal:  -129 MG/DL



   Borderline High Risk:  -159 MG/DL



   High Risk:  -189 MG/DL



   Very High Risk:  LDL Greater than 189 MG/DL

 

 57  Anion gap measurement may be of limited value in the



   presence of any alkalosis, especially in a combined acid



   base disorder.



   .

 

 58  *******Because ethnic data is not always readily available,



   this report includes an eGFR for both -Americans and



   non- Americans.****



   The National Kidney Disease Education Program (NKDEP) does



   not endorse the use of the MDRD equation for patients that



   are not between the ages of 18 and 70, are pregnant, have



   extremes of body size, muscle mass, or nutritional status,



   or are non- or non-.



   According to the National Kidney Foundation, irrespective of



   diagnosis, the stage of the disease is based on the level of



   kidney function:



   Stage Description                      GFR(mL/min/1.73 m(2))



   1     Kidney damage with normal or decreased GFR       90



   2     Kidney damage with mild decrease in GFR          60-89



   3     Moderate decrease in GFR                         30-59



   4     Severe decrease in GFR                           15-29



   5     Kidney failure                       &lt;15 (or dialysis)

 

 59  Anion gap measurement may be of limited value in the



   presence of any alkalosis, especially in a combined acid



   base disorder.



   .

 

 60  *******Because ethnic data is not always readily available,



   this report includes an eGFR for both -Americans and



   non- Americans.****



   The National Kidney Disease Education Program (NKDEP) does



   not endorse the use of the MDRD equation for patients that



   are not between the ages of 18 and 70, are pregnant, have



   extremes of body size, muscle mass, or nutritional status,



   or are non- or non-.



   According to the National Kidney Foundation, irrespective of



   diagnosis, the stage of the disease is based on the level of



   kidney function:



   Stage Description                      GFR(mL/min/1.73 m(2))



   1     Kidney damage with normal or decreased GFR       90



   2     Kidney damage with mild decrease in GFR          60-89



   3     Moderate decrease in GFR                         30-59



   4     Severe decrease in GFR                           15-29



   5     Kidney failure                       &lt;15 (or dialysis)

 

 61  CHOLESTEROL INTERPRETATION:



   Desirable:  Less than 200 MG/DL



   Borderline-High Risk:  200-239 MG/DL



   High-Risk:  240 MG/DL and over

 

 62  HDL INTERPRETATION:



   Undesirable: High Risk:  Less than 40 MG/DL



   Desirable:  Low Risk:  Greater than 60 MG/DL

 

 63  LDL INTERPRETATION:



   Low Risk Optimal Level:  LDL Less than 100 MG/DL



   Near or Above Optimal:  -129 MG/DL



   Borderline High Risk:  -159 MG/DL



   High Risk:  -189 MG/DL



   Very High Risk:  LDL Greater than 189 MG/DL

 

 64  Anion gap measurement may be of limited value in the



   presence of any alkalosis, especially in a combined acid



   base disorder.



   .

 

 65  *******Because ethnic data is not always readily available,



   this report includes an eGFR for both -Americans and



   non- Americans.****



   The National Kidney Disease Education Program (NKDEP) does



   not endorse the use of the MDRD equation for patients that



   are not between the ages of 18 and 70, are pregnant, have



   extremes of body size, muscle mass, or nutritional status,



   or are non- or non-.



   According to the National Kidney Foundation, irrespective of



   diagnosis, the stage of the disease is based on the level of



   kidney function:



   Stage Description                      GFR(mL/min/1.73 m(2))



   1     Kidney damage with normal or decreased GFR       90



   2     Kidney damage with mild decrease in GFR          60-89



   3     Moderate decrease in GFR                         30-59



   4     Severe decrease in GFR                           15-29



   5     Kidney failure                       &lt;15 (or dialysis)

 

 66  CHOLESTEROL INTERPRETATION:



   Desirable:  Less than 200 MG/DL



   Borderline-High Risk:  200-239 MG/DL



   High-Risk:  240 MG/DL and over

 

 67  HDL INTERPRETATION:



   Undesirable: High Risk:  Less than 40 MG/DL



   Desirable:  Low Risk:  Greater than 60 MG/DL

 

 68  LDL INTERPRETATION:



   Low Risk Optimal Level:  LDL Less than 100 MG/DL



   Near or Above Optimal:  -129 MG/DL



   Borderline High Risk:  -159 MG/DL



   High Risk:  -189 MG/DL



   Very High Risk:  LDL Greater than 189 MG/DL

 

 69  Anion gap measurement may be of limited value in the



   presence of any alkalosis, especially in a combined acid



   base disorder.



   .

 

 70  ** Note change in reference range as of 08.  The



   change was based on recommendations from the American



   Diabetes Association.

 

 71  *******Because ethnic data is not always readily available,



   this report includes an eGFR for both -Americans and



   non- Americans.****



   The National Kidney Disease Education Program (NKDEP) does



   not endorse the use of the MDRD equation for patients that



   are not between the ages of 18 and 70, are pregnant, have



   extremes of body size, muscle mass, or nutritional status,



   or are non- or non-.



   According to the National Kidney Foundation, irrespective of



   diagnosis, the stage of the disease is based on the level of



   kidney function:



   Stage Description                      GFR(mL/min/1.73 m(2))



   1     Kidney damage with normal or decreased GFR       90



   2     Kidney damage with mild decrease in GFR          60-89



   3     Moderate decrease in GFR                         30-59



   4     Severe decrease in GFR                           15-29



   5     Kidney failure                       &lt;15 (or dialysis)

 

 72  Anion gap measurement may be of limited value in the



   presence of any alkalosis, especially in a combined acid



   base disorder.



   .

 

 73  ** Note change in reference range as of 08.  The



   change was based on recommendations from the American



   Diabetes Association.

 

 74  Please note change in reference range effective 08



   .

 

 75  *******Because ethnic data is not always readily available,



   this report includes an eGFR for both -Americans and



   non- Americans.****



   The National Kidney Disease Education Program (NKDEP) does



   not endorse the use of the MDRD equation for patients that



   are not between the ages of 18 and 70, are pregnant, have



   extremes of body size, muscle mass, or nutritional status,



   or are non- or non-.



   According to the National Kidney Foundation, irrespective of



   diagnosis, the stage of the disease is based on the level of



   kidney function:



   Stage Description                      GFR(mL/min/1.73 m(2))



   1     Kidney damage with normal or decreased GFR       90



   2     Kidney damage with mild decrease in GFR          60-89



   3     Moderate decrease in GFR                         30-59



   4     Severe decrease in GFR                           15-29



   5     Kidney failure                       &lt;15 (or dialysis)

 

 76  CHOLESTEROL INTERPRETATION:



   Desirable:  Less than 200 MG/DL



   Borderline-High Risk:  200-239 MG/DL



   High-Risk:  240 MG/DL and over

 

 77  HDL INTERPRETATION:



   Undesirable: High Risk:  Less than 40 MG/DL



   Desirable:  Low Risk:  Greater than 60 MG/DL

 

 78  LDL INTERPRETATION:



   Low Risk Optimal Level:  LDL Less than 100 MG/DL



   Near or Above Optimal:  -129 MG/DL



   Borderline High Risk:  -159 MG/DL



   High Risk:  -189 MG/DL



   Very High Risk:  LDL Greater than 189 MG/DL

 

 79  Anion gap measurement may be of limited value in the



   presence of any alkalosis, especially in a combined acid



   base disorder.



   .

 

 80  ** Note change in reference range as of 08.  The



   change was based on recommendations from the American



   Diabetes Association.

 

 81  Please note change in reference range effective 08



   



   



   .

 

 82  *******Because ethnic data is not always readily available,



   this report includes an eGFR for both -Americans and



   non- Americans.****



   The National Kidney Disease Education Program (NKDEP) does



   not endorse the use of the MDRD equation for patients that



   are not between the ages of 18 and 70, are pregnant, have



   extremes of body size, muscle mass, or nutritional status,



   or are non- or non-.



   According to the National Kidney Foundation, irrespective of



   diagnosis, the stage of the disease is based on the level of



   kidney function:



   Stage Description                      GFR(mL/min/1.73 m(2))



   1     Kidney damage with normal or decreased GFR       90



   2     Kidney damage with mild decrease in GFR          60-89



   3     Moderate decrease in GFR                         30-59



   4     Severe decrease in GFR                           15-29



   5     Kidney failure                       &lt;15 (or dialysis)

 

 83  epi lg, sperm occ, 4-6 rbc

 

 84  Anion gap measurement may be of limited value in the



   presence of any alkalosis, especially in a combined acid



   base disorder.



   .

 

 85  ** Note change in reference range as of 08.  The



   change was based on recommendations from the American



   Diabetes Association.

 

 86  Please note change in reference range effective 08



   



   



   .

 

 87  CHOLESTEROL INTERPRETATION:



   Desirable:  Less than 200 MG/DL



   Borderline-High Risk:  200-239 MG/DL



   High-Risk:  240 MG/DL and over

 

 88  HDL INTERPRETATION:



   Undesirable: High Risk:  Less than 40 MG/DL



   Desirable:  Low Risk:  Greater than 60 MG/DL

 

 89  LDL INTERPRETATION:



   Low Risk Optimal Level:  LDL Less than 100 MG/DL



   Near or Above Optimal:  -129 MG/DL



   Borderline High Risk:  -159 MG/DL



   High Risk:  -189 MG/DL



   Very High Risk:  LDL Greater than 189 MG/DL

 

 90  Anion gap measurement may be of limited value in the



   presence of any alkalosis, especially in a combined acid



   base disorder.



   .

 

 91  ** Note change in reference range as of 08.  The



   change was based on recommendations from the American



   Diabetes Association.

 

 92  Please note change in reference range effective 08



   



   



   .

 

 93  Anion gap measurement may be of limited value in the



   presence of any alkalosis, especially in a combined acid



   base disorder.



   .

 

 94  Please note change in reference range effective 08



   



   



   .

 

 95  FASTING

 

 96  CHOLESTEROL INTERPRETATION:



   Desirable:  Less than 200 MG/DL



   Borderline-High Risk:  200-239 MG/DL



   High-Risk:  240 MG/DL and over

 

 97  HDL INTERPRETATION:



   Undesirable: High Risk:  Less than 40 MG/DL



   Desirable:  Low Risk:  Greater than 60 MG/DL

 

 98  LDL INTERPRETATION:



   Low Risk Optimal Level:  LDL Less than 100 MG/DL



   Near or Above Optimal:  -129 MG/DL



   Borderline High Risk:  -159 MG/DL



   High Risk:  -189 MG/DL



   Very High Risk:  LDL Greater than 189 MG/DL

 

 99  Anion gap measurement may be of limited value in the



   presence of any alkalosis, especially in a combined acid



   base disorder.



   .

 

 100  Please note change in reference range effective 08



   



   



   .

 

 101  Classification: Desirable



   .

 

 102  Classification: Low



   .

 

 103  CALCULATED LDL APPROXIMATES THE VALUE OF A DIRECT LDL



   MEASUREMENT.



   Classification: Optimal Level



   .

 

 104  Anion gap measurement may be of limited value in the



   presence of any alkalosis, especially in a combined acid



   base disorder.



   .

 

 105  Classification: Desirable



   .

 

 106  Classification: Low



   .

 

 107  



   CALCULATED LDL APPROXIMATES THE VALUE OF A DIRECT LDL



   MEASUREMENT.



   Classification: Optimal Level



   .

 

 108  Classification: Desirable



   .

 

 109  Classification: Low



   .

 

 110  



   CALCULATED LDL APPROXIMATES THE VALUE OF A DIRECT LDL



   MEASUREMENT.



   Classification: Optimal Level



   .

 

 111  Anion gap measurement may be of limited value in the



   presence of any alkalosis, especially in a combined acid



   base disorder.



   .

 

 112  Classification: Desirable



   .

 

 113  Classification: Low



   .

 

 114  



   CALCULATED LDL APPROXIMATES THE VALUE OF A DIRECT LDL



   MEASUREMENT.



   Classification: Borderline High



   .

 

 115  Anion gap measurement may be of limited value in the



   presence of any alkalosis, especially in a combined acid



   base disorder.



   .

 

 116  Anion gap measurement may be of limited value in the



   presence of any alkalosis, especially in a combined acid



   base disorder.



   .

 

 117  Anion gap measurement may be of limited value in the



   presence of any alkalosis, especially in a combined acid



   base disorder.



   .

 

 118  Classification: Desirable



   .

 

 119  Classification: Low



   .

 

 120  



   CALCULATED LDL APPROXIMATES THE VALUE OF A DIRECT LDL



   MEASUREMENT.



   Classification: Borderline High



   .

 

 121  Anion gap measurement may be of limited value in the



   presence of any alkalosis, especially in a combined acid



   base disorder.



   .

 

 122  Anion gap measurement may be of limited value in the



   presence of any alkalosis, especially in a combined acid



   base disorder.



   .

 

 123  Classification: Desirable



   .

 

 124  Classification: Low



   .

 

 125  



   CALCULATED LDL APPROXIMATES THE VALUE OF A DIRECT LDL



   MEASUREMENT.



   Classification: Borderline High



   .







Procedures







 Date  CPT Code  Description  Status

 

 2018  60595  X-Ray Abdomen 1 V TC  Completed

 

 2018  53089  X-Ray Abdomen 1 V pc  Completed

 

 2018  39306  X-Ray Abdomen 1 V  Completed

 

 2018  63723  X-Ray Chest 2 V TC  Completed

 

 2018  80663  X-Ray Chest 2 V pc  Completed

 

 2018  76513  X-Ray Chest 2 V  Completed

 

 2017  82478  Electrocardiogram Complete  Completed

 

 2016  26139  Shave Skin Lesion  .6-1CM Trunk/Arm/Leg  Completed

 

 2016  03636  Shave Skin Lesion  .6-1CM Trunk/Arm/Leg  Completed

 

 2016  96689  Electrocardiogram Complete  Completed

 

 2015  00812  Electrocardiogram Complete  Completed

 

 01/15/2014  36799  Electrocardiogram Complete  Completed

 

 2013  93403  Electrocardiogram Complete  Completed

 

 2012  21025  Electrocardiogram Complete  Completed

 

 2011  77582  Electrocardiogram Complete  Completed

 

 2010  30371  Electrocardiogram Complete  Completed

 

 2009  27604  Electrocardiogram Complete  Completed

 

 2007  74531  Electrocardiogram Complete  Completed

 

 2007  71923  X-Ray Knee, Ap &amp; Lateral Views  Completed

 

 2007  42849  X-Ray Chest Two Views  Completed

 

 10/23/2006  69166  Electrocardiogram Complete  Completed

 

 2005  65608  Electrocardiogram Complete  Completed

 

 2004    Colonoscopy  Completed







Encounters







 Type  Date  Location  Provider  CPT E/M  Dx

 

 Office Visit  2018  2:45p  Main Office  Cholo Boo M.D.  73963  
R06.00









 R53.1

 

 I50.20

 

 E87.6

 

 I50.32









 Office Visit  2018 11:30a  Main Office  Cholo Boo M.D.  94694  
I50.20









 J91.8

 

 R05

 

 I10

 

 E87.6

 

 D64.9









 Office Visit  2018  9:20a  Main Office  Sofie Mead P.A.  52041  J90









 R05

 

 R31.0









 Office Visit  01/15/2018  4:00p  Main Office  Sofie Mead P.A.  56000  J90









 J06.9

 

 R05

 

 R06.2

 

 I10

 

 I25.10

 

 S29.012A









 Office Visit  2018  2:35p  Main Office  Haylie Muhammad PA  76242  J20.9

 

 Office Visit  2017 10:00a  Main Office  Cholo Boo M.D.  53287  
I48.0









 Z79.01

 

 I25.10

 

 I10

 

 M17.0

 

 R49.0

 

 R73.01

 

 M54.5

 

 R10.33









 Office Visit  2017 10:00a  Main Office  Cholo Boo M.D.  95220  
I25.10









 I48.0

 

 I10

 

 M17.0

 

 R49.0









 Office Visit  2017 10:15a  Main Office  Cholo Boo M.D.  45371  
R73.01









 I10

 

 M54.5

 

 E87.1

 

 E71.30









 Office Visit  2016 11:00a  Main Office  Cholo Boo M.D.  17771  
M54.5









 I10

 

 R73.01

 

 E87.1

 

 E71.30

 

 D48.5









 Office Visit  2016 11:00a  Main Office  Cholo Boo M.D.  72279  
R73.01









 I10

 

 E87.1

 

 M54.5









 Office Visit  2015 10:45a  Main Office  Cholo Boo M.D.  31451  
401.1









 276.1

 

 724.2

 

 272.8

 

 V03.82

 

 V07.2









 Office Visit  2015  9:45a  Main Office  Cholo Boo M.D.  75898  
790.21









 401.1

 

 272.8

 

 276.1

 

 724.2

 

 722.52









 Office Visit  2014  9:15a  Main Office  Cholo Boo M.D.  34570  
401.1









 790.21

 

 272.8

 

 724.2

 

 722.52









 Office Visit  01/15/2014 10:45a  Main Office  Cholo Boo M.D.  73964  
401.1









 790.21

 

 272.8









 Office Visit  2013  1:30p  Main Office  Cholo Boo M.D.  54318  
401.1









 276.1

 

 272.8

 

 790.21









 Office Visit  2013  2:30p  Main Office  Cholo Boo M.D.  35674  
401.1









 276.1









 Office Visit  2013 11:00a  Main Office  Cholo Boo M.D.  27598  
401.1









 790.21

 

 272.8









 Office Visit  2012  2:30p  Main Office  Cholo Boo M.D.  35525  
401.1









 272.8

 

 790.21

 

 276.1









 Office Visit  2012  9:15a  Main Office  Cholo Boo M.D.  78160  
401.1









 272.8

 

 790.21

 

 276.1

 

 V65.49

 

 v06.1

 

 v07.2









 Office Visit  2011 12:55p  Main Office  Cholo Boo M.D.  26210  
366.9









 401.1

 

 272.8

 

 790.21

 

 477.9

 

 V72.84









 Office Visit  2011  9:30a  Main Office  Cholo Boo M.D.  09923  
401.1









 276.1

 

 272.8

 

 790.21









 Office Visit  10/06/2010  1:45p  Main Office  Cholo Boo M.D.  45121  
786.2

 

 Office Visit  2010  8:55a  Main Office  Cholo Boo M.D.  10751  
401.1









 276.1

 

 272.8

 

 719.46

 

 715.16

 

 477.9

 

 V04.81

 

 V07.2









 Office Visit  2010  9:30a  Main Office  Cholo Boo M.D.  38265  
401.1









 276.1

 

 272.8

 

 477.9

 

 V65.49

 

 V04.81









 Office Visit  2009  8:45a  Main Office  Cholo Boo M.D.  76578  
401.1









 276.1

 

 477.9

 

 V04.81

 

 V07.2









 Office Visit  2009 11:15a  Main Office  Cholo Boo M.D.  58303  
401.1









 272.8

 

 276.1









 Office Visit  2008  4:15p  Main Office  Cholo Boo M.D.  84684  
401.1









 272.8

 

 276.1

 

 787.3

 

 V04.81

 

 V07.2









 Office Visit  2008  2:15p  Main Office  Cholo Boo M.D.  54455  
401.1









 272.8

 

 728.9

 

 276.1









 Office Visit  2008  8:55a  Main Office  Cholo Boo M.D.  31774  
401.1









 272.8

 

 728.9









 Office Visit  2008 11:00a  Main Office  Loyda Castle MD  36059  473.9









 401.1

 

 692.74









 Office Visit  2007  8:45a  Main Office  Cholo Boo M.D.  23062  
401.1









 272.8

 

 V65.49

 

 715.16

 

 465.9

 

 V04.81









 Office Visit  2007  9:30a  Main Office  Cholo Boo M.D.  33714  
401.1









 272.8

 

 719.46

 

 715.16









 Office Visit  2007  1:15p  Main Office  Cholo Boo M.D.  14923  
465.9









 786.2

 

 466.0

 

 719.46









 Office Visit  2006  9:15a  Main Office  Cholo Boo M.D.  22567  
401.1









 272.8

 

 790.21









 Office Visit  10/23/2006  9:45a  Main Office  Cholo Boo M.D.  81237  
401.1









 272.8

 

 790.21









 Office Visit  2006  9:30a  Main Office  Cholo Boo M.D.  88470  
401.1









 285.9

 

 785.2









 Office Visit  10/21/2005  9:30a  Main Office  Cholo Boo M.D.  98696  
401.1









 285.9

 

 V03.82

 

 V07.2









 Office Visit  2005  8:45a  Main Office  Cholo Boo M.D.  79664  
401.1









 285.9









 Office Visit  2005  1:30p  Main Office  Cholo Boo M.D.  31277  
401.1









 285.9









 Office Visit  2005 10:45a  Main Office  Cohlo Boo M.D.  75180  
401.1









 272.8

 

 285.9

 

 790.21









 Office Visit  2004  8:55a  Main Office  Cholo Boo M.D.  30635  
401.1









 272.8

 

 V76.51

 

 285.9









 Office Visit  2003  3:15p  Main Office  Cholo Boo M.D.  51134  
461.0









 461.1







Plan of Care

2018 - Cholo Boo M.D.I10 Essential (primary) hypertensionComments:
ControlledFollow up:RTO 3 months.I50.20 Unspecified systolic (congestive) heart 
rsbyapdX44.32 Chronic diastolic (congestive) heart zczvdsnU48.9 Chronic kidney 
disease, jfdrujnvyntE00.829 Elevated white blood cell count, dngqjwmevnkK40 
Encounter for immunizationComments:Counseled re Shingrix incl cost. He will 
check his insurance coverage (as he has Medicare primary but no part D, has 
[employer] secondary), schedule nurse visit for the vaccine if desired

## 2018-05-02 NOTE — PN
Subjective


Date of Service: 05/02/18


Interval History: 





Feels good today, no events on telemetry.  No complaints except itchiness all 

over his body.  No chest pain, palpitations, nausea, headache, falls.





Objective


Active Medications: 








Acetaminophen (Tylenol Tab*)  650 mg PO Q4H PRN


   PRN Reason: PAIN


Aspirin (Aspirin Ec Tab*)  81 mg PO DAILY [facility]


   Last Admin: 05/02/18 09:59 Dose:  81 mg


Atorvastatin Calcium (Lipitor*)  10 mg PO DAILY [facility]


   Last Admin: 05/02/18 09:59 Dose:  10 mg


Cholecalciferol (Vitamin D Tab*)  1,000 units PO DAILY [facility]


   Last Admin: 05/02/18 09:59 Dose:  1,000 units


Diphenhydramine HCl (Benadryl Iv*)  25 mg IV Q6H PRN


   PRN Reason: PRURITIS


Diphenhydramine HCl (Benadryl Liq*)  12.5 mg PO Q4H PRN


   PRN Reason: ITCHING


   Last Admin: 05/02/18 11:54 Dose:  12.5 mg


Metoprolol Tartrate (Lopressor Tab*)  37.5 mg PO BID [facility]


   Last Admin: 05/02/18 09:58 Dose:  37.5 mg


Montelukast Sodium (Singulair Tab*)  5 mg PO DAILY [facility]


Omeprazole (Prilosec Cap*)  20 mg PO QAM [facility]


   Last Admin: 05/02/18 09:59 Dose:  20 mg


Psyllium Hydrophilic Mucilloid (Metamucil Oscar*)  1 pkt PO BID [facility]


   Last Admin: 05/02/18 10:23 Dose:  1 pkt








 Vital Signs - 8 hr











  05/02/18 05/02/18 05/02/18





  11:24 11:54 12:56


 


Temperature 97.7 F  


 


Pulse Rate 59  


 


Respiratory 16 18 16





Rate   


 


Blood Pressure 95/41  





(mmHg)   


 


O2 Sat by Pulse 98  





Oximetry   














  05/02/18 05/02/18





  15:14 15:17


 


Temperature  


 


Pulse Rate 68 69


 


Respiratory  





Rate  


 


Blood Pressure  103/46





(mmHg)  


 


O2 Sat by Pulse 98 98





Oximetry  











Oxygen Devices in Use Now: None


Appearance: alert, nontoxic, well appearing


Eyes: No Scleral Icterus


Ears/Nose/Mouth/Throat: - - poor dentition


Neck: NL Appearance and Movements; NL JVP


Respiratory: Symmetrical Chest Expansion and Respiratory Effort, Clear to 

Auscultation


Cardiovascular: NL Sounds; No Murmurs; No JVD, RRR, No Edema


Abdominal: NL Sounds; No Tenderness; No Distention, No Hepatosplenomegaly


Lymphatic: No Cervical Adenopathy


Extremities: No Edema


Skin: - - scattered macules over abdomen, chest, neck, and thighs


Neurological: Alert and Oriented x 3


Result Diagrams: 


 05/02/18 04:53





 05/02/18 04:53





Assess/Plan/Problems-Billing


Assessment: 





This is an 85 year old man with history of CAD s/p CABG, HTN, chf and recent 

hospitalization for syncope when an event monitor was implanted, who presents 

with syncope again. 





- Patient Problems


(1) Syncope


Current Visit: Yes   Status: Acute   Code(s): R55 - SYNCOPE AND COLLAPSE   

SNOMED Code(s): 580328552


   Comment: [employer] interrogated event monitor and reported an episode of 

atrial fibrillation on March 25th but no events yesterday that would explain 

syncope 


he says his torsemide dose was recently doubled and that he spent four hours 

outside yesterday and believes he may have gotten dehydrated


Check orthostatic vital signs 


TTE January 2018 showed mild-mod AR, no PH, and borderline AS; none of which 

should cause syncope, and I see no reason to repeat the TTE 


YU may also suggest volume depletion 


   





(2) Fixed drug eruption


Current Visit: Yes   Status: Acute   Code(s): L27.1 - LOC SKIN ERUPTION DUE TO 

DRUGS AND MEDS TAKEN INTERNALLY   SNOMED Code(s): 59506452


   Comment: he thinks this began after starting torsemide 


hold torsemide given yu anyway, will see if this improves; may have sulfa 

allergy    





(3) YU (acute kidney injury)


Current Visit: Yes   Status: Acute   Code(s): N17.9 - ACUTE KIDNEY FAILURE, 

UNSPECIFIED   SNOMED Code(s): 31029918


   Comment: re-trial IVF now


if not improving tomorrow, check urine lytes 


SPEP pending, concern for myeloma kidney    





(4) Thrombocytopenia


Current Visit: Yes   Status: Acute   Code(s): D69.6 - THROMBOCYTOPENIA, 

UNSPECIFIED   SNOMED Code(s): 150416576


   Comment: appreciate heme evaluation for consideration of bm biopsy    





(5) Leukocytosis


Current Visit: Yes   Status: Acute   Code(s): D72.829 - ELEVATED WHITE BLOOD 

CELL COUNT, UNSPECIFIED   SNOMED Code(s): 775780816


   Comment: no localizing symptoms of infection, no abx indicated 


heme for bm biopsy?    





(6) Chronic diastolic (congestive) heart failure


Current Visit: No   Status: Acute   Code(s): I50.32 - CHRONIC DIASTOLIC (

CONGESTIVE) HEART FAILURE   SNOMED Code(s): 371064789


   Comment: compensated, in fact dry 


hold torsemide

## 2018-05-02 NOTE — CONS
CONSULTATION REPORT:

 

DATE OF CONSULT:  18

 

REFERRING PHYSICIAN:  Dr. Garcias.

 

PRIMARY CARE PROVIDER:  Dr. Boo.

 

CARDIOLOGY:  Dr. Bran.

 

REASON FOR CONSULT:  Thrombocytopenia.

 

IDENTIFICATION:  An 85-year-old male admitted with syncope.

 

HISTORY OF PRESENT ILLNESS:  This is one of the several recent admissions for 
syncope.  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 
They were outside 4 to 5 hours.  He was mostly in a lawn tractor.  She noticed 
he was wobbly and acting funny, thought he was very fatigued.  Then he slumped 
over and passed out.  He was unarousable.  She went to get her cellphone and 
came back, and at that point he had woken up.  He was probably passed out for a 
minute to a few minutes.  She said he was pale and white when he first passed 
out.  He has no recollection of any of these events.  He specifically denies 
cardiac symptoms, shortness of breath before passing out.  He was not 
incontinent.  He had no tonic- clonic movements.  He had an event in  where he passed out in the bathroom, that was unwitnessed.  On that 
episode, he fell backwards and struck his head, amnesia attributed to head 
trauma.  There is no head trauma for this event. Evaluation has included an 
event monitor which was running at the time of this syncopal episode and that 
was interrogated this morning.

 

He also has a history of progressive thrombocytopenia and leukocytosis.  He had 
platelet count of 320,000 to 180,000 up through 2018.  On 18, he 
had platelet count of 108,000, then 86,000 on the , then down to 24,000 to 
25,000 on 18 and 18, and 54,000 on this admission.  He has a mild 
anemia that has been chronic and unchanged.  He also has leukocytosis with a 
white count of 20,000 on presentation today, but he was 17,000 in February and 
had been elevated through 2017 at 11,000 to 14,000.  There seems to be an upper 
trend in the white count.

 

In addition to the syncopal episode, he has had a diffuse rash.  It started in 
2017 after he had cardiac bypass surgery and was then placed on 
several new medications.  The rash has come and gone, but he gets blotchy welts 
that are itching.  Over the last several months, the rash has gotten 
significantly worse and now it is really driving him crazy.  He itches day and 
night and it is clearly progressive.  He developed mouth sores about 3 weeks 
ago and it has been harder for him to eat.  It is a general soreness on the 
tongue as well as the hard palate, no specific associated ulcers or lesions.  
He denies any fevers or chills, no lymphadenopathy.

 

PAST MEDICAL HISTORY:

1.  Severe GERD.  He takes omeprazole plus p.r.n. antacids.

2.  Hypertension.

3.  Hyperlipidemia.

4.  Coronary artery disease, status post bypass surgery in 2017.

5.  Chronic renal insufficiency, baseline creatinine 1.4 to 1.6, but elevated 
to 2.6 a day ago and 2.41 today.  He has dropping bicarbonate at 14 and 15 
today down from baseline of 25 to 30.

6.  Hyperlipidemia.

 

PAST SURGICAL HISTORY:

1.  CABG, 2017.

2.  Inguinal hernia repair.

3.  Bilateral cataract extractions.

4.  Tonsillectomy.

 

MEDICATIONS:

1.  Omeprazole 40 mg a day.

2.  Metoprolol 37.5 two times a day.

3.  Losartan 25 mg a day.

4.  Atorvastatin 10 mg a day.

5.  Metamucil 1 packet daily.

6.  Furosemide 40 mg a day.

7.  Vitamin D.

8.  Low dose aspirin 81 mg a day.

9.  Potassium 20 mEq daily.

 

The atorvastatin is longstanding.  Omeprazole, metoprolol, losartan and 
furosemide are all newer medicines.

 

ALLERGIES:  None that are known.

 

FAMILY HISTORY:  No early malignancies.  No blood disease that runs in the 
family. Mom had renal failure at 70 and his father  in his 70s as well.

 

SOCIAL HISTORY:  He is with his wife in the hospital as his primary support.  
No alcohol, no drug use and he is retired, lives at home.

 

REVIEW OF SYSTEMS:  HEENT:  Mouth sores, dry mouth.  History of cataracts.  
Normal smell.  No sinus pain, congestion, postnasal drip.  GI:  Severe GERD.  
He also has had diarrhea recently.  Cardiac:  No symptoms.  Pulmonary:  No 
symptoms.  : Negative.  Skin:  Diffuse itching as noted above.  
Musculoskeletal:  Chronic joint pains, but nothing specific.  Endocrine:  
Negative.

 

PHYSICAL EXAM:  Vital Signs:  BP 95/41, respirations 16, heart rate 59, 
temperature 97.7.  HEENT:  Mucosa slightly dry.  No lesions.  No thrush.  No 
cervical or supraclavicular lymphadenopathy.  Skin:  He has diffuse 
maculopapular rash with welts along his upper thighs, abdomen and back.  They 
are pruritic, nonulcerated. Nodes:  No peripheral lymphadenopathy.  Lungs:  
Clear to auscultation bilaterally. Heart:  Regular rate and rhythm.  S1, S2.  
60s.  Abdomen: Positive bowel sounds. Nontender, nondistended.  No spleen.  
Extremities:  No clubbing, cyanosis and he has good pulses, no edema.  
Neurologic:  Alert and oriented x3.  Strength is 5/5, but exam otherwise 
deferred.

 

DIAGNOSTIC STUDIES/LAB DATA:  As noted above, with increased creatinine, 
decreased bicarbonate.  Manual blood film shows marked eosinophilia and some 
hypersegmentation.  He has variable size platelets, but they are unremarkable.  
He has no red blood cell morphologic abnormalities.  There is an occasional 
metamyelocyte in the peripheral blood.  Review of old blood tests show increase 
in leukocytosis.  The manual eosinophil count was 31% today and is consistent 
with my review of the peripheral film.  Prior eosinophilia percents are lower, 
but may not be accurate.

 

He has a urine that has hyaline casts and protein.

 

ASSESSMENT AND PLAN:  An 85-year-old male who presents with a diffuse rash for 
over a year, progressive over several months, now mouth soars. CBC with 
eosinophil and thrombocytopenia. The differential diagnosis for eosinophilia is 
broad.  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 
early eosinophilic leukemia.  He may have end-organ infiltration from primary 
eosinophilia that is causing his heartburn and contributing to his renal 
insufficiency.  Unclear if the eosinophilia is triggering the syncopal 
episodes.  Differential diagnosis could also include reactive eosinophilia from 
one of his newer medicines.  I did not find any PubMed article for metoprolol 
or furosemide, but there is one report for ACE inhibitor causing an 
eosinophilic pneumonitis. He could have primary eosinophilic esophagitis or 
gastritis and peripheral eosinophilia as spill over. 

 

1.  I will send studies for myelodysplasia and disease including chronic 
myelogenous leukemia and JAK2.  We will send LDH and uric acid.

2.  A bone marrow biopsy tomorrow morning.

3.  Recommend stopping any unnecessary medicines including metoprolol, 
lisinopril and Lasix if possible.

4.  Continue Benadryl, could try Singulair 5 mg daily.  Avoid steroids as they 
could mask a diagnosis.

5.  Platelets appear stable and I think thrombocytopenia is secondary to a 
marrow process, could have low grade idiopathic thrombocytopenic purpura.  We 
will follow and no transfusion is indicated.

6.  Consider EGD with esophageal biopsies for eosinophilic esophagitis.

7.  Given lack of cardiac symptoms, the event monitor is negative.  Consider 
MRI of the brain.

 

Continue to follow during the hospitalization.

 

 [id number]/CPS #: [id number]

MARIO

## 2018-05-03 NOTE — PN
Subjective


Date of Service: 05/03/18


Interval History: 





Mr. Vazquez reports that he feels ok today.  He continues to have an itchy rash 

that is unchanged.  He appears to a bit labored in his breathing at rest but 

reports that he feels that he is at his baseline.  He denies chest pain.  He 

denies nausea or abdominal pain and is tolerating oral intake well.  He has 

poor dentition and reports that his teeth hurt but that the right side of his 

tongue also hurts (though he cannot see any lesion).  He is ambulating in his 

room with with walker.











Objective


Active Medications: 





Acetaminophen (Tylenol Tab*)  650 mg PO Q4H PRN


Aspirin (Aspirin Ec Tab*)  81 mg PO DAILY ROBB


Atorvastatin Calcium (Lipitor*)  10 mg PO DAILY ROBB


Cholecalciferol (Vitamin D Tab*)  1,000 units PO DAILY ROBB


Citric Acid/Sodium Citrate (Bicitra*)  15 ml PO TID ROBB


Diphenhydramine HCl (Benadryl Iv*)  25 mg IV Q6H PRN


Diphenhydramine HCl (Benadryl Liq*)  12.5 mg PO Q4H PRN


Metoprolol Tartrate (Lopressor Tab*)  37.5 mg PO BID ROBB


Montelukast Sodium (Singulair Tab*)  5 mg PO DAILY ROBB


Omeprazole (Prilosec Cap*)  20 mg PO QAM ROBB


Psyllium Hydrophilic Mucilloid (Metamucil Oscar*)  1 pkt PO BID [facility]





Vital Signs:











Temp Pulse Resp BP Pulse Ox


 


 98.7 F   73   20   111/50   99 


 


 05/03/18 07:09  05/03/18 07:09  05/03/18 07:09  05/03/18 07:09  05/03/18 07:09











Oxygen Devices in Use Now: None


Appearance: Male lying in bed, heavy raspy breathing noted but able to speak in 

complete sentences


Eyes: No Scleral Icterus


Ears/Nose/Mouth/Throat: Mucous Membranes Moist


Neck: Trachea Midline


Respiratory: Symmetrical Chest Expansion and Respiratory Effort, - - 

Inspiratory crackles bilaterally


Cardiovascular: NL Sounds; No Murmurs; No JVD, No Edema


Abdominal: NL Sounds; No Tenderness; No Distention


Skin: - - Raised hives to chest wall, neck, arms


Neurological: Alert and Oriented x 3, NL Muscle Strength and Tone


Nutrition: Taking PO's


Result Diagrams: 


 05/03/18 04:57





 05/03/18 04:57





Assess/Plan/Problems-Billing


Assessment: 





Ms. Vazquez is an 85 year old man with history of CAD s/p CABG, HTN, CHF and 

recent hospitalization for syncope when an event monitor was implanted, who 

presents with syncope again, found to have eosinophilia, thrombocytopenia 

associated with diffuse rash x 1 year.


 





- Patient Problems


(1) Syncope


Comment: 


- Patient suspects event related to dehydration as his torsemide was recently 

doubled and he spent four hours outside in warm weather.  JOSE ARMANDO may also suggest 

volume depletion. Check orthostatic vital signs 


- [employer] interrogated event monitor and reported an episode of atrial 

fibrillation on March 25th but no events yesterday that would explain syncope.


- TTE January 2018 showed mild-mod AR, no PH, and borderline AS; none of which 

should cause syncope, and I see no reason to repeat the TTE    





(2) Eosinophilia


Comment: 


- With thrombocytopenia and leukocytosis. 


- Appreciate hematology consult, plan for bone marrow biopsy today.  Multiple 

lab studies for myelodysplasia pending.


- Despite leukocytosis, no localizing symptoms of infection, no abx indicated  

  





(3) JOSE ARMANDO (acute kidney injury)


Comment: 


- Slight improvement in creatinine with IVF. But worsening acidosis, add 

bicitra.


- No evidence of obstruction, appears somewhat hypovolemic which may be 

contributing.  Per Dr. Thomas could have end organ infiltration from primary 

eosinophilia, see below. Will benefit from outpatient follow up with Dr. Jha 

as well.  


- Continue to hold torsemide and lisinopril.   





(4) Fixed drug eruption


Comment: 


- Diffuse rash for over a year, now with mouth sores


- Patient thinks this began after starting torsemide 


- Hold torsemide given JOSE ARMANDO anyway, will see if this improves; may have sulfa 

allergy 


- Dr. Thomas questions if this is related to eosinophilia.


- Continue benadryl.   





(5) GERD (gastroesophageal reflux disease)


Comment: 


- Dr. Thomas questions if this could reflect eosinophilic esophagitis.


- Continue omeprazole, but could benefit from EGD as well.   





(6) Chronic diastolic (congestive) heart failure


Comment: 


- Well compensated, in fact dry


- Appeared a bit SOB on exam, but cxray was clear on admission


- Hold torsemide, repeat cxray in AM   





(7) Dyslipidemia


Comment: 


 - Continue statin    





(8) DVT prophylaxis


Comment: 


 - Continue HSQ   





(9) Full code status


Comment: 


   


Status and Disposition: 





Inpatient.

## 2018-05-03 NOTE — PN
Hospitalist Progress Note


Date of Service: 05/03/18





called with co2 results checking abg, add bictra, ? if r/t renal failure, non 

gap acidosis, will check abg.

## 2018-05-04 NOTE — RAD
INDICATION: Short of breath     



COMPARISON: May 01, 2018

 

TECHNIQUE: An AP portable view obtained at 0600 hours is submitted.



FINDINGS: 



Bones/Soft Tissues: There are no acute bony findings. There is a cardiac event monitor.

There are sternotomy/CABG.



Cardiomediastinal: The cardiomediastinal silhouette is normal. 



Lungs: There are no infiltrates. There is bibasilar hypoventilation.



Pleura: There are no pleural effusions. 



Other: None



IMPRESSION:  EXPIRATORY EXAMINATION. POSTSURGICAL CHANGE. NO ACTIVE DISEASE..

## 2018-05-04 NOTE — PN
Subjective


Date of Service: 05/04/18


Interval History: 





Pt feels well. the itchy rash is improved. appertly had a CXR this AM for SOB, 

but denies ever c/o SOB during his hospital stay





Objective


Active Medications: 








Acetaminophen (Tylenol Tab*)  650 mg PO Q4H PRN


   PRN Reason: PAIN


Aspirin (Aspirin Ec Tab*)  81 mg PO DAILY [facility]


   Last Admin: 05/04/18 07:40 Dose:  81 mg


Atorvastatin Calcium (Lipitor*)  10 mg PO DAILY [facility]


   Last Admin: 05/04/18 07:41 Dose:  10 mg


Cholecalciferol (Vitamin D Tab*)  1,000 units PO DAILY [facility]


   Last Admin: 05/04/18 07:40 Dose:  1,000 units


Citric Acid/Sodium Citrate (Bicitra*)  15 ml PO TID [facility]


   Last Admin: 05/04/18 13:38 Dose:  15 ml


Diphenhydramine HCl (Benadryl Iv*)  25 mg IV Q6H PRN


   PRN Reason: PRURITIS


   Last Admin: 05/02/18 21:38 Dose:  25 mg


Diphenhydramine HCl (Benadryl Liq*)  12.5 mg PO Q4H PRN


   PRN Reason: ITCHING


   Last Admin: 05/03/18 22:15 Dose:  12.5 mg


Magnesium Oxide (Magox 400 Tab*)  800 mg PO DAILY [facility]


   Last Admin: 05/04/18 13:38 Dose:  800 mg


Metoprolol Tartrate (Lopressor Tab*)  37.5 mg PO BID [facility]


   Last Admin: 05/04/18 07:41 Dose:  37.5 mg


Montelukast Sodium (Singulair Tab*)  5 mg PO DAILY [facility]


   Last Admin: 05/04/18 07:41 Dose:  5 mg


Omeprazole (Prilosec Cap*)  20 mg PO QAM [facility]


   Last Admin: 05/04/18 07:40 Dose:  20 mg


Psyllium Hydrophilic Mucilloid (Metamucil Oscar*)  1 pkt PO BID [facility]


   Last Admin: 05/04/18 07:40 Dose:  1 pkt








 Vital Signs - 8 hr











  05/04/18 05/04/18





  08:00 11:21


 


Temperature  97.9 F


 


Pulse Rate  58


 


Respiratory 18 16





Rate  


 


Blood Pressure  101/43





(mmHg)  


 


O2 Sat by Pulse  96





Oximetry  











Oxygen Devices in Use Now: None


Result Diagrams: 


 05/04/18 05:28





 05/04/18 05:28





Assess/Plan/Problems-Billing


Assessment: 





Ms. Vazquez is an 85 year old man with history of CAD s/p CABG, HTN, CHF and 

recent hospitalization for syncope when an event monitor was implanted, who 

presents with syncope again, found to have eosinophilia, thrombocytopenia 

associated with diffuse rash x 1 year.


 





- Patient Problems


(1) JOSE ARMANDO (acute kidney injury)


Comment: Slight improvement in creatinine with IVF. But worsening acidosis, 

started bicitra on 5/3/18. asked Dr. Jha or advice.


- No evidence of obstruction, appears somewhat hypovolemic which may be 

contributing.  Per Dr. Thomas could have end organ infiltration from primary 

eosinophilia, see below. Will benefit from outpatient follow up with Dr. Jha 

as well.  


- Continue to hold torsemide and lisinopril.   





(2) Eosinophilia


Comment: - With thrombocytopenia and leukocytosis. 


- Appreciate hematology consult, s/p  bone marrow biopsy 5/3/18.  Multiple lab 

studies for myelodysplasia pending.


- Despite leukocytosis, no localizing symptoms of infection, no abx indicated  

  





(3) Fixed drug eruption


Comment: - Diffuse rash for over a year, now with mouth sores


- Patient thinks this began after starting torsemide 


- Held torsemide given JOSE ARMANDO anyway, will see if this improves; may have sulfa 

allergy 


- Dr. Thomas questions if this is related to eosinophilia. Rash improving with 

Singulair


- Continue benadryl.   





(4) GERD (gastroesophageal reflux disease)


Comment: 


- Dr. Thomas questions if this could reflect eosinophilic esophagitis.


- Continue omeprazole, but could benefit from EGD as well.   





(5) Syncope


Comment: Orthostaiss noted on 5/3/18


- [employer] interrogated event monitor and reported an episode of atrial 

fibrillation on March 25th only


- TTE January 2018 showed mild-mod AR, no PH, and borderline AS; none of which 

should cause syncope, and I see no reason to repeat the TTE    





(6) Thrombocytopenia


Comment:  Appreciate hematology evaluation.   





(7) Chronic diastolic (congestive) heart failure


Comment: appears euvolemic today.


- Hold torsemide   





(8) DVT prophylaxis


Comment: 


 - Continue HSQ   


Status and Disposition: 





Inpatient.

## 2018-05-05 NOTE — CONS
*** AMENDED REPORT NOW INCLUDES DATE OF CONSULT AND ADDENDUM ***

         *** REPORT E-SIGNED BEFORE ADJUSTMENTS ***



CC:  Cholo Boo MD *

 

CONSULTATION REPORT:

 

DATE OF CONSULTATION:  18

 

HISTORY OF PRESENT ILLNESS:  Mr. Vazquez is an 85-year-old gentleman with a 
history of chronic diastolic congestive heart failure, hypertension, coronary 
artery disease, and who has had past episodes of syncope.  He had a syncopal 
episode just before this admission.  He had apparently been sitting on his lawn 
tractor when his wife noted that he was doing poorly.  She then found him 
slumped over.  He came around very rapidly and he was brought to the emergency 
room.  He was feeling somewhat better at the present time.  Of significance, 
his furosemide dose was changed a couple of months ago because of some 
worsening edema and some worsening dyspnea on exertion.  He was told that he 
had fluid on his lungs.  Over the past year or so, he has had 2 different 
rashes.  One rash is erythematous, pleuritic, biliary. Another rash appears 
like hives, however, the rashes are not raised.  They are round, not really 
erythematous, are also pruritic.

 

PAST MEDICAL HISTORY:  Previous medical history is significant for 
gastroesophageal reflux disease, hypertension, hyperlipidemia.  He has known 
chronic renal insufficiency.

 

MEDICATIONS:  His medications at the time of admission included:

1.  Losartan 25 mg b.i.d.

2.  Lasix 40 mg daily.

3.  Vitamin D 1000 units daily.

4.  Lipitor 10 mg daily.

5.  Aspirin 81 mg daily.

6.  Potassium chloride 20 mEq daily.

7.  Omeprazole 20 mg daily.

8.  Metoprolol 37.5 mg twice a day.

9.  Psyllium 1 packet daily.

 

ALLERGIES:  No medical allergies.

 

SOCIAL HISTORY:  He is , lives with his wife, he does not smoke.  His 
mother  in her 70s of renal failure.

 

REVIEW OF SYSTEMS:  No visual disturbances, no swallowing difficulties.  No 
chest pain, no nausea, no vomiting, no arthropathies.

 

PHYSICAL EXAMINATION:  He is a well-developed elderly white gentleman, who 
appears to be quite comfortable.  His blood pressure is 106/53 with a pulse of 
75; he has been in the 50's during this hospitalization, however.  Respirations 
are 16, he is normocephalic.  He has very poor dentition.  He is anicteric.  
His extraocular muscles are intact.  There is no jugular venous distention.  
The chest was clear. The heart revealed a regular rhythm without murmurs.  The 
abdomen is soft and nontender.  There is no edema.  The rashes were as I noted 
above.

 

LABORATORY DATA:  Review of his laboratory values reveals a white count of 21.3
, hemoglobin of 10.1, hematocrit 30.  His eosinophil count is 32.5%.  Sodium of 
135, potassium 2.9, chloride 110, total CO2 15, creatinine 1.87, BUN 44, 
calcium 7.9 with an albumin of 3.3.  Of significance, his urinary pH was 5, 
there was 1+ protein, there were some hyaline casts present as well as some 
urinary epithelial cells.

 

IMPRESSION:  

1.  Chronic renal insufficiency with an acute deterioration recently.

2.  Metabolic acidosis.  This is a non-anion gap metabolic acidosis and the 
total CO2 has been as low as 11 during this hospitalization.  The fact that it 
is associated with hypokalemia and a urinary pH of 5 means that is highly 
likely to be a proximal tubular acidosis.

 

RECOMMENDATIONS:  Certainly, acute interstitial nephritis can be associated 
with furosemide dose.  The argument against this is that he had been on 
furosemide for a long time and it was merely only a recent dose change.  So, 
the interstitial nephritis is associated with peripheral eosinophilia.  There 
is the possibility that he could have an eosinophilic leukemia that could be 
associated with acquired Fanconi syndrome, which would explain the proximal 
tubular acidosis.  Vitamin D deficiency can also cause a proximal tubular 
disorder.  He has been receiving vitamin D, but in the face of his chronic 
renal insufficiency, he may not be converting 25 hydroxyvitamin D into 1, 25 
dihydroxyvitamin D and that should be measured.  At the present time, we should 
buffer his acidosis and I would use Bicitra at 30 mL 3 times a day and he will 
require significant supplementation of his potassium while that is done.  I 
have discussed the case with Dr. Bassett.

 

ADDENDUM:

 

CONSULTATION REPORT:

 

RECOMMENDATIONS:  If the flow cytometry of his bone marrow biopsy does not 
confirm eosinophilic leukemia, we may have to begin the evaluation for 
eosinophilic granulomatosis with polyangiitis ([place]).

 

 [id number] #: [id number]

A- [id number] #: [id number]

[facility]

## 2018-05-05 NOTE — CONS
ADDENDUM:

 

CONSULTATION REPORT:

 

RECOMMENDATIONS:  If the flow cytometry of his bone marrow biopsy does not 
confirm eosinophilic leukemia, we may have to begin the evaluation for 
eosinophilic granulomatosis with polyangiitis ([place]).

 

 [id number] #: [id number]

MTDD

## 2018-05-05 NOTE — DS
CC:  Dr. Boo; Dr. Bran; Dr. Corcoran; Dr. Thomas; Dr. Boo; Dr. Jha; 
Dr. Sierra

 

DISCHARGE SUMMARY:

 

DATE OF ADMISSION:  05/01/18

 

DATE OF DISCHARGE:  05/05/18

 

PRIMARY CARE PROVIDER:  Dr. Boo.

 

DISCHARGE DIAGNOSES:

1.  Syncope due to orthostatic hypotension.

2.  Acute kidney injury on top of chronic kidney disease stage 3.

3.  Proximal renal tubular acidosis.

4.  Hypokalemia.

5.  Eosinophilia, leukocytosis and thrombocytopenia, status post bone marrow 
biopsy on 05/03/18.

6.  Rash for the past 1 year.

 

MEDICATIONS AT DISCHARGE:  Include:

 

1.  Bicitra 30 mL 3 times a day.

2.  Metamucil 1 packet b.i.d. p.r.n.

3.  Potassium chloride 40 mEq daily.

4.  Singulair 5 mg daily.

5.  Metoprolol tartrate 37.5 mg b.i.d.

6.  Mag-Ox 800 a day for total of 7 days.

7.  Vitamin D3 1000 units daily.

8.  Betamethasone cream 0.1% one application to affected skin and skin rash on 
a p.r.n. basis.

9.  Lipitor 10 mg a day.

10.  Aspirin 81 mg a day.

 

CONSULTATIONS DURING HOSPITAL STAY:  Included:

 

1.  Dr. Thomas and Dr. Boo for Hematology/Oncology

2.  Dr. Jha for Nephrology.

 

LABORATORY DATA AND STUDIES PERFORMED DURING THE HOSPITAL STAY:  Included:

 

On 05/05/18, white blood cell count 21.3, hemoglobin is 10.1, hematocrit of 30, 
platelets of 57, MCV was 96.  The differential included 32% eosinophils, 53 
neutrophils, 7.5 lymphocytes.

 

ABG obtained on 05/03/18 showed pH of 7.3, pCO2 of 22, pO2 of 90, bicarb of 14.
  On 05/05/18, sodium of 135, potassium 2.9, chloride 110, carbon dioxide 15, 
BUN 44, creatinine 1.87, magnesium of 1.7.

 

Urinalysis showed pH of 5.0, specific gravity of 1.011 with trace protein 
present, squamous epithelial cells with trace bacteria.

 

Microbiology tests showed negative urine cultures.

 

The patient had a bone marrow biopsy on 05/03/18 with the pathology showing 
aspirate with hypercellular bone marrow 35% with eosinophils and 33% 
megakaryocytic hypoplasia and erythroid hypoplasia, no evidence of dysplasia.  
Specific testing that includes MDS screen, flow cytometry is pending during the 
time of dictation.

 

Patient's JAK2 screen mutation analysis was negative.

 

HOSPITAL COURSE:  Carmelo Vazquez is an 85-year-old male with history of chronic 
diastolic CHF for which his Lasix was increased recently.  He was admitted to 
the hospital after a syncopal episode.  He was noted to have orthostatic 
hypotension. He also was noted to be in acute renal failure likely related to 
overdiuresis. His creatinine on presentation was 2.6.  His diuretics as well as 
losartan were stopped and the patient's creatinine started recovering to a 
level of 1.87 by the time of discharge.  He still was noted to have hypokalemia 
and low carbon dioxide level with its lowest at 11 on 05/04/18.  The patient 
was started on Bicitra with good result with the carbon dioxide level of 15 by 
the time of discharge.  He was grossly asymptomatic throughout his hospital 
stay.  He was noted to have diffuse erythema, eczema like appearing rash that 
he had been complaining of for the past 1 year.  The rash is very pruritic, 
mostly concentrating on his upper thigh and lower abdomen.  The patient also 
was noted to have marked leukocytosis and peripheral eosinophilia.  Due to that
, the patient was seen by Dr. Thomas and then followed by Dr. Boo.  The patient 
had a bone marrow biopsy with specifics still pending but showing hypercellular 
bone marrow with 33 predominance of eosinophils.  Dr. Thomas initially questioned 
possibility of eosinophilic syndrome and started the patient on Singular, which 
improved the patient's pruritic rash.

 

The patient's renal function again started recovering after his diuretics and 
losartan were stopped.

 

At this point, the patient is back to his baseline and ready to go home.  He 
still needs a basic metabolic panel to be drawn next week and to follow up with 
Dr. Jha and Dr. Thomas in regards of his bone marrow biopsy results and his 
continuation of his renal function issues.

 

I appreciate Dr. Jha's consultation.  The patient has renal tubular acidosis 
that is proximal and could be related to an infiltrative process that may be 
related to the eosinophilia.

 

The patient was also asked to follow up with his primary care provider in 
approximately 1 week.

 

PHYSICAL EXAMINATION:  At the time of discharge, blood pressure of 106/53, 
heart rate of 75 and regular, respiratory rate 20, oxygen saturation 97% on 
room air, and temperature 97.9.  General Appearance:  This is a very pleasant 85
-year-old male who is in no acute distress, alert, awake and oriented x3.  HEENT
:  Head atraumatic and normocephalic.  Eyes:  Pupils are equal and reactive to 
light and accommodation.  Oropharynx clear.  Mucosa moist.  Neck:  Supple.  No 
JVD, no bruits bilaterally.  Cardiovascular:  Regular rate and rhythm with 3/6 
systolic ejection murmur noted on auscultation of the right upper sternal 
border.  Respiratory:  Fine crackles at bilateral bases, otherwise clear.  
Abdomen:  Soft, nontender.  Bowel sounds are present in all 4 quadrants.  
Extremities:  There is no edema.  Pulses are +2 bilaterally.  There is no 
clubbing or cyanosis.  Skin:  On evaluation of the skin, the patient has patchy 
erythema like rash that is blanching overlying his lower abdomen and his upper 
thighs with excoriations on top of that.  There are some chronic 
lichenification changes noted in the rash area and dry skin.  Neuro Evaluation:
  Speech clear.  Cranial nerves II through XII grossly intact.  Motor strength 
is 5/5 bilaterally.

 

Once again, the patient is recommended to follow up with Dr. Thomas and Dr. Jha within the next week.  The patient is also recommended to follow up with 
his primary care provider.  He can follow up with Dr. Bran and Dr. Corcoran 
as previously scheduled.

 

If he has any worsening of shortness of breath or any other symptoms that are 
concerning, he is asked to return to the emergency department for further 
evaluation. Please note that this is a short summary of the patient's 
hospitalization.  Please refer to further medical records for details.

 

TIME SPENT:  Approximately 45 minutes were spent on the patient's discharge.

 

 [id number] #: [id number]

MTDD

## 2018-05-08 NOTE — ED
Marcus GAMBLE Jennifer scribed for Fortunato Yoo MD on 05/01/18 at 1934 .





Syncope/Near Syncope





- HPI Summary


HPI Summary: 





The patient is an 85 year old male who presents with a syncopal episode PTA. 

The patient reports he was sitting on the lawn tractor when he passed out while 

outside. The wife reportedly witnessed the syncopal episode, but the patient is 

accompanied by his daughter in the ED. The patient reports he ate today and 

denies alcohol use. He denies chest pain, heaviness, and tightness.





- History Of Current Complaint


Chief Complaint: EDSyncope


Time Seen by Provider: 05/01/18 18:11


Hx Obtained From: Patient


Onset/Duration: Sudden Onset


Timing: Frequency Of Episodes - 1


Context: Witnessed - by wife, Loss Of Consciousness


Activity At Onset: Other - sitting on lawn tractor


Associated Head Trauma: No


Aggravating Factor(s): Nothing


Alleviating Factor(s): Nothing


Associated Signs And Symptoms: Other - syncope. NEGATIVE: chests pain, heaviness

, tightness





- Allergies/Home Medications


Allergies/Adverse Reactions: 


 Allergies











Allergy/AdvReac Type Severity Reaction Status Date / Time


 


No Known Allergies Allergy   Verified 05/01/18 17:52














PMH/Surg Hx/FS Hx/Imm Hx


Endocrine/Hematology History: 


   Denies: Hx Diabetes


Cardiovascular History: Reports: Hx Congestive Heart Failure, Hx Coronary 

Artery Disease, Hx Hypercholesterolemia, Hx Hypertension


   Denies: Hx Pacemaker/ICD


GI History: Reports: Hx Gastroesophageal Reflux Disease


 History: Reports: Hx Renal Disease - abnormal


   Denies: Hx Dialysis


Musculoskeletal History: Reports: Hx Arthritis, Hx Back Problems - spinal 

stenosis


Sensory History: Reports: Hx Cataracts, Hx Contacts or Glasses


   Denies: Hx Hearing Aid


Opthamlomology History: Reports: Hx Cataracts, Hx Contacts or Glasses


Psychiatric History: 


   Denies: Hx Panic Disorder





- Surgical History


Surgery Procedure, Year, and Place: HERNIA SURGERY 1965-CATARACTS BILATERAL 

EYES 2011,cardiac bypass


Infectious Disease History: No


Infectious Disease History: 


   Denies: Traveled Outside the [place] in Last 30 Days





- Family History


Known Family History: 


   Negative: Cardiac Disease





- Social History


Occupation: Retired


Alcohol Use: Weekly


Substance Use Type: Reports: None


Smoking Status (MU): Never Smoked Tobacco


Have You Smoked in the Last Year: No





Review of Systems


Negative: Fever, Chills


Negative: Erythema


Negative: Sore Throat


Negative: Chest Pain


Negative: Shortness Of Breath, Cough


Negative: Abdominal Pain, Vomiting, Nausea


Negative: dysuria, hematuria


Negative: Myalgia


Negative: Rash


Neurological: Negative - Dizziness


Positive: Syncope


All Other Systems Reviewed And Are Negative: Yes





Physical Exam





- Summary


Physical Exam Summary: 





Constitutional: Well-developed, Well-nourished, Alert. (-) Distressed


Skin: Warm, Dry


HENT: Dry oral mucosa. Normocephalic; Atraumatic


Eyes: Conjunctiva normal


Neck: Musculoskeletal ROM normal neck. (-) JVD, (-) Stridor, (-) Tracheal 

deviation


Cardio: Rhythm regular, rate normal, Heart sounds normal; Intact distal pulses; 

The pedal pulses are 2+ and symmetric. Radial pulses are 2+ and symmetric. (-) 

Murmur


Pulmonary/Chest wall: Bibasilar crackles. (-) Respiratory distress, (-) Wheezes

, (-) Rales


Abd: Soft, (-) Tenderness, (-) Distension, (-) Guarding, (-) Rebound


Musculoskeletal: (-) Edema


Lymph: (-) Cervical adenopathy


Neuro: Alert, Oriented x3


Psych: Mood and affect Normal


Triage Information Reviewed: Yes


Vital Signs On Initial Exam: 


 Initial Vitals











Pulse Resp BP Pulse Ox


 


 67   25   130/67   100 


 


 05/01/18 17:35  05/01/18 17:35  05/01/18 17:35  05/01/18 17:35











Vital Signs Reviewed: Yes





Diagnostics





- Vital Signs


 Vital Signs











  Temp Pulse Resp BP Pulse Ox


 


 05/01/18 19:00   74  24   100


 


 05/01/18 18:05   67  23  118/62  98


 


 05/01/18 18:00   67  22   100


 


 05/01/18 17:45   67  18   100


 


 05/01/18 17:37  97.5 F  67  14  130/67  100


 


 05/01/18 17:35   67  25  130/67  100














- Laboratory


Lab Results: 


 Lab Results











  05/01/18 05/01/18 Range/Units





  18:20 18:20 


 


WBC  20.0 H   (3.5-10.8)  10^3/ul


 


RBC  3.76 L   (4.0-5.4)  10^6/ul


 


Hgb  11.7 L   (14.0-18.0)  g/dl


 


Hct  36 L   (42-52)  %


 


MCV  97 H   (80-94)  fL


 


MCH  31   (27-31)  pg


 


MCHC  32   (31-36)  g/dl


 


RDW  20 H   (10.5-15)  %


 


Plt Count  54 L D   (150-450)  10^3/ul


 


MPV  11.7 H   (7.4-10.4)  um3


 


Neut % (Auto)  70.8   (38-83)  %


 


Lymph % (Auto)  6.9 L   (25-47)  %


 


Mono % (Auto)  8.3 H   (0-7)  %


 


Eos % (Auto)  13.6 H   (0-6)  %


 


Baso % (Auto)  0.4   (0-2)  %


 


Absolute Neuts (auto)  14.2 H   (1.5-7.7)  10^3/ul


 


Absolute Lymphs (auto)  1.4   (1.0-4.8)  10^3/ul


 


Absolute Monos (auto)  1.7 H   (0-0.8)  10^3/ul


 


Absolute Eos (auto)  2.7 H   (0-0.6)  10^3/ul


 


Absolute Basos (auto)  0.1   (0-0.2)  10^3/ul


 


Absolute Nucleated RBC  0   10^3/ul


 


Nucleated RBC %  0.1   


 


Sodium   135 L  (139-145)  mmol/L


 


Potassium   TNP  


 


Chloride   112 H  (101-111)  mmol/L


 


Carbon Dioxide   15 L  (22-32)  mmol/L


 


Anion Gap   8  (2-11)  mmol/L


 


BUN   59 H  (6-24)  mg/dL


 


Creatinine   2.60 H  (0.67-1.17)  mg/dL


 


Est GFR ( Amer)   30.3  (>60)  


 


Est GFR (Non-Af Amer)   23.6  (>60)  


 


BUN/Creatinine Ratio   22.7 H  (8-20)  


 


Glucose   79  ()  mg/dL


 


Calcium   8.7  (8.6-10.3)  mg/dL


 


Total Bilirubin   0.30  (0.2-1.0)  mg/dL


 


AST   TNP  


 


ALT   11  (7-52)  U/L


 


Alkaline Phosphatase   153 H  ()  U/L


 


Troponin I   0.03  (<0.04)  ng/mL


 


Total Protein   7.1  (6.4-8.9)  g/dL


 


Albumin   3.3  (3.2-5.2)  g/dL


 


Globulin   3.8  (2-4)  g/dL


 


Albumin/Globulin Ratio   0.9 L  (1-3)  











Result Diagrams: 


 05/01/18 18:20





 05/01/18 19:16


Lab Statement: Any lab studies that have been ordered have been reviewed, and 

results considered in the medical decision making process.





- Radiology


  ** CXR


Xray Interpretation: No Acute Changes - LOW LUNG VOLUMES. LUNGS ARE CLEAR FOR 

THE PHASE OF RESPIRATION. Dr. Yoo has reviewed this report.


Radiology Interpretation Completed By: Radiologist





- EKG


  ** 17:46


Cardiac Rate: NL


EKG Rhythm: Sinus Rhythm - 66 BPM





Course/Dx


Course Of Treatment: The patient is an 85 year old male who presents with a 

syncopal episode PTA. CXR and EKG were obtained. The patient is diagnosed with 

syncope and acute renal failure. The patient was admitted to [facility].





- Diagnoses


Provider Diagnoses: 


 Syncope, Acute renal failure








Discharge





- Sign-Out/Discharge


Documenting (check all that apply): Discharge/Admit/Transfer





- Discharge Plan


Condition: Guarded


Disposition: ADMITTED TO [facility]





The documentation as recorded by the Marcus aleman Jennifer accurately reflects 

the service I personally performed and the decisions made by Naila brown Jerry, MD.

## 2018-05-22 NOTE — UC
Margaret GAMBLE Emily, scribed for Linda Choi MD on 05/22/18 at 1319 .





Cardiac HPI





- HPI Summary


HPI Summary: 


This patient is an 87 yo gentleman, accompanied by care giver, c/o chest pain.  

Pain started yesterday, exact timing unclear.  But worsened last upon awakening 

this am approx 5am.  + sob, no cough.  Caregiver thinks his color doesn't look 

right.  Nondiaphoretic.  Denies GI sx.  + tingling in both hands, unclear if 

this is new.  No focal weakness, denies headache, vision issues.  No recent 

fever / chills.  Lifetime nonsmoker.  





+ Hx quintuple bybass cardiac approx 1 yr ago in [place], [place], d/t ischemia.  

Denies fullblown MI perse.  








The patient rates the pain 3/10 in severity, but unclear if this is max level.  











- History of Current Complaint


Chief Complaint: UCChestPain


Stated Complaint: CHEST PAIN,ABD PAIN,SOB


Time Seen by Provider: 05/22/18 13:00


Hx Obtained From: Patient


Onset/Duration: Sudden Onset, Lasting Days, Worse Since - This morning at 0800


Timing: Constant


Initial Severity: Moderate


Current Severity: Moderate


Pain Intensity: 3


Chest Pain Location: Mid Sternal


Aggravating Factor(s): Nothing


Alleviating Factor(s): Nothing


Associated Signs & Symptoms: Positive: Chest Pain, SOB





- Allergy/Home Medications


Allergies/Adverse Reactions: 


 Allergies











Allergy/AdvReac Type Severity Reaction Status Date / Time


 


No Known Allergies Allergy   Verified 05/22/18 13:01














PMH/Surg Hx/FS Hx/Imm Hx


Previously Healthy: No - see hpi


Cardiovascular History: Cardiac Disease, Congestive Heart Failure


GI/ History: Gastroesophageal Reflux, Renal Disease





- Surgical History


Surgical History: Yes


Surgery Procedure, Year, and Place: HERNIA SURGERY 1965-CATARACTS BILATERAL 

EYES 2011,cardiac bypass





- Family History


Known Family History: 


   Negative: Cardiac Disease





- Social History


Occupation: Retired


Lives: With Family


Alcohol Use: None


Substance Use Type: None


Smoking Status (MU): Never Smoked Tobacco


Have You Smoked in the Last Year: No





- Immunization History


Most Recent Influenza Vaccination: 2017 fall


Most Recent Tetanus Shot: 2017


Most Recent Pneumonia Vaccination: 2015





Review of Systems


Constitutional: Fatigue


Skin: Other - see hpi


ENT: Negative


Respiratory: Shortness Of Breath


Cardiovascular: Chest Pain


Gastrointestinal: Abdominal Pain


Motor: Other - see hpi


Neurovascular: Other - see hpi


Musculoskeletal: Other: - Positive arm pain


Neurological: Numbness - In hands


Psychological: Other - see hpi


Is Patient Immunocompromised?: No


All Other Systems Reviewed And Are Negative: Yes





Physical Exam


Triage Information Reviewed: Yes


Appearance: Ill-Appearing, Thin


Vital Signs: 


 Initial Vital Signs











Temp  98.7 F   05/22/18 13:01


 


Pulse  102   05/22/18 13:01


 


Resp  26   05/22/18 13:01


 


BP  140/82   05/22/18 13:01


 


Pulse Ox  81   05/22/18 13:01











Vital Signs Reviewed: Yes


Eye Exam: Normal


ENT Exam: Normal - grossly - perrla, eomi lips a little dry


Neck exam: Other - + arthritic changes.  ? jvd.


Respiratory: Positive: Other: - Normal respiratory rate Conversing in full 

sentances.  Decreased bibas breath sounds


Cardiovascular Exam: Other - HR irreg, reg.  + Syst murmer.  HR correlates with 

radial pulse L


Cardiovascular: Positive: Other: - Good general skin color, good capillary 

refill


Abdominal Exam: Other - soft, nondistended, + bs denies back pain


Abdomen Description: Positive: Soft


Bowel Sounds: Positive: Present


Musculoskeletal Exam: Other - moves x 4 ext's. gait not tested, although 

apparently ambulated in [facility]. BLE + venous insuff no flores unilateral edema


Neurological Exam: Other - grossly nonfocal, denies h/a, does c/o BUE (hands) 

dysesthesia.


Neurological: Positive: Other: - Nonfocal, grossly intact


Psychological Exam: Normal


Psychological: Positive: Other: - Conversing easily and appropriately


Skin: Positive: Other - No visible or reported rash Color initially grey-liz.  

Nondiaphoretic.  Color improves with oxygen





- Assessment/Plan


Course Of Treatment: EKG:  SR at 101 (decreased to 90 with oxygen) - RBBVB and 

LBFB.  C/w EKC 5/1/18 there are new depressed ST segments V4-6.  EMS notified 

immediately upon arrival to room.  Initially, Mr. Vazquez did not want to go to ED 

via EMS.  However, he relented, and agreed to go to ED via EMS.  Call to ED at 

1311, no answer.  13:18 spoke with Dr. Alcantar, ED.  ASA 324mg po x 1 (81mg 

divided).  Nitro had been anticipated (ready to give), IV access still in 

progress at time of EMS arrival.  Chest pain resolved after oxygen and 

increased Sat to 90's.  





- Differential Diagnoses - Chest Pain


Differential Diagnosis/HQI/PQRI: Acute MI, ACS, Angina, CHF, Chest Wall, 

Pulmonary Edema, Pulmonary Embolism





- Clinical Impression


Provider Diagnoses: Acute Chest pain.





- Physician Notifications


Discussed Patient Care With: Cesar Alcantar


Time Discussed With Above Provider: 13:18


Instructed by Provider To: Other - Consult with Dr. Alcantar (emergency medicine) 

at 1318. Informed him of the patient's case prior to his arrival at the ED.





Discharge





- Sign-Out/Discharge


Documenting (check all that apply): Discharge/Admit/Transfer - Transfer





- Discharge Plan


Condition: Stable


Disposition: TRANS HIGHER LVL OF CARE FAC


Referrals: 


Cholo Boo MD [Primary Care Provider] - 





- Billing Disposition and Condition


Condition: STABLE


Disposition: EMTALA





The documentation as recorded by the Margaret aleman Emily accurately reflects the 

service I personally performed and the decisions made by me, Linda Choi MD.

## 2018-05-22 NOTE — ED
Galina GAMBLE Gabriel, scribed for Cesar Alcantar MD on 05/22/18 at 1417 .





HPI Chest Pain





- HPI Summary


HPI Summary: 





This patient is a 86 [facility]  with a chief complaint of 

CP that began last night but was much worse this morning. Pt was seen at  and 

given 328 ASA. The patient rates the pain 7/10 in severity on onset. Symptoms 

alleviated by oxygen and ASA, currently the pt has no pain. Patient reports 

wheezing. Patient denies fever and nausea. NKDA. 


Hx Renal insufficiency and CABG. Denies COPD. 








- History of Current Complaint


Chief Complaint: EDShortnessOfBreath


Hx Obtained From: Patient


Onset/Duration: Still Present


Timing: Constant


Initial Severity: Severe


Current Severity: None


Pain Intensity: 0


Pain Scale Used: 0-10 Numeric


Chest Pain Location: Diffuse


Chest Pain Radiates: No


Alleviating Factor(s): Oxygen, OTC Meds


Associated Signs and Symptoms: Positive: Other: - wheezing





- Additional Pertinent History


Primary Care Physician: JEFFERY





- Allergy/Home Medications


Allergies/Adverse Reactions: 


 Allergies











Allergy/AdvReac Type Severity Reaction Status Date / Time


 


No Known Allergies Allergy   Verified 05/22/18 13:01











Home Medications: 


 Home Medications





Potassium Chlor TAB* [Klor Con ER TAB*] 20 meq PO QPM 05/22/18 [History 

Confirmed 05/22/18]


Potassium Chlor TAB* [Potassium Chlor TAB 20 MEQ*] 40 meq PO QAM 05/22/18 [

History Confirmed 05/22/18]


hydrOXYzine HCL TAB* [Atarax 25 MG TAB*] 25 mg PO Q6H PRN 05/22/18 [History 

Confirmed 05/22/18]


predniSONE TAB* [Deltasone TAB*] 50 mg PO DAILY 05/22/18 [History Confirmed 05/ 22/18]











PMH/Surg Hx/FS Hx/Imm Hx


Endocrine/Hematology History: 


   Denies: Hx Diabetes


Cardiovascular History: Reports: Hx Congestive Heart Failure, Hx Coronary 

Artery Disease, Hx Hypercholesterolemia, Hx Hypertension


   Denies: Hx Pacemaker/ICD


Respiratory History: 


   Denies: Hx Chronic Obstructive Pulmonary Disease (COPD)


GI History: Reports: Hx Gastroesophageal Reflux Disease


 History: Reports: Hx Renal Disease - abnormal


   Denies: Hx Dialysis


Musculoskeletal History: Reports: Hx Arthritis, Hx Back Problems - spinal 

stenosis


Sensory History: Reports: Hx Cataracts, Hx Contacts or Glasses


   Denies: Hx Hearing Aid


Opthamlomology History: Reports: Hx Cataracts, Hx Contacts or Glasses


Neurological History: 


   Denies: Hx Developmental Delay


Psychiatric History: 


   Denies: Hx Panic Disorder





- Surgical History


Surgery Procedure, Year, and Place: HERNIA SURGERY 1965-CATARACTS BILATERAL 

EYES 2011,cardiac bypass


Infectious Disease History: No


Infectious Disease History: 


   Denies: Traveled Outside the [place] in Last 30 Days





- Family History


Known Family History: 


   Negative: Cardiac Disease





- Social History


Alcohol Use: None


Substance Use Type: Reports: None


Smoking Status (MU): Never Smoked Tobacco


Have You Smoked in the Last Year: No





Review of Systems


Negative: Fever


Positive: Chest Pain


Positive: Other - wheeze 


Negative: Nausea


All Other Systems Reviewed And Are Negative: Yes





Physical Exam





- Summary


Physical Exam Summary: 





VITAL SIGNS: Reviewed.


GENERAL:  Patient is a well-developed and nourished male who is lying 

comfortable in the stretcher.  Patient is not in any acute respiratory distress.


HEAD AND FACE: No signs of trauma.  No ecchymosis, hematomas or skull 

depressions. No sinus tenderness.


EYES: PERRLA, EOMI x 2, No injected conjunctiva, no nystagmus.


EARS: Hearing grossly intact. Ear canals and tympanic membranes are within 

normal limits.


MOUTH: Oropharynx within normal limits.


NECK: Supple, trachea is midline, no adenopathy, no JVD, no carotid bruit, no c-

spine tenderness, neck with full ROM.


CHEST: Symmetric, no tenderness at palpation


LUNGS: there are bilateral wheezes


CVS: Regular rate and rhythm, S1 and S2 present, no murmurs or gallops 

appreciated.


ABDOMEN: Soft, non-tender. No signs of distention. No rebound no guarding, and 

no masses palpated. Bowel sounds are normal.


EXTREMITIES: FROM in all major joints, 1+ edema bilaterally.


NEURO: Alert and oriented x 3. No acute neurological deficits. Speech is normal 

and follows commands.


SKIN: Dry and warm





Triage Information Reviewed: Yes


Vital Signs On Initial Exam: 


 Initial Vitals











Temp Pulse Resp BP Pulse Ox


 


 98.4 F   87   24   159/84   98 


 


 05/22/18 14:05  05/22/18 14:05  05/22/18 14:05  05/22/18 14:05  05/22/18 14:05











Vital Signs Reviewed: Yes





Diagnostics





- Vital Signs


 Vital Signs











  Temp Pulse Resp BP Pulse Ox


 


 05/22/18 14:05  98.4 F  87  24  159/84  98














- Laboratory


Lab Results: 


 Lab Results











  05/22/18 05/22/18 05/22/18 Range/Units





  14:09 14:09 14:09 


 


WBC  19.5 H    (3.5-10.8)  10^3/ul


 


RBC  3.12 L    (4.0-5.4)  10^6/ul


 


Hgb  9.8 L    (14.0-18.0)  g/dl


 


Hct  30 L    (42-52)  %


 


MCV  97 H    (80-94)  fL


 


MCH  32 H    (27-31)  pg


 


MCHC  32    (31-36)  g/dl


 


RDW  17 H    (10.5-15)  %


 


Plt Count  121 L    (150-450)  10^3/ul


 


MPV  11.5 H    (7.4-10.4)  um3


 


Neut % (Auto)  81.4    (38-83)  %


 


Lymph % (Auto)  8.8 L    (25-47)  %


 


Mono % (Auto)  8.5 H    (0-7)  %


 


Eos % (Auto)  1.0    (0-6)  %


 


Baso % (Auto)  0.3    (0-2)  %


 


Absolute Neuts (auto)  15.8 H    (1.5-7.7)  10^3/ul


 


Absolute Lymphs (auto)  1.7    (1.0-4.8)  10^3/ul


 


Absolute Monos (auto)  1.6 H    (0-0.8)  10^3/ul


 


Absolute Eos (auto)  0.2    (0-0.6)  10^3/ul


 


Absolute Basos (auto)  0    (0-0.2)  10^3/ul


 


Absolute Nucleated RBC  0.1    10^3/ul


 


Nucleated RBC %  0.2    


 


INR (Anticoag Therapy)     (0.77-1.02)  


 


APTT     (26.0-36.3)  seconds


 


Sodium   141   (139-145)  mmol/L


 


Potassium   TNP   


 


Chloride   106   (101-111)  mmol/L


 


Carbon Dioxide   26   (22-32)  mmol/L


 


Anion Gap   9   (2-11)  mmol/L


 


BUN   31 H   (6-24)  mg/dL


 


Creatinine   1.56 H   (0.67-1.17)  mg/dL


 


Est GFR ( Amer)   54.5   (>60)  


 


Est GFR (Non-Af Amer)   42.4   (>60)  


 


BUN/Creatinine Ratio   19.9   (8-20)  


 


Glucose   102 H   ()  mg/dL


 


Lactic Acid    2.2 H*  (0.5-2.0)  mmol/L


 


Calcium   7.8 L   (8.6-10.3)  mg/dL


 


Magnesium   TNP   


 


Total Bilirubin   0.80   (0.2-1.0)  mg/dL


 


AST   TNP   


 


ALT   85 H   (7-52)  U/L


 


Alkaline Phosphatase   257 H   ()  U/L


 


Total Creatine Kinase   53   ()  U/L


 


CK-MB (CK-2)   3.9   (0.6-6.3)  ng/mL


 


Troponin I   0.15 H*   (<0.04)  ng/mL


 


B-Natriuretic Peptide    ( - 100) pg/mL


 


Total Protein   7.1   (6.4-8.9)  g/dL


 


Albumin   3.1 L   (3.2-5.2)  g/dL


 


Globulin   4.0   (2-4)  g/dL


 


Albumin/Globulin Ratio   0.8 L   (1-3)  


 


TSH   2.19   (0.34-5.60)  mcIU/mL














  05/22/18 05/22/18 05/22/18 Range/Units





  14:09 14:09 15:45 


 


WBC     (3.5-10.8)  10^3/ul


 


RBC     (4.0-5.4)  10^6/ul


 


Hgb     (14.0-18.0)  g/dl


 


Hct     (42-52)  %


 


MCV     (80-94)  fL


 


MCH     (27-31)  pg


 


MCHC     (31-36)  g/dl


 


RDW     (10.5-15)  %


 


Plt Count     (150-450)  10^3/ul


 


MPV     (7.4-10.4)  um3


 


Neut % (Auto)     (38-83)  %


 


Lymph % (Auto)     (25-47)  %


 


Mono % (Auto)     (0-7)  %


 


Eos % (Auto)     (0-6)  %


 


Baso % (Auto)     (0-2)  %


 


Absolute Neuts (auto)     (1.5-7.7)  10^3/ul


 


Absolute Lymphs (auto)     (1.0-4.8)  10^3/ul


 


Absolute Monos (auto)     (0-0.8)  10^3/ul


 


Absolute Eos (auto)     (0-0.6)  10^3/ul


 


Absolute Basos (auto)     (0-0.2)  10^3/ul


 


Absolute Nucleated RBC     10^3/ul


 


Nucleated RBC %     


 


INR (Anticoag Therapy)  1.08 H    (0.77-1.02)  


 


APTT  25.8 L    (26.0-36.3)  seconds


 


Sodium     (139-145)  mmol/L


 


Potassium    3.4 L  


 


Chloride     (101-111)  mmol/L


 


Carbon Dioxide     (22-32)  mmol/L


 


Anion Gap     (2-11)  mmol/L


 


BUN     (6-24)  mg/dL


 


Creatinine     (0.67-1.17)  mg/dL


 


Est GFR ( Amer)     (>60)  


 


Est GFR (Non-Af Amer)     (>60)  


 


BUN/Creatinine Ratio     (8-20)  


 


Glucose     ()  mg/dL


 


Lactic Acid     (0.5-2.0)  mmol/L


 


Calcium     (8.6-10.3)  mg/dL


 


Magnesium    1.3 L  


 


Total Bilirubin     (0.2-1.0)  mg/dL


 


AST    63 H  


 


ALT     (7-52)  U/L


 


Alkaline Phosphatase     ()  U/L


 


Total Creatine Kinase     ()  U/L


 


CK-MB (CK-2)     (0.6-6.3)  ng/mL


 


Troponin I     (<0.04)  ng/mL


 


B-Natriuretic Peptide   2487 H  ( - 100) pg/mL


 


Total Protein     (6.4-8.9)  g/dL


 


Albumin     (3.2-5.2)  g/dL


 


Globulin     (2-4)  g/dL


 


Albumin/Globulin Ratio     (1-3)  


 


TSH     (0.34-5.60)  mcIU/mL











Result Diagrams: 


 05/22/18 14:09





 05/22/18 15:45


Lab Statement: Any lab studies that have been ordered have been reviewed, and 

results considered in the medical decision making process.





- Radiology


  ** CXR


Radiology Interpretation Completed By: Radiologist - The constellation of 

findings is most suggestive of alveolar and interstitial pulmonary edema with 

associated small pleural effusions new compared with the May 04, 2018 exam. 

Asymmetric alveolar consolidation may represent asymmetric pulmonary edema with 

the differential including LEFT lung inflammatory infiltrate. Correlate with 

clinical assessment. ED physician has reviewed this radiology report.





- EKG


  ** 1402


Cardiac Rate: NL


EKG Rhythm: Sinus Rhythm - at 87 BPM


EKG Interpretation: RBBB, ST depressions in v4 v5 v6 


EKG Comparison: Other - Different from 5-22-18





Chest Pain Course/Dx





- Course


Assessment/Plan: Patient is an 86-year-old male who presents to the emergency 

room via ambulance after the patient was transferred from the urgent care with 

a chief complaint of chest pain.  The patient has history of CABG, paroxysmal 

atrial fibrillation, syncope, chronic kidney disease, hypertension.  Patient 

reports that for the last couple days the patient is having intermittent chest 

pain.  Patient was given aspirin and oxygen and after these medications the 

symptoms have improved.  At arrival to the emergency room he reports pain 0 out 

of 10.  EKG shows a sinus rhythm at 87 bpm with a right bundle branch block but 

also has ST depressions and V4 V5 V6 which are new from a previous EKG done on 5 /1/18.  Blood cultures results without any significant abnormality except for 

white  blood cell count of 19.5 which actually has decreased from a previous 

22.6.  Also the patient has a normocytic normochromic anemia which is seems to 

be chronic. BUN/creatinine is 31 over 1.56, lactic acid is 2.2, calcium 7.8, 

BNP is 2487 and troponin is 0.15.  Since the patient has an increased troponin 

and abnormal EKG I believe that the patient would benefit of admission to rule 

out acute coronary syndrome versus non-ST elevation MI.  The patient also has a 

CHF exacerbation.  I discuss my physical exam, findings and test results with 

Dr. Graham  from the hospitalist services and he agrees to admit patient to his 

services. Patient is hemodynamically stable alert and oriented x 3.





- Chest Pain


Differential Diagnosis/HQI/PQRI: Acute MI, ACS, Angina, CHF, Chest Wall, GI 

Disease, Lower Respiratory Infection





- Diagnoses


Provider Diagnoses: 


 Non-STEMI (non-ST elevated myocardial infarction), CHF exacerbation








- Provider Notifications


Discussed Care Of Patient With: Jennifer Graham


Time Discussed With Above Provider: 15:24


Instructed by Provider To: Admit As Observation





- Critical Care Time


Critical Care Time:  min





Discharge





- Sign-Out/Discharge


Documenting (check all that apply): Discharge/Admit/Transfer - admitted 





- Discharge Plan


Condition: Fair


Disposition: ADMITTED TO [facility]


Referrals: 


Cholo Boo MD [Primary Care Provider] - 





- Billing Disposition and Condition


Condition: FAIR


Disposition: [facility]





The documentation as recorded by the Galina aleman Gabriel accurately reflects 

the service I personally performed and the decisions made by me, Cesar Alcantar MD.

## 2018-05-22 NOTE — RAD
Indication: Shortness of breath, chest pain. Cardiac disease.



Comparison: May 04, 2018



Technique: Upright AP 1432 hours



Report: Cardiomegaly, prominent ill-defined central pulmonary vasculature, diffuse

prominence of interstitial markings, perihilar alveolar opacities greater on the LEFT,

small bilateral pleural effusions. Negative for pneumothorax. Median sternotomy wires and

cardiac monitor.



IMPRESSION:  The constellation of findings is most suggestive of alveolar and interstitial

pulmonary edema with associated small pleural effusions new compared with the May 04, 2018

exam. Asymmetric alveolar consolidation may represent asymmetric pulmonary edema with the

differential including LEFT lung inflammatory infiltrate. Correlate with clinical

assessment.

## 2018-05-22 NOTE — HP
CC:  Dr. Boo; Dr. Corcoran; Dr. Thomas; Dr. Tran; Dr. Jha *

 

HISTORY AND PHYSICAL:

 

DATE OF ADMISSION:  18

 

TIME OF EVALUATION:  4:30 p.m.

 

PRIMARY CARE PROVIDER:  Dr. Boo.

 

CARDIOLOGIST:  Dr. Corcoran.

 

ONCOLOGIST:  Dr. Thomas.

 

DERMATOLOGIST:  Dr. Tran.

 

NEPHROLOGIST:  Dr. Jha.

 

CHIEF COMPLAINT:  Shortness of breath.

 

HISTORY OF PRESENT ILLNESS:  Mr. Vazquez is an 86-year-old male with a complex 
past medical history of CAD, CHF, GERD, hypertension, hyperlipidemia, recent 
admission for syncopal episodes due to orthostatic hypotension, acute kidney 
injury with eosinophilia, who presented to the emergency room with complaints 
of shortness of breath.

 

The patient's history goes back to earlier this month when he was seen by his 
primary care provider and had his furosemide dose increased.  He had a syncopal 
episode while riding on his tractor.  The impression was that the patient had a 
syncopal episode secondary to orthostatic hypotension.  The patient had an 
event monitor placed and this was interrogated and showed no significant 
arrhythmias. The patient was also found to be in acute kidney injury with 
proximal renal tubular acidosis.  Also of note is the fact that the patient had 
eosinophilia, leukocytosis, and thrombocytopenia and had a bone marrow biopsy 
done.  From my reading, his bone marrow biopsy did not show any abnormalities.  
The patient has had a debilitating rash for the past year and he had a biopsy 
done by Dr. Tran on 18.  The report shows eosinophilic spongiotic 
dermatitis.  The patient was started on prednisone 50 mg and he states that the 
itching is a little better.  The etiology is still not clear, but there was 
some question that may be furosemide was the cause of it.

 

The patient was discharged home on 18 without any diuretics.  He was seen 
in followup by Dr. Corcoran on 18 and he was felt to be stable at that time.

 

The patient states that he has noticed progressive weight gain of almost 20 
pounds over the past 2 weeks and also had progressive dyspnea and swelling.  On 
his admission on 18, he weighed 147 pounds and today, he weighs 162.  
Yesterday, he started to have shortness of breath that became severe today and 
he decided to come to the emergency room for further evaluation.  He also had 
some chest heaviness yesterday, but none yesterday.

 

He denies palpitations, nausea, vomiting, urinary or bowel complaints at this 
time.

 

PAST MEDICAL HISTORY:

1.  Coronary artery disease, status post CABG in 2017, LIMA to LAD, 
SVG to RPDA, SVG to OM1 and OM2.

2.  Hypertension.

3.  Hyperlipidemia.

4.  CKD stage 3.

5.  Paroxysmal atrial fibrillation.

6.  Right bundle-branch block.

7.  Recent admission for syncope due to orthostatic hypotension.

8.  JOSE ARMANDO.

9.  Proximal renal tubular acidosis.

 

MEDICATION LIST:

1.  Aspirin 81 mg p.o. daily.

2.  Atorvastatin 10 mg p.o. daily.

3.  Cholecalciferol 1000 units p.o. daily.

4.  Hydroxyzine 25 mg p.o. q.6 hours p.r.n. itching.

5.  Magnesium oxide 800 mg p.o. daily.

6.  Metoprolol tartrate 37.5 mg p.o. b.i.d.

7.  Montelukast 5 mg p.o. daily.

8.  Omeprazole 20 mg p.o. q.a.m.

9.  Potassium chloride 40 mEq p.o. q.a.m. and 20 mEq p.o. q.p.m.

10.  Prednisone 50 mg p.o. daily.

11.  Metamucil 1 packet p.o. b.i.d.

12.  Bicitra 30 mL p.o. t.i.d.

 

ALLERGIES:  No known drug allergies.

 

FAMILY HISTORY:  His mother  in her 70s of renal failure.

 

SOCIAL HISTORY:  No history of tobacco use, alcohol on occasion.  He is a 
farmer. Surrogate decision maker is his wife, Catherine Vazquez.  Phone number is 
[phone].

 

REVIEW OF SYSTEMS:  A 14-point review of systems was performed and all the 
pertinent negatives and positives are in the HPI.

 

                               PHYSICAL EXAMINATION

 

GENERAL:  The patient is a pleasant elderly male, sitting up in the ED stretcher
, in mild respiratory distress.

 

VITAL SIGNS:  Temperature 98.3, heart rate is 121, respiratory rate is 25, 
oxygen saturation is 100% on 10 L, blood pressure is 153/97.

 

HEENT:  Pupils are equal.  Moist mucous membranes.

 

CHEST:  Breath sounds present bilaterally with crackles diffusely.

 

CVS:  Normal S1, S2.  Regular rate and rhythm, tachycardic.

 

ABDOMEN:  Soft, nontender.  Bowel sounds are present.

 

EXTREMITIES:  There is severe lower extremity pitting edema extending all the 
way up to his thighs.

 

NEURO:  He is alert, awake, oriented x3.  Able to move all 4 extremities.

 

SKIN:  The patient has a diffuse macular rash with multiple excoriations and 
scabs from his itching.

 

 LABORATORY AND IMAGING DATA:  The patient had a CBC that showed a WBC of 19.5, 
hemoglobin 9.8, hematocrit of 30, platelets of 121 with 81% neutrophils.  INR 
is 1.08.  Chemistry showed a sodium of 141, potassium 3.4, chloride of 106, 
bicarb of 26, BUN of 31, creatinine of 1.5, glucose of 102, lactic acid of 2.2, 
calcium of 7.8, magnesium of 1.3.  Total bilirubin of 0.8, AST of 63, ALT of 85
, alk phos 257. First troponin is 0.15.  BNP is 2487.  Total protein is 7.1, 
albumin is 3.1, globulin 4.  TSH is 2.19.

 

Chest x-ray showed alveolar and interstitial pulmonary edema with associated 
small pleural effusions.

 

EKG done on 18 at 1400 shows sinus rhythm at 87 beats per minute with a 
right bundle-branch block, Q-waves in II, III, aVF.  Mild ST depressions on V4 
through V6, new when compared to his prior EKG from 18.

 

ASSESSMENT AND PLAN:  Mr. Vazquez is an 86-year-old male with a complex past 
medical history of coronary artery disease, diastolic congestive heart failure, 
gastroesophageal reflux disease, hypertension, hyperlipidemia, recent admission 
for syncope due to orthostatic hypertension, acute kidney injury, eosinophilia 
who presented to the emergency room with complaints of shortness of breath, 
found to have respiratory failure secondary to acute diastolic congestive heart 
failure exacerbation/fluid overload.

 

1.  Acute hypoxemic respiratory failure.  The patient does not use supplemental 
oxygen and now he is requiring 10 L.  He is going to be admitted to the 
intensive care unit and the source is acute diastolic congestive heart failure 
exacerbation.

2.  Acute diastolic congestive heart failure exacerbation.  He patient was on 
furosemide prior to that admission and he was discharged home with no 
diuretics. He describes weight gain of almost 20 pounds since discharge and he 
show signs of fluid overload on physical examination and chest x-ray.  The 
patient will receive metolazone and it is unclear at this time if his 
eosinophilia/rash are associated with furosemide or not.  He was on furosemide 
for a long time and I believe the medication was discontinued due to his acute 
renal failure and not the rash.  He was seen by Dr. Tran on 18, but 
unfortunately, his note is not yet available.  I did call his office and I 
talked to his answering service and they say that he is not on call and that 
the service does not page, so the only way is to contact him tomorrow morning.  
Considering the patient's presentation, I am going to order one dose of 
furosemide for tonight and then we can clarify tomorrow if Dr. Tran feels 
that his eosinophilia and rash have any relation with furosemide.

3.  Eosinophilic spongiotic dermatitis/eosinophilic syndrome.  We will continue 
Singulair and steroids.

4.  Troponin elevation.  The patient has no complaints of chest pain at this 
time. I suspect that this elevation is likely secondary to his congestive heart 
failure and we are going to trend his troponins and I will consult Cardiology.

5.  Hypokalemia.  We will replete.

6.  Hypomagnesemia.  We will replete.

7.  Transaminitis.  I suspect this is secondary to passive liver congestion due 
to congestive heart failure.

8.  Coronary artery disease.  We will continue aspirin, atorvastatin, 
metoprolol.

9.  DVT prophylaxis.  The patient has a score of 4 on the DVT Prophylaxis Risk 
Assessment Guide and he will be started on subcutaneous heparin.

10.  Code status is full.

 

TIME SPENT:  Approximately 65 minutes were spent with patient and family 
interview, medical records review, physical examination to complete this 
admission, more than half of this time was spent face-to-face with patient in 
coordination of care.

 

[id number]/[facility] #: [id number]

MARIO

## 2018-05-22 NOTE — UC
Cardiac HPI





- History of Current Complaint


Stated Complaint: CHEST PAIN,ABD PAIN,SOB


Time Seen by Provider: 05/22/18 13:00





- Allergy/Home Medications


Allergies/Adverse Reactions: 


 Allergies











Allergy/AdvReac Type Severity Reaction Status Date / Time


 


No Known Allergies Allergy   Verified 05/01/18 17:52














PMH/Surg Hx/FS Hx/Imm Hx





- Surgical History


Surgical History: Yes


Surgery Procedure, Year, and Place: HERNIA SURGERY 1965-CATARACTS BILATERAL 

EYES 2011,cardiac bypass





- Family History


Known Family History: 


   Negative: Cardiac Disease





- Social History


Alcohol Use: Weekly


Substance Use Type: None


Smoking Status (MU): Never Smoked Tobacco


Have You Smoked in the Last Year: No





- Immunization History


Most Recent Influenza Vaccination: 2017 fall


Most Recent Tetanus Shot: 2017


Most Recent Pneumonia Vaccination: 2015





Discharge





- Discharge Plan


Referrals: 


Cholo Boo MD [Primary Care Provider] -

## 2018-05-22 NOTE — XMS REPORT
Carmelo Vazquez 

 Created on:May 11, 2018



Patient:Carmelo Vazquez JR

Sex:Male

:1932

External Reference #:[id number]





Demographics







 Address  [address]



   [place] [address]

 

 Home Phone  [phone]

 

 Mobile Phone  [phone]

 

 Preferred Language  English

 

 Marital Status  Declined to Specify/Unknown

 

 [employer] Affiliation  Unknown

 

 Race  White

 

 Ethnic Group  Declined to Specify/Unknown









Author







 Organization  [facility]

 

 Address  [address] [place] [address]

 

 Phone  [phone]









Support







 Name  Relationship  Address  Phone

 

 Catherine Vazquez  Wife  [address]  [phone]



     [place] [address]  









Care Team Providers







 Name  Role  Phone

 

 HCP given  Primary Care Physician  Unavailable









Payers







 Type  Date  Identification Numbers  Payment Provider  Subscriber

 

 Medicare Primary  Effective:  Policy Number:  National Govt  Carmelo Vazquez 



   1999  [id number]  Services  









 PayID: [id number]  [address]









 [place] [address]









 Medigap Part B    Policy Number: [id number]  [facility]  Carmelo HOU Taylor GUERRERO









 Group Number: [id number]  [address]

 

 PayID: [id number]  [place] [address]







Problems







 Date  Description  Provider  Status

 

 Onset: 2004  Benign essential hypertension  Cholo Boo M.D.  Active

 

 Onset: 2004  Disorder of lipid metabolism  Cholo Boo M.D.  Active

 

 Onset: 2011  Impaired fasting glycaemia  Cholo Boo M.D.  Active

 

 Onset: 2016  Low back pain  Cholo Boo M.D.  Active

 

 Onset: 2016  Hypo-osmolality and or hyponatremia  Cholo Boo M.D.  
Active

 

 Onset: 2016  Disorder of fatty acid metabolism  Cholo Boo M.D.  
Active

 

 Onset: 2017  Essential hypertension  Cholo Boo M.D.  Active







Family History







 Date  Family Member(s)  Problem(s)  Comments

 

   General  No CAD, Colon Or Prostate  



     Cancer In 1St Degree  



     Relatives.  

 

   Father   due to Lung Disease  () - at age of 72.

 

   Mother   due to Kidney  () - 73



     Disease  

 

   Mother  High Blood Pressure  

 

   First Son  Sujit  

 

   First Son  52  

 

   First Daughter  Lilibeth  

 

   First Daughter  56  

 

 Onset: (2017)  First Sister  Heart Problems  had open heart surgery at



       age 81, for valve problem



       (pt unsure if she also had



       CAD/CABG)

 

   First Sister  Nadeeg  

 

   First Sister  193  







Social History







 Type  Date  Description  Comments

 

 Education    Highest level of education completed is  



     12th grade  

 

 Marital Status    Patient is   

 

 Occupation    Junior  

 

 Employment    Currently working  

 

 Cigarette Use  2015  Never Smoked Cigarettes  

 

 ETOH Use    Rare Alcohol Use  

 

 Smoking    Patient has never smoked  

 

 Daily Caffeine    Consumes on average 1 soda per day  

 

 Sun Exposure    Moderate amount of sun exposure. Uses  



     sunscreen  

 

 Seat Belt/Car Seat    Always uses a seat belt  

 

 Age 1st [place]    First intercourse was at age 18  

 

 # Partners in a Lifetime    The patient has had 2 sexual partners  







Allergies, Adverse Reactions, Alerts







 Date  Description  Reaction  Status  Severity  Comments

 

 2007  Simvastatin    active    Myalgias (normal CPK)







Medications







 Medication  Date  Status  Form  Strength  Qnty  SIG  Indications  Ordering



                 Provider

 

 Potassium  /  Active  Tablets ER  20Meq  90tab  2 tablets by  E87.6  
Silcoff,



 Chloride ER  2018        s  mouth every    Cholo,



             morning and    M.D.



             1 in the    



             afternoon or    



             evening; for    



             potassium    



             replacement    

 

 Metamucil  /  Active  Powder  28.3%    1 packet    Unknown



   2018          twice daily    



             as needed    

 

 Sod  /  Active  Solution  500-334mg    Take 30ML    Unknown



 Citrate-Citric  2018      /5ML    Three Times    



 Acid            A Day    

 

 Montelukast  /  Active  Chewtabs  5mg    chew and    Unknown



 Sodium  2018          swallow 1    



             tablet by    



             mouth once    



             daily    

 

                 

 

 Magnesium Oxide  /  Hx  Tablets  400mg    2 tablets by    Unknown



   2018 -          mouth once a    



             day; take    



             with a meal;    



             for    



             magnesium    



             replacement    



             x 7 days    

 

 Betamethasone  /  Active  Cream  0.1%  90gm  apply to  R21  Silcoff,



 Valerate  2018          affected    Cholo,



             areas on    M.D.



             torso and    



             arms 2x/day    



             as needed    



             for itchy    



             rash    









 L29.9









 Omeprazole  2018  Active  Capsules DR  20mg    1 by mouth    Unknown



             every day    

 

 Proair HFA  2018  Active  Aerosol  108(90  8.50  1-2 puffs  J9  Silcoff,



         Base)  0gm  four times a  0  Cholo,



         mcg/Ac    day as needed    M.D.



         t        

 

 Metoprolol Tartrate  2017  Active  Tablets  25mg  270t  1.5 tablets  I2  
Ema



           abs  by mouth  5.  Cholo,



             twice a day,  10  M.D.



             for heart    

 

 Voltaren  2017  Active  Gel  1%  300u  apply up to    Ema



           nits  2grams to    Cholo,



             each knee    M.D.



             4x/day; for    



             knee    



             arthritis    

 

 Multivitamin Adult  2017  Active  Tablets      1 by mouth    Unknown



             every day    

 

 Vitamin D  2017  Active  Tablets  1000Un    1 by mouth    Unknown



         it    every day    

 

 Vitamin C  2013  Active  Capsules      one po daily    Unknown



             during winter    



             months    

 

 Aspirin Ec  2013  Active  Tablets DR  81mg    1 pill daily    Unknown



             for heart    



             disease    



             prevention    

 

 Atorvastatin Calcium  2012  Active  Tablets  10mg  90ta  take 1 tablet  
E7  jad Boo  once daily  1.  Cholo,



             for  30  M.D.



             cholesterol    

 

                 

 

 Potassium Chloride ER  2018 -  Hx  Tablets ER  20Meq    2 tabs by  E8  
Unknown



   2018          mouth every  7.  



             morning  6  

 

 Doxepin HCL  2018 -  Hx  Capsules  10mg  15ca  1 by mouth  L2  Ema,



   05/10/2018        ps  every night  9.  Cholo,



             for itch; may  9  M.D.



             inc by 1 pill    



             every few    



             nights to a    



             max of    



             3/night    

 

 Tessalon Perles  2018 -  Hx  Capsules  100mg    1 cap by    Unknown



   04/10/2018          mouth three    



             times a day    



             as needed for    



             cough    

 

 Furosemide  2018 -  Hx  Tablets  40mg    1 by mouth in    Unknown



   2018          the morning;    



             for    



             congestive    



             heart    



             failure.    

 

 Potassium Chloride ER  2018 -  Hx  Tablets ER  20Meq  30ta  1 tab by  E8
  Ema,



   05/10/2018        bs  mouth once a  7.  Cholo,



             day; for  6  M.D.



             potassium    



             replacement    

 

 Benzonatate  2018 -  Hx  Capsules  200mg  30ca  1 by mouth  R0  Ema,



   2018        ps  three times a  5  Cholo,



             day as needed    M.D.



             for cough    

 

 Potassium Chloride ER  2018 -  Hx  Tablets ER  20Meq    1 tab by  E8  
Unknown



   2018          mouth twice a  7.  



             day; for  6  



             potassium    



             replacement    

 

 Azithromycin  2018 -  Hx  Tablets  250mg  6tab  2 tabs by  J9  Ema,



   2018        s  mouth daily  0  Cholo,



             x1 day then 1    M.D.



             tab by mouth    



             daily x4 days    

 

 Omeprazole  2017 -  Hx  Capsules   40mg  90ca  take one    Ema,



   2018        ps  capsule by    Cholo,



             mouth every    M.D.



             day for acid    



             reflux    

 

 Furosemide  2017 -  Hx  Tablets  40mg  90ta  1 tablet by    Ema,



   2017        bs  mouth daily    Cholo



             (diuretic)    M.D.

 

 Warfarin Sodium  2017 -  Hx  Tablets  5mg  100t  use as    Ema



   2017        abs  directed;    Cholo,



             take 1/day    M.D.



             for now then    



             adjust as    



             directed    

 

 Potassium Chloride  2017 -  Hx  Tablets ER  20Meq  180t  1 tablet by    
Ema



 Gloria ER  2017        abs  mouth twice    Cholo,



             daily    M.D.

 

 Digitek  2017 -  Hx  Tablets  125mcg    1 tablet po    Unknown



   2017          daily    

 

 Furosemide  2017 -  Hx  Tablets  40mg  90ta  1 tablet by    Ema



   2017        bs  mouth daily    Cholo



             (diuretic)    M.D.

 

 Omeprazole  2017 -  Hx  Capsules DR  20mg  90ca  1 capsule by    Ema



   2017        ps  mouth daily    Cholo,



             (for acid    M.D.



             suppression)    

 

 Losartan Potassium  2017 -  Hx  Tablets  25mg  180t  1 tablet by  I1  
Ema,



   2018        abs  mouth twice  0  Cholo,



             daily for    M.D.



             high blood    



             pressure    

 

 Metoprolol Tartrate  2017 -  Hx  Tablets  25mg  90ta  1 tablet by  I2  
Unknown



   2017        bs  mouth three  5.  



             times a day  10  



             for heart    

 

 Warfarin Sodium  2017 -  Hx  Tablets  1mg  100t  use 1 every    Sopchak,



   2017        abs  day or as    enrrique Manning    DMaureenOMaureen

 

 Warfarin Sodium  2017 -  Hx  Tablets  2mg  100t  1 tablet by    Amador



   2017        abs  mouth daily    A.



             or as    enrrique Zhang M.D.

 

 Hydrocodone-Acetamino  2017 -  Hx  Tablets  5-325m    take 1 tablet    
Unknown



 phen  2018      g    by mouth    



             every 6 hours    



             if needed    

 

 Oxycodone-Acetaminoph  2016 -  Hx  Tablets  5-325m  30ta  take 1 tablet  
M5  Unknown



 en  2016      g  bs  by mouth  4.  



             every 4 to 6  5  



             hours if    



             needed for    



             pain    

 

 Losartan  2016 -  Hx  Tablets  100-25  30ta  take 1 tablet    Amador



 Potassium/Hydrochloro  2016      mg  bs  daily for    A.



 thiazide            high blood    Klepack,



             pressure    M.D.

 

 Atorvastatin Calcium  2016 -  Hx  Tablets  10mg  90ta  1 every at    
Silcoff,



   2017        bs  night    BEHZAD Emmanuel

 

 Losartan  2016 -  Hx  Tablets  100-25  90ta  Take 1 Tablet  I1  Silcoff,



 Potassium/Hydrochloro  2017      mg  bs  By Mouth  0  Cholo,



 thiazide            Every Morning    M.D.



             For High    



             Blood    



             Pressure    

 

 Prilosec OTC  2016 -  Hx  Tablets DR  20mg    1 by mouth    Unknown



   2017          once daily    



             generic brand    

 

 Lidocaine  2014 -  Hx  Patches  5%  60un  apply up to 3  72  Silcoff,



   2015        its  patches for  4.  Cholo,



             up to 12hrs/d  2  M.D.

 

 Lisinopril-Hydrochlor  2013 -  Hx  Tablets  20-12.  180t  Take 2  I1  
Silcoff,



 othiazide  2016      5mg  abs  Tablets Every  0  Cholo,



             Morning For    M.D.



             High Blood    



             Pressure    

 

 Vitamin D  2013 -  Hx  Tablets      1 po prn    Silcoff,



   2017              BEHZAD Emmanuel

 

 Benzonatate  10/06/2010 -  Hx  Capsules  100mg  30ca  1-2 po tid  78  Silcoff,



   10/16/2010        ps  prn for cough  6.  Cholo



               2  M.D.

 

 Combivent  10/06/2010 -  Hx  Aerosol  103-18  14.7  2 puffs q4h  78  Silcoff,



   2011      mcg/Ac  00gm  prn for cough  6.  mariana Emmanuel      2  M.D.

 

 Hydrochlorothiazide  2010 -  Hx  Capsules  12.5mg  90ca  Take 1  40  
Silcoff,



   2013        ps  Capsule By  1.  Cholo,



             Mouth Once  1  M.D.



             Daily For    



             Blood    



             Pressure    

 

 Fexofenadine HCL  2010 -  Hx  Tabs  180mg  90ta  take 1 tablet  47  
Silcoff,



   2011        bs  by mouth once  7.  Cholo



             daily for  9  M.D.



             allergies    

 

 Fexofenadine HCL  2010 -  Hx  Tablets  180mg  90ta  take 1 tablet  47  
Silcoff,



   2017        bs  by mouth once  7.  Cholo,



             daily for  9  M.D.



             allergies    

 

 Fexofenadine HCL  2009 -  Hx  Tablets  180mg  30ta  1 po qd prn  47  
Silcoff,



   2010        bs  for allergies  7.  Cholo,



               9  M.D.

 

 Veramyst  2009 -  Hx  Susp  27.5mc  10un  Instill 2  47  Silcoff,



   2011      g/Spra  its  Sprays Into  7.  Cholo,



         y    Each Nostril  9  M.D.



             Once Daily    



             For Nasal    



             Congestion    

 

 HCTZ  2008 -  Hx  Tablets  12.5mg  90ta  1 PO qAM For  40  Silcoff,



   2010        bs  High BP  1.  Cholo,



               1  M.D.

 

 Lisinopril  2008 -  Hx  Tablets  40mg  90ta  take 1 tablet  40  Silcoff,



   2013        bs  by mouth  1.  Cholo



             every morning  1  M.D.



             for high    



             blood    



             pressure    

 

 Metamucil  2008 -  Hx  Capsules  0.52gm    qd    Silcoff,



   2018              BEHZAD Emmanuel

 

 Lisinopril/Hydrochlor  2008 -  Hx  Tablets  20-12.  180t  2 po qam for  
40  Silcoff,



 othiazide  2008      5  abs  high blood  1.  Cholo



             pressure  1  M.D.

 

 Alavert  2008 -  Hx  Tablets  10mg  30ta  1 tab po  47  Tin,



   2009        bs  daily x #12  7.  Loyda TORRES



             given. can  9  



             replace with    



             claritin/zyrt    



             ec if helps    



             your    



             congestion    

 

 Rhinocort Aqua  2008 -  Hx  Suspension  32mcg/  1uni  2 squirts  47  
,



   2009      Act  ts  once daily.  7.  Loyda TORRES



             rinse mouth  9  



             post  sample    



             given. call    



             for script if    



             needed    

 

 Augmentin  2008 -  Hx  Tablets  875mg  20ta  one tablet  47  ,



   2008        bs  p.O. twice  3.  Loyda TORRES



             daily x 10  9  



             days for your    



             sinus    



             congestion if    



             fail    



             rhinocort and    



             alavert over    



             next 72 hours    

 

 Aleve  2007 -  Hx  Tablets  220mg    1 PO 2-3X/D  71  Silcoff,



   2017          prn For Knee  5.  Cholo,



             Pain  16  M.D.

 

 Zithromax Z-Oscar  2007 -  Hx  Tablets  250mg  1Pac  2 PO On Day  46  
Silcoff,



   2007        k  1, 1 PO On  6.  Cholo,



             Days 2-5  0  M.D.

 

 Combivent Inhalation  2007 -  Hx  Aerosol  18mcg;  1uni  2 puffs q4h  46
  Silco,



   2008      103mcg  ts  prn for  6.  Cholo,



         /Actua    cough,  0  M.D.



         ti    wheezing    

 

 Lipitor  2007 -  Hx  Tablets  10mg  90ta  Take 1 Tablet  27  Silcoff,



   2012        bs  By Mouth Once  2.  Cholo,



             Daily For  8  M.D.



             Cholesterol    

 

 Simvastatin  2006 -  Hx  Tablets  20mg  60ta  1 po qd t  27  Silco,



   2007        bs  olower  2.  Cholo,



             cholesterol  8  M.D.



             (to replace    



             gemfibrozil)    

 

 Lisinopril &amp; HCTZ  2005 -  Hx  Tablets  20mg;1  90ta  1 po qd for  
40  Silcoff,



   2008      2.5 mg  bs  high blood  1.  Cholo,



             pressure  1  M.D.

 

 HCTZ  2005 -  Hx  Capsules  12.5mg  90ca  1 po qd for  40  Silcoff,



   2005        ps  high blood  1.  Cholo,



             pressure  1  M.D.

 

 Trinsicon  2004 -  Hx  Capsules  240mg;  60ca  1 po qd on an  28  Silco
,



   2008      15McG;  ps  empty  5.  Cholo,



         110McG    stomsch,  9  M.D.



         ;7    increase to 1    



             pill twice a    



             day after 1    



             week    

 

 Permethrin  2004 -  Hx  Cream  5%  QS  apply to  13  Amador



   2004          whole body  3.  A.



             and leave on  0  Klepack,



             for 8 to 12    M.D.



             hours and    



             again 7  days    



             later    

 

 Zestril  2003 -  Hx  Tablets  20mg  90ta  1 po qd  40  Silcoff,



   2005        bs    1.  Cholo,



               1  M.D.

 

 Gemfibrozil  2003 -  Hx  Tablets  600mg  180t  1 bid for  27  Silcoff,



   2006        abs  cholesterol  2.  Cholo,



               8  M.D.

 

 Amoxil  2003 -  Hx  Tablets  500mg  30ta  1 po tid for  46  Silcoff,



   2004        bs  10 days for  1.  Cholo,



             sinusitis  1  M.D.









 461.0









 Ferrous Sulfate   - 2004  Hx  Tablets  200mg    1 po qd  285.9
  Unknown







Medications Administered in Office







 Medication  Date  Status  Form  Strength  Qnty  SIG  Indications  Ordering



                 Provider

 

 H1N1 Swine Flu    Administered  Injection          Kathyacosuly,



 Vaccine  010              BEHZAD Emmanuel







Immunizations







 CPT Code  Status  Date  Vaccine  Lot #

 

 [id number]  Given  10/03/2017  Influenza Virus Vaccine, Quadrivalent, Split,  



       Preservative Free  

 

 45082  Given  2016  Influenza Vaccine Split Virus Preservative Free Im  



       Use  

 

 36750  Given  2015  Influenza Vaccine Split Virus Preservative Free Im  



       Use  

 

 24028  Given  2015  Prevnar 13  [id number]

 

 58086  Given  10/03/2013  Flu, Split Virus 3Yrs  

 

 70629  Given  2012  Flu, Split Virus 3Yrs  

 

 46147  Given  2012  Adacel or Boostrix, TDaP  [id number]

 

 96103  Given  2010  Flu, Split Virus 3Yrs  [id number]

 

 33581  Given  2009  Flu, Split Virus 3Yrs  [id number]

 

 35889  Given  2008  Flu, Split Virus 3Yrs  [id number]

 

 91522  Given  2007  Flu, Split Virus 3Yrs  [id number]

 

 47496  Given  10/21/2005  Pneumococcal Immunization  

 

 05372  Given  2002  Td Immunization  









 









 09632  Refused  2007  Zostavax  







Vital Signs







 Date  Vital  Result  Comment

 

 2018  BP Systolic  130 mmHg  









 BP Diastolic  60 mmHg  

 

 BP Systolic Recheck  128 mmHg  R arm sitting

 

 BP Diastolic Recheck  50 mmHg  R arm sitting

 

 BP Systolic Standing Resting Right Arm  124 mmHg  

 

 BP Diastolic Standing Resting Right Arm  58 mmHg  

 

 Heart Rate  72 /min  reg

 

 Weight  162.00 lb  









 2018  BP Systolic  106 mmHg  









 BP Diastolic  58 mmHg  









 2018  BP Systolic  112 mmHg  









 BP Diastolic  70 mmHg  

 

 Heart Rate  76 /min  reg

 

 Respiratory Rate  16 /min  not laboured

 

 Weight  157.00 lb  









 2018  BP Systolic  112 mmHg  









 BP Diastolic  64 mmHg  

 

 Respiratory Rate  18 /min  not laboured

 

 Height  64.5 inches  5'4.50"

 

 Weight  154.00 lb  

 

 BMI (Body Mass Index)  26.0 kg/m2  









 2018  BP Systolic  142 mmHg  









 BP Diastolic  82 mmHg  

 

 Heart Rate  72 /min  reg

 

 Respiratory Rate  16 /min  not laboured

 

 Weight  160.00 lb  









 2018  BP Systolic  126 mmHg  









 BP Diastolic  68 mmHg  

 

 Heart Rate  66 /min  

 

 O2 % BldC Oximetry  95 %  

 

 Weight  164.00 lb  









 01/15/2018  BP Systolic  142 mmHg  









 BP Diastolic  72 mmHg  

 

 O2 % BldC Oximetry  97 %  

 

 Body Temperature  97.4 F  









 2018  BP Systolic  128 mmHg  









 BP Diastolic  70 mmHg  

 

 Body Temperature  97.5 F  

 

 Weight  172.00 lb  with shoes









 2017  BP Systolic  132 mmHg  









 BP Diastolic  72 mmHg  

 

 Heart Rate  68 /min  reg

 

 Respiratory Rate  16 /min  not laboured

 

 Height  65 inches  5'5"

 

 Weight  164.00 lb  

 

 BMI (Body Mass Index)  27.3 kg/m2  









 2017  BP Systolic  124 mmHg  









 BP Diastolic  64 mmHg  

 

 Heart Rate  72 /min  reg

 

 Respiratory Rate  16 /min  not laboured

 

 Weight  158.00 lb  









 2017  BP Systolic  140 mmHg  









 BP Diastolic  58 mmHg  

 

 Height  65.50 inches  5'5.50" w/shoes

 

 Weight  166.00 lb  w/shoes

 

 BMI (Body Mass Index)  27.2 kg/m2  









 2016  BP Systolic  132 mmHg  









 BP Diastolic  65 mmHg  

 

 Weight  170.00 lb  with sneakers









 2016  BP Systolic  142 mmHg  









 BP Diastolic  60 mmHg  

 

 Height  65.50 inches  5'5.50" w/shoes

 

 Weight  174.00 lb  w/shoes

 

 BMI (Body Mass Index)  28.5 kg/m2  









 2015  BP Systolic  144 mmHg  









 BP Diastolic  58 mmHg  

 

 Height  66.25 inches  5'6.25"

 

 Weight  177.00 lb  shoes on

 

 BMI (Body Mass Index)  28.4 kg/m2  









 2015  BP Systolic  150 mmHg  









 BP Diastolic  68 mmHg  

 

 Height  66.25 inches  5'6.25"

 

 Weight  176.00 lb  

 

 BMI (Body Mass Index)  28.2 kg/m2  









 2014  BP Systolic  140 mmHg  









 BP Diastolic  72 mmHg  

 

 BP Systolic Recheck  130 mmHg  R arm sitting

 

 BP Diastolic Recheck  68 mmHg  R arm sitting

 

 Heart Rate  74 /min  reg

 

 Height  66.50 inches  5'6.50"

 

 Weight  172.00 lb  

 

 BMI (Body Mass Index)  27.3 kg/m2  









 01/15/2014  BP Systolic  150 mmHg  









 BP Diastolic  62 mmHg  

 

 BP Systolic Recheck  126 mmHg  R arm sitting

 

 BP Diastolic Recheck  60 mmHg  R arm sitting

 

 Heart Rate  80 /min  reg

 

 Height  66.5 inches  5'6.50"

 

 Weight  176.00 lb  shoes on

 

 BMI (Body Mass Index)  28.0 kg/m2  









 2013  BP Systolic  118 mmHg  









 BP Diastolic  62 mmHg  

 

 BP Systolic Recheck  120 mmHg  R arm sitting

 

 BP Diastolic Recheck  54 mmHg  R arm sitting

 

 Heart Rate  76 /min  reg

 

 Weight  168.00 lb  









 2013  BP Systolic  140 mmHg  









 BP Diastolic  66 mmHg  

 

 BP Systolic Recheck  152 mmHg  R arm sitting

 

 BP Diastolic Recheck  70 mmHg  R arm sitting

 

 Heart Rate  76 /min  reg

 

 Weight  173.00 lb  









 2013  BP Systolic  160 mmHg  









 BP Diastolic  78 mmHg  

 

 BP Systolic Recheck  170 mmHg  R arm sitting

 

 BP Diastolic Recheck  80 mmHg  R arm sitting

 

 Heart Rate  68 /min  reg

 

 Height  66 inches  5'6"

 

 Weight  170.00 lb  

 

 BMI (Body Mass Index)  27.4 kg/m2  









 2012  BP Systolic  138 mmHg  









 BP Diastolic  80 mmHg  

 

 BP Systolic Recheck  150 mmHg  R arm sitting

 

 BP Diastolic Recheck  66 mmHg  R arm sitting

 

 Heart Rate  68 /min  reg

 

 Height  66 inches  5'6"

 

 Weight  173.00 lb  

 

 BMI (Body Mass Index)  27.9 kg/m2  









 2012  BP Systolic  130 mmHg  









 BP Diastolic  68 mmHg  

 

 BP Systolic Recheck  134 mmHg  R arm sitting

 

 BP Diastolic Recheck  62 mmHg  R arm sitting

 

 Heart Rate  82 /min  reg

 

 Height  66 inches  5'6"

 

 Weight  172.00 lb  

 

 BMI (Body Mass Index)  27.8 kg/m2  

 

 Last Menstrual Period  0  









 2011  BP Systolic  130 mmHg  









 BP Diastolic  74 mmHg  

 

 Heart Rate  80 /min  reg

 

 Respiratory Rate  16 /min  not laboured

 

 Weight  180.00 lb  









 2011  BP Systolic  112 mmHg  









 BP Diastolic  84 mmHg  

 

 Height  66.50 inches  5'6.50"

 

 Weight  179.00 lb  

 

 BMI (Body Mass Index)  28.5 kg/m2  

 

 Last Menstrual Period  0  









 10/06/2010  BP Systolic  150 mmHg  









 BP Diastolic  72 mmHg  

 

 Heart Rate  80 /min  reg

 

 Respiratory Rate  16 /min  not laboured

 

 Body Temperature  98.0 F  

 

 Weight  184.00 lb  









 2010  BP Systolic  140 mmHg  









 BP Diastolic  70 mmHg  

 

 BP Systolic Recheck  138 mmHg  R arm sitting

 

 BP Diastolic Recheck  70 mmHg  R arm sitting

 

 Heart Rate  88 /min  reg

 

 Height  66 inches  5'6"

 

 Weight  177.00 lb  w/out shoes

 

 BMI (Body Mass Index)  28.6 kg/m2  









 2010  BP Systolic  148 mmHg  









 BP Diastolic  60 mmHg  

 

 BP Systolic Recheck  144 mmHg  R arm sitting

 

 BP Diastolic Recheck  62 mmHg  R arm sitting

 

 Heart Rate  76 /min  reg

 

 Weight  181.00 lb  

 

 Last Menstrual Period  0  









 2009  BP Systolic  140 mmHg  









 BP Diastolic  70 mmHg  

 

 Weight  175.00 lb  

 

 Last Menstrual Period  0  









 2009  BP Systolic  140 mmHg  









 BP Diastolic  72 mmHg  

 

 BP Systolic Recheck  130 mmHg  R arm sitting

 

 BP Diastolic Recheck  66 mmHg  R arm sitting

 

 Heart Rate  86 /min  reg

 

 Weight  170.00 lb  

 

 Last Menstrual Period  0  









 2008  BP Systolic  142 mmHg  









 BP Diastolic  80 mmHg  

 

 BP Systolic Recheck  156 mmHg  R arm sitting

 

 BP Diastolic Recheck  70 mmHg  R arm sitting

 

 Height  66.5 inches  5'6.50"

 

 Weight  174.00 lb  

 

 BMI (Body Mass Index)  27.7 kg/m2  









 2008  BP Systolic  118 mmHg  









 BP Diastolic  60 mmHg  

 

 Height  66.6 inches  5'6.60"

 

 Weight  178.00 lb  

 

 BMI (Body Mass Index)  28.2 kg/m2  









 2008  BP Systolic  162 mmHg  









 BP Diastolic  80 mmHg  

 

 Height  66.6 inches  5'6.60"

 

 Weight  175.00 lb  

 

 BMI (Body Mass Index)  27.7 kg/m2  









 2008  BP Systolic  124 mmHg  









 BP Diastolic  70 mmHg  

 

 Body Temperature  98.1 F  

 

 Height  66.6 inches  5'6.60"

 

 Weight  177.00 lb  

 

 BMI (Body Mass Index)  28.1 kg/m2  









 2007  BP Systolic  140 mmHg  









 BP Diastolic  60 mmHg  

 

 BP Systolic Recheck  146 mmHg  R arm sitting

 

 BP Diastolic Recheck  64 mmHg  R arm sitting

 

 Heart Rate  68 /min  reg

 

 Height  66.6 inches  5'6.60"

 

 Weight  172.00 lb  

 

 BMI (Body Mass Index)  27.3 kg/m2  

 

 Last Menstrual Period  0  









 2007  BP Systolic  106 mmHg  









 BP Diastolic  74 mmHg  

 

 BP Systolic Recheck  118 mmHg  R arm sitting

 

 BP Diastolic Recheck  64 mmHg  R arm sitting

 

 Heart Rate  72 /min  reg

 

 Height  66.6 inches  5'6.60"

 

 Weight  168.00 lb  

 

 BMI (Body Mass Index)  26.6 kg/m2  

 

 Last Menstrual Period  0  









 2007  BP Systolic  160 mmHg  









 BP Diastolic  84 mmHg  

 

 Heart Rate  76 /min  reg

 

 Respiratory Rate  16 /min  not laboured

 

 Body Temperature  99.1 F  

 

 Height  66.6 inches  5'6.60"

 

 Weight  170.00 lb  

 

 BMI (Body Mass Index)  26.9 kg/m2  

 

 Last Menstrual Period  0  









 2006  BP Systolic  141 mmHg  R arm w/ his cuff









 BP Diastolic  67 mmHg  R arm w/ his cuff

 

 BP Systolic Recheck  138 mmHg  R arm w/ our cuff

 

 BP Diastolic Recheck  60 mmHg  R arm w/ our cuff

 

 Heart Rate  90 /min  reg

 

 Height  66.6 inches  5'6.60"

 

 Weight  164.00 lb  

 

 BMI (Body Mass Index)  26.0 kg/m2  









 2006  BP Systolic  162 mmHg  [employer] Machine









 BP Diastolic  70 mmHg  Office Machine

 

 BP Systolic Recheck  137 mmHg  Home Machine

 

 BP Diastolic Recheck  74 mmHg  Home Machine

 

 Height  66.6 inches  5'6.60"

 

 Weight  164.00 lb  

 

 BMI (Body Mass Index)  26.0 kg/m2  









 10/23/2006  BP Systolic  164 mmHg  









 BP Diastolic  72 mmHg  

 

 BP Systolic Recheck  152 mmHg  R arm sitting

 

 BP Diastolic Recheck  66 mmHg  R arm sitting

 

 Heart Rate  72 /min  reg

 

 Height  66.6 inches  5'6.60"

 

 Weight  163.00 lb  

 

 BMI (Body Mass Index)  25.8 kg/m2  









 2006  BP Systolic  148 mmHg  R arm sitting









 BP Diastolic  80 mmHg  R arm sitting

 

 BP Systolic Recheck  136 mmHg  R arm after lying in dark 5min

 

 BP Diastolic Recheck  72 mmHg  R arm after lying in dark 5min

 

 Height  66.6 inches  5'6.60"









 2006  BP Systolic  122 mmHg  









 BP Diastolic  68 mmHg  

 

 Height  66.6 inches  5'6.60"

 

 Weight  164.00 lb  

 

 BMI (Body Mass Index)  26.0 kg/m2  









 10/21/2005  BP Systolic  132 mmHg  101/56 at home 10/20/05.









 BP Diastolic  70 mmHg  101/56 at home 10/20/05.

 

 BP Systolic Recheck  132 mmHg  R arm sitting

 

 BP Diastolic Recheck  58 mmHg  R arm sitting

 

 Height  67 inches  5'7"

 

 Weight  163.00 lb  

 

 BMI (Body Mass Index)  25.5 kg/m2  









 2005  BP Systolic  126 mmHg  









 BP Diastolic  66 mmHg  

 

 BP Systolic Recheck  122 mmHg  R arm sitting

 

 BP Diastolic Recheck  60 mmHg  R arm sitting

 

 Heart Rate  68 /min  reg w/ frequent skipped beats

 

 Respiratory Rate  16 /min  not laboured

 

 Height  67 inches  5'7"

 

 Weight  160.00 lb  

 

 BMI (Body Mass Index)  25.1 kg/m2  









 2005  BP Systolic  138 mmHg  142/77 pt's mach.









 BP Diastolic  70 mmHg  142/77 pt's mach.

 

 BP Systolic Recheck  160 mmHg  R arm our cuff; 145/81 R arm his cuff

 

 BP Diastolic Recheck  78 mmHg  R arm our cuff; 145/81 R arm his cuff

 

 Heart Rate  64 /min  reg

 

 Height  67 inches  5'7"

 

 Weight  161.00 lb  

 

 BMI (Body Mass Index)  25.2 kg/m2  









 2005  BP Systolic  156 mmHg  lg cuff









 BP Diastolic  70 mmHg  lg cuff

 

 BP Systolic Recheck  170 mmHg  R arm sitting

 

 BP Diastolic Recheck  76 mmHg  R arm sitting

 

 Heart Rate  60 /min  reg

 

 Respiratory Rate  12 /min  not laboured

 

 Height  67 inches  5'7"

 

 Weight  162.00 lb  

 

 BMI (Body Mass Index)  25.4 kg/m2  









 2004  BP Systolic  152 mmHg  









 BP Diastolic  74 mmHg  

 

 BP Systolic Recheck  140 mmHg  R arm sitting

 

 BP Diastolic Recheck  76 mmHg  R arm sitting

 

 Weight  162.00 lb  









 2003  BP Systolic  140 mmHg  









 BP Diastolic  80 mmHg  

 

 Body Temperature  100.1 F  PO

 

 Weight  160.00 lb  







Results







 Test  Date  Test  Result  H/L  Range  Note

 

 Comp Metabolic Panel  2018  Sodium  138 mmol/L  Low  139-145  









 Potassium  3.3 mmol/L  Low  3.5-5.0  

 

 Chloride  108 mmol/L    101-111  

 

 Co2 Carbon Dioxide  18 mmol/L  Low  22-32  

 

 Anion Gap  12 mmol/L  High  2-11  

 

 Glucose  84 mg/dL      

 

 Blood Urea Nitrogen  39 mg/dL  High  6-24  

 

 Creatinine  1.97 mg/dL  High  0.67-1.17  

 

 One Over Creatinine  0.50 mg/dL  Low  0.67-1.17  

 

 BUN/Creatinine Ratio  19.8    8-20  

 

 Calcium  8.1 mg/dL  Low  8.6-10.3  

 

 Total Protein  6.2 g/dL  Low  6.4-8.9  

 

 Albumin  3.0 g/dL  Low  3.2-5.2  

 

 Globulin  3.2 g/dL    2-4  

 

 Albumin/Globulin Ratio  0.9  Low  1-3  

 

 Total Bilirubin  0.30 mg/dL    0.2-1.0  

 

 Alkaline Phosphatase  134 U/L  High    

 

 Alt  11 U/L    7-52  

 

 Ast  20 U/L    13-39  

 

 Egfr Non-  32.5    &gt;60  

 

 Egfr   41.8    &gt;60  1









 Laboratory test finding  2018  Phosphorus  2.4 mg/dL  Low  2.5-5.0  









 Magnesium  2.0 mg/dL    1.9-2.7  









 Urinalysis Profile  2018  Urine Color  Yellow      









 Urine Appearance  Clear      

 

 Urine Specific Gravity  1.011    1.010-1.030  

 

 Urine pH  5.0    5-9  

 

 Urine Urobilinogen  Negative    Negative  

 

 Urine Ketones  Negative    Negative  

 

 Urine Protein  1+(30 mg/dL)    Negative  

 

 Urine Leukocytes  Negative    Negative  

 

 Urine Blood  Negative    Negative  

 

 Urine Nitrite  Negative    Negative  

 

 Urine Bilirubin  Negative    Negative  

 

 Urine Glucose  Negative    Negative  

 

 Urine White Blood Cell  Trace(0-5/hpf)    Absent  

 

 Urine Red Blood Cell  Absent    Absent  

 

 Urine Bacteria  1+    Absent  

 

 Urine Squamous Epithelial Cell  Present    Absent  

 

 Urine Hyaline Casts  Present    Absent  









 Urine Culture And  2018  Urine Culture  SEE RESULT BELOW      2



 Sensitivities            

 

 Laboratory test finding  2018  Potassium Redraw  4.2 mmol/L    3.5-5.0  









 Ast Redraw  17 U/L    13-39  









 Protein Electrophoresis  2018  Total Protein(Pep)  7.2 g/dL    6.3 - 7.9
  









 Albumin  3.1 g/dL    3.4-4.7  

 

 Alpha-1 Globulin  0.3 g/dL    0.1-0.3  

 

 Alpha-2 Globulin  1.1 g/dL    0.6-1.0  

 

 Beta Globulin  0.8 g/dL    0.7-1.2  

 

 Gamma Globulin  2.0 g/dL    0.6-1.6  

 

 Albumin/Globulin Ratio  0.75      

 

 Impression  See Comment      3









 CBC Auto Diff  2018  White Blood Count  11.4 10^3/uL  High  3.5-10.8  









 Red Blood Count  3.66 10^6/uL  Low  4.0-5.4  

 

 Hemoglobin  11.2 g/dL  Low  14.0-18.0  

 

 Hematocrit  35 %  Low  42-52  

 

 Mean Corpuscular Volume  95 fL  High  80-94  

 

 Mean Corpuscular Hemoglobin  31 pg    27-31  

 

 Mean Corpuscular HGB Conc  32 g/dL    31-36  

 

 Red Cell Distribution Width  20 %  High  10.5-15  

 

 Platelet Count  25 10^3/uL  Low  150-450  

 

 Mean Platelet Volume  12.5 um3  High  7.4-10.4  

 

 Abs Neutrophils  8.9 10^3/uL  High  1.5-7.7  

 

 Abs Lymphocytes  1.2 10^3/uL    1.0-4.8  

 

 Abs Monocytes  1.0 10^3/uL  High  0-0.8  

 

 Abs Eosinophils  0.3 10^3/uL    0-0.6  

 

 Abs Basophils  0.1 10^3/uL    0-0.2  

 

 Abs Nucleated RBC  0 10^3/uL      

 

 Granulocyte %  78.1 %    38-83  

 

 Lymphocyte %  10.4 %  Low  25-47  

 

 Monocyte %  8.5 %  High  0-7  

 

 Eosinophil %  2.2 %    0-6  

 

 Basophil %  0.8 %    0-2  

 

 Nucleated Red Blood Cells %  0      









 Comp Metabolic Panel  2018  Sodium  139 mmol/L    139-145  









 Potassium  4.0 mmol/L    3.5-5.0  

 

 Chloride  107 mmol/L    101-111  

 

 Co2 Carbon Dioxide  23 mmol/L    22-32  

 

 Anion Gap  9 mmol/L    2-11  

 

 Glucose  116 mg/dL  High    

 

 Blood Urea Nitrogen  34 mg/dL  High  6-24  

 

 Creatinine  1.67 mg/dL  High  0.67-1.17  

 

 BUN/Creatinine Ratio  20.4  High  8-20  

 

 Calcium  8.7 mg/dL    8.6-10.3  

 

 Total Protein  7.2 g/dL    6.4-8.9  

 

 Albumin  3.6 g/dL    3.2-5.2  

 

 Globulin  3.6 g/dL    2-4  

 

 Albumin/Globulin Ratio  1.0    1-3  

 

 Total Bilirubin  0.30 mg/dL    0.2-1.0  

 

 Alkaline Phosphatase  193 U/L  High    

 

 Alt  16 U/L    7-52  

 

 Ast  21 U/L    13-39  

 

 Egfr Non-  39.3    &gt;60  

 

 Egfr   50.5    &gt;60  4









 Basic Metabolic Panel  2018  Sodium  138 mmol/L  Low  139-145  









 Potassium  3.7 mmol/L    3.5-5.0  

 

 Chloride  108 mmol/L    101-111  

 

 Co2 Carbon Dioxide  22 mmol/L    22-32  

 

 Anion Gap  8 mmol/L    2-11  

 

 Glucose  113 mg/dL  High    

 

 Blood Urea Nitrogen  34 mg/dL  High  6-24  

 

 Creatinine  1.80 mg/dL  High  0.67-1.17  

 

 BUN/Creatinine Ratio  18.9    8-20  

 

 Calcium  8.6 mg/dL    8.6-10.3  

 

 Egfr Non-  36.0    &gt;60  

 

 Egfr   46.3    &gt;60  5









 CBC Auto Diff  2018  White Blood Count  13.2 10^3/uL  High  3.5-10.8  









 Red Blood Count  3.64 10^6/uL  Low  4.0-5.4  

 

 Hemoglobin  11.0 g/dL  Low  14.0-18.0  

 

 Hematocrit  34 %  Low  42-52  

 

 Mean Corpuscular Volume  94 fL    80-94  

 

 Mean Corpuscular Hemoglobin  30 pg    27-31  

 

 Mean Corpuscular HGB Conc  32 g/dL    31-36  

 

 Red Cell Distribution Width  20 %  High  10.5-15  

 

 Platelet Count  24 10^3/uL  Low  150-450  6

 

 Mean Platelet Volume  11.3 um3  High  7.4-10.4  

 

 Abs Neutrophils  10.0 10^3/uL  High  1.5-7.7  

 

 Abs Lymphocytes  1.6 10^3/uL    1.0-4.8  

 

 Abs Monocytes  1.2 10^3/uL  High  0-0.8  

 

 Abs Eosinophils  0.3 10^3/uL    0-0.6  

 

 Abs Basophils  0.1 10^3/uL    0-0.2  

 

 Abs Nucleated RBC  0 10^3/uL      

 

 Granulocyte %  75.6 %    38-83  

 

 Lymphocyte %  12.5 %  Low  25-47  

 

 Monocyte %  9.1 %  High  0-7  

 

 Eosinophil %  2.1 %    0-6  

 

 Basophil %  0.7 %    0-2  

 

 Nucleated Red Blood Cells %  0      









 Comp Metabolic Panel  2018  Sodium  138 mmol/L  Low  139-145  









 Potassium  3.7 mmol/L    3.5-5.0  

 

 Chloride  108 mmol/L    101-111  

 

 Co2 Carbon Dioxide  22 mmol/L    22-32  

 

 Anion Gap  8 mmol/L    2-11  

 

 Glucose  113 mg/dL  High    

 

 Blood Urea Nitrogen  34 mg/dL  High  6-24  

 

 Creatinine  1.80 mg/dL  High  0.67-1.17  

 

 BUN/Creatinine Ratio  18.9    8-20  

 

 Calcium  8.6 mg/dL    8.6-10.3  

 

 Total Protein  7.2 g/dL    6.4-8.9  

 

 Albumin  3.5 g/dL    3.2-5.2  

 

 Globulin  3.7 g/dL    2-4  

 

 Albumin/Globulin Ratio  0.9  Low  1-3  

 

 Total Bilirubin  0.30 mg/dL    0.2-1.0  

 

 Alkaline Phosphatase  187 U/L  High    

 

 Alt  17 U/L    7-52  

 

 Ast  20 U/L    13-39  

 

 Egfr Non-  36.0    &gt;60  

 

 Egfr   46.3    &gt;60  7









 Laboratory test finding  2018  Magnesium  1.6 mg/dL  Low  1.9-2.7  









 Troponin-I (TnI)  0.06 ng/mL  High  &lt;0.04  8

 

 TSH (Thyroid Stim Horm)  2.08 mcIU/mL    0.34-5.60  









 Comp Metabolic Panel  2018  Sodium  133 mmol/L    133-145  









 Potassium  4.6 mmol/L    3.5-5.0  

 

 Chloride  107 mmol/L    101-111  

 

 Co2 Carbon Dioxide  20 mmol/L  Low  22-32  

 

 Anion Gap  6 mmol/L    2-11  

 

 Glucose  91 mg/dL      

 

 Blood Urea Nitrogen  36 mg/dL  High  6-24  

 

 Creatinine  1.97 mg/dL  High  0.67-1.17  

 

 BUN/Creatinine Ratio  18.3    8-20  

 

 Calcium  9.4 mg/dL    8.6-10.3  

 

 Total Protein  7.6 g/dL    6.4-8.9  

 

 Albumin  3.5 g/dL    3.2-5.2  

 

 Globulin  4.1 g/dL  High  2-4  

 

 Albumin/Globulin Ratio  0.9  Low  1-3  

 

 Total Bilirubin  0.20 mg/dL    0.2-1.0  

 

 Alkaline Phosphatase  155 U/L  High    

 

 Alt  32 U/L    7-52  

 

 Ast  31 U/L    13-39  

 

 Egfr Non-  32.5    &gt;60  

 

 Egfr   41.8    &gt;60  9









 Laboratory test finding  2018  Lactic Acid  2.1 mmol/L  High  0.5-2.0  10

 

 Inr/Protime  2018  Inr  0.94    0.77-1.02  

 

 CBC Auto Diff  2018  White Blood Count  15.0 10^3/uL  High  3.5-10.8  









 Red Blood Count  3.68 10^6/uL  Low  4.0-5.4  

 

 Hemoglobin  11.2 g/dL  Low  14.0-18.0  

 

 Hematocrit  34 %  Low  42-52  

 

 Mean Corpuscular Volume  93 fL    80-94  

 

 Mean Corpuscular Hemoglobin  31 pg    27-31  

 

 Mean Corpuscular HGB Conc  33 g/dL    31-36  

 

 Red Cell Distribution Width  19 %  High  10.5-15  

 

 Platelet Count  108 10^3/uL  Low  150-450  

 

 Mean Platelet Volume  10 um3    7.4-10.4  

 

 Abs Neutrophils  11.0 10^3/uL  High  1.5-7.7  

 

 Abs Lymphocytes  2.1 10^3/uL    1.0-4.8  

 

 Abs Monocytes  1.5 10^3/uL  High  0-0.8  

 

 Abs Eosinophils  0.2 10^3/uL    0-0.6  

 

 Abs Basophils  0.1 10^3/uL    0-0.2  

 

 Abs Nucleated RBC  0 10^3/uL      

 

 Granulocyte %  73.8 %    38-83  

 

 Lymphocyte %  14.4 %  Low  25-47  

 

 Monocyte %  10.2 %  High  0-7  

 

 Eosinophil %  1.2 %    0-6  

 

 Basophil %  0.4 %    0-2  

 

 Nucleated Red Blood Cells %  0      









 Urinalysis Profile  2018  Urine Color  Straw      









 Urine Appearance  Clear      

 

 Urine Specific Gravity  1.009  Low  1.010-1.030  

 

 Urine pH  5.0    5-9  

 

 Urine Urobilinogen  Negative    Negative  

 

 Urine Ketones  Negative    Negative  

 

 Urine Protein  Negative    Negative  

 

 Urine Leukocytes  Negative    Negative  

 

 Urine Blood  1+    Negative  

 

 Urine Nitrite  Negative    Negative  

 

 Urine Bilirubin  Negative    Negative  

 

 Urine Glucose  Negative    Negative  

 

 Urine White Blood Cell  Absent    Absent  

 

 Urine Red Blood Cell  Trace(0-2/hpf)    Absent  

 

 Urine Bacteria  Absent    Absent  









 Basic Metabolic Panel  2018  Sodium  134 mmol/L    133-145  









 Potassium  4.4 mmol/L    3.5-5.0  

 

 Chloride  108 mmol/L    101-111  

 

 Co2 Carbon Dioxide  19 mmol/L  Low  22-32  

 

 Anion Gap  7 mmol/L    2-11  

 

 Glucose  109 mg/dL  High    

 

 Blood Urea Nitrogen  42 mg/dL  High  6-24  

 

 Creatinine  1.66 mg/dL  High  0.67-1.17  

 

 BUN/Creatinine Ratio  25.3  High  8-20  

 

 Calcium  9.0 mg/dL    8.6-10.3  

 

 Egfr Non-  39.6    &gt;60  

 

 Egfr   50.9    &gt;60  11









 Rapid Influenza A &amp; B  2018  Influenza A Molecular  NEGATIVE    
Negative  12



 Molecular            









 Influenza B Molecular  NEGATIVE    Negative  









 Laboratory test finding  2018  Rapid Influenza A B  SEE RESULT BELOW    
  13



     Antigen        

 

 Urine Micro Inhouse  2018  Ua WBC  -      14









 Ua RBC  packed      14

 

 Ua Casts  -      14

 

 Ua Epi  -      14

 

 Ua Other  -      14

 

 Ua Glucose  -      14

 

 Ua Bilirubin  -      14

 

 Ua Ketones  -      14

 

 Ua Specific Gravity  1.015      14

 

 Ua Blood  lg      14

 

 Ua PH  5.0      14

 

 Ua Protein  3+      14

 

 Ua Urobilinogen  -      14

 

 Ua Nitrite  -      14

 

 Ua Leukocytes  tr      14









 Inr/Protime  2018  Inr  1.06  High  0.77-1.02  

 

 CBC Auto Diff  2018  White Blood Count  7.8 10^3/uL    3.5-10.8  









 Red Blood Count  3.58 10^6/uL  Low  4.0-5.4  

 

 Hemoglobin  11.3 g/dL  Low  14.0-18.0  

 

 Hematocrit  35 %  Low  42-52  

 

 Mean Corpuscular Volume  96 fL  High  80-94  

 

 Mean Corpuscular Hemoglobin  32 pg  High  27-31  

 

 Mean Corpuscular HGB Conc  33 g/dL    31-36  

 

 Red Cell Distribution Width  14 %    10.5-15  

 

 Platelet Count  161 10^3/uL    150-450  

 

 Mean Platelet Volume  11 um3  High  7.4-10.4  

 

 Abs Neutrophils  5.3 10^3/uL    1.5-7.7  

 

 Abs Lymphocytes  1.2 10^3/uL    1.0-4.8  

 

 Abs Monocytes  1.1 10^3/uL  High  0-0.8  

 

 Abs Eosinophils  0.2 10^3/uL    0-0.6  

 

 Abs Basophils  0 10^3/uL    0-0.2  

 

 Abs Nucleated RBC  0 10^3/uL      

 

 Granulocyte %  68.0 %    38-83  

 

 Lymphocyte %  15.4 %  Low  25-47  

 

 Monocyte %  14.2 %  High  1-9  

 

 Eosinophil %  2.0 %    0-6  

 

 Basophil %  0.4 %    0-2  

 

 Nucleated Red Blood Cells %  0.1      









 Basic Metabolic Panel  2018  Sodium  134 mmol/L    133-145  









 Potassium  4.1 mmol/L    3.5-5.0  

 

 Chloride  101 mmol/L    101-111  

 

 Co2 Carbon Dioxide  27 mmol/L    22-32  

 

 Anion Gap  6 mmol/L    2-11  

 

 Glucose  88 mg/dL      

 

 Blood Urea Nitrogen  23 mg/dL    6-24  

 

 Creatinine  1.53 mg/dL  High  0.67-1.17  

 

 BUN/Creatinine Ratio  15.0    8-20  

 

 Calcium  8.8 mg/dL    8.6-10.3  

 

 Egfr Non-  43.5    &gt;60  

 

 Egfr   55.9    &gt;60  15









 CBC Auto Diff  01/15/2018  White Blood Count  11.5 10^3/uL  High  3.5-10.8  









 Red Blood Count  3.55 10^6/uL  Low  4.0-5.4  

 

 Hemoglobin  11.3 g/dL  Low  14.0-18.0  

 

 Hematocrit  34 %  Low  42-52  

 

 Mean Corpuscular Volume  97 fL  High  80-94  

 

 Mean Corpuscular Hemoglobin  32 pg  High  27-31  

 

 Mean Corpuscular HGB Conc  33 g/dL    31-36  

 

 Red Cell Distribution Width  14 %    10.5-15  

 

 Platelet Count  204 10^3/uL    150-450  

 

 Mean Platelet Volume  10 um3    7.4-10.4  

 

 Abs Neutrophils  9.1 10^3/uL  High  1.5-7.7  

 

 Abs Lymphocytes  1.3 10^3/uL    1.0-4.8  

 

 Abs Monocytes  1.0 10^3/uL  High  0-0.8  

 

 Abs Eosinophils  0.1 10^3/uL    0-0.6  

 

 Abs Basophils  0.1 10^3/uL    0-0.2  

 

 Abs Nucleated RBC  0 10^3/uL      

 

 Granulocyte %  78.9 %    38-83  

 

 Lymphocyte %  11.2 %  Low  25-47  

 

 Monocyte %  8.9 %    1-9  

 

 Eosinophil %  0.5 %    0-6  

 

 Basophil %  0.5 %    0-2  

 

 Nucleated Red Blood Cells %  0      









 Laboratory test finding  01/15/2018  Lactic Acid  0.9 mmol/L    0.5-2.0  16

 

 Comp Metabolic Panel  01/15/2018  Sodium  134 mmol/L    133-145  









 Potassium  3.1 mmol/L  Low  3.5-5.0  

 

 Chloride  102 mmol/L    101-111  

 

 Co2 Carbon Dioxide  26 mmol/L    22-32  

 

 Anion Gap  6 mmol/L    2-11  

 

 Glucose  122 mg/dL  High    

 

 Blood Urea Nitrogen  22 mg/dL    6-24  

 

 Creatinine  1.28 mg/dL  High  0.67-1.17  

 

 BUN/Creatinine Ratio  17.2    8-20  

 

 Calcium  9.0 mg/dL    8.6-10.3  

 

 Total Protein  7.2 g/dL    6.4-8.9  

 

 Albumin  3.3 g/dL    3.2-5.2  

 

 Globulin  3.9 g/dL    2-4  

 

 Albumin/Globulin Ratio  0.8  Low  1-3  

 

 Total Bilirubin  0.50 mg/dL    0.2-1.0  

 

 Alkaline Phosphatase  150 U/L  High    

 

 Alt  20 U/L    7-52  

 

 Ast  26 U/L    13-39  

 

 Egfr Non-  53.4    &gt;60  

 

 Egfr   68.7    &gt;60  17









 Laboratory test finding  01/15/2018  Lipase  54 U/L    11.0-82.0  









 C Reactive Protein  28.35 mg/L  High  &lt; 5.00  18

 

 B-Type Natriuretic Peptide BNP  1125 pg/mL  High    19









 Basic Metabolic Panel  2017  Sodium  136 mmol/L    133-145  









 Potassium  4.0 mmol/L    3.5-5.0  

 

 Chloride  104 mmol/L    101-111  

 

 Co2 Carbon Dioxide  23 mmol/L    22-32  

 

 Anion Gap  9 mmol/L    2-11  

 

 Glucose  101 mg/dL  High    

 

 Blood Urea Nitrogen  19 mg/dL    6-24  

 

 Creatinine  1.41 mg/dL  High  0.67-1.17  

 

 BUN/Creatinine Ratio  13.5    8-20  

 

 Calcium  8.6 mg/dL    8.6-10.3  

 

 Egfr Non-  47.8    &gt;60  

 

 Egfr   61.4    &gt;60  20









 Laboratory test finding  2017  Alt (SGPT)  31 U/L    7-52  









 Ast (Sgot)  34 U/L    13-39  









 Lipid Profile (Trig/Chol/HDL)  2017  Triglycerides  99 mg/dL      21









 Cholesterol  112 mg/dL      22

 

 HDL Cholesterol  20.2 mg/dL      23

 

 LDL Cholesterol  72 mg/dL      24









 Inr/Protime  2017  Inr  1.77  High  0.89-1.11  25

 

 Basic Metabolic Panel  2017  Sodium  132 mmol/L  Low  133-145  25









 Potassium  5.5 mmol/L  High  3.5-5.0  25

 

 Chloride  105 mmol/L    101-111  25

 

 Co2 Carbon Dioxide  23 mmol/L    22-32  25

 

 Anion Gap  4 mmol/L    2-11  25

 

 Glucose  93 mg/dL      25

 

 Blood Urea Nitrogen  29 mg/dL  High  6-24  25

 

 Creatinine  1.82 mg/dL  High  0.67-1.17  25

 

 BUN/Creatinine Ratio  15.9    8-20  25

 

 Calcium  9.0 mg/dL    8.6-10.3  25

 

 Egfr Non-  35.6    &gt;60  25

 

 Egfr   45.8    &gt;60  25, 26









 Inr/Protime  2017  Inr  3.06  High  0.89-1.11  

 

 Inr/Protime  2017  Inr  2.88  High  0.89-1.11  

 

 Laboratory test finding  2017  Inr/Protime  1.63  High  0.89-1.11  

 

 Inr/Protime  2017  Inr  1.40  High  0.89-1.11  

 

 Inr/Protime  2017  Inr  2.47  High  0.89-1.11  

 

 Inr/Protime  2017  Inr  2.48  High  0.89-1.11  

 

 Inr/Protime  2017  Inr  2.05  High  0.89-1.11  

 

 Protime W/ Inr(Add V58.61)  2017  Inr  1.72  High  0.89-1.11  27

 

 Basic Metabolic Panel  2017  Sodium  137 mmol/L    133-145  









 Potassium  4.6 mmol/L    3.5-5.0  

 

 Chloride  111 mmol/L    101-111  

 

 Co2 Carbon Dioxide  21 mmol/L  Low  22-32  

 

 Anion Gap  5 mmol/L    2-11  

 

 Glucose  111 mg/dL  High    

 

 Blood Urea Nitrogen  21 mg/dL    6-24  

 

 Creatinine  1.38 mg/dL  High  0.67-1.17  

 

 BUN/Creatinine Ratio  15.2    8-20  

 

 Calcium  8.9 mg/dL    8.6-10.3  

 

 Egfr Non-  49.1    &gt;60  

 

 Egfr   63.1    &gt;60  28









 Protime W/ Inr(Add  2017  Inr  3.70  High  0.89-1.11  



 V58.61)            

 

 Protime W/ Inr(Add  2017  Inr  4.64  High  0.89-1.11  



 V58.61)            

 

 Protime W/ Inr(Add  2017  #International Normalized  2.8      



 V58.61)            

 

 Protime W/ Inr(Add  2017  #International Normalized  1.6      



 V58.61)            

 

 Inr/Protime  2017  Inr  1.14  High  0.89-1.11  

 

 Protime W/ Inr(Add  2017  #International Normalized  1.3      29



 V58.61)            

 

 Laboratory test finding  2017  Inr/Protime  0.93    0.89-1.11  









 Partial Thrombo Time PTT  28.7 seconds    26.0-36.3  

 

 B-Type Natriuretic Peptide BNP  46 pg/mL      30









 Laboratory test finding  2017  Magnesium  1.7 mg/dL  Low  1.9-2.7  









 LDL Cholesterol Direct  69 mg/dL      31

 

 Creatine Kinase(CK)  159 U/L      

 

 Troponin-I (TnI)  0.06 ng/mL  High  &lt;0.04  32









 Comp Metabolic Panel  2017  Sodium  130 mmol/L  Low  133-145  









 Potassium  4.0 mmol/L    3.5-5.0  

 

 Chloride  101 mmol/L    101-111  

 

 Co2 Carbon Dioxide  21 mmol/L  Low  22-32  

 

 Anion Gap  8 mmol/L    2-11  

 

 Glucose  155 mg/dL  High    

 

 Blood Urea Nitrogen  31 mg/dL  High  6-24  

 

 Creatinine  1.51 mg/dL  High  0.67-1.17  

 

 BUN/Creatinine Ratio  20.5  High  8-20  

 

 Calcium  9.5 mg/dL    8.6-10.3  

 

 Total Protein  8.3 g/dL    6.4-8.9  

 

 Albumin  4.1 g/dL    3.2-5.2  

 

 Globulin  4.2 g/dL  High  2-4  

 

 Albumin/Globulin Ratio  1.0    1-3  

 

 Total Bilirubin  0.50 mg/dL    0.2-1.0  

 

 Alkaline Phosphatase  127 U/L  High    

 

 Alt  34 U/L    7-52  

 

 Ast  34 U/L    13-39  

 

 Egfr Non-  44.2    &gt;60  

 

 Egfr   56.9    &gt;60  33









 Laboratory test finding  2017  Lactic Acid  2.2 mmol/L  High  0.5-2.0  34

 

 CBC Auto Diff  2017  White Blood Count  11.6 10^3/uL  High  3.5-10.8  









 Red Blood Count  3.93 10^6/uL  Low  4.0-5.4  

 

 Hemoglobin  13.2 g/dL  Low  14.0-18.0  

 

 Hematocrit  40 %  Low  42-52  

 

 Mean Corpuscular Volume  101 fL  High  80-94  

 

 Mean Corpuscular Hemoglobin  34 pg  High  27-31  

 

 Mean Corpuscular HGB Conc  33 g/dL    31-36  

 

 Red Cell Distribution Width  13 %    10.5-15  

 

 Platelet Count  220 10^3/uL    150-450  

 

 Mean Platelet Volume  10 um3    7.4-10.4  

 

 Abs Neutrophils  8.9 10^3/uL  High  1.5-7.7  

 

 Abs Lymphocytes  1.5 10^3/uL    1.0-4.8  

 

 Abs Monocytes  1.0 10^3/uL  High  0-0.8  

 

 Abs Eosinophils  0.1 10^3/uL    0-0.6  

 

 Abs Basophils  0 10^3/uL    0-0.2  

 

 Abs Nucleated RBC  0 10^3/uL      

 

 Granulocyte %  76.9 %    38-83  

 

 Lymphocyte %  13.4 %  Low  25-47  

 

 Monocyte %  8.6 %    1-9  

 

 Eosinophil %  0.8 %    0-6  

 

 Basophil %  0.3 %    0-2  

 

 Nucleated Red Blood Cells %  0      









 Laboratory test finding  2017  Hemoglobin A1c  5.7      

 

 Urine Micro Inhouse  2017  Ua WBC  -      35









 Ua RBC  2-3      35

 

 Ua Casts  -      35

 

 Ua Epi  -      35

 

 Ua Other  -      35

 

 Ua Glucose  -      35

 

 Ua Bilirubin  -      35

 

 Ua Ketones  -      35

 

 Ua Specific Gravity  1.015      35

 

 Ua Blood  NH Tr      35

 

 Ua PH  5.0      35

 

 Ua Protein  2+      35

 

 Ua Urobilinogen  -      35

 

 Ua Nitrite  -      35

 

 Ua Leukocytes  -      35









 Lipid Profile (Trig/Chol/HDL)  2017  Triglycerides  123 mg/dL      36









 Cholesterol  122 mg/dL      37

 

 HDL Cholesterol  22.1 mg/dL      38

 

 LDL Cholesterol  75 mg/dL      39









 Basic Metabolic Panel  2017  Sodium  133 mmol/L    133-145  









 Potassium  4.6 mmol/L    3.5-5.0  

 

 Chloride  102 mmol/L    101-111  

 

 Co2 Carbon Dioxide  25 mmol/L    22-32  

 

 Anion Gap  6 mmol/L    2-11  

 

 Glucose  100 mg/dL      

 

 Blood Urea Nitrogen  31 mg/dL  High  6-24  

 

 Creatinine  1.59 mg/dL  High  0.67-1.17  

 

 BUN/Creatinine Ratio  19.5    8-20  

 

 Calcium  9.2 mg/dL    8.6-10.3  

 

 Egfr Non-  41.7    &gt;60  

 

 Egfr   53.6    &gt;60  40









 Urine Micro Inhouse  2016  Ua WBC  -      









 Ua RBC  -      

 

 Ua Casts  -      

 

 Ua Epi  -      

 

 Ua Other  -      

 

 Ua Glucose  -      

 

 Ua Bilirubin  -      

 

 Ua Ketones  -      

 

 Ua Specific Gravity  1.010      

 

 Ua Blood  -      

 

 Ua PH  6.0      

 

 Ua Protein  1+      

 

 Ua Urobilinogen  -      

 

 Ua Nitrite  -      

 

 Ua Leukocytes  -      









 Urine Micro Inhouse  2016  Ua WBC  -      41









 Ua RBC  ~100 per field      41

 

 Ua Casts  -      41

 

 Ua Epi  -      41

 

 Ua Other  -      41

 

 Ua Glucose  -      41

 

 Ua Bilirubin  -      41

 

 Ua Ketones  -      41

 

 Ua Specific Gravity  1.015      41

 

 Ua Blood  lg      41

 

 Ua PH  5.0      41

 

 Ua Protein  1+      41

 

 Ua Urobilinogen  -      41

 

 Ua Nitrite  -      41

 

 Ua Leukocytes  -      41









 Laboratory test  2016  Surgical Pathology  SEE RESULT BELOW      42



 finding            

 

 Laboratory test  2016  Hemoglobin A1c  6.0      



 finding            

 

 Basic Metabolic Panel  2016  Sodium  130 mmol/L  Low  133-145  









 Potassium  4.5 mmol/L    3.5-5.0  

 

 Chloride  95 mmol/L  Low  101-111  

 

 Co2 Carbon Dioxide  28 mmol/L    22-32  

 

 Anion Gap  7 mmol/L    2-11  

 

 Glucose  98 mg/dL      

 

 Blood Urea Nitrogen  24 mg/dL    6-24  

 

 Creatinine  1.47 mg/dL  High  0.67-1.17  

 

 BUN/Creatinine Ratio  16.3    8-20  

 

 Calcium  8.9 mg/dL    8.6-10.3  

 

 Egfr Non-  45.7    &gt;60  

 

 Egfr   58.8    &gt;60  43









 Laboratory test finding  2016  Alt (SGPT)  25 U/L    7-52  

 

 Lipid Profile (Trig/Chol/HDL)  2016  Triglycerides  116 mg/dL      44









 Cholesterol  108 mg/dL      45

 

 HDL Cholesterol  21.3 mg/dL      46

 

 LDL Cholesterol  64 mg/dL      47









 Laboratory test finding  2015  Sodium  131 mmol/L  Low  133-145  

 

 Urine Micro Inhouse  2015  Ua WBC  6-10      









 Ua RBC  -      

 

 Ua Casts  -      

 

 Ua Epi  -      

 

 Ua Other  occ clump WBC's      

 

 Ua Glucose  -      

 

 Ua Bilirubin  -      

 

 Ua Ketones  -      

 

 Ua Specific Gravity  1.010      

 

 Ua Blood  -      

 

 Ua PH  5.0      

 

 Ua Protein  tr      

 

 Ua Urobilinogen  -      

 

 Ua Nitrite  -      

 

 Ua Leukocytes  tr      









 Laboratory test finding  2015  Hemoglobin A1c  6.1      

 

 Basic Metabolic Panel  2015  Sodium  127 mmol/L  Low  133-145  48









 Potassium  5.0 mmol/L    3.5-5.0  48

 

 Chloride  94 mmol/L  Low  101-111  48

 

 Co2 Carbon Dioxide  27 mmol/L    22-32  48

 

 Anion Gap  6 mmol/L    2-11  48

 

 Glucose  102 mg/dL  High    48

 

 Blood Urea Nitrogen  35 mg/dL  High  6-24  48

 

 Creatinine  1.56 mg/dL  High  0.67-1.17  48

 

 BUN/Creatinine Ratio  22.4  High  8-20  48

 

 Calcium  9.5 mg/dL    8.6-10.3  48

 

 Egfr Non-  42.8    &gt;60  48

 

 Egfr   55.1    &gt;60  48, 49









 Laboratory test finding  2015  Alt  33 U/L    7-52  48, 50

 

 Lipid Profile (Trig/Chol/HDL)  2015  Triglycerides  153 mg/dL      48, 51









 Cholesterol  118 mg/dL      48, 52

 

 HDL Cholesterol  20.7 mg/dL      48, 53

 

 LDL Cholesterol  67 mg/dL      48, 54









 Laboratory test finding  01/15/2014  Hemoglobin A1c  5.6      

 

 Urine Micro Inhouse  01/15/2014  Ua WBC  2-4      









 Ua RBC  0-1      

 

 Ua Casts  -      

 

 Ua Epi  0-2      

 

 Ua Other  -      

 

 Ua Glucose  -      

 

 Ua Bilirubin  -      

 

 Ua Ketones  -      

 

 Ua Specific Gravity  1.020      

 

 Ua Blood  -      

 

 Ua PH  5.0      

 

 Ua Protein  -      

 

 Ua Urobilinogen  -      

 

 Ua Nitrite  -      

 

 Ua Leukocytes  tr      









 Lipid Profile (Trig/Chol/HDL)  01/10/2014  Triglycerides  77 mg/dL      









 Cholesterol  129 mg/dL    Less than 200  

 

 HDL Cholesterol  25 mg/dL  Low  40-60  55

 

 Cholesterol/HDL Ratio  5.2 Average  High  1-4.44  

 

 LDL Cholesterol  88.6    Less Than 100  56









 Laboratory test finding  01/10/2014  Alt  41 U/L    14-54  57

 

 Basic Metabolic Panel  01/10/2014  Sodium  134 mmol/L    133-145  









 Potassium  4.4 mmol/L    3.5-5.0  

 

 Chloride  98 mmol/L  Low  101-111  

 

 Co2 Carbon Dioxide  29.0 mmol/L    22-32  

 

 Anion Gap  7.0 mmol/L    2-11  

 

 Glucose  101 mg/dL  High    

 

 Blood Urea Nitrogen  21 mg/dL    6-24  

 

 Creatinine  1.40 mg/dL    0.50-1.40  

 

 BUN/Creatinine Ratio  15.0    8-20  

 

 Calcium  9.0 mg/dL    8.1-9.9  

 

 Egfr Non-  48.6    &gt;60  

 

 Egfr   62.6    &gt;60  58









 Basic Metabolic Panel  2013  Sodium  134 mmol/L    133-145  









 Potassium  4.5 mmol/L    3.5-5.0  

 

 Chloride  101 mmol/L    101-111  

 

 Co2 Carbon Dioxide  27.0 mmol/L    22-32  

 

 Anion Gap  6.0 mmol/L    2-11  

 

 Glucose  108 mg/dL  High    

 

 Blood Urea Nitrogen  34 mg/dL  High  6-24  

 

 Creatinine  1.40 mg/dL    0.50-1.40  

 

 BUN/Creatinine Ratio  24.3  High  8-20  

 

 Calcium  9.6 mg/dL    8.1-9.9  

 

 Egfr Non-  48.6    &gt;60  

 

 Egfr   62.6    &gt;60  59









 Basic Metabolic Panel  2013  Sodium  130 mmol/L  Low  133-145  









 Potassium  4.9 mmol/L    3.5-5.0  

 

 Chloride  97 mmol/L  Low  101-111  

 

 Co2 Carbon Dioxide  25.0 mmol/L    22-32  

 

 Anion Gap  8.0 mmol/L    2-11  

 

 Glucose  104 mg/dL  High    

 

 Blood Urea Nitrogen  31 mg/dL  High  6-24  

 

 Creatinine  1.40 mg/dL    0.50-1.40  

 

 BUN/Creatinine Ratio  22.1  High  8-20  

 

 Calcium  9.0 mg/dL    8.1-9.9  

 

 Egfr Non-  48.8    &gt;60  

 

 Egfr   62.7    &gt;60  60









 Laboratory test finding  2013  Hemoglobin A1c  6.0      

 

 Urine Micro Inhouse  2013  Ua WBC  2-5      









 Ua RBC  0-1      

 

 Ua Casts  -      

 

 Ua Epi  -      

 

 Ua Other  -      

 

 Ua Glucose  -      

 

 Ua Bilirubin  -      

 

 Ua Ketones  -      

 

 Ua Specific Gravity  1.010      

 

 Ua Blood  -      

 

 Ua PH  6.0      

 

 Ua Protein  tr      

 

 Ua Urobilinogen  -      

 

 Ua Nitrite  -      

 

 Ua Leukocytes  tr      









 Basic Metabolic Panel  2013  Sodium  133 mmol/L    133-145  









 Potassium  4.9 mmol/L    3.5-5.0  

 

 Chloride  102 mmol/L    101-111  

 

 Co2 Carbon Dioxide  26.0 mmol/L    22-32  

 

 Anion Gap  5.0 mmol/L    2-11  

 

 Glucose  105 mg/dL  High    

 

 Blood Urea Nitrogen  35 mg/dL  High  6-24  

 

 Creatinine  1.60 mg/dL  High  0.50-1.40  

 

 BUN/Creatinine Ratio  21.9  High  8-20  

 

 Calcium  9.6 mg/dL    8.1-9.9  

 

 Egfr Non-  41.8    &gt;60  

 

 Egfr   53.8    &gt;60  61









 Lipid Profile (Trig/Chol/HDL)  2013  Triglycerides  76 mg/dL      









 Cholesterol  111 mg/dL    Less than 200  

 

 HDL Cholesterol  22 mg/dL  Low  40-60  62

 

 Cholesterol/HDL Ratio  5.1 Average  High  1-4.44  

 

 LDL Cholesterol  73.8 mg/dL    Less Than 100  63









 Laboratory test finding  2013  Alt  44 U/L    14-54  

 

 Basic Metabolic Panel  08/15/2012  Sodium  130 mmol/L  Low  135-145  









 Potassium  4.8 mmol/L    3.5-5.0  

 

 Chloride  98 mmol/L  Low  101-111  

 

 Co2 (Carbon Dioxide)  26.0 mmol/L    22-32  

 

 Anion Gap  6.0 mmol/L    2-11  64

 

 Glucose  99 mg/dL      

 

 BUN  20 mg/dL    6-24  

 

 Creatinine  1.3 mg/dL    0.50-1.40  

 

 One Over Creatinine  0.76      

 

 BUN/Creatinine Ratio  15.4    8-20  

 

 Calcium  9.0 mg/dL    8.1-9.9  

 

 eGFR Non-  53.1    &gt; 60  

 

 eGFR   68.3    &gt; 60  65









 Laboratory test finding  2012  Hemoglobin A1c  5.7      

 

 Basic Metabolic Panel  2012  Sodium  127 mmol/L  Low  135-145  









 Potassium  4.5 mmol/L    3.5-5.0  

 

 Chloride  92 mmol/L  Low  101-111  

 

 Co2 (Carbon Dioxide)  26.0 mmol/L    22-32  

 

 Anion Gap  9.0 mmol/L    2-11  66

 

 Glucose  119 mg/dL  High    

 

 BUN  22 mg/dL    6-24  

 

 Creatinine  1.4 mg/dL    0.50-1.40  

 

 One Over Creatinine  0.71      

 

 BUN/Creatinine Ratio  15.7    8-20  

 

 Calcium  8.9 mg/dL    8.1-9.9  

 

 eGFR Non-  48.9    &gt; 60  

 

 eGFR   62.9    &gt; 60  67









 Lipid Profile (Trig/Chol/HDL)  2012  Triglyceride  110 mg/dL      









 Cholesterol  122 mg/dL    Less Than 200  68

 

 High Density Lipoprotein  22 mg/dL  Low  40-60  69

 

 Cholesterol/HDL Ratio  5.55 AVERAGE  High  1-4.97  

 

 Low Density Lipoprotein  78 mg/dL    Less Than 100  70









 Laboratory test finding  2011  Hemoglobin A1c  6.4      

 

 Basic Metabolic Panel  2011  Sodium  133 mmol/L  Low  135-145  









 Potassium  5.0 mmol/L    3.5-5.0  

 

 Chloride  104 mmol/L    101-111  

 

 Co2 (Carbon Dioxide)  24.0 mmol/L    22-32  

 

 Anion Gap  5.0 mmol/L    2-11  71

 

 Glucose  109 mg/dL  High    

 

 BUN  28 mg/dL  High  6-24  

 

 Creatinine  1.20 mg/dL    0.50-1.40  

 

 One Over Creatinine  0.80      

 

 BUN/Creatinine Ratio  23.3  High  8-20  

 

 Calcium  9.0 mg/dL    8.1-9.9  

 

 eGFR Non-  58.6    &gt; 60  

 

 eGFR   75.3    &gt; 60  72









 Laboratory test finding  2011  Alt (SGPT)  71 U/L  High  17-63  

 

 Lipid Profile (Trig/Chol/HDL)  2011  Triglyceride  134 mg/dL      









 Cholesterol  124 mg/dL    Less Than 200  73

 

 High Density Lipoprotein  21 mg/dL  Low  40-60  74

 

 Cholesterol/HDL Ratio  5.90 AVERAGE  High  1-4.97  

 

 Low Density Lipoprotein  76 mg/dL    Less Than 100  75









 Basic Metabolic Panel  2010  Sodium  131 mmol/L  Low  135-145  









 Potassium  4.6 mmol/L    3.5-5.0  

 

 Chloride  103 mmol/L    101-111  

 

 Co2 (Carbon Dioxide)  21.0 mmol/L  Low  22-32  

 

 Anion Gap  7.0 mmol/L    2-11  76

 

 Glucose  146 mg/dL  High    77

 

 BUN  27 mg/dL  High  6-24  

 

 Creatinine  1.70 mg/dL  High  0.50-1.40  

 

 One Over Creatinine  0.50      

 

 BUN/Creatinine Ratio  15.9    8-20  

 

 Calcium  9.0 mg/dL    8.1-9.9  

 

 eGFR Non-  41.6    &gt; 60  

 

 eGFR   50.4    &gt; 60  78









 Laboratory test finding  2010  Sodium  130 mmol/L  Low  135-145  

 

 Basic Metabolic Panel  2010  Sodium  127 mmol/L  Low  135-145  









 Potassium  4.7 mmol/L    3.5-5.0  

 

 Chloride  93 mmol/L  Low  101-111  

 

 Co2 (Carbon Dioxide)  26.0 mmol/L    22-32  

 

 Anion Gap  8.0 mmol/L    2-11  79

 

 Glucose  105 mg/dL  High    80

 

 BUN  17 mg/dL    6-24  

 

 Creatinine  1.20 mg/dL    0.50-1.40  

 

 One Over Creatinine  0.80      

 

 BUN/Creatinine Ratio  14.2    8-20  

 

 Calcium  9.1 mg/dL    8.1-9.9  81

 

 eGFR Non-  62.4    &gt; 60  

 

 eGFR   75.5    &gt; 60  82









 Lipid Profile (Trig/Chol/HDL)  2010  Triglyceride  125 mg/dL      









 Cholesterol  135 mg/dL    Less Than 200  83

 

 High Density Lipoprotein  25 mg/dL  Low  40-60  84

 

 Cholesterol/HDL Ratio  5.40 AVERAGE  High  1-4.97  

 

 Low Density Lipoprotein  85 mg/dL    Less Than 100  85









 Laboratory test finding  2010  Alt (SGPT)  72 U/L  High  17-63  

 

 Basic Metabolic Panel  2009  Sodium  131 mmol/L  Low  135-145  









 Potassium  4.3 mmol/L    3.5-5.0  

 

 Chloride  99 mmol/L  Low  101-111  

 

 Co2 (Carbon Dioxide)  27.0 mmol/L    22-32  

 

 Anion Gap  5.0 mmol/L    2-11  86

 

 Glucose  117 mg/dL  High    87

 

 BUN  14 mg/dL    6-24  

 

 Creatinine  1.20 mg/dL    0.50-1.40  

 

 One Over Creatinine  0.80      

 

 BUN/Creatinine Ratio  11.7    8-20  

 

 Calcium  9.1 mg/dL    8.1-9.9  88

 

 eGFR Non-  62.4    &gt; 60  

 

 eGFR   75.5    &gt; 60  89









 Urine Micro Inhouse  2009  Urine Microscopic Inhouse  -      

 

 Ua Inhouse  2009  Ua Glucose  -      









 Ua Bilirubin  -      

 

 Ua Ketones  -      

 

 Ua Specific Gravity  1.005      

 

 Ua Blood  -      

 

 Ua PH  5.0      

 

 Ua Protein  -      

 

 Ua Urobilinogen  -      

 

 Ua Nitrite  -      

 

 Ua Leukocytes  -      









 Urine Micro Inhouse  2009  Urine Microscopic Inhouse  see result note   
   90

 

 Ua Inhouse  2009  Ua Glucose  -      90









 Ua Bilirubin  -      90

 

 Ua Ketones  -      90

 

 Ua Specific [place]  1.015      90

 

 Ua Blood  -      90

 

 Ua PH  6.0      90

 

 Ua Protein  ++      90

 

 Ua Urobilinogen  -      90

 

 Ua Nitrite  -      90

 

 Ua Leukocytes  -      90









 Basic Metabolic Panel  2009  Sodium  131 mmol/L  Low  135-145  









 Potassium  4.5 mmol/L    3.5-5.0  

 

 Chloride  97 mmol/L  Low  101-111  

 

 Co2 (Carbon Dioxide)  28.0 mmol/L    22-32  

 

 Anion Gap  6.0 mmol/L    2-11  91

 

 Glucose  106 mg/dL  High    92

 

 BUN  13 mg/dL    6-24  

 

 Creatinine  1.10 mg/dL    0.50-1.40  

 

 One Over Creatinine  0.90      

 

 BUN/Creatinine Ratio  11.8    8-20  

 

 Calcium  9.3 mg/dL    8.1-9.9  93









 Laboratory test finding  2009  Alt (SGPT)  42 U/L    17-63  

 

 Lipid Profile (Trig/Chol/HDL)  2009  Triglyceride  86 mg/dL      









 Cholesterol  112 mg/dL    Less Than 200  94

 

 High Density Lipoprotein  21 mg/dL  Low  40-60  95

 

 Cholesterol/HDL Ratio  5.33 AVERAGE  High  1-4.97  

 

 Low Density Lipoprotein  74 mg/dL    Less Than 100  96









 Laboratory test finding  2009  CPK (Creatine Kinase)  257 U/L  High  0-
200  

 

 Basic Metabolic Panel  2008  Sodium  129 mmol/L  Low  135-145  









 Potassium  4.0 mmol/L    3.5-5.0  

 

 Chloride  98 mmol/L  Low  101-111  

 

 Co2 (Carbon Dioxide)  25.0 mmol/L    22-32  

 

 Anion Gap  6.0 mmol/L    2-11  97

 

 Glucose  95 mg/dL      98

 

 BUN  15 mg/dL    6-24  

 

 Creatinine  1.2 mg/dL    0.5-1.4  

 

 One Over Creatinine  0.83      

 

 BUN/Creatinine Ratio  12.5    8-20  

 

 Calcium  8.2 mg/dL    8.1-9.9  99









 Basic Metabolic Panel  2008  Sodium  126 mmol/L  Low  135-145  









 Potassium  3.8 mmol/L    3.5-5.0  

 

 Chloride  94 mmol/L  Low  101-111  

 

 Co2 (Carbon Dioxide)  26.0 mmol/L    22-32  

 

 Anion Gap  6.0 mmol/L    2-11  100

 

 Glucose  86 mg/dL      

 

 BUN  16 mg/dL    6-24  

 

 Creatinine  1.3 mg/dL    0.5-1.4  

 

 One Over Creatinine  0.76      

 

 BUN/Creatinine Ratio  12.3    8-20  

 

 Calcium  8.1 mg/dL    8.1-9.9  101









 Laboratory test finding  2008  CPK (Creatine Kinase)  300 U/L  High  0-
200  

 

 Lipid Profile  2008  Triglyceride  139 mg/dL      102



 (Trig/Chol/HDL)            









 Cholesterol  106 mg/dL    Less Than 200  102, 103

 

 High Density Lipoprotein  16 mg/dL  Low  40-60  102, 104

 

 Cholesterol/HDL Ratio  6.63 AVERAGE  High  1-4.97  102

 

 Low Density Lipoprotein  62 mg/dL    Less Than 100  102, 105









 Liver Function Panel  2008  Total Protein  7.2 GM/DL    6.2-8.1  102









 Albumin  3.9 GM/DL    3.2-5.2  102

 

 Globulin  3.3 GM/DL    2-4  102

 

 Albumin/Globulin Ratio  1.2    1-3  102

 

 Bilirubin Total  0.8 mg/dL    0.4-1.5  102

 

 Bilirubin Direct  0.2 mg/dL    0.1-0.5  102

 

 Indirect Bilirubin  0.6 mg/dL    0.1-0.75  102

 

 Alkaline Phosphatase  103 U/L      102

 

 Alt (SGPT)  49 U/L    17-63  102

 

 Ast (Sgot)  48 U/L  High  12-42  102









 Laboratory test finding  2008  CPK (Creatine Kinase)  302 U/L  High  0-
200  102

 

 Basic Metabolic Panel  2008  Sodium  125 mmol/L  Low  135-145  102









 Potassium  4.4 mmol/L    3.5-5.0  102

 

 Chloride  91 mmol/L  Low  101-111  102

 

 Co2 (Carbon Dioxide)  27.0 mmol/L    22-32  102

 

 Anion Gap  7.0 mmol/L    2-11  102, 106

 

 Glucose  100 mg/dL      102

 

 BUN  18 mg/dL    6-24  102

 

 Creatinine  1.4 mg/dL    0.5-1.4  102

 

 One Over Creatinine  0.71      102

 

 BUN/Creatinine Ratio  12.9    8-20  102

 

 Calcium  8.3 mg/dL    8.1-9.9  102, 107









 Lipid Profile  2007  Cholesterol/HDL Ratio  7.25 AVERAGE  High  1-4.97  



 (Trig/Chol/HDL)            









 Cholesterol  116 mg/dL    Less Than 200  108

 

 Triglyceride  125 mg/dL      

 

 High Density Lipoprotein  16 mg/dL  Low  40-60  109

 

 Low Density Lipoprotein  75 mg/dL    Less Than 100  110









 Laboratory test finding  2007  Alt (SGPT)  70 U/L  High  17-63  









 CPK (Creatine Kinase)  252 U/L  High  0-200  









 Basic Metabolic Panel  2007  One Over Creatinine  0.76      









 Anion Gap  9.0 mmol/L    2-11  111

 

 BUN  22 mg/dL    6-24  

 

 Calcium  9.0 mg/dL    8.7-10.2  

 

 Chloride  98 mmol/L  Low  101-111  

 

 Co2 (Carbon Dioxide)  25.0 mmol/L    22-32  

 

 Glucose  83 mg/dL      

 

 Potassium  5.0 mmol/L    3.5-5.0  

 

 Sodium  132 mmol/L  Low  135-145  

 

 BUN/Creatinine Ratio  16.9    8-20  

 

 Creatinine  1.3 mg/dL    0.5-1.4  









 Liver Function Panel  2007  Albumin/Globulin Ratio  1.1    1-3  









 Albumin  4.0 GM/DL    3.2-5.2  

 

 Alkaline Phosphatase  103 U/L      

 

 Ast (Sgot)  59 U/L  High  12-42  

 

 Bilirubin Direct  0.2 mg/dL    0.1-0.5  

 

 Globulin  3.6 GM/DL    2-4  

 

 Indirect Bilirubin  0.6 mg/dL    0.1-0.75  

 

 Bilirubin Total  0.8 mg/dL    0.4-1.5  

 

 Total Protein  7.6 GM/DL    6.2-8.1  









 Lipid Profile  2007  Cholesterol/HDL Ratio  6.94 AVERAGE  High  1-4.97  



 (Trig/Chol/HDL)            









 Cholesterol  125 mg/dL    Less Than 200  112

 

 Triglyceride  84 mg/dL      

 

 High Density Lipoprotein  18 mg/dL  Low  40-60  113

 

 Low Density Lipoprotein  90 mg/dL    Less Than 100  114









 Laboratory test finding  2007  Alt (SGPT)  67 U/L  High  17-63  









 CPK (Creatine Kinase)  195 U/L    0-200  









 Lipid Profile  2007  Cholesterol/HDL Ratio  6.58 AVERAGE  High  1-4.97  



 (Trig/Chol/HDL)            









 Cholesterol  125 mg/dL    Less Than 200  115

 

 Triglyceride  76 mg/dL      

 

 High Density Lipoprotein  19 mg/dL  Low  40-60  116

 

 Low Density Lipoprotein  91 mg/dL    Less Than 100  117









 Laboratory test finding  2007  Alt (SGPT)  45 U/L    17-63  









 CPK (Creatine Kinase)  194 U/L    0-200  









 Basic Metabolic Panel  10/23/2006  One Over Creatinine  0.76      102









 Anion Gap  10.0 mmol/L    2-11  102, 118

 

 BUN  28 mg/dL  High  6-24  102

 

 Calcium  9.6 mg/dL    8.7-10.2  102

 

 Chloride  101 mmol/L    101-111  102

 

 Co2 (Carbon Dioxide)  22.0 mmol/L    22-32  102

 

 Glucose  91 mg/dL      102

 

 Potassium  5.1 mmol/L  High  3.5-5.0  102

 

 Sodium  133 mmol/L  Low  135-145  102

 

 BUN/Creatinine Ratio  21.5  High  8-20  102

 

 Creatinine  1.3 mg/dL    0.5-1.4  102









 Lipid Profile  10/23/2006  Cholesterol  199 mg/dL    Less Than 200  102, 119



 (Trig/Chol/HDL)            









 Triglyceride  62 mg/dL      102

 

 High Density Lipoprotein  28 mg/dL  Low  40-60  102, 120

 

 Low Density Lipoprotein  159 mg/dL  High  Less Than 100  102, 121

 

 Cholesterol/HDL Ratio  7.11 AVERAGE  High  1-4.97  102









 Laboratory test finding  10/23/2006  Alt (SGPT)  37 U/L    17-63  102

 

 CBC With Electronic Diff  2006  White Blood Count  9.2 CUMM    4.8-10.8  









 Abs Basophils  0    0-0.2  

 

 Abs Eosinophils  0.2    0-0.6  

 

 Absolute Neutrophil Count  6.4    1.5-7.7  

 

 Abs Lymphs  1.6    1.0-4.8  

 

 Abs Mononuclear  0.9  High  0-0.8  

 

 Basophil %  0.4 %    0-2  

 

 Hematocrit  34 %  Low  42-52  

 

 Hemoglobin  11.7 g/dL  Low  14.0-18.0  

 

 Eosinophil %  2.4 %    0-6  

 

 Gran %  70.0 %    38-83  

 

 Lymph %  17.7 %  Low  20-45  

 

 Mean Corpuscular HGB Cone  34 g/dL    32-36  

 

 Mean Corpuscular Hemoglob  34 pg  High  27-31  

 

 Mean Corpuscular Volume  98 um3  High  80-94  

 

 Mean Platelet Volume  9.7 um3    7.4-10.4  

 

 Mononuclear %  9.5 %  High  1-9  

 

 Platelet Count  326 CUMM    150-450  

 

 Red Cell Count  3.49 CUMM  Low  4.6-6.2  

 

 Redcell Distribution WDTH  13 %    10.5-15  









 Basic Metabolic Panel  2006  One Over Creatinine  0.90      









 Anion Gap  7.0 mmol/L    2-11  122

 

 BUN  38 mg/dL  High  6-24  

 

 Calcium  9.5 mg/dL    8.7-10.2  

 

 Chloride  109 mmol/L    101-111  

 

 Co2 (Carbon Dioxide)  21.0 mmol/L  Low  22-32  

 

 Glucose  101 mg/dL      

 

 Potassium  5.1 mmol/L  High  3.5-5.0  

 

 Sodium  137 mmol/L    135-145  

 

 BUN/Creatinine Ratio  34.5  High  8-20  

 

 Creatinine  1.1 mg/dL    0.5-1.4  









 Basic Metabolic Panel  2005  Anion Gap  7.0 mmol/L    2-11  123









 BUN  25 mg/dL  High  6-24  

 

 Calcium  9.3 mg/dL    8.7-10.2  

 

 Chloride  106 mmol/L    101-111  

 

 Co2 (Carbon Dioxide)  25.0 mmol/L    22-32  

 

 Creatinine  1.3 mg/dL    0.5-1.4  

 

 Glucose  110 mg/dL  High    

 

 Potassium  4.7 mmol/L    3.5-5.0  

 

 Sodium  138 mmol/L    135-145  

 

 BUN/Creatinine Ratio  19.2    8-20  









 CBC With Electronic Diff  2005  White Blood Count  8.2 CUMM    4.8-10.8  









 Abs Basophils  0    0-0.2  

 

 Abs Eosinophils  0.4    0-0.6  

 

 Abs Grans  5.2    1.5-7.7  

 

 Abs Lymphs  1.8    1.0-4.8  

 

 Abs Mononuclear  0.8    0-0.8  

 

 Basophil %  0.3 %    0-2  

 

 Hematocrit  36 %  Low  42-52  

 

 Hemoglobin  12.3 g/dL  Low  14.0-18.0  

 

 Eosinophil %  4.4 %    0-6  

 

 Gran %  63.0 %    38-83  

 

 Lymph %  22.5 %    20-45  

 

 Mean Corpuscular HGB Cone  34 g/dL    32-36  

 

 Mean Corpuscular Hemoglob  34 pg  High  27-31  

 

 Mean Corpuscular Volume  99 um3  High  80-94  

 

 Mean Platelet Volume  10.4 um3    7.4-10.4  

 

 Mononuclear %  9.8 %  High  1-9  

 

 Platelet Count  248 CUMM    150-450  

 

 Red Cell Count  3.62 CUMM  Low  4.6-6.2  

 

 Redcell Distribution WDTH  13 %    10.5-15  









 Lipid Profile (Trig/Chol/HDL)  2005  Cholesterol  181 mg/dL    Less Than 
200  124









 Triglyceride  85 mg/dL      

 

 High Density Lipoprotein  27 mg/dL  Low  40-60  125

 

 Low Density Lipoprotein  137 mg/dL  High  Less Than 100  126

 

 Cholesterol/HDL Ratio  6.70 AVERAGE  High  1-4.97  









 Basic Metabolic Panel  2005  Anion Gap  7.0 mmol/L    2-11  127









 BUN  18 mg/dL    6-24  

 

 Calcium  9.8 mg/dL    8.7-10.2  

 

 Chloride  104 mmol/L    101-111  

 

 Co2 (Carbon Dioxide)  27.0 mmol/L    22-32  

 

 Creatinine  0.9 mg/dL    0.5-1.4  

 

 Glucose  106 mg/dL  High    

 

 Potassium  4.8 mmol/L    3.5-5.0  

 

 Sodium  138 mmol/L    135-145  

 

 BUN/Creatinine Ratio  20.0    8-20  









 Laboratory test finding  2005  TSH  1.90 MIU/ML    0.34-5.60  

 

 CBC With Electronic Diff  2004  White Blood Count  6.8 CUMM    4.8-10.8  









 Abs Basophils  0    0-0.2  

 

 Abs Eosinophils  0.4    0-0.6  

 

 Abs Grans  4.0    1.5-7.7  

 

 Abs Lymphs  1.8    1.0-4.8  

 

 Abs Mononuclear  0.5    0-0.8  

 

 Basophil %  0.6 %    0-2  

 

 Hematocrit  37 %  Low  42-52  

 

 Hemoglobin  12.3 g/dL  Low  14.0-18.0  

 

 Eosinophil %  6.3 %  High  0-6  

 

 Gran %  59.5 %    38-83  

 

 Lymph %  25.9 %    20-45  

 

 Mean Corpuscular HGB Cone  34 g/dL    32-36  

 

 Mean Corpuscular Hemoglob  33 pg  High  27-31  

 

 Mean Corpuscular Volume  99 um3  High  80-94  

 

 Mean Platelet Volume  10.7 um3  High  7.4-10.4  

 

 Mononuclear %  7.7 %    1-9  

 

 Platelet Count  238 CUMM    150-450  

 

 Red Cell Count  3.70 CUMM  Low  4.6-6.2  

 

 Redcell Distribution WDTH  13 %    10.5-15  









 Basic Metabolic Panel  2004  Anion Gap  7.0 mmol/L    2-11  128









 BUN  23 mg/dL    6-24  

 

 Calcium  9.3 mg/dL    8.7-10.2  

 

 Chloride  106 mmol/L    101-111  

 

 Co2 (Carbon Dioxide)  26.0 mmol/L    22-32  

 

 Creatinine  1.2 mg/dL    0.5-1.4  

 

 Glucose  99 mg/dL      

 

 Potassium  5.3 mmol/L  High  3.5-5.0  

 

 Sodium  139 mmol/L    135-145  

 

 BUN/Creatinine Ratio  19.2    8-20  









 Laboratory test finding  2004  TSH  1.53 MIU/ML    0.34-5.60  









 Vitamin B12  392 pg/mL    180-914  









 Iron &amp; Iron Binding Capacity  2004  Iron Total  92 g/dL      









 Unsaturated Iron Binding  317 g/dL      

 

 Total Iron Binding Capacity  409 g/dL    250-450  

 

 % Iron Saturation  22 %    15-55  









 Laboratory test finding  2004  Ferritin  382 NG/ML  High    









 Folic Acid  15.6 NG/ML    2-16  









 Lipid Profile  2004  Cholesterol/HDL Ratio  7.91 AVERAGE  High  1-4.97  



 (Trig/Chol/HDL)            









 Cholesterol  182 mg/dL    Less Than 200  129

 

 Triglyceride  83 mg/dL      

 

 High Density Lipoprotein  23 mg/dL  Low  40-60  130

 

 Low Density Lipoprotein  142 mg/dL  High  Less Than 100  131









 Laboratory test finding  2004  Alt (SGPT)  31 U/L    17-63  

 

 Basic Metabolic Panel  2004  Anion Gap  7.0 mmol/L    2-11  132









 BUN  23 mg/dL    6-24  

 

 Calcium  9.3 mg/dL    8.7-10.2  

 

 Chloride  106 mmol/L    101-111  

 

 Co2 (Carbon Dioxide)  26.0 mmol/L    22-32  

 

 Creatinine  1.2 mg/dL    0.5-1.4  

 

 Glucose  99 mg/dL      

 

 Potassium  5.3 mmol/L  High  3.5-5.0  

 

 Sodium  139 mmol/L    135-145  

 

 BUN/Creatinine Ratio  19.2    8-20  









 1  *******Because ethnic data is not always readily available,



   this report includes an eGFR for both -Americans and



   non- Americans.****



   The National Kidney Disease Education Program (NKDEP) does



   not endorse the use of the MDRD equation for patients that



   are not between the ages of 18 and 70, are pregnant, have



   extremes of body size, muscle mass, or nutritional status,



   or are non- or non-.



   According to the National Kidney Foundation, irrespective of



   diagnosis, the stage of the disease is based on the level of



   kidney function:



   Stage Description                      GFR(mL/min/1.73 m(2))



   1     Kidney damage with normal or decreased GFR       90



   2     Kidney damage with mild decrease in GFR          60-89



   3     Moderate decrease in GFR                         30-59



   4     Severe decrease in GFR                           15-29



   5     Kidney failure                       &lt;15 (or dialysis)

 

 2  SEE RESULT BELOW



   -----------------------------------------------------------------------------
---------------



   Name:  CARMELO VAZQUEZ JR                   : 1932    Attend Dr: 
Tate Laura MD



   Acct:  [id number]  Unit: [id number]  AGE: 85            Location:  [place] [address]



   Re18                        SEX: M             Status:    ADM IN



   -----------------------------------------------------------------------------
---------------



   



   SPEC: [id number]         ALBERTO:       [facility] DR: Fortunato Yoo MD



   REQ:  [id number]              RECD:   



   STATUS: COMP             DENIS WILSON: Cholo Boo MD



   _



   SOURCE: URINE          SPDESC:



   ORDERED:  Urine Culture



   



   -----------------------------------------------------------------------------
---------------



   Procedure                         Result                         Reported   
        Site



   -----------------------------------------------------------------------------
---------------



   Urine Culture  Final                                             18-
[id number]      ML



   



   Mixed jackelyn; possible contamination. Suggest



   resubmission.



   



   -----------------------------------------------------------------------------
---------------



   * ML - Main Lab



   .



   



   



   



   



   



   



   



   



   



   



   



   



   



   



   



   



   



   



   



   



   



   



   



   



   



   ** END OF REPORT **



   



   DEPARTMENT OF PATHOLOGY,  [address] [place] [address]



   Phone # [phone]      Fax #[phone]



   Brendan Tran M.D. Director     [facility] # [id number]

 

 3  RESULT: Polyclonal hypergammaglobulinemia



   Test Performed by:



   [facility] - [address] [address]

 

 4  *******Because ethnic data is not always readily available,



   this report includes an eGFR for both -Americans and



   non- Americans.****



   The National Kidney Disease Education Program (NKDEP) does



   not endorse the use of the MDRD equation for patients that



   are not between the ages of 18 and 70, are pregnant, have



   extremes of body size, muscle mass, or nutritional status,



   or are non- or non-.



   According to the National Kidney Foundation, irrespective of



   diagnosis, the stage of the disease is based on the level of



   kidney function:



   Stage Description                      GFR(mL/min/1.73 m(2))



   1     Kidney damage with normal or decreased GFR       90



   2     Kidney damage with mild decrease in GFR          60-89



   3     Moderate decrease in GFR                         30-59



   4     Severe decrease in GFR                           15-29



   5     Kidney failure                       &lt;15 (or dialysis)

 

 5  *******Because ethnic data is not always readily available,



   this report includes an eGFR for both -Americans and



   non- Americans.****



   The National Kidney Disease Education Program (NKDEP) does



   not endorse the use of the MDRD equation for patients that



   are not between the ages of 18 and 70, are pregnant, have



   extremes of body size, muscle mass, or nutritional status,



   or are non- or non-.



   According to the National Kidney Foundation, irrespective of



   diagnosis, the stage of the disease is based on the level of



   kidney function:



   Stage Description                      GFR(mL/min/1.73 m(2))



   1     Kidney damage with normal or decreased GFR       90



   2     Kidney damage with mild decrease in GFR          60-89



   3     Moderate decrease in GFR                         30-59



   4     Severe decrease in GFR                           15-29



   5     Kidney failure                       &lt;15 (or dialysis)

 

 6  Consistent with Previous Results Reported on 3/19/18

 

 7  *******Because ethnic data is not always readily available,



   this report includes an eGFR for both -Americans and



   non- Americans.****



   The National Kidney Disease Education Program (NKDEP) does



   not endorse the use of the MDRD equation for patients that



   are not between the ages of 18 and 70, are pregnant, have



   extremes of body size, muscle mass, or nutritional status,



   or are non- or non-.



   According to the National Kidney Foundation, irrespective of



   diagnosis, the stage of the disease is based on the level of



   kidney function:



   Stage Description                      GFR(mL/min/1.73 m(2))



   1     Kidney damage with normal or decreased GFR       90



   2     Kidney damage with mild decrease in GFR          60-89



   3     Moderate decrease in GFR                         30-59



   4     Severe decrease in GFR                           15-29



   5     Kidney failure                       &lt;15 (or dialysis)

 

 8  Result TnIDx:0.06 Called to [id number] at: 18:42:44 by:[id number] Read back by:
[id number]

 

 9  *******Because ethnic data is not always readily available,



   this report includes an eGFR for both -Americans and



   non- Americans.****



   The National Kidney Disease Education Program (NKDEP) does



   not endorse the use of the MDRD equation for patients that



   are not between the ages of 18 and 70, are pregnant, have



   extremes of body size, muscle mass, or nutritional status,



   or are non- or non-.



   According to the National Kidney Foundation, irrespective of



   diagnosis, the stage of the disease is based on the level of



   kidney function:



   Stage Description                      GFR(mL/min/1.73 m(2))



   1     Kidney damage with normal or decreased GFR       90



   2     Kidney damage with mild decrease in GFR          60-89



   3     Moderate decrease in GFR                         30-59



   4     Severe decrease in GFR                           15-29



   5     Kidney failure                       &lt;15 (or dialysis)

 

 10  Critical Result LACT:2.1 Called to [id number] at: 18:37:22 by:[id number] Read 
back



   by:[id number]



   [place] Severe Sepsis and Septic Shock Management Bundle Measure



   requires all lactic acids initially measuring &gt;2.0 mmol/L be



   repeated.

 

 11  *******Because ethnic data is not always readily available,



   this report includes an eGFR for both -Americans and



   non- Americans.****



   The National Kidney Disease Education Program (NKDEP) does



   not endorse the use of the MDRD equation for patients that



   are not between the ages of 18 and 70, are pregnant, have



   extremes of body size, muscle mass, or nutritional status,



   or are non- or non-.



   According to the National Kidney Foundation, irrespective of



   diagnosis, the stage of the disease is based on the level of



   kidney function:



   Stage Description                      GFR(mL/min/1.73 m(2))



   1     Kidney damage with normal or decreased GFR       90



   2     Kidney damage with mild decrease in GFR          60-89



   3     Moderate decrease in GFR                         30-59



   4     Severe decrease in GFR                           15-29



   5     Kidney failure                       &lt;15 (or dialysis)

 

 12  : [id number]

 

 13  SEE RESULT BELOW



   -----------------------------------------------------------------------------
---------------



   Name:  CARMELO VAZQUEZ JR                   : 1932    Attend Dr: Cesar Alcantar MD



   Acct:  [id number]  Unit: [id number]  AGE: 85            Location:  [facility]



   Re18                        SEX: M             Status:    REG ER



   -----------------------------------------------------------------------------
---------------



   



   SPEC: [id number]         ALBERTO:       [facility] DR: Cesar Alcantar MD



   REQ:  [id number]              RECD:   



   STATUS: RASTA BARRIOS DR: Cholo Boo MD



   _



   SOURCE: NASAL          SPDESC:



   ORDERED:  Flu A B Request



   



   -----------------------------------------------------------------------------
---------------



   Procedure                         Result                         Reported   
        Site



   -----------------------------------------------------------------------------
---------------



   Rapid Influenza A   B Request  Final                             18-
1028      ML



   Specimen received for Influenza A/B Molecular testing



   



   -----------------------------------------------------------------------------
---------------



   * ML - MAIN LAB ([facility])



   .



   



   



   



   



   



   



   



   



   



   



   



   



   



   



   



   



   



   



   



   



   



   



   



   



   



   



   



   ** END OF REPORT **



   



   * ML=Testing performed at Main Lab



   DEPARTMENT OF PATHOLOGY,  [address] [place] [address]



   Phone # [phone]      Fax #[phone]



   Brendan Tran M.D. Director     [facility] # [id number]

 

 14  void, hazy, color red/vanita.

 

 15  *******Because ethnic data is not always readily available,



   this report includes an eGFR for both -Americans and



   non- Americans.****



   The National Kidney Disease Education Program (NKDEP) does



   not endorse the use of the MDRD equation for patients that



   are not between the ages of 18 and 70, are pregnant, have



   extremes of body size, muscle mass, or nutritional status,



   or are non- or non-.



   According to the National Kidney Foundation, irrespective of



   diagnosis, the stage of the disease is based on the level of



   kidney function:



   Stage Description                      GFR(mL/min/1.73 m(2))



   1     Kidney damage with normal or decreased GFR       90



   2     Kidney damage with mild decrease in GFR          60-89



   3     Moderate decrease in GFR                         30-59



   4     Severe decrease in GFR                           15-29



   5     Kidney failure                       &lt;15 (or dialysis)

 

 16  [facility] Severe Sepsis and Septic Shock Management Bundle Measure



   requires all lactic acids initially measuring &gt;2.0 mmol/L be



   repeated.

 

 17  *******Because ethnic data is not always readily available,



   this report includes an eGFR for both -Americans and



   non- Americans.****



   The National Kidney Disease Education Program (NKDEP) does



   not endorse the use of the MDRD equation for patients that



   are not between the ages of 18 and 70, are pregnant, have



   extremes of body size, muscle mass, or nutritional status,



   or are non- or non-.



   According to the National Kidney Foundation, irrespective of



   diagnosis, the stage of the disease is based on the level of



   kidney function:



   Stage Description                      GFR(mL/min/1.73 m(2))



   1     Kidney damage with normal or decreased GFR       90



   2     Kidney damage with mild decrease in GFR          60-89



   3     Moderate decrease in GFR                         30-59



   4     Severe decrease in GFR                           15-29



   5     Kidney failure                       &lt;15 (or dialysis)

 

 18  Acute inflammation:  &gt;10.00

 

 19  &gt;100 to &lt;200 pg/mL: likely compensated congestive heart



   failure (CHF)



   200 to 400 pg/mL: likely moderate CHF



   &gt;400 pg/mL: likely moderate to severe CHF

 

 20  *******Because ethnic data is not always readily available,



   this report includes an eGFR for both -Americans and



   non- Americans.****



   The National Kidney Disease Education Program (NKDEP) does



   not endorse the use of the MDRD equation for patients that



   are not between the ages of 18 and 70, are pregnant, have



   extremes of body size, muscle mass, or nutritional status,



   or are non- or non-.



   According to the National Kidney Foundation, irrespective of



   diagnosis, the stage of the disease is based on the level of



   kidney function:



   Stage Description                      GFR(mL/min/1.73 m(2))



   1     Kidney damage with normal or decreased GFR       90



   2     Kidney damage with mild decrease in GFR          60-89



   3     Moderate decrease in GFR                         30-59



   4     Severe decrease in GFR                           15-29



   5     Kidney failure                       &lt;15 (or dialysis)

 

 21  Desirable &lt;150



   Borderline high 150-199



   High 200-499



   Very High &gt;500

 

 22  Desirable &lt;200



   Borderline high 200-239



   High &gt;239

 

 23  Low &lt;40



   Desirable: 40-60



   High: &gt;60

 

 24  Desirable: &lt;100 mg/dL



   Near Optimal: 100-129 mg/dL



   Borderline High: 130-159 mg/dL



   High: 160-189 mg/dL



   Very High: &gt;189 mg/dL

 

 25  INR taken care of in separate triage

 

 26  *******Because ethnic data is not always readily available,



   this report includes an eGFR for both -Americans and



   non- Americans.****



   The National Kidney Disease Education Program (NKDEP) does



   not endorse the use of the MDRD equation for patients that



   are not between the ages of 18 and 70, are pregnant, have



   extremes of body size, muscle mass, or nutritional status,



   or are non- or non-.



   According to the National Kidney Foundation, irrespective of



   diagnosis, the stage of the disease is based on the level of



   kidney function:



   Stage Description                      GFR(mL/min/1.73 m(2))



   1     Kidney damage with normal or decreased GFR       90



   2     Kidney damage with mild decrease in GFR          60-89



   3     Moderate decrease in GFR                         30-59



   4     Severe decrease in GFR                           15-29



   5     Kidney failure                       &lt;15 (or dialysis)

 

 27  Pt. can have INR drawn on Wed.17 or Thurs.17 next week.

 

 28  *******Because ethnic data is not always readily available,



   this report includes an eGFR for both -Americans and



   non- Americans.****



   The National Kidney Disease Education Program (NKDEP) does



   not endorse the use of the MDRD equation for patients that



   are not between the ages of 18 and 70, are pregnant, have



   extremes of body size, muscle mass, or nutritional status,



   or are non- or non-.



   According to the National Kidney Foundation, irrespective of



   diagnosis, the stage of the disease is based on the level of



   kidney function:



   Stage Description                      GFR(mL/min/1.73 m(2))



   1     Kidney damage with normal or decreased GFR       90



   2     Kidney damage with mild decrease in GFR          60-89



   3     Moderate decrease in GFR                         30-59



   4     Severe decrease in GFR                           15-29



   5     Kidney failure                       &lt;15 (or dialysis)

 

 29  Per VNS, fingerstick INR is 1.3

 

 30  &gt;100 to &lt;200 pg/mL: likely compensated congestive heart



   failure (CHF)



   200 to 400 pg/mL: likely moderate CHF



   &gt;400 pg/mL: likely moderate to severe CHF

 

 31  Desirable: &lt;100 mg/dL



   Near Optimal: 100-129 mg/dL



   Borderline High: 130-159 mg/dL



   High: 160-189 mg/dL



   Very High: &gt;189 mg/dL

 

 32  Result TnIDx:0.06 Called to VINEET AMBROSE at: 11:42:17 by: Read 
back



   by:VINEET AMBROSE



   99th percentile=0.04 ng/mL



   



   Troponin results at [facility] and [facility] are not interchangeable.

 

 33  *******Because ethnic data is not always readily available,



   this report includes an eGFR for both -Americans and



   non- Americans.****



   The National Kidney Disease Education Program (NKDEP) does



   not endorse the use of the MDRD equation for patients that



   are not between the ages of 18 and 70, are pregnant, have



   extremes of body size, muscle mass, or nutritional status,



   or are non- or non-.



   According to the National Kidney Foundation, irrespective of



   diagnosis, the stage of the disease is based on the level of



   kidney function:



   Stage Description                      GFR(mL/min/1.73 m(2))



   1     Kidney damage with normal or decreased GFR       90



   2     Kidney damage with mild decrease in GFR          60-89



   3     Moderate decrease in GFR                         30-59



   4     Severe decrease in GFR                           15-29



   5     Kidney failure                       &lt;15 (or dialysis)

 

 34  Critical Result LACT:2.2 Called to VINEET AMBROSE at: 11:43:11 by:[id number] 
Read



   back by:VINEET [email or web address] Severe Sepsis and Septic Shock Management Bundle Measure



   requires all lactic acids initially measuring &gt;2.0 mmol/L be



   repeated.

 

 35  void, clear, yellow

 

 36  Desirable &lt;150



   Borderline high 150-199



   High 200-499



   Very High &gt;500

 

 37  Desirable &lt;200



   Borderline high 200-239



   High &gt;239

 

 38  Low &lt;40



   Desirable: 40-60



   High: &gt;60

 

 39  Desirable: &lt;100 mg/dL



   Near Optimal: 100-129 mg/dL



   Borderline High: 130-159 mg/dL



   High: 160-189 mg/dL



   Very High: &gt;189 mg/dL

 

 40  *******Because ethnic data is not always readily available,



   this report includes an eGFR for both -Americans and



   non- Americans.****



   The National Kidney Disease Education Program (NKDEP) does



   not endorse the use of the MDRD equation for patients that



   are not between the ages of 18 and 70, are pregnant, have



   extremes of body size, muscle mass, or nutritional status,



   or are non- or non-.



   According to the National Kidney Foundation, irrespective of



   diagnosis, the stage of the disease is based on the level of



   kidney function:



   Stage Description                      GFR(mL/min/1.73 m(2))



   1     Kidney damage with normal or decreased GFR       90



   2     Kidney damage with mild decrease in GFR          60-89



   3     Moderate decrease in GFR                         30-59



   4     Severe decrease in GFR                           15-29



   5     Kidney failure                       &lt;15 (or dialysis)

 

 41  void, clear, gold

 

 42  SEE RESULT BELOW



   -----------------------------------------------------------------------------
---------------



   Name:  TAYLORCARMELO HOU JR                 : 1932    Attend Dr: Cholo Boo MD



   Acct:  [id number]  Unit: [id number]  AGE: 84            Location:  [facility]



   Re16                        SEX: M             Status:    REG REF



   -----------------------------------------------------------------------------
---------------



   



   SPEC: [id number]             ALBERTO:       [facility] DR: Cholo Boo MD



   REQ:  [id number]             RECD: 16-



   STATUS: SOUT



   _



   ORDERED:  LEVEL IV



   COMMENTS: [id number]



   



   FINAL DIAGNOSIS



   



   



   Skin, back, shave excision:



   -- Hyper parakeratotic actinic keratosis



   



   



   



   CLINICAL HISTORY



   



   Bothering patient for approximately six months; hurts to sit back in chair, 
when he gets



   sweaty his close stick to it



   



   PRE-OPERATIVE DIAGNOSIS



   



   Neoplasm of uncertain behavior of skin



   



   GROSS DESCRIPTION



   



   The specimen is received in formalin labeled, Shave Excision Back, and 
consists of a 1.0 x



   0.8 cm tan-grey irregular skin shave with a central 0.7 x 0.6 x 0.3 cm tan 
keratotic



   protuberance.  The specimen is inked, trisected and submitted entirely in 
one cassette.



   



   Signed __________(signature on file)___________ Brendan Tran MD  1530



   



   -----------------------------------------------------------------------------
---------------



   



   



   



   



   



   



   



   



   



   



   ** END OF REPORT **



   



   * ML=Testing performed at Main Lab



   DEPARTMENT OF PATHOLOGY,  [address] [place] [address]



   Phone # [phone]      Fax #[phone]



   Brendan Tran M.D. Director     [facility] # [id number]

 

 43  *******Because ethnic data is not always readily available,



   this report includes an eGFR for both -Americans and



   non- Americans.****



   The National Kidney Disease Education Program (NKDEP) does



   not endorse the use of the MDRD equation for patients that



   are not between the ages of 18 and 70, are pregnant, have



   extremes of body size, muscle mass, or nutritional status,



   or are non- or non-.



   According to the National Kidney Foundation, irrespective of



   diagnosis, the stage of the disease is based on the level of



   kidney function:



   Stage Description                      GFR(mL/min/1.73 m(2))



   1     Kidney damage with normal or decreased GFR       90



   2     Kidney damage with mild decrease in GFR          60-89



   3     Moderate decrease in GFR                         30-59



   4     Severe decrease in GFR                           15-29



   5     Kidney failure                       &lt;15 (or dialysis)

 

 44  Desirable &lt;150



   Borderline high 150-199



   High 200-499



   Very High &gt;500

 

 45  Desirable &lt;200



   Borderline high 200-239



   High &gt;239

 

 46  Low &lt;40



   Desirable: 40-60



   High: &gt;60

 

 47  Desirable: &lt;100 mg/dL



   Near Optimal: 100-129 mg/dL



   Borderline High: 130-159 mg/dL



   High: 160-189 mg/dL



   Very High: &gt;189 mg/dL

 

 48  FASTING 12 HOUR

 

 49  *******Because ethnic data is not always readily available,



   this report includes an eGFR for both -Americans and



   non- Americans.****



   The National Kidney Disease Education Program (NKDEP) does



   not endorse the use of the MDRD equation for patients that



   are not between the ages of 18 and 70, are pregnant, have



   extremes of body size, muscle mass, or nutritional status,



   or are non- or non-.



   According to the National Kidney Foundation, irrespective of



   diagnosis, the stage of the disease is based on the level of



   kidney function:



   Stage Description                      GFR(mL/min/1.73 m(2))



   1     Kidney damage with normal or decreased GFR       90



   2     Kidney damage with mild decrease in GFR          60-89



   3     Moderate decrease in GFR                         30-59



   4     Severe decrease in GFR                           15-29



   5     Kidney failure                       &lt;15 (or dialysis)

 

 50  FASTING 12 HOUR

 

 51  Desirable &lt;150



   Borderline high 150-199



   High 200-499



   Very High &gt;500

 

 52  Desirable &lt;200



   Borderline high 200-239



   High &gt;239

 

 53  Low &lt;40



   Desirable: 40-60



   High: &gt;60

 

 54  Desirable &lt;100



   Near Optimal 100-129



   Borderline high 130-159



   High 160-189



   Very High &gt;189

 

 55  HDL Interpretation:



   Undesirable: High Risk:  Less than 40 mg/dL



   Desirable:  Low Risk:  Greater than 60 mg/dL

 

 56  LDL Interpretation:



   Low Risk Optimal Level:  LDL Less than 100 mg/dL



   Near or Above Optimal:  -129 mg/dL



   Borderline High Risk:  -159 mg/dL



   High Risk:  -189 mg/dL



   Very High Risk:  LDL Greater than 189 mg/dL

 

 57  FASTING 12 HOUR

 

 58  *******Because ethnic data is not always readily available,



   this report includes an eGFR for both -Americans and



   non- Americans.****



   The National Kidney Disease Education Program (NKDEP) does



   not endorse the use of the MDRD equation for patients that



   are not between the ages of 18 and 70, are pregnant, have



   extremes of body size, muscle mass, or nutritional status,



   or are non- or non-.



   According to the National [employer], irrespective of



   diagnosis, the stage of the disease is based on the level of



   kidney function:



   Stage Description                      GFR(mL/min/1.73 m(2))



   1     Kidney damage with normal or decreased GFR       90



   2     Kidney damage with mild decrease in GFR          60-89



   3     Moderate decrease in GFR                         30-59



   4     Severe decrease in GFR                           15-29



   5     Kidney failure                       &lt;15 (or dialysis)

 

 59  *******Because ethnic data is not always readily available,



   this report includes an eGFR for both -Americans and



   non- Americans.****



   The National Kidney Disease Education Program (NKDEP) does



   not endorse the use of the MDRD equation for patients that



   are not between the ages of 18 and 70, are pregnant, have



   extremes of body size, muscle mass, or nutritional status,



   or are non- or non-.



   According to the National Kid[employer], irrespective of



   diagnosis, the stage of the disease is based on the level of



   kidney function:



   Stage Description                      GFR(mL/min/1.73 m(2))



   1     Kidney damage with normal or decreased GFR       90



   2     Kidney damage with mild decrease in GFR          60-89



   3     Moderate decrease in GFR                         30-59



   4     Severe decrease in GFR                           15-29



   5     Kidney failure                       &lt;15 (or dialysis)

 

 60  *******Because ethnic data is not always readily available,



   this report includes an eGFR for both -Americans and



   non- Americans.****



   The National Kidney Disease Education Program (NKDEP) does



   not endorse the use of the MDRD equation for patients that



   are not between the ages of 18 and 70, are pregnant, have



   extremes of body size, muscle mass, or nutritional status,



   or are non- or non-.



   According to the National Kid[employer] Foundation, irrespective of



   diagnosis, the stage of the disease is based on the level of



   kidney function:



   Stage Description                      GFR(mL/min/1.73 m(2))



   1     Kidney damage with normal or decreased GFR       90



   2     Kidney damage with mild decrease in GFR          60-89



   3     Moderate decrease in GFR                         30-59



   4     Severe decrease in GFR                           15-29



   5     Kidney failure                       &lt;15 (or dialysis)

 

 61  *******Because ethnic data is not always readily available,



   this report includes an eGFR for both -Americans and



   non- Americans.****



   The National Kidney Disease Education Program (NKDEP) does



   not endorse the use of the MDRD equation for patients that



   are not between the ages of 18 and 70, are pregnant, have



   extremes of body size, muscle mass, or nutritional status,



   or are non- or non-.



   According to the National Kid[employer] Foundation, irrespective of



   diagnosis, the stage of the disease is based on the level of



   kidney function:



   Stage Description                      GFR(mL/min/1.73 m(2))



   1     Kidney damage with normal or decreased GFR       90



   2     Kidney damage with mild decrease in GFR          60-89



   3     Moderate decrease in GFR                         30-59



   4     Severe decrease in GFR                           15-29



   5     Kidney failure                       &lt;15 (or dialysis)

 

 62  HDL Interpretation:



   Undesirable: High Risk:  Less than 40 MG/DL



   Desirable:  Low Risk:  Greater than 60 MG/DL

 

 63  LDL Interpretation:



   Low Risk Optimal Level:  LDL Less than 100 MG/DL



   Near or Above Optimal:  -129 MG/DL



   Borderline High Risk:  -159 MG/DL



   High Risk:  -189 MG/DL



   Very High Risk:  LDL Greater than 189 MG/DL

 

 64  Anion gap measurement may be of limited value in the



   presence of any alkalosis, especially in a combined acid



   base disorder.



   .

 

 65  *******Because ethnic data is not always readily available,



   this report includes an eGFR for both -Americans and



   non- Americans.****



   The National Kidney Disease Education Program (NKDEP) does



   not endorse the use of the MDRD equation for patients that



   are not between the ages of 18 and 70, are pregnant, have



   extremes of body size, muscle mass, or nutritional status,



   or are non- or non-.



   According to the National [employer] Foundation, irrespective of



   diagnosis, the stage of the disease is based on the level of



   kidney function:



   Stage Description                      GFR(mL/min/1.73 m(2))



   1     Kidney damage with normal or decreased GFR       90



   2     Kidney damage with mild decrease in GFR          60-89



   3     Moderate decrease in GFR                         30-59



   4     Severe decrease in GFR                           15-29



   5     Kidney failure                       &lt;15 (or dialysis)

 

 66  Anion gap measurement may be of limited value in the



   presence of any alkalosis, especially in a combined acid



   base disorder.



   .

 

 67  *******Because ethnic data is not always readily available,



   this report includes an eGFR for both -Americans and



   non- Americans.****



   The National Kidney Disease Education Program (NKDEP) does



   not endorse the use of the MDRD equation for patients that



   are not between the ages of 18 and 70, are pregnant, have



   extremes of body size, muscle mass, or nutritional status,



   or are non- or non-.



   According to the National Kid[employer] Foundation, irrespective of



   diagnosis, the stage of the disease is based on the level of



   kidney function:



   Stage Description                      GFR(mL/min/1.73 m(2))



   1     Kidney damage with normal or decreased GFR       90



   2     Kidney damage with mild decrease in GFR          60-89



   3     Moderate decrease in GFR                         30-59



   4     Severe decrease in GFR                           15-29



   5     Kidney failure                       &lt;15 (or dialysis)

 

 68  CHOLESTEROL INTERPRETATION:



   Desirable:  Less than 200 MG/DL



   Borderline-High Risk:  200-239 MG/DL



   High-Risk:  240 MG/DL and over

 

 69  HDL INTERPRETATION:



   Undesirable: High Risk:  Less than 40 MG/DL



   Desirable:  Low Risk:  Greater than 60 MG/DL

 

 70  LDL INTERPRETATION:



   Low Risk Optimal Level:  LDL Less than 100 MG/DL



   Near or Above Optimal:  -129 MG/DL



   Borderline High Risk:  -159 MG/DL



   High Risk:  -189 MG/DL



   Very High Risk:  LDL Greater than 189 MG/DL

 

 71  Anion gap measurement may be of limited value in the



   presence of any alkalosis, especially in a combined acid



   base disorder.



   .

 

 72  *******Because ethnic data is not always readily available,



   this report includes an eGFR for both -Americans and



   non- Americans.****



   The National Kidney Disease Education Program (NKDEP) does



   not endorse the use of the MDRD equation for patients that



   are not between the ages of 18 and 70, are pregnant, have



   extremes of body size, muscle mass, or nutritional status,



   or are non- or non-.



   According to the National Kid[employer] Foundation, irrespective of



   diagnosis, the stage of the disease is based on the level of



   kidney function:



   Stage Description                      GFR(mL/min/1.73 m(2))



   1     Kidney damage with normal or decreased GFR       90



   2     Kidney damage with mild decrease in GFR          60-89



   3     Moderate decrease in GFR                         30-59



   4     Severe decrease in GFR                           15-29



   5     Kidney failure                       &lt;15 (or dialysis)

 

 73  CHOLESTEROL INTERPRETATION:



   Desirable:  Less than 200 MG/DL



   Borderline-High Risk:  200-239 MG/DL



   High-Risk:  240 MG/DL and over

 

 74  HDL INTERPRETATION:



   Undesirable: High Risk:  Less than 40 MG/DL



   Desirable:  Low Risk:  Greater than 60 MG/DL

 

 75  LDL INTERPRETATION:



   Low Risk Optimal Level:  LDL Less than 100 MG/DL



   Near or Above Optimal:  -129 MG/DL



   Borderline High Risk:  -159 MG/DL



   High Risk:  -189 MG/DL



   Very High Risk:  LDL Greater than 189 MG/DL

 

 76  Anion gap measurement may be of limited value in the



   presence of any alkalosis, especially in a combined acid



   base disorder.



   .

 

 77  ** Note change in reference range as of 08.  The



   change was based on recommendations from the American



   Diabetes Association.

 

 78  *******Because ethnic data is not always readily available,



   this report includes an eGFR for both -Americans and



   non- Americans.****



   The National Kidney Disease Education Program (NKDEP) does



   not endorse the use of the MDRD equation for patients that



   are not between the ages of 18 and 70, are pregnant, have



   extremes of body size, muscle mass, or nutritional status,



   or are non- or non-.



   According to the National Kid[employer] Foundation, irrespective of



   diagnosis, the stage of the disease is based on the level of



   kidney function:



   Stage Description                      GFR(mL/min/1.73 m(2))



   1     Kidney damage with normal or decreased GFR       90



   2     Kidney damage with mild decrease in GFR          60-89



   3     Moderate decrease in GFR                         30-59



   4     Severe decrease in GFR                           15-29



   5     Kidney failure                       &lt;15 (or dialysis)

 

 79  Anion gap measurement may be of limited value in the



   presence of any alkalosis, especially in a combined acid



   base disorder.



   .

 

 80  ** Note change in reference range as of 08.  The



   change was based on recommendations from the American



   Diabetes Association.

 

 81  Please note change in reference range effective 08



   .

 

 82  *******Because ethnic data is not always readily available,



   this report includes an eGFR for both -Americans and



   non- Americans.****



   The National Kidney Disease Education Program (NKDEP) does



   not endorse the use of the MDRD equation for patients that



   are not between the ages of 18 and 70, are pregnant, have



   extremes of body size, muscle mass, or nutritional status,



   or are non- or non-.



   According to the National Kidney Foundation, irrespective of



   diagnosis, the stage of the disease is based on the level of



   kidney function:



   Stage Description                      GFR(mL/min/1.73 m(2))



   1     Kidney damage with normal or decreased GFR       90



   2     Kidney damage with mild decrease in GFR          60-89



   3     Moderate decrease in GFR                         30-59



   4     Severe decrease in GFR                           15-29



   5     Kidney failure                       &lt;15 (or dialysis)

 

 83  CHOLESTEROL INTERPRETATION:



   Desirable:  Less than 200 MG/DL



   Borderline-High Risk:  200-239 MG/DL



   High-Risk:  240 MG/DL and over

 

 84  HDL INTERPRETATION:



   Undesirable: High Risk:  Less than 40 MG/DL



   Desirable:  Low Risk:  Greater than 60 MG/DL

 

 85  LDL INTERPRETATION:



   Low Risk Optimal Level:  LDL Less than 100 MG/DL



   Near or Above Optimal:  -129 MG/DL



   Borderline High Risk:  -159 MG/DL



   High Risk:  -189 MG/DL



   Very High Risk:  LDL Greater than 189 MG/DL

 

 86  Anion gap measurement may be of limited value in the



   presence of any alkalosis, especially in a combined acid



   base disorder.



   .

 

 87  ** Note change in reference range as of 08.  The



   change was based on recommendations from the American



   Diabetes Association.

 

 88  Please note change in reference range effective 08



   



   



   .

 

 89  *******Because ethnic data is not always readily available,



   this report includes an eGFR for both -Americans and



   non- Americans.****



   The National Kidney Disease Education Program (NKDEP) does



   not endorse the use of the MDRD equation for patients that



   are not between the ages of 18 and 70, are pregnant, have



   extremes of body size, muscle mass, or nutritional status,



   or are non- or non-.



   According to the National Kid[employer] Foundation, irrespective of



   diagnosis, the stage of the disease is based on the level of



   kidney function:



   Stage Description                      GFR(mL/min/1.73 m(2))



   1     Kidney damage with normal or decreased GFR       90



   2     Kidney damage with mild decrease in GFR          60-89



   3     Moderate decrease in GFR                         30-59



   4     Severe decrease in GFR                           15-29



   5     Kidney failure                       &lt;15 (or dialysis)

 

 90  epi lg, sperm occ, 4-6 rbc

 

 91  Anion gap measurement may be of limited value in the



   presence of any alkalosis, especially in a combined acid



   base disorder.



   .

 

 92  ** Note change in reference range as of 08.  The



   change was based on recommendations from the American



   Diabetes Association.

 

 93  Please note change in reference range effective 08



   



   



   .

 

 94  CHOLESTEROL INTERPRETATION:



   Desirable:  Less than 200 MG/DL



   Borderline-High Risk:  200-239 MG/DL



   High-Risk:  240 MG/DL and over

 

 95  HDL INTERPRETATION:



   Undesirable: High Risk:  Less than 40 MG/DL



   Desirable:  Low Risk:  Greater than 60 MG/DL

 

 96  LDL INTERPRETATION:



   Low Risk Optimal Level:  LDL Less than 100 MG/DL



   Near or Above Optimal:  -129 MG/DL



   Borderline High Risk:  -159 MG/DL



   High Risk:  -189 MG/DL



   Very High Risk:  LDL Greater than 189 MG/DL

 

 97  Anion gap measurement may be of limited value in the



   presence of any alkalosis, especially in a combined acid



   base disorder.



   .

 

 98  ** Note change in reference range as of 08.  The



   change was based on recommendations from the American



   Diabetes Association.

 

 99  Please note change in reference range effective 08



   



   



   .

 

 100  Anion gap measurement may be of limited value in the



   presence of any alkalosis, especially in a combined acid



   base disorder.



   .

 

 101  Please note change in reference range effective 08



   



   



   .

 

 102  FASTING

 

 103  CHOLESTEROL INTERPRETATION:



   Desirable:  Less than 200 MG/DL



   Borderline-High Risk:  200-239 MG/DL



   High-Risk:  240 MG/DL and over

 

 104  HDL INTERPRETATION:



   Undesirable: High Risk:  Less than 40 MG/DL



   Desirable:  Low Risk:  Greater than 60 MG/DL

 

 105  LDL INTERPRETATION:



   Low Risk Optimal Level:  LDL Less than 100 MG/DL



   Near or Above Optimal:  -129 MG/DL



   Borderline High Risk:  -159 MG/DL



   High Risk:  -189 MG/DL



   Very High Risk:  LDL Greater than 189 MG/DL

 

 106  Anion gap measurement may be of limited value in the



   presence of any alkalosis, especially in a combined acid



   base disorder.



   .

 

 107  Please note change in reference range effective 08



   



   



   .

 

 108  Classification: Desirable



   .

 

 109  Classification: Low



   .

 

 110  CALCULATED LDL APPROXIMATES THE VALUE OF A DIRECT LDL



   MEASUREMENT.



   Classification: Optimal Level



   .

 

 111  Anion gap measurement may be of limited value in the



   presence of any alkalosis, especially in a combined acid



   base disorder.



   .

 

 112  Classification: Desirable



   .

 

 113  Classification: Low



   .

 

 114  



   CALCULATED LDL APPROXIMATES THE VALUE OF A DIRECT LDL



   MEASUREMENT.



   Classification: Optimal Level



   .

 

 115  Classification: Desirable



   .

 

 116  Classification: Low



   .

 

 117  



   CALCULATED LDL APPROXIMATES THE VALUE OF A DIRECT LDL



   MEASUREMENT.



   Classification: Optimal Level



   .

 

 118  Anion gap measurement may be of limited value in the



   presence of any alkalosis, especially in a combined acid



   base disorder.



   .

 

 119  Classification: Desirable



   .

 

 120  Classification: Low



   .

 

 121  



   CALCULATED LDL APPROXIMATES THE VALUE OF A DIRECT LDL



   MEASUREMENT.



   Classification: Borderline High



   .

 

 122  Anion gap measurement may be of limited value in the



   presence of any alkalosis, especially in a combined acid



   base disorder.



   .

 

 123  Anion gap measurement may be of limited value in the



   presence of any alkalosis, especially in a combined acid



   base disorder.



   .

 

 124  Classification: Desirable



   .

 

 125  Classification: Low



   .

 

 126  



   CALCULATED LDL APPROXIMATES THE VALUE OF A DIRECT LDL



   MEASUREMENT.



   Classification: Borderline High



   .

 

 127  Anion gap measurement may be of limited value in the



   presence of any alkalosis, especially in a combined acid



   base disorder.



   .

 

 128  Anion gap measurement may be of limited value in the



   presence of any alkalosis, especially in a combined acid



   base disorder.



   .

 

 129  Classification: Desirable



   .

 

 130  Classification: Low



   .

 

 131  



   CALCULATED LDL APPROXIMATES THE VALUE OF A DIRECT LDL



   MEASUREMENT.



   Classification: Borderline High



   .

 

 132  Anion gap measurement may be of limited value in the



   presence of any alkalosis, especially in a combined acid



   base disorder.



   .







Procedures







 Date  CPT Code  Description  Status

 

 2018  37014  X-Ray Abdomen 1 V TC  Completed

 

 2018  41254  X-Ray Abdomen 1 V pc  Completed

 

 2018  10979  X-Ray Abdomen 1 V  Completed

 

 2018  09940  X-Ray Chest 2 V TC  Completed

 

 2018  58765  X-Ray Chest 2 V pc  Completed

 

 2018  79544  X-Ray Chest 2 V  Completed

 

 2017  89239  Electrocardiogram Complete  Completed

 

 2016  62120  Shave Skin Lesion  .6-1CM Trunk/Arm/Leg  Completed

 

 2016  26235  Shave Skin Lesion  .6-1CM Trunk/Arm/Leg  Completed

 

 2016  64536  Electrocardiogram Complete  Completed

 

 2015  96947  Electrocardiogram Complete  Completed

 

 01/15/2014  59294  Electrocardiogram Complete  Completed

 

 2013  63935  Electrocardiogram Complete  Completed

 

 2012  21452  Electrocardiogram Complete  Completed

 

 2011  53565  Electrocardiogram Complete  Completed

 

 2010  38188  Electrocardiogram Complete  Completed

 

 2009  40974  Electrocardiogram Complete  Completed

 

 2007  57348  Electrocardiogram Complete  Completed

 

 2007  42230  X-Ray Knee, Ap &amp; Lateral Views  Completed

 

 2007  55469  X-Ray Chest Two Views  Completed

 

 10/23/2006  29031  Electrocardiogram Complete  Completed

 

 2005  68530  Electrocardiogram Complete  Completed

 

 2004    Colonoscopy  Completed







Encounters







 Type  Date  Location  Provider  CPT E/M  Dx

 

 Office Visit  2018  3:45p  [facility]  Cholo Boo M.D.  50297  
I95.1









 R55

 

 I10

 

 L29.9

 

 D72.1

 

 N18.9

 

 E87.6









 Office Visit  2018 10:30a  [facility]  Cholo Boo M.D.  86495  
L29.9









 R21









 Office Visit  2018 10:30a  [facility]  Cholo Boo M.D.  42310  I10









 I50.20

 

 I50.32

 

 N18.9

 

 D72.829

 

 Z23

 

 Z91.81

 

 R55









 Office Visit  2018  2:45p  [facility]  Cholo Boo M.D.  01603  
R06.00









 R53.1

 

 I50.20

 

 E87.6

 

 I50.32









 Office Visit  2018 11:30a  [facility]  Cholo Boo M.D.  30725  
I50.20









 J91.8

 

 R05

 

 I10

 

 E87.6

 

 D64.9









 Office Visit  2018  9:20a  [facility]  Sofie Mead P.AMaureen  75580  J90









 R05

 

 R31.0









 Office Visit  01/15/2018  4:00p  [facility]  Sofie Mead P.ASIF  69787  J90









 J06.9

 

 R05

 

 R06.2

 

 I10

 

 I25.10

 

 S29.012A









 Office Visit  2018  2:35p  [facility]  Haylie Muhammad PA  10916  J20.9

 

 Office Visit  2017 10:00a  [facility]  Cholo Boo M.D.  28621  
I48.0









 Z79.01

 

 I25.10

 

 I10

 

 M17.0

 

 R49.0

 

 R73.01

 

 M54.5

 

 R10.33









 Office Visit  2017 10:00a  [facility]  Cholo Boo M.D.  18174  
I25.10









 I48.0

 

 I10

 

 M17.0

 

 R49.0









 Office Visit  2017 10:15a  [facility]  Cholo Boo M.D.  20641  
R73.01









 I10

 

 M54.5

 

 E87.1

 

 E71.30









 Office Visit  2016 11:00a  [facility]  Cholo Boo M.D.  21103  
M54.5









 I10

 

 R73.01

 

 E87.1

 

 E71.30

 

 D48.5









 Office Visit  2016 11:00a  [facility]  Cholo Boo M.D.  16166  
R73.01









 I10

 

 E87.1

 

 M54.5









 Office Visit  2015 10:45a  [facility]  Cholo Boo M.D.  06518  
401.1









 276.1

 

 724.2

 

 272.8

 

 V03.82

 

 V07.2









 Office Visit  2015  9:45a  [facility]  Cholo Boo M.D.  13389  
790.21









 401.1

 

 272.8

 

 276.1

 

 724.2

 

 722.52









 Office Visit  2014  9:15a  [facility]  Cholo Boo M.D.  83165  
401.1









 790.21

 

 272.8

 

 724.2

 

 722.52









 Office Visit  01/15/2014 10:45a  [facility]  Cholo Boo M.D.  34712  
401.1









 790.21

 

 272.8









 Office Visit  2013  1:30p  [facility]  Cholo Boo M.D.  16533  
401.1









 276.1

 

 272.8

 

 790.21









 Office Visit  2013  2:30p  [facility]  Cholo Boo M.D.  26310  
401.1









 276.1









 Office Visit  2013 11:00a  [facility]  Cholo Boo M.D.  35102  
401.1









 790.21

 

 272.8









 Office Visit  2012  2:30p  [facility]  Cholo Boo M.D.  82215  
401.1









 272.8

 

 790.21

 

 276.1









 Office Visit  2012  9:15a  [facility]  Cholo Boo M.D.  08842  
401.1









 272.8

 

 790.21

 

 276.1

 

 V65.49

 

 v06.1

 

 v07.2









 Office Visit  2011 12:55p  [facility]  Cholo Boo M.D.  08129  
366.9









 401.1

 

 272.8

 

 790.21

 

 477.9

 

 V72.84









 Office Visit  2011  9:30a  [facility]  Cholo Boo M.D.  21086  
401.1









 276.1

 

 272.8

 

 790.21









 Office Visit  10/06/2010  1:45p  [facility]  Cholo Boo M.D.  45998  
786.2

 

 Office Visit  2010  8:55a  [facility]  Cholo Boo M.D.  13611  
401.1









 276.1

 

 272.8

 

 719.46

 

 715.16

 

 477.9

 

 V04.81

 

 V07.2









 Office Visit  2010  9:30a  [facility]  Cholo Boo M.D.  78836  
401.1









 276.1

 

 272.8

 

 477.9

 

 V65.49

 

 V04.81









 Office Visit  2009  8:45a  [facility]  Cholo Boo M.D.  92613  
401.1









 276.1

 

 477.9

 

 V04.81

 

 V07.2









 Office Visit  2009 11:15a  [facility]  Cholo Boo M.D.  90255  
401.1









 272.8

 

 276.1









 Office Visit  2008  4:15p  [facility]  Cholo Boo M.D.  80409  
401.1









 272.8

 

 276.1

 

 787.3

 

 V04.81

 

 V07.2









 Office Visit  2008  2:15p  [facility]  Cholo Boo M.D.  83156  
401.1









 272.8

 

 728.9

 

 276.1









 Office Visit  2008  8:55a  [facility]  Cholo Boo M.D.  23533  
401.1









 272.8

 

 728.9









 Office Visit  2008 11:00a  [facility]  Loyda Castle MD  63449  473.9









 401.1

 

 692.74









 Office Visit  2007  8:45a  [facility]  Cholo Boo M.D.  23337  
401.1









 272.8

 

 V65.49

 

 715.16

 

 465.9

 

 V04.81









 Office Visit  2007  9:30a  [facility]  Cholo Boo M.D.  93974  
401.1









 272.8

 

 719.46

 

 715.16









 Office Visit  2007  1:15p  [facility]  Cholo Boo M.D.  13049  
465.9









 786.2

 

 466.0

 

 719.46









 Office Visit  2006  9:15a  [facility]  Cholo Boo M.D.  03838  
401.1









 272.8

 

 790.21









 Office Visit  10/23/2006  9:45a  [facility]  Cholo Boo M.D.  24158  
401.1









 272.8

 

 790.21









 Office Visit  2006  9:30a  [facility]  Cholo Boo M.D.  33971  
401.1









 285.9

 

 785.2









 Office Visit  10/21/2005  9:30a  [facility]  Cholo Boo M.D.  61435  
401.1









 285.9

 

 V03.82

 

 V07.2









 Office Visit  2005  8:45a  [facility]  Cholo Boo M.D.  89073  
401.1









 285.9









 Office Visit  2005  1:30p  [facility]  Cholo Boo M.D.  20469  
401.1









 285.9









 Office Visit  2005 10:45a  [facility]  Cholo Boo M.D.  61760  
401.1









 272.8

 

 285.9

 

 790.21









 Office Visit  2004  8:55a  [facility]  Cholo Boo M.D.  03293  
401.1









 272.8

 

 V76.51

 

 285.9









 Office Visit  2003  3:15p  [facility]  Cholo Boo M.D.  62476  
461.0









 461.1







Plan of Care

Future Appointment(s):2018 10:15 am - Cholo Boo M.D. at [facility] 
[id number] - Cholo Boo M.D.I95.1 Orthostatic hypotensionComments:
[id number] Syncope and bnjrajnsH51 Essential (primary) hypertensionComments:
[id number] Pruritus, unspecifiedComments:suspect this is related to the 
underlying eosinophilia. Await derm eval, heme/onc eval. Discussed doxepin 
again. Advised re the many possible S/es and fact that it could bring on or 
exacerbate orthostasis and agreed to hold off on it for now.D72.1 
EosinophiliaComments:Dx and further management per Dr ThomasN18.9 Chronic kidney 
disease, unspecifiedComments:per Dr Rodriges87.6 HypokalemiaNew Medication:
Potassium Chloride ER 20 MeqComments:will inc K+ given result of 3.1 today, 3.3 
a few days ago. Given his renal f/u will assume that Dr Jha will recheck K 
likely with other labs, and Daisy will make sure this gets addressed at his visit 
next week.

## 2018-05-23 NOTE — ECHO
Patient:      OBDULIA MONTELONGO 

Med Rec#:     [id number]            :          [id number]          

Date:         2018            Age:          86y                 

Account#:     [id number]          Height:       170.18 cm / 67.0 in

Accession#:   [id number]           Weight:       72.57 kg / 159.9 lbs

Sex:          M                     BSA:          1.84

Room#:        ICU 6                 

Admit Date#:  2018          

Type:         Inpatient

 

Referring:    Qutaybeh Maghaydah, MD

Reading:      Davian Severino DO

Sonographer:  Edwina Ramirez RDCS,RDMS

CC:           Cholo Boo MD

______________________________________________________________________

 

Transthoracic Echocardiogram

 

Indication:

CP

BP:           133/67

HR:           73

Rhythm:       NSR with PVCs

 

Findings     

History:

CAD, CABG, CHF, AFIB, HTN, HLD, CKD, RBBB 

 

Technical Comments:

The study quality is fair.  The study is technically limited due to poor

apical windows.  

 

Left Ventricle:

The left ventricular chamber size is normal. Mild concentric left

ventricular hypertrophy is observed. Mild global hypokinesis of the left

ventricle is observed. The estimated ejection fraction is 45-50%.  Post

surgical hypokinesis of the interventricular septum is observed

consistent with coronary artery bypass.  Abnormal left ventricular

diastolic function is observed. 

 

Left Atrium:

The left atrium is moderately dilated. 

 

Right Ventricle:

The right ventricular cavity size is normal. The right ventricular

global systolic function is mildly to moderately reduced. 

 

Right Atrium:

The right atrium is mildly dilated.  

 

Aortic Valve:

The aortic valve leaflets are moderately thickened. Moderate aortic

leaflet calcification is visualized. Systolic excursion of the aortic

valve cusps is reduced. There is aortic annular calcification. There is

moderate aortic regurgitation. There is mild aortic stenosis. The mean

gradient of the aortic valve is 15 mmHg.  The highest aortic valve

velocity was obtained with the standard probe from the A5C view. The

measured aortic regurgitation pressure half-time is 373 msec.  

 

Mitral Valve:

Moderate mitral annular calcification present. The mitral valve leaflets

are mildly thickened. There is mild to moderate mitral regurgitation. 

There is no evidence of mitral stenosis. 

 

Tricuspid Valve:

The tricuspid valve leaflets are normal.  There is trace tricuspid

regurgitation.  No pulmonary hypertension is noted. 

 

Pulmonic Valve:

There is no evidence of pulmonic valve thickening. There is no evidence

of pulmonic regurgitation. 

 

Pericardium:

There is no significant pericardial effusion. 

 

Aorta:

There is mild dilatation of the ascending aorta. There is no dilatation

of the aortic arch. There is no dilation of the aortic root. 

 

Pulmonary Artery:

The main pulmonary artery is not well visualized. 

 

Venous:

The inferior vena cava appears normal in size. There is a greater than

50% respiratory change in the inferior vena cava dimension. 

 

Contrast:

Definity was used to optimize study.  A total of 2 ml was used 

 

Conclusions

The left ventricular chamber size is normal.

Mild concentric left ventricular hypertrophy is observed.

The estimated ejection fraction is 45-50%. 

Mild global hypokinesis of the left ventricle is observed.

The left atrium is moderately dilated.

The right ventricular cavity size is normal.

The right ventricular global systolic function is mildly to moderately

reduced.

There is mild aortic stenosis.

There is moderate aortic regurgitation.

There is mild to moderate mitral regurgitation. 

No pulmonary hypertension is noted.

Definity was used to optimize study.  

 

Compared to prior study from 2018, there is now biventricular

dysfunction, previously LVEF 50-55% and RV function low normal.  

 

Measurements     

Name                    Value         Normal Range            

RVIDd (AP) 2D           3.5 cm        (0.9 - 2.6)             

RVDdMajor (2D)          4 cm          (2.2 - 4.4)             

RAd ISD 4CH             4.8 cm        (3.4 - 4.9)             

RA (A4C)W               5.2 cm        (2.9 - 4.6)             

IVSd (2D)               1.2 cm        (0.6 - 1)               

LVPWd (2D)              1.1 cm        (0.6 - 1)               

LVIDd (2D)              4.4 cm        (3.6 - 5.4)             

LVIDs (2D)              3.5 cm        -                        

LV FS (2D)              21 %          (25 - 45)               

Aortic Annulus          2 cm          (1.4 - 2.6)             

Ao root diameter (2D)   3.5 cm        (2.1 - 3.5)             

Ascending Ao            3.8 cm        (2.1 - 3.4)             

Aortic arch             2.8 cm        (1.8 - 3.4)             

LA dimension (AP) 2D    4.5 cm        (2.3 - 3.8)             

LAd ISD 4CH             6.8 cm        (2.9 - 5.3)             

LA ISD 4CH W            5 cm          (2.5 - 4.5)             

 

Name                    Value         Normal Range            

LA ESV SP 4CH (A/L)     92.8 ml       -                        

LA ESV SP 2CH (A/L)     59.94 ml      -                        

LA ESV BP (A/L)         84.91 ml      -                        

LA ESV BP (A/L) index   46 ml/m2      -                        

LA ESV SP 4CH (MOD)     87.39 ml      -                        

LA ESV SP 2CH (MOD)     55.65 ml      -                        

 

Name                    Value         Normal Range            

MV E-wave Vmax          1.2 m/sec     -                        

MV deceleration time    229 msec      -                        

MV A-wave Vmax          0.7 m/sec     -                        

MV E:A ratio            1.7 ratio     -                        

LV septal e' Vmax       0.03 m/sec    -                        

LV lateral e' Vmax      0.05 m/sec    -                        

LV E:e' septal ratio    40 ratio      -                        

LV E:e' lateral ratio   24 ratio      -                        

 

Name                    Value         Normal Range            

AV Vmax                 2.5 m/sec     -                        

AV VTI                  58 cm         -                        

AV peak gradient        25 mmHg       -                        

AV mean gradient        15 mmHg       -                        

LVOT diameter           2 cm          -                        

LVOT Vmax               0.9 m/sec     -                        

LVOT VTI                21 cm         -                        

LVOT peak gradient      3.2 mmHg      -                        

LVOT mean gradient      1.7 mmHg      -                        

DOI (VTI)               0.4 ratio     -                        

AR PHT                  373 msec      -                        

JUAN FRANCISCO Vmax                0.5 m/sec     -                        

 

Name                    Value         Normal Range            

MV Vmax                 1.1 m/sec     -                        

MV VTI                  27 cm         -                        

MV peak gradient        5 mmHg        -                        

MV mean gradient        1.4 mmHg      -                        

MV PHT                  67 msec       -                        

MR Vmax                 5.2 m/sec     -                        

MR VTI                  190 cm        -                        

MR volume (PISA)        18 ml         -                        

MR flow (PISA)          55 ml/sec     -                        

MR ERO                  1 cm2         -                        

MR PISA radius          0.5 cm        -                        

MR alias Vmax           32 cm/sec     -                        

MVA (PHT)               3.3 cm2       -                        

MVA (continuity VTI)    2.5 cm2       -                        

 

Name                    Value         Normal Range            

TR Vmax                 2.3 m/sec     -                        

TR peak gradient        21 mmHg       -                        

RAP                     3 mmHg        -                        

RVSP                    24 mmHg       -                        

IVC diameter            1.8 cm        -                        

 

Name                    Value         Normal Range            

PV Vmax                 0.6 m/sec     -                        

PV peak gradient        1.4 mmHg      -                        

 

Electronically signed by: Davian Severino DO on 2018 10:14:21

## 2018-05-23 NOTE — PN
Subjective


Date of Service: 05/23/18


Interval History: 





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





Breathing improved, still tachypneic with talking and volume overloaded on exam


No further chest discomfort


Good diuresis yesterday, less today 





Medications


Active Medications: 








Acetaminophen (Tylenol Tab*)  650 mg PO Q6H PRN


   PRN Reason: pain/fever


   Last Admin: 05/22/18 19:22 Dose:  650 mg


Al Hydrox/Mg Hydrox/Simethicone (Maalox Plus*)  30 ml PO Q6H PRN


   PRN Reason: HEARTBURN


Aspirin (Aspirin Ec Tab*)  81 mg PO DAILY [facility]


   Last Admin: 05/23/18 08:53 Dose:  81 mg


Atorvastatin Calcium (Lipitor*)  10 mg PO DAILY [facility]


   Last Admin: 05/23/18 08:53 Dose:  10 mg


Cholecalciferol (Vitamin D Tab*)  1,000 units PO DAILY [facility]


   Last Admin: 05/23/18 08:50 Dose:  1,000 units


Heparin Sodium (Porcine) (Heparin Vial(*))  5,000 units IV .PER PROTOCOL PRN


   PRN Reason: BOLUS  PER HEP. DRIP ORDERS


   Last Admin: 05/22/18 19:34 Dose:  4,000 units


Hydroxyzine HCl (Atarax Tab*)  25 mg PO Q6H PRN


   PRN Reason: ITCHING


Heparin Sodium/Dextrose (Heparin Drip 25,000 Units(*))  25,000 units in 500 mls 

@ 0 mls/hr IV PER RATE ROBB; Per Protocol


   PRN Reason: Protocol


   Last Admin: 05/22/18 19:27 Dose:  18 mls/hr


Ceftriaxone Sodium 1 gm/ (Sodium Chloride)  50 mls @ 200 mls/hr IVPB BEDTIME [facility]


Azithromycin 500 mg/ Sodium (Chloride)  250 mls @ 250 mls/hr IVPB Q24HR@2200 [facility]


Isosorbide Mononitrate (Imdur Er Tab*)  30 mg PO DAILY [facility]


Magnesium Oxide (Magox 400 Tab*)  800 mg PO DAILY [facility]


   Last Admin: 05/23/18 08:50 Dose:  800 mg


Metoprolol Tartrate (Lopressor Tab*)  37.5 mg PO BID [facility]


   Last Admin: 05/23/18 08:51 Dose:  37.5 mg


Metoprolol Tartrate (Lopressor Iv*)  5 mg IV Q6H PRN


   PRN Reason: SBP>180


Montelukast Sodium (Singulair Tab*)  5 mg PO DAILY [facility]


   Last Admin: 05/23/18 08:50 Dose:  5 mg


Omeprazole (Prilosec Cap*)  20 mg PO QAM [facility]


   Last Admin: 05/23/18 08:51 Dose:  20 mg


Prednisone (Deltasone Tab*)  50 mg PO DAILY [facility]


   Last Admin: 05/23/18 08:53 Dose:  50 mg


Psyllium Hydrophilic Mucilloid (Metamucil Oscar*)  1 pkt PO BID [facility]


   Last Admin: 05/23/18 08:54 Dose:  1 pkt








Objective


Vital Signs:











Temp Pulse Resp BP Pulse Ox


 


 98.7 F   73   24   119/67   95 


 


 05/23/18 12:00  05/23/18 15:00  05/23/18 15:00  05/23/18 14:00  05/23/18 15:00











Oxygen Devices in Use Now: Nasal Cannula - 3 liters


Appearance: nad but appears tachypneic when speaking


Respiratory: - - mild increased work of breathing with tachypnea, b/l rales 

bases


Cardiovascular: RRR, - - 2/6 murmur systolic, sternotomy scar noted


Extremities: - - 1+ edema b/l


Neurological: Alert and Oriented x 3


Laboratory Results: 


 





 05/23/18 03:51 





 05/23/18 03:51 





 











INR (Anticoag Therapy)  1.08  (0.77-1.02)  H  05/22/18  14:09    


 


APTT  47.6 seconds (26.0-36.3)  H  05/23/18  15:04    


 


Total Bilirubin  0.70 mg/dL (0.2-1.0)   05/23/18  03:51    


 


AST  43 U/L (13-39)  H  05/23/18  03:51    


 


ALT  60 U/L (7-52)  H  05/23/18  03:51    


 


Alkaline Phosphatase  209 U/L ()  H  05/23/18  03:51    


 


CK-MB (CK-2)  3.9 ng/mL (0.6-6.3)   05/22/18  14:09    


 


B-Natriuretic Peptide  2487 pg/mL (-100) H  05/22/18  14:09    


 


Total Protein  5.8 g/dL (6.4-8.9)  L  05/23/18  03:51    


 


Albumin  2.5 g/dL (3.2-5.2)  L  05/23/18  03:51    


 


Globulin  3.3 g/dL (2-4)   05/23/18  03:51    


 


Albumin/Globulin Ratio  0.8  (1-3)  L  05/23/18  03:51    


 


TSH  2.19 mcIU/mL (0.34-5.60)   05/22/18  14:09    








 











  05/22/18 05/22/18 05/22/18





  17:03 20:16 23:20


 


Troponin I  0.51 H*  1.08 H*  1.49 H*














  05/23/18





  03:51


 


Troponin I  1.32 H*











Diagnostic Imaging: 





echo 5/23/2018: LVEF 45-50%, normal rv size with mild-mod reduced function, 

mild as, moderate ai, mild-moderate MR


EKG Data: 





ekg today NSR, pac rbbb, with non-specific st/t changes grossly unchanged from 5 /1/2018





Assessment/Plan


86 year old man admitted with ADHF and type 2 small MI in setting of prednisone 

use, CKD, anemia and possibly an infection 





- d/c nitro paste and add imdur 30 mg po daily (ordered)


- If remains without further angina would d/c heparin gtt tomorrow


- Given recent severe thrombocytopenia would not add a second anti-platelet at 

this time


- Continue statin, lft's noted.   


- Continue beta-blocker.


- Needs more diuresis, I gave an additional lasix 80 mg IV x 1 with 40 of k+ 

now (ordered).  I think kidney function is worse than what current labs suggest 

and may rise significantly when he is at the point of euvolemia.  There is 

concern about higher dose of lasix with patient and family so may be able to 

use low dose torsemide at at discharge.  Would use as short of steroid course 

as possible.  





Thank you for allowing me to participate in the cardiovascular care of this 

patient.  Please do not hesitate to contact me with questions or concerns.

## 2018-05-23 NOTE — PN
Subjective


Date of Service: 05/23/18


Interval History: 


HOSPITALIST PROGRESS NOTE


Patient seen and examined at bedside. Care reviewed and d/w Leslie Andujar RN.


He feels better today. Denies chest pain, breathing is easier. Does not think 

his legs are puffy "although everyone tells me they are".


Family History: Unchanged from Admission


Social History: Unchanged from Admission


Past Medical History: Unchanged from Admission





Objective


Active Medications: 








Acetaminophen (Tylenol Tab*)  650 mg PO Q6H PRN


   PRN Reason: pain/fever


   Last Admin: 05/22/18 19:22 Dose:  650 mg


Al Hydrox/Mg Hydrox/Simethicone (Maalox Plus*)  30 ml PO Q6H PRN


   PRN Reason: HEARTBURN


Aspirin (Aspirin Ec Tab*)  81 mg PO DAILY [facility]


   Last Admin: 05/23/18 08:53 Dose:  81 mg


Atorvastatin Calcium (Lipitor*)  10 mg PO DAILY [facility]


   Last Admin: 05/23/18 08:53 Dose:  10 mg


Cholecalciferol (Vitamin D Tab*)  1,000 units PO DAILY [facility]


   Last Admin: 05/23/18 08:50 Dose:  1,000 units


Heparin Sodium (Porcine) (Heparin Vial(*))  5,000 units IV .PER PROTOCOL PRN


   PRN Reason: BOLUS  PER HEP. DRIP ORDERS


   Last Admin: 05/22/18 19:34 Dose:  4,000 units


Hydroxyzine HCl (Atarax Tab*)  25 mg PO Q6H PRN


   PRN Reason: ITCHING


Heparin Sodium/Dextrose (Heparin Drip 25,000 Units(*))  25,000 units in 500 mls 

@ 0 mls/hr IV PER RATE ROBB; Per Protocol


   PRN Reason: Protocol


   Last Admin: 05/22/18 19:27 Dose:  18 mls/hr


Ceftriaxone Sodium 1 gm/ (Sodium Chloride)  50 mls @ 200 mls/hr IVPB BEDTIME [facility]


Azithromycin 500 mg/ Sodium (Chloride)  250 mls @ 250 mls/hr IVPB Q24HR@2200 [facility]


Magnesium Oxide (Magox 400 Tab*)  800 mg PO DAILY [facility]


   Last Admin: 05/23/18 08:50 Dose:  800 mg


Metoprolol Tartrate (Lopressor Tab*)  37.5 mg PO BID [facility]


   Last Admin: 05/23/18 08:51 Dose:  37.5 mg


Metoprolol Tartrate (Lopressor Iv*)  5 mg IV Q6H PRN


   PRN Reason: SBP>180


Montelukast Sodium (Singulair Tab*)  5 mg PO DAILY [facility]


   Last Admin: 05/23/18 08:50 Dose:  5 mg


Nitroglycerin (Nitroglycerin 2% Oint*)  0.5 inch TOPICAL 0800,1400 [facility]


   PRN Reason: Protocol


   Last Admin: 05/23/18 08:45 Dose:  0.5 inch


Omeprazole (Prilosec Cap*)  20 mg PO QAM [facility]


   Last Admin: 05/23/18 08:51 Dose:  20 mg


Prednisone (Deltasone Tab*)  50 mg PO DAILY [facility]


   Last Admin: 05/23/18 08:53 Dose:  50 mg


Psyllium Hydrophilic Mucilloid (Metamucil Oscar*)  1 pkt PO BID [facility]


   Last Admin: 05/23/18 08:54 Dose:  1 pkt








 Vital Signs - 8 hr











  05/23/18 05/23/18 05/23/18





  06:00 07:00 08:00


 


Temperature   98.4 F


 


Pulse Rate  65 62


 


Respiratory 18 26 20





Rate   


 


Blood Pressure 133/67 153/68 140/65





(mmHg)   


 


O2 Sat by Pulse 99 98 98





Oximetry   














  05/23/18 05/23/18 05/23/18





  11:01 12:00 12:01


 


Temperature  98.7 F 


 


Pulse Rate 63 66 78


 


Respiratory 25 21 24





Rate   


 


Blood Pressure 92/52  135/64





(mmHg)   


 


O2 Sat by Pulse 95 93 89





Oximetry   











Oxygen Devices in Use Now: Nasal Cannula - 3 liters


Appearance: Pleasant elderly gentleman lying in bed in NAD.


Eyes: No Scleral Icterus


Ears/Nose/Mouth/Throat: Mucous Membranes Moist


Neck: Trachea Midline


Respiratory: Symmetrical Chest Expansion and Respiratory Effort, - - BS+ 

bilaterally with bilateral crackles, scattered wheezes


Cardiovascular: RRR - Normal S1 and S2


Abdominal: NL Sounds; No Tenderness; No Distention


Extremities: - - Severe bilateral LE edema


Neurological: Alert and Oriented x 3, NL Muscle Strength and Tone


Result Diagrams: 


 05/23/18 03:51





 05/23/18 03:51





Assess/Plan/Problems-Billing


Assessment: 


Mr. Vazquez is an 87yo with PMH of CAD s/p/ CABG, HTN, HLD, CKD stage 3, 

paroxysmal Afib, RBBB, recent admission for syncope secondary to orthostatic 

hypotension and JOSE ARMANDO, who presented to ED with dyspnea and edema, found to be in 

acute CHF exacerbation.





- Patient Problems


(1) Acute hypoxemic respiratory failure


Comment: - Patient required 10 liters of O2 in the ED, vapotherm overnight in 

ICU, but is now down to 3 liters.


- Source is CHF exacerbation.   





(2) Acute on chronic diastolic (congestive) heart failure


Comment: - Echo shows EF 45-50% with mild global hypokinesis.


- Weight is 168lbs and UO 2600ml.


- Continue diuresis with Metolazone/Furosemide and monitor renal function.   





(3) NSTEMI (non-ST elevated myocardial infarction)


Comment: - Troponin elevation likely secondary to increased demand in the 

setting of CHF and hypoxia.


- Cardiology input appreciated.


- Continue heparin drip for 24h more.


- Continue Aspirin, Metoprolol, and Atorvastatin.   





(4) Fever


Comment: - Patient spiked fever 101.1 last night, suspect source is possible 

pneumonia.


- Check blood cultures, Legionella and pneumo Ags.   





(5) Eosinophilia


Comment: - With thrombocytopenia and leukocytosis. 


- Etiology is still unclear - d/w Dr. Tran - unclear if Furosemide is the 

culprit. He thinks the eosinophilia drop is likely secondary to steroids and 

not Furosemide removal.


- Heme follow up requested.


- Continue prednisone.   





(6) Hypokalemia


Comment: - Replete. Keep >4.0.   





(7) Hypomagnesemia


Comment: - Replete. Keep it >2.0.   





(8) Transaminitis


Comment: - Suspect secondary to passive liver congestion - trending down.   





(9) DVT prophylaxis


Comment: - Heparin drip.   





(10) Full code status





Status and Disposition: 


Inpatient. Transfer to Telemetry floor.

## 2018-05-23 NOTE — CONS
CC:  Dr. Corcoran; Dr. Boo; Hospitalist Service *

 

CARDIOLOGY CONSULTATION REPORT:

 

DATE OF CONSULT:  05/22/18

 

HISTORY OF PRESENT ILLNESS:  I was asked by hospitalist service to see this 86-
year- old male patient who presented to the hospital with progressive symptoms 
of shortness of breath and some chest pressure.  The patient has extensive 
cardiac history followed up from cardiac standpoint by Dr. Corcoran.  He had 
coronary artery bypass grafting in February 2017.  He had history of congestive 
heart failure, although his echo back in February was about 50% to 55%.  He was 
recently in the hospital because of dehydration and syncope, and his 
medications was adjusted, especially his Lasix was put on hold given increase 
in his BUN and creatinine.  He does have known history of chronic kidney disease
, systemic arterial hypertension, paroxysmal atrial fibrillation, hyperlipidemia
, right bundle branch block.  He said today this morning he woke up because of 
progressive shortness of breath.  He had some skin rash.  He was taking 
prednisone according to the patient, 5 pills every day for 5 days, each one of 
them is 10 mg.  The last dose was yesterday.  He has no fever, no chills, no 
nausea, no vomiting, no hematochezia.  He had no palpitations, no orthopnea.  
He had some swelling in the lower extremities.  BNP was more than 2000.  His 
troponin, the second one actually 0.5, Cardiology consult was further 
requested.  His review of all other systems essentially is negative.  His past 
medical history is extensive included coronary artery disease, acute coronary 
syndrome in the past, urgent cath by Dr. Corcoran in February 2017, multivessel 
disease, CABG.  He had history of hyperlipidemia, history of mild to moderate 
aortic insufficiency.  He had history of borderline aortic stenosis, moderate 
mitral insufficiency.  He was seen recently by Dr. Corcoran on 05/09/18.

 

PAST MEDICAL HISTORY:  As outlined above.  His other medical problem includes 
hyperlipidemia and systemic arterial hypertension.

 

MEDICATIONS:  His medications as an outpatient include:

1.  Cozaar 25 mg twice daily.

2.  Lipitor 10 mg daily.

3.  Metoprolol 37.5 mg twice daily.

4.  Aspirin 81 mg daily.

5.  Omeprazole 20 mg daily.

6.  Vitamin  units daily.

7.  Potassium chloride 20 mEq daily.

8.  Magnesium 400 mg 2 daily.

 

ALLERGIES:  No known drug allergies.

 

FAMILY HISTORY:  He is , retired.  He has no history of smoking.  Drinks 
alcohol rarely.  No history of illicit drug use.

 

REVIEW OF SYSTEMS:  His review of all other systems essentially is negative.

 

PHYSICAL EXAM:  He is awake, alert and oriented.  He had no symptoms of chest 
pain at the moment.  His vitals include, blood pressure is elevated at 184 
systolic/80. He is afebrile.  He is in sinus rhythm with right bundle branch 
block.  Head and Neck:  JVP is elevated at 45 degrees.  Lungs:  Diminished air 
entry bilaterally with some rhonchi.  Heart:  Normal S1, S2.  There is a grade 2
/6 systolic murmur in the left sternal border and soft S3.  Abdomen:  Benign.  
Positive bowel sounds. Extremities:  +1 edema.  No cyanosis and no clubbing.  
Skin exam is normal.  Psych: Normal affect and normal mood.  CNS:  No focal 
deficit is appreciated.

 

DIAGNOSTIC STUDIES/LAB DATA:  His EKG from today showed sinus rhythm, right 
bundle branch block is appreciated.  Heart rate 87.  Nonspecific ST-T 
abnormalities.

 

His labs, white blood cell count 19.5, hemoglobin 9.8, hematocrit 30 and 
platelets 121,000.

 

Chemistry:  Sodium 141, potassium 3.4, chloride 106, total CO2 of 26, BUN 31, 
creatinine 1.56.  Magnesium 1.3, ALT 85, AST 63.  Troponin 0.15 and then 0.51.  
BNP 2487.  INR 1.08.  His chest x-ray was reported to have interstitial 
pulmonary edema with associated small pleural effusion which are new compared 
to 05/04/18.

 

IMPRESSION:  The patient is an 86-year-old male with extensive cardiac history 
with:

1.  Presentation with congestive heart failure probably related to him being 
off his diuretics, use of steroid and hypertensive heart disease.  This is 
documented on physical exam, chest x-ray and by elevated BNP.

2.  Known history of coronary artery disease and coronary artery bypass 
grafting in February 2017.

3.  Systemic arterial hypertension.

4.  Hyperlipidemia.

5.  Right bundle block.

6.  Known history of coronary artery disease.

7.  Hypokalemia.

8.  Hypomagnesemia.

9.  Borderline troponin probably related to his acute decompensating congestive 
heart failure.

 

PLAN/RECOMMENDATIONS:  The patient is currently in the intensive care unit.  I 
have discussed him with Dr. Rivas and Dr. Corcoran.  I think it is important to 
control his blood pressure.  IV nitroglycerin might be a good idea that will 
help also his congestive heart failure and hypertension.  Lasix to be 
restarted.  We can use it IV, daily weights, I's and O's, limit total fluid to 
2 L over 24 hours.  Limit sodium to less than 2 g of salt sodium in 24 hours.  
Continue Lipitor.  I agree with heparin.  Magnesium and potassium supplements 
aggressively, make sure potassium 4 or more, magnesium 2 or more and we will 
obtain a transthoracic echocardiogram in the morning to follow up on his left 
ventricular systolic function and mixed valvular disease.  I answered all his 
concerns and questions up to his satisfaction.

 

TIME SPENT:  More than half of at least 60 to 65 plus minutes were face-to-face 
in the education and counseling mode and discussing him further.

 

 [id number]/[facility] #: [id number]

MARIO

## 2018-05-24 NOTE — PN
Subjective


Date of Service: 05/24/18


Interval History: 


HOSPITALIST PROGRESS NOTE


Patient seen and examined at bedside. Care reviewed and d/w Michael Johnston RN.


He states his breathing is a little better today, denies CP or palpitations. 

Major complaint is mesogastric abdominal pain with no radiation, mild nausea.


Family History: Unchanged from Admission


Social History: Unchanged from Admission


Past Medical History: Unchanged from Admission





Objective


Active Medications: 








Acetaminophen (Tylenol Tab*)  650 mg PO Q6H PRN


   PRN Reason: pain/fever


   Last Admin: 05/22/18 19:22 Dose:  650 mg


Al Hydrox/Mg Hydrox/Simethicone (Maalox Plus*)  30 ml PO Q6H PRN


   PRN Reason: HEARTBURN


   Last Admin: 05/24/18 10:39 Dose:  30 ml


Aspirin (Aspirin Ec Tab*)  81 mg PO DAILY [facility]


   Last Admin: 05/24/18 08:54 Dose:  81 mg


Atorvastatin Calcium (Lipitor*)  10 mg PO DAILY [facility]


   Last Admin: 05/24/18 08:57 Dose:  10 mg


Cholecalciferol (Vitamin D Tab*)  1,000 units PO DAILY [facility]


   Last Admin: 05/24/18 08:57 Dose:  1,000 units


Hydroxyzine HCl (Atarax Tab*)  25 mg PO Q6H PRN


   PRN Reason: ITCHING


Ceftriaxone Sodium 1 gm/ (Sodium Chloride)  50 mls @ 200 mls/hr IVPB BEDTIME [facility]


   Last Admin: 05/23/18 21:22 Dose:  200 mls/hr


Azithromycin 500 mg/ Sodium (Chloride)  250 mls @ 250 mls/hr IVPB Q24HR@2200 [facility]


   Last Admin: 05/23/18 22:01 Dose:  250 mls/hr


Magnesium Sulfate 3 gm/ Sodium (Chloride)  106 mls @ 53 mls/hr IVPB ONCE ONE


   Stop: 05/24/18 14:59


Isosorbide Mononitrate (Imdur Er Tab*)  30 mg PO DAILY [facility]


   Last Admin: 05/24/18 08:54 Dose:  30 mg


Magnesium Oxide (Magox 400 Tab*)  800 mg PO DAILY [facility]


   Last Admin: 05/24/18 08:54 Dose:  800 mg


Metoprolol Tartrate (Lopressor Tab*)  37.5 mg PO BID [facility]


   Last Admin: 05/24/18 08:55 Dose:  37.5 mg


Metoprolol Tartrate (Lopressor Iv*)  5 mg IV Q6H PRN


   PRN Reason: SBP>180


Montelukast Sodium (Singulair Tab*)  5 mg PO DAILY [facility]


   Last Admin: 05/24/18 11:53 Dose:  Not Given


Morphine Sulfate (Morphine Vial*)  2 mg IV Q4H PRN


   PRN Reason: PAIN


Omeprazole (Prilosec Cap*)  20 mg PO QAM [facility]


   Last Admin: 05/24/18 08:54 Dose:  20 mg


Prednisone (Deltasone Tab*)  50 mg PO DAILY [facility]


   Last Admin: 05/24/18 08:56 Dose:  50 mg


Psyllium Hydrophilic Mucilloid (Metamucil Oscar*)  1 pkt PO BID [facility]


   Last Admin: 05/24/18 10:39 Dose:  1 pkt








 Vital Signs - 8 hr











  05/24/18 05/24/18 05/24/18





  07:23 08:00 11:59


 


Temperature   97.9 F


 


Pulse Rate 68  67


 


Respiratory 20 20 16





Rate   


 


Blood Pressure 135/76  150/71





(mmHg)   


 


O2 Sat by Pulse 97  99





Oximetry   











Oxygen Devices in Use Now: Nasal Cannula - 3 liters


Appearance: Elderly gentleman sitting up in bed in NAD.


Eyes: No Scleral Icterus


Ears/Nose/Mouth/Throat: Mucous Membranes Moist


Neck: Trachea Midline


Respiratory: Symmetrical Chest Expansion and Respiratory Effort, - - BS+ 

bilaterally with bilateral crackles


Cardiovascular: RRR - Normal S1 and S2


Abdominal: - - Soft, mild mesogastric tenderness, NG, NR, BS+ hypoactive


Extremities: - - Bilateral LE moderate to severe pitting edema


Neurological: Alert and Oriented x 3, NL Muscle Strength and Tone


Result Diagrams: 


 05/24/18 06:24





 05/24/18 06:24





Assess/Plan/Problems-Billing


Assessment: 


Mr. Vazquez is an 87yo with PMH of CAD s/p/ CABG, HTN, HLD, CKD stage 3, 

paroxysmal Afib, RBBB, recent admission for syncope secondary to orthostatic 

hypotension and JOSE ARMANDO, who presented to ED with dyspnea and edema, found to be in 

acute CHF exacerbation.





- Patient Problems


(1) Acute hypoxemic respiratory failure


Comment: - Patient required 10 liters of O2 in the ED, vapotherm overnight in 

ICU, but is now down to 3 liters.


- Source is CHF exacerbation.   





(2) Acute on chronic diastolic (congestive) heart failure


Comment: - Echo shows EF 45-50% with mild global hypokinesis.


- Weight down to 158lbs.


- Continue diuresis with Metolazone. Hold Furosemide for now as there is 

concern this could be the source of his eosinophilia. Will d/w Nephrology if 

ethacrynic acid would be an option.   





(3) NSTEMI (non-ST elevated myocardial infarction)


Comment: - Troponin elevation likely secondary to increased demand in the 

setting of CHF and hypoxia.


- Cardiology input appreciated.


- D/c heparin drip.


- Continue Aspirin, Metoprolol, and Atorvastatin.   





(4) Fever


Comment: - Patient spiked fever 101.1 last night, suspect source is possible 

pneumonia, but cultures show no growth so far. Suspect patient may have an 

autoimmune disease/drug reaction as cause of his fever.


- If no further fever spikes and no culture growth in the next 24h, will d/c 

antibiotics.   





(5) Eosinophilia


Comment: - With thrombocytopenia and leukocytosis. 


- Etiology is still unclear - d/w Dr. Tran - unclear if Furosemide is the 

culprit. He thinks the eosinophilia drop is likely secondary to steroids and 

not Furosemide removal.


- Heme follow up appreciated.


- Continue prednisone.


- Trying to find unifying diagnosis to explain his eosinophilia, skin rash, 

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 

with polyangiitis, although he does not have a h/o asthma/allergy preceding. 

Follow ANCA.   





(6) Ileus


Comment: - CT abdome showed findings suggestive of SBO vs ileus.


- May benefit of GI eval and endoscopy when stable from cardiac point of view.


- EGD may be helpful if diagnoses eosinophilic gastritis.


- Keep NPO for now and continue pain management.   





(7) Hypokalemia


Comment: - Replete. Keep >4.0.   





(8) Hypomagnesemia


Comment: - Replete. Keep it >2.0.   





(9) Transaminitis


Comment: - Suspect secondary to passive liver congestion - trending down.   





(10) DVT prophylaxis


Comment: - Heparin drip.   





(11) Full code status





Status and Disposition: 


Inpatient. Transfer to Telemetry floor.

## 2018-05-24 NOTE — CONS
MEDICAL ONCOLOGY CONSULTATION NOTE:

 

DATE OF CONSULT:  05/23/18

 

REASON FOR CONSULT:  Eosinophilia.

 

HISTORY OF PRESENT ILLNESS:  Mr. Vazquez is an 86-year-old male with an underlying 
history of coronary artery disease, congestive heart failure, hypertension.  He 
has had several recent admissions for syncopal episodes.  Most recently on 05/02
/18. At that time, he was noted to have a progressive thrombocytopenia and 
leukocytosis. Blood counts had been relatively normal through February of this 
year.  At that time, he had white count 11,500, H and H of 31/10 which was 
unchanged from 1 year previously, and platelet count of 314,000.  In March 2018
, his platelet count was down to 108,000 and has stayed 100,000 or less on 
almost all occasions since then, falling into the 50s during the recent 
hospitalization early May.  His white count has been elevated on all recent 
occasions since February 2018.  He had no eosinophilia until 05/01/18 when his 
absolute neutrophil count was 2700, by the time of discharge it was 6900 and 
travis to as high as 10,600 on 05/17/18.  During that hospitalization, he had a 
bone marrow aspirate and biopsy performed, which revealed a bone marrow with 35
% eosinophils.  In addition, the bone marrow was hypercellular for age being 35
% cellular.  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 
megakaryocytic hypoplasia on review, this may not be so evident. Multiple 
special studies were performed on the bone marrow biopsy including PDGFRB which 
was normal, BCR-ABL was normal, PDGFRA was normal, SDF-1 was normal, MDS panel 
for FISH was within normal limits as well.  No abnormalities were seen on flow 
cytometry.  Peripheral blood test performed included JAK2 and BCR-ABL, both of 
which were within the normal range as well.  The patient was seen back in the 
office by Dr. Thomas on 05/17/18.  At that time, decision was made to start the 
patient on steroids once he had a biopsy performed of the skin by Dermatology. 
This was performed by Dr. Tran on 05/18/18 and revealed a nonspecific 
eosinophilic spongiotic dermatitis.  Those slides were reviewed by myself with 
Dr. Chavarria of Dermatopathology and with Dermatology this morning.  There 
certainly was no evidence for any vasculitis in that biopsy.

 

The patient has had a rash since early 2017, occurring just after being placed 
on medications following cardiac bypass surgery.  The rash has come and gone 
over time, but had become progressively worse and incredibly pruritic over the 
past couple of months.  Thus, the rash well preceded the eosinophilia but it 
has been much worse during the period of time of the eosinophilia.  There has 
never been any blistering portion to the rash.  The itching remains severe in 
spite of the fact that he was started by Dr. Thomas on prednisone at a dose of 30 
mg daily, which he started the day after the skin biopsy.  There was 
consideration during his previous hospitalization as to whether this could be a 
form of vasculitis causing the eosinophilia and the rash.  At that time, he had 
developed some increasing renal failure and was seen in consultation by Dr. Jha of Nephrology.  It was Dr. Jha's opinion at that time that if there 
was no obvious cause found for the eosinophilia based on the bone marrow biopsy 
that eosinophilic granulomatosis with polyangiitis, a vasculitis type disease 
also called Churg-Amish should be considered.

 

The patient was readmitted at this time with progressive weight gain of 
approximately 20 pounds over the past several weeks along with progressive 
dyspnea and increased peripheral edema.  He became more short of breath and was 
brought to the emergency room for further evaluation.  He has been seen by 
Cardiology during this admission and has resumed on his Lasix.

 

PAST MEDICAL HISTORY:  Otherwise significant for severe GERD; hypertension; 
hyperlipidemia; coronary artery disease, status post coronary artery bypass 
surgery in February 2017; chronic renal insufficiency with creatinine actually 
rising at 2.6 during his last hospitalization from his baseline of 
approximately 1.4, although more recently has been 1.5 to 1.6.

 

PAST SURGICAL HISTORY:  Inguinal herniorrhaphy, bilateral cataract surgeries, 
tonsillectomy.

 

MEDICATIONS:  At the time of this admission include:

1.  Prednisone 50 mg daily.

2.  Aspirin 81 mg daily.

3.  Atorvastatin 10 mg daily.

4.  Cholecalciferol 1000 units daily.

5.  Hydroxyzine 25 mg q.6 hours p.r.n.

6.  Magnesium oxide 800 mg daily.

7.  Metoprolol 37.5 mg b.i.d.

8.  Montelukast 5 mg daily.

9.  Omeprazole 20 mg daily.

10.  Potassium chloride 40 mEq in the morning and 20 mEq in the evening.

11.  Metamucil b.i.d.

12.  Bicitra 30 mL t.i.d.

 

ALLERGIES:  No known medical allergies.

 

FAMILY HISTORY:  Noncontributory.

 

SOCIAL HISTORY:  The patient is a retired farmer.  He lives with his wife.  No 
significant use of alcohol or tobacco.

 

REVIEW OF SYSTEMS:  The patient reports that he is still markedly bothered by 
itching.  He has had marked weight gain and shortness of breath as noted above.
  No recent signs of infection.  No significant changes in GI patterns and 
specifically no diarrhea recently.  Diffuse itching as noted above.  Chronic 
joint pains, but nothing on specific joints.

 

PHYSICAL EXAM:  General:  An 86-year-old male, in no acute distress.  Vital 
Signs: Blood pressure 92/52, pulse 68, afebrile.  HEENT:  PERRL, EOMI.  No 
erythema or exudates.  No palpable cervical, supraclavicular or axillary 
adenopathy.  Lungs: Clear in the upper lung fields with diffuse wet crackles in 
the lower lung fields. Heart:  Regular rate and rhythm except for a modest 
tachycardia at times.  Abdomen: Soft, nontender without masses or organomegaly.
  Extremities:  2 to 3+ pitting edema bilaterally.  Skin:  Diffuse macular rash 
with multiple excoriations.  There is an area on the posterior right shoulder 
where he has had previous biopsy, which appears healing.

 

DIAGNOSTIC STUDIES:  Chest x-ray:  Alveolar and interstitial pulmonary edema 
with small pleural effusions.

 

IMPRESSION:  An 86-year-old male with known underlying coronary artery disease, 
now with  respiratory failure.  He is felt to be in congestive heart failure 
mostly on a diastolic basis.  He has been seen by Cardiology as well as 
hospitalist service and does seem to be doing somewhat better.  He did receive 
a dose of furosemide.  It is unlikely that the rash and eosinophilia are 
related to furosemide given the fact that he was on it for a long period of 
time prior to the prior hospitalization when the eosinophilia first developed .

 

In regard to his eosinophilia, there is no evidence of vasculitis on the 
current skin biopsy.  It is unlikely therefore, this is a vasculitis, although 
certainly other laboratory studies looking for vasculitis could be obtained at 
this time including ANCA's, sedimentation rate. If eosinophila persists in 
spite or the steroids or recurs after the steroids are discontinued then there 
would be more of a consideration despite this for vasculitis  He certainly 
seems to be responding in terms of his eosinophil count to the steroids.  His 
absolute eosinophil count has gone down from 10,600 on 05/17/18 to 200 on 05/22/
18 and is mildly higher at 800 today. On review of his peripheral smear today, 
he does have approximately 10% eosinophils noted out of a total white count of 
16,000 given the mildly elevated eosinophil count but nowhere near the range 
that was in last hospitalization.  It is most likely that there is a primary 
bone marrow cause for the eosinophilia.  Chronic eosinophilic leukemia cannot 
be ruled in given the fact that no abnormalities were seen in terms of 
chromosomal or other specific studies, but also cannot totally ruled out if no 
other cause is found.  It is possible that he has an early myelodysplastic 
syndrome given the hypercellularity of the bone marrow and the decrease in red 
cell precursors and potentially decrease in platelet precursors along with the 
eosinophils in the bone marrow.  If there is no evidence for vasculitis,on 
blood work or on the recent skin biopsy, then I do not see a role for doing 
bronchoalveolar lavage or other studies looking into the possibility of Churg-
Amish at this time .  I would recommend that he have a CT scan done without 
contrast to make sure that this is not a paraneoplastic eosinophilia as his 
last CT scan performed was a CT of the abdomen only as a CT urogram in January 
of this year long before the eosinophilia started.

 

To summarize, the patient should be maintained on steroids of 30 to 50 mg of 
prednisone per day.  Eosinophil counts should continue to be followed.  Studies 
for vasculitis to include ANCA and sedimentation rate should be obtained.  CT 
scan of the chest, abdomen and pelvis without contrast would be useful.

 

We will continue to follow the patient as long as he remains in the hospital 
and he will continue to follow up with Dr. Thomas as an outpatient following 
discharge.

 

 [id number]/[facility] #: [id number]

Ellenville Regional HospitalHUAN

## 2018-05-24 NOTE — RAD
HISTORY: Paraneoplastic syndrome



COMPARISONS: January 26, 2018



TECHNIQUE: Multiple contiguous axial CT scans were obtained of the chest, abdomen, and

pelvis, without intravenous contrast enhancement. Coronal and sagittal multiplanar

reformations are submitted for review.. Oral contrast was not administered.    





FINDINGS: 

The study is limited by the lack of intravenous contrast. This limits evaluation of the

solid organs and vasculature.





CHEST



NECK AND THYROID: The lower neck and thyroid are unremarkable.

CHEST WALL: There is no lower cervical, axillary, or supraclavicular lymphadenopathy by

size criteria.



HEART AND PERICARDIUM: Coronary and valvular cardiac calcifications are noted.

AORTA AND PULMONARY VASCULATURE: There is calcification of the thoracic aorta. The

pulmonary vasculature is unremarkable.



MEDIASTINUM: There are subcentimeter short axis prevascular, pretracheal, and AP window

lymph nodes.

RACHEL: Evaluation of the rachel is limited by the lack of intravenous contrast. There is no

obvious hilar lymphadenopathy by size criteria.



AIRWAY AND ESOPHAGUS: The airway is unremarkable, without endobronchial filling defect.

The esophagus is grossly normal.

LUNG PARENCHYMA: There is a geographic pattern of ground glass opacification with an

apical predominance, left greater than right.

PLEURA: There are small bilateral pleural effusions.



BONES AND SOFT TISSUES: No bone or soft tissue abnormalities are noted.



ABDOMEN/PELVIS:



LIVER: The liver is normal in shape, size, contour, and attenuation.

BILE DUCTS: There is no intrahepatic or extrahepatic biliary dilatation.

GALLBLADDER: The gallbladder is normal, without pericholecystic inflammatory change.



PANCREAS: The pancreas is normal, without mass or ductal dilatation.

SPLEEN: Normal in size and appearance.



UPPER GI TRACT: Evaluation of the gastrointestinal tract is limited by incomplete gastric

distention. The upper GI tract is unremarkable.

SMALL BOWEL & MESENTERY:  There is mild distention of small bowel bowel loops

COLON:   The colon is normal in contour, course, caliber. There is no pericolonic

inflammatory change.



ADRENALS: Normal bilaterally.

KIDNEYS: The kidneys are normal in shape, size, contour, and axis. There is no

hydronephrosis or nephrolithiasis.

BLADDER: The bladder is smooth in contour.



PELVIC ORGANS: The prostate is diffusely enlarged. The seminal vesicles are symmetric.

There are bilateral hydroceles. There is fluid within the processes vaginalis on the right



AORTA: There is calcific atherosclerotic disease of the abdominal aorta and its branches,

without aneurysmal dilatation

IVC: Unremarkable



LYMPH NODES: There is no lymphadenopathy by size criteria.



ABDOMINAL WALL: There is no evidence for abdominal wall hernia.

BONES AND SOFT TISSUES: Degenerative changes are noted.

OTHER: None



IMPRESSION:

1.  THERE IS A GEOGRAPHIC PATTERN OF GROUND GLASS OPACIFICATION OF THE LUNGS BILATERALLY.

THIS IS NONSPECIFIC, THOUGH THE DIFFERENTIAL INCLUDES ACUTE INFECTIOUS OR INFLAMMATORY

ALVEOLAR DISEASE, CHRONIC INTERSTITIAL DISEASE, PULMONARY ALVEOLAR PROTEINOSIS, AND

LEPIDIC SPREAD OF NEOPLASM.

2.  DISTENTION OF THE SMALL BOWEL WHICH MAY REFLECT ILEUS VERSUS PARTIAL OR INTERMITTENT

OBSTRUCTION.

3.  BILATERAL PLEURAL EFFUSIONS.

4.  ENLARGED PROSTATE.

5.  BILATERAL HYDROCELES WITH FLUID IN THE RIGHT PROCESSUS VAGINALIS.

6.  ATHEROSCLEROSIS.

## 2018-05-25 NOTE — CONS
CC: Dr. Boo.*

 

CONSULTATION REPORT:

 

DATE OF CONSULT:  05/25/18.

 

REQUESTING PHYSICIAN:  Dr. Hensley.

 

INDICATION:  Question of eosinophilic granulomatosis.

 

NARRATIVE:  Mr. Vazquez is a pleasant 86-year-old gentleman who was admitted on 05/
22/18 with shortness of breath.  He was found to have acute congestive heart 
failure, was placed on oxygen, did have diuresis.  He also had elevated 
troponin.  Cardiology felt that patient was having congestive heart failure and 
diuresis was attempted.  He is also being worked up for an eosinophilic 
syndrome by Dermatology. He was complaining of some pelvic pain/abdominal pain.
  This has since resolved.  A CT showed possible ileus.  This is likely the 
cause of his pain.  Patient denied any diarrhea prior to coming in.  He has not 
seen any blood in the stool and the primary team is questioning whether or not 
he has eosinophilic granulomatosis.

 

PAST MEDICAL HISTORY:  Significant for congestive heart failure, coronary 
artery disease, paroxysmal AFib, chronic kidney disease, bundle branch block, 
hyperlipidemia, hypertension.

 

MEDICATIONS:  Include:

1.  Aspirin.

2.  Atorvastatin.

3.  Hydroxyzine.

4.  Magnesium.

5.  Montelukast.

6.  Omeprazole.

7.  Prednisone.

 

ALLERGIES:  None.

 

FAMILY HISTORY:  Renal failure.

 

SOCIAL HISTORY:  No tobacco use.

 

REVIEW OF SYSTEMS:  Twelve systems were reviewed.  Other than mentioned in the 
HPI were unremarkable.

 

PHYSICAL EXAM:  Temperature is 98.0, blood pressure is 122/63, pulse of 55. 
General:  Chronically ill-appearing elderly male in no apparent distress.  Alert
, oriented, pleasant, and fluent.  HEENT: Mucous membranes are moist without 
lesions, ulcers or exudates.  Neck is supple.  Trachea is midline.  Dentition 
is poor. Heart:  Irregular rate and rhythm.  Lungs:  Coarse breath sounds 
bilaterally. Slight wheezes expiratory.  Abdomen:  Positive bowel sounds, soft, 
nontender, nondistended.  No hepatosplenomegaly, masses, rebound or guarding.  
Skin:  Warm and dry.

 

DIAGNOSTIC STUDIES/LAB DATA:  Labs of note, white count is 4.3, hemoglobin is 
8.3, platelets of 119.  BUN is 32, creatinine is 1.52, CRP is 93.62.  He has a 
CT that shows small bowel ileus.

 

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 
granulomatosis associated with polyangitis.  He does not have any diarrhea.  
His pain has resolved.  I think it is a low likelihood he has eosinophilic 
granulomatosis or GI manifestations of it.  The primary team has requested an 
upper endoscopy.  This is not unreasonable.  Given his semi-tenuous lung 
situation, I would prefer to wait a few days since he has never been on oxygen 
before and he is still requiring oxygen.  I do not think that there is a huge 
rush in needing the endoscopy with biopsies performed immediately.  The patient 
and his wife tell me that he has been told he is going to be here for a few 
more days anyways.  I think after a couple of days it would be safer to perform 
an upper endoscopy with biopsies.  We can make arrangements for this as his 
medical condition improves.

 

 [id number]/CPS #: [id number]

MARIO

## 2018-05-25 NOTE — CONS
CONSULTATION REPORT:

 

DATE OF CONSULT:  18

 

CONSULTING PHYSICIAN:  Dr. Suarez

 

CHIEF COMPLAINT:  Shortness of breath.

 

REASON FOR CONSULTATION:  Evaluate for vasculitis.

 

HISTORY OF PRESENT ILLNESS:  Mr. Vazquez is an 86-year-old male with the 
longstanding history of coronary artery disease.  He was admitted to the 
hospital with progressive symptoms of shortness of breath.  His pulmonary 
findings suggested an underlying vasculitis picture, however, and although his 
PR3 antibody was negative. It was felt that the may have an underlying 
vasculitic process such as Churg Amish vasculitis, so the tentative plan is 
for him to consider it and receive a transbronchial biopsy.  In terms of his 
prior symptoms, he does have a history of coronary artery bypass graft done in 
2017 with a history of congestive heart failure and his Lasix dose was 
recently increased.  His EF has been between 50% and 55%.  He had been recently 
in the hospital due to dehydration and the syncopal episode and his medications 
were adjusted, but his Lasix was put on hold given an increase in his BUN and 
creatinine.  He does have a history of some known renal disease as well as 
extensive osteoarthritis of his knees, hypertension, paroxysmal atrial 
fibrillation, hyperlipidemia, and a right bundle-branch block. In terms of his 
symptoms, he woke up with progressive shortness of breath worse than his 
baseline shortness of breath with some abdominal discomfort.  He also had a 
rash.  He has had no fever, no chills, no nausea, no vomiting, no hematochezia. 
His rash did resolve with prednisone.  He has had no palpitations, however, no 
orthopnea.  He does have some swelling in his lower extremities and his BNP was 
greater than 2000.  His past medical history is notable for coronary artery 
disease with an urgent cath done in 2017 showing multivessel disease, 
and a history of hyperlipidemia and aortic insufficiency.  He also has history 
of aortic stenosis, moderate mitral insufficiency and has been followed by a 
Cardiology.  He also has history of peripheral eosinophilia with acute kidney 
injury with tubular necrosis, leukocytosis, and thrombocytopenia.  He has seen 
Hematology and had a bone marrow biopsy which is nonspecific.  In terms of his 
rash, he started having pruritic rash all over his body and saw Dermatology and 
underwent a biopsy which showed an eosinophilic infiltrate into the dermis.  He 
has also had some weight gain recently.  He has a history of sinusitis that 
happens usually in the spring. He has also had allergies, but no other 
pulmonary problems in the past.  He did develop abdominal pain in the morning 
around prior hernia which improved.  His bone marrow biopsy specifically showed 
35% eosinophils and there has been improvement in the rash.  He has been seeing 
Nephrology as well, Dr. Jha.  He has also recurrent rash in the past which 
has been more pruritic recently and responded to prednisone.

 

PAST MEDICAL HISTORY:  Includes:

 

1.  Hypertension.

2.  Gastroesophageal reflux disease.

3.  Hyperlipidemia.

4.  Coronary artery disease status post coronary artery bypass graft surgery in 
2017.

5.  Chronic renal insufficiency with worsening of creatinine recently.

6.  Recurrent rashes with eosinophilic infiltration on skin biopsy.

7.  Thrombocytopenia, eosinophilia, and leukocytosis.

 

PAST SURGICAL HISTORY:  Includes:

 

1.  Inguinal herniorrhaphy.

2.  Bilateral cataract surgeries.

3.  Tonsillectomy.

 

HOME MEDICATIONS:  Include:

 

1.  Prednisone 50 mg daily.

2.  Aspirin 81 mg daily.

3.  Atorvastatin 10 mg daily.

4.  Cholecalciferol 1000 units daily.

5.  Hydroxyzine 25 mg every 6 hours as needed.

6.  Magnesium oxide 800 mg daily.

7.  Metoprolol 37.5 mg b.i.d.

8.  Montelukast 5 mg daily.

9.  Omeprazole 20 mg daily.

10.  Potassium chloride 40 mEq in the morning and 20 in the evening.

11.  Metamucil.

12.  Bicitra.

 

ALLERGIES:  No known drug allergies.

 

FAMILY HISTORY:  Negative for lupus or rheumatoid arthritis.  His father  
of an unknown infection that progressed over a period of a year.

 

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  
No significant alcohol use.  No tobacco use.  He denies any recent travel 
outside of [facility].  He does have a horse at home which has been sick and 
was put to sleep.

 

REVIEW OF SYSTEMS:  General:  No acute symptoms.  Pulmonary:  Mild chronic 
shortness of breath.  Denies any shortness of breath right now.  Cardiovascular
: As noted above.  Pulmonary:  As noted above.  Abdomen:  Denies abdominal 
pain.  He does have a history of hernia in the past and he did have abdominal 
pain yesterday which improved.  He does have chronic constipation, weight gain 
with mild shortness of breath.  Musculoskeletal:  He has chronic deformities in 
his knees and he has felt to have some end-stage knee osteoarthritis.  He has 
failed physical therapy and local injections in the past.  He has a  history of 
sinusitis but no recent problems.  He denies diarrhea.  Neurologic:  Denies 
headache.  HEENT:  Denies blurry vision and he does have a history of asthma.  
Other 14-point review of systems were reviewed and were otherwise negative.

 

PHYSICAL EXAM:  He is a pleasant, sitting up in no acute distress, conversive. 
Temperature was 97.8, pulse of 80 beats per minute, respiratory rate of 16 to 
18 with an O2 sats of 93% on 3 L, blood pressure of 106/52.  HEENT Exam:  
Pupils are equal, round, reactive to light and accommodate.  Mucous membranes 
were moist. Lungs:  He had minimal crackles present at the basis bilaterally 
but otherwise was clear with no rhonchi or egophony.  Cardiovascular Exam: 
Reveal a 2/6 systolic ejection murmur left lower sternal border noted supine, 
but S1 and S2 are present with no gallops or rubs.  Point of maximum impulse 
nondisplaced.  He did have a scar where he had prior coronary artery bypass 
surgery.  Lymph:  No adenopathy.  No supraclavicular or axillary adenopathy.  
Abdomen:  Soft, nontender, nondistended with no lower quadrant tenderness.  
Extremities:  Pitting minimal edema bilaterally.  Skin:  He had a minimal 
exanthem in left thigh region, which he says is fading, slightly raised in 
appearance.  Musculoskeletal Exam:  He had varus knee deformities.  He did have 
end-stage osteoarthritic changes in his knees as well as mild DIP 
osteoarthritic changes, but no synovitis in his joints.  :  No CVA 
tenderness.  Endocrine:  No glandular swelling.  Hematologic:  No bruising or 
bleeding.

 

DIAGNOSTIC STUDIES/LAB DATA:  He had a proteinase 3 antibody that was less than 
0.2 and a myeloperoxidase antibody which was less than 2 which is normal.  His 
creatinine is 1.52 which is improved from 1.72 and initially his PTT was 
prolonged but now it is normal.  Blood sugar is 122 and 119.  Urinalysis showed 
3+ blood with 1+ rbc's.  His LFTs were mildly elevated on admission.  An echo 
showed mildly concentric LV hypertrophy with an EF of 45%.  No evidence of 
pulmonary hypertension.  He had evidence of eosinophilia with an absolute 
eosinophil count of 1.6 with the sed rate elevated at 83.  CT of the chest 
showed patchy airspace opacities bilaterally with prominence of interstitium, 
small bilateral pleural effusion, and no significant mediastinal or hilar 
adenopathy.

 

ASSESSMENT AND PLAN:  He is an 86-year-old male with a longstanding history of 
coronary artery disease, renal insufficiency, now with interstitial lung 
disease found on CT exam with some patchy airspace opacities in the setting of 
peripheral eosinophilia and eosinophilic skin rash, but no definitive evidence 
of vasculitis otherwise; however, he has been started on prednisone and there 
is an improvement in his rash. At this time, he might have an underlying 
vasculitic process.  He also has hematuria which could potentially present a 
nephritis.  Nephrology is following and I agree that he may benefit from a 
transbronchial biopsy.  Other conditions in the differential diagnosis include 
hypereosinophilic syndrome and allergic bronchopulmonary aspergillosis, however
, he has had no recent travel.  Of note, he has had some abdominal pain which 
was nonspecific, may be seen in vasculitis, although he has no abdominal 
symptoms now.  No hematologic malignancy, however, was identified.  He is not 
felt so far to have any concurrent paraneoplastic syndrome.  Also, given his 
pulmonary history, consider an underlying medium or small vessel vasculitis 
even though the PR3 antibody was negative. Therefore, consider transbronchial 
biopsy via bronchoscopy, which I understand is already being scheduled for 
Tuesday.  I will also add other autoimmune labs including a GBM antibody and a 
connective tissue panel for evaluation as well as IGA and hypersensitivity 
pneumonitis panel.

 

TIME SPENT:  With the patient was greater than 60 minutes, with greater than 50
% of the time spent in counseling the patient.

 

 [id number]/CPS #: [id number]

MARIO

## 2018-05-25 NOTE — CONS
PULMONARY CONSULTATION REPORT:

 

DATE OF CONSULTATION:  05/24/18.

 

CONSULTATION REQUESTED BY:  Dr. Hensley.

 

REASON FOR CONSULTATION:  Evaluation of abnormal CT chest, eosinophilia.

 

HISTORY OF PRESENT ILLNESS:  The patient is an 86-year-old male with a complex 
past medical history including CAD, CHF, GERD, hypertension, dyslipidemia, has 
been having a complicated course recently with recurrent admissions for syncope
, which was attributed to orthostatic hypotension.  He also was noted to have 
acute kidney injury with tubular necrosis and peripheral eosinophilia, 
leukocytosis, and thrombocytopenia.  The patient had bone marrow biopsy, which 
did not reveal hematological malignancy.  The patient was thought be in heart 
failure, Lasix was initiated and was discharged home with generic Lasix.  The 
patient reports that three days on to starting the medication, he started 
having pruritic rash all over his body.  He was seen by Dermatology, underwent 
biopsy, which revealed eosinophil infiltration into the dermis.  The patient 
also reports weight gain recently.  The patient reports mild shortness of breath
; however, he reports that is not his significant issue.  He denies palpitations
, nausea, vomiting or urinary complaints. He has chronic constipation, which is 
unchanged.  The patient reports history of sinusitis that happens usually in 
Spring.  He reports allergies.  The patient denies any pulmonary problems in 
the past.  The patient has developed abdominal pain this morning that improved 
with morphine.  His bone marrow biopsy showed 35% eosinophils.  The patient 
reports improvement of the rash and breathing since prednisone was initiated.  
He was also seen by Dr. Jha.  The patient reports that the rash had been 
there since early 2017 after he was initiated on medications post coronary 
bypass surgery.  It has been increasingly pruritic recently and was initiated 
on prednisone.  The patient was readmitted for weight gain, worsening shortness 
of breath, increased peripheral edema, and was resumed on Lasix.

 

PAST MEDICAL HISTORY:

1.  GERD.

2.  Hypertension.

3.  Hyperlipidemia.

4.  Coronary artery disease, status post coronary artery bypass surgery in 
February of 2017.

5.  Chronic renal insufficiency with worsening of creatinine recently.

6.  Rash for the past one year, increasingly pruritic with evidence of 
eosinophilic infiltration.

7.  Thrombocytopenia, recent onset.

8.  Eosinophilia and leukocytosis.

 

PAST SURGICAL HISTORY:

1.  Inguinal herniorrhaphy.

2.  Bilateral cataract surgeries.

3.  Tonsillectomy.

 

MEDICATIONS:  At home:

1.  Prednisone 50 mg daily.

2.  Aspirin 81 mg daily.

3.  Atorvastatin 10 mg daily.

4.  Cholecalciferol 1000 units daily.

5.  Hydroxyzine 25 mg q.6 hours p.r.n.

6.  Magnesium oxide 800 mg daily.

7.  Metoprolol 37.5 mg b.i.d.

8.  Montelukast 5 mg daily.

9.  Omeprazole 20 mg daily.

10.  Potassium chloride 40 mEq in the morning and 20 mEq in the evening.

11.  Metamucil b.i.d.

12.  Bicitra 30 mL t.i.d.

 

ALLERGIES:  No known medical allergies.

 

FAMILY HISTORY:  Noncontributory.

 

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  No significant 
alcohol or tobacco abuse history.  The patient denies recent travel out of [place].  
He has horse at home, who has been sick and was put to sleep.

 

REVIEW OF SYSTEMS:  Abdominal pain that developed yesterday that improved, 
chronic constipation, weight gain, mild shortness of breath, lower extremity 
edema, diffuse itching that is improved, chronic joint pain, history of 
sinusitis, no problems recently.  Denies diarrhea, headache, blurry vision, 
history of asthma.

 

PHYSICAL EXAM:  The patient in bed, in no apparent distress.  Vital Signs: 
Temperature 97.8, pulse 80 beats per minute, respiratory rate 16 per minute, O2 
sat 93% on 3 L, blood pressure 106/52.  HEENT:  Pupils are equal and reactive 
to light. Mucous membranes are moist.  Chest:  Crackles present on auscultation 
bilaterally at the bases.  Cardiovascular:  S1 and S2 present, regular.  No 
murmurs, gallops or rubs.  Lymphatics:  No palpable cervical, supraclavicular 
or axillary adenopathy. Abdomen:  Soft, nontender, and nondistended.  Mild 
tenderness in right lower quadrant.  Extremities:  Pitting edema bilaterally.  
Skin:  Diffuse macular rash with multiple excoriations.

 

DIAGNOSTIC STUDIES/LAB DATA:  Laboratory exam, WBC count elevated at 15.3 with 
hemoglobin of 8.1, hematocrit of 24, MCV elevated at 97, platelet count 99 with 
evidence of eosinophilia with absolute eosinophil count of 1.6, ESR elevated at 
83. Sodium was 139, potassium 4.3, chloride 104, bicarb 28, BUN 37, creatinine 
1.72, glucose mildly elevated, calcium slightly decreased at 7.9, magnesium 
1.6.  LFTs were mildly elevated on admission, improved except for elevated alk 
phos at 199. Troponins remain elevated, trending up.  Total protein 5.7, 
albumin 2.4. Echocardiogram showed mild concentric left ventricular hypertrophy 
with EF of 45% to 50% then mild global hypokinesis, moderately dilated left 
atrium, right ventricular systolic function, mild to moderately reduced with 
mild aortic stenosis, moderate aortic regurgitation, mild to moderate mitral 
regurgitation with no evidence of pulmonary hypertension.

 

CT of the chest was personally reviewed by me - the patient with evidence of 
patchy airspace opacities bilaterally with prominence of interstitium, small 
bilateral pleural effusions, and no significant mediastinal or hilar adenopathy.

 

IMPRESSION AND RECOMMENDATIONS:  86-year-old male with complicated course 
recently with evidence of peripheral eosinophilia, skin rash, status post 
biopsy with evidence of eosinophils and no evidence of vasculitis.  The patient 
started on prednisone with improvement in pruritic rash.

 

Peripheral eosinophilia conditions with pulmonary renal involvement are 
considered in the differentials.  He also has a history of sinusitis with 
possibility of Churg- Amish/ Eosinophilic granulomatosis with polyangiitis. 
Drug induced less likely as symptoms preceded addition of Furosemide or other 
cardiac meds although patient correlates symptom onset to drug.

 

Other eosinophilic pulmonary infiltrating conditions like CEP(chronic 
eosinophilic PNA) also possibility. Hypereosinophilic syndrome also in 
differential given involvement of other organs



Allergic bronchopulmonary aspergillosis also in differential



He has a history of recurrent syncope in the past, the other nonspecific 
symptoms like abdominal pain, which point toward the possibility of vasculitis.

 

No evidence of hematological malignancy was noted.  No lung mass was noted to 
consider perineoplastic syndrome.  No recent travel out of country to consider 
a parasitic infestation.  The patient does not have gastrointestinal symptoms 
other than chronic constipation.

 

Given the sinusitis symptoms, peripheral eosinophilia, and pulmonary involvement
, Churg-Amish is high in the differential.  He is already on prednisone, 
awaiting workup of Churg-Amish and other vasculitis, which might or might not 
be positive given that he is already started on prednisone.  If above tests are 
unrevealing, we will consider bronchoscopy with transbronchial biopsy.

 

Thank you for allowing me to participate in the care of your patient.  Will 
follow up with you.

 

 [id number]/CPS #: [id number]

MARIO

## 2018-05-25 NOTE — PN
Subjective


Date of Service: 05/25/18


Interval History: 


HOSPITALIST PROGRESS NOTE


Patient seen and examined at bedside. Care reviewed and d/w Michael Johnston RN.


He feels better today. Abdominal pain is much improved, tolerated clear liquids 

diet with no N/V. Breathing is also better, but not yet back to baseline.


Family History: Unchanged from Admission


Social History: Unchanged from Admission


Past Medical History: Unchanged from Admission





Objective


Active Medications: 








Acetaminophen (Tylenol Tab*)  650 mg PO Q6H PRN


   PRN Reason: pain/fever


   Last Admin: 05/22/18 19:22 Dose:  650 mg


Al Hydrox/Mg Hydrox/Simethicone (Maalox Plus*)  30 ml PO Q6H PRN


   PRN Reason: HEARTBURN


   Last Admin: 05/24/18 10:39 Dose:  30 ml


Aspirin (Aspirin Ec Tab*)  81 mg PO DAILY [facility]


   Last Admin: 05/25/18 08:01 Dose:  81 mg


Atorvastatin Calcium (Lipitor*)  10 mg PO DAILY [facility]


   Last Admin: 05/25/18 08:01 Dose:  10 mg


Cholecalciferol (Vitamin D Tab*)  1,000 units PO DAILY [facility]


   Last Admin: 05/25/18 08:01 Dose:  1,000 units


Ethacrynic Acid (Edecrin Tab (Nf))  25 mg PO DAILY [facility]


Hydroxyzine HCl (Atarax Tab*)  25 mg PO Q6H PRN


   PRN Reason: ITCHING


Ceftriaxone Sodium 1 gm/ (Sodium Chloride)  50 mls @ 200 mls/hr IVPB BEDTIME [facility]


   Last Admin: 05/24/18 20:51 Dose:  200 mls/hr


Azithromycin 500 mg/ Sodium (Chloride)  250 mls @ 250 mls/hr IVPB Q24HR@2200 [facility]


   Last Admin: 05/24/18 21:20 Dose:  250 mls/hr


Isosorbide Mononitrate (Imdur Er Tab*)  30 mg PO DAILY [facility]


   Last Admin: 05/25/18 08:00 Dose:  30 mg


Magnesium Oxide (Magox 400 Tab*)  800 mg PO DAILY [facility]


   Last Admin: 05/25/18 08:01 Dose:  800 mg


Metoprolol Tartrate (Lopressor Tab*)  37.5 mg PO BID [facility]


   Last Admin: 05/25/18 08:02 Dose:  37.5 mg


Metoprolol Tartrate (Lopressor Iv*)  5 mg IV Q6H PRN


   PRN Reason: SBP>180


Montelukast Sodium (Singulair Tab*)  5 mg PO DAILY [facility]


   Last Admin: 05/25/18 08:00 Dose:  5 mg


Morphine Sulfate (Morphine Vial*)  2 mg IV Q4H PRN


   PRN Reason: PAIN


   Last Admin: 05/24/18 14:42 Dose:  2 mg


Omeprazole (Prilosec Cap*)  20 mg PO QAM [facility]


   Last Admin: 05/25/18 08:01 Dose:  20 mg


Prednisone (Deltasone Tab*)  50 mg PO DAILY [facility]


   Last Admin: 05/25/18 08:02 Dose:  50 mg


Psyllium Hydrophilic Mucilloid (Metamucil Oscar*)  1 pkt PO BID [facility]


   Last Admin: 05/25/18 08:02 Dose:  1 pkt








 Vital Signs - 8 hr











  05/25/18





  08:00


 


Temperature 97.9 F


 


Pulse Rate 61


 


Respiratory 16





Rate 


 


Blood Pressure 132/62





(mmHg) 


 


O2 Sat by Pulse 99





Oximetry 











Oxygen Devices in Use Now: Nasal Cannula


Appearance: Elderly gentleman lying in bed in NAD.


Eyes: No Scleral Icterus


Ears/Nose/Mouth/Throat: Mucous Membranes Moist


Neck: Trachea Midline


Respiratory: Symmetrical Chest Expansion and Respiratory Effort, - - BS+ 

bilaterally with bibasilar crackles and scattered wheezes.


Cardiovascular: RRR - Normal S1 and S2


Abdominal: NL Sounds; No Tenderness; No Distention


Extremities: - - Bilateral LE moderate pitting edema


Skin: - - Multiple excoriations on upper and lower extremities from itching.


Neurological: Alert and Oriented x 3, NL Muscle Strength and Tone


Result Diagrams: 


 05/25/18 05:27





 05/25/18 05:27





Assess/Plan/Problems-Billing


Assessment: 


Mr. Vazquez is an 87yo with PMH of CAD s/p/ CABG, HTN, HLD, CKD stage 3, 

paroxysmal Afib, RBBB, recent admission for syncope secondary to orthostatic 

hypotension and JOSE ARAMNDO, who presented to ED with dyspnea and edema, found to be in 

acute CHF exacerbation.





- Patient Problems


(1) Acute hypoxemic respiratory failure


Comment: - Patient required 10 liters of O2 in the ED, vapotherm overnight in 

ICU, but is now down to 3 liters.


- Source is CHF exacerbation.   





(2) Acute on chronic diastolic (congestive) heart failure


Comment: - Echo shows EF 45-50% with mild global hypokinesis.


- Weight down to 155lbs.


- Continue diuresis with Metolazone. Hold Furosemide for now as there is 

concern this could be the source of his eosinophilia. 


- D/w Nephrology - continue ethacrynic acid.   





(3) NSTEMI (non-ST elevated myocardial infarction)


Comment: - Troponin elevation likely secondary to increased demand in the 

setting of CHF and hypoxia.


- Cardiology input appreciated.


- D/c heparin drip.


- Continue Aspirin, Metoprolol, and Atorvastatin.   





(4) V-tach


Comment: - Patient had 15 beats of Vtach last night, asymptomatic.


- Replete K/Mg.


- Continue Metoprolol.   





(5) Fever


Comment: - Patient spiked fever 101.1 with no clear source of infection. 

Cultures show no growth. Suspect autoimmune disease/drug reaction as cause of 

his fever.


- D/c antibiotics.   





(6) Eosinophilia


Comment: - With thrombocytopenia and leukocytosis. 


- Etiology is still unclear - d/w Dr. Tran - unclear if Furosemide is the 

culprit. He thinks the eosinophilia drop is likely secondary to steroids and 

not Furosemide removal.


- Heme follow up appreciated.


- Continue prednisone.


- Trying to find unifying diagnosis to explain his eosinophilia, skin rash, 

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 

with polyangiitis, although he does not have a h/o asthma/allergy preceding. 

Follow ANCA.


- Pulmonology f/u appreciated.


- Rheum and GI input requested.   





(7) Ileus


Comment: - CT abdome showed findings suggestive of SBO vs ileus.


- May benefit of GI eval and endoscopy when stable from cardiac point of view.


- EGD may be helpful if diagnoses eosinophilic gastritis.


- Advanced to full liquids as tolerated.   





(8) Hypokalemia


Comment: - Replete. Keep >4.0.   





(9) Hypomagnesemia


Comment: - Replete. Keep it >2.0.   





(10) Transaminitis


Comment: - Suspect secondary to passive liver congestion - resolved.   





(11) DVT prophylaxis


Comment: - SQ Heparin.   





(12) Full code status





Status and Disposition: 


Inpatient.

## 2018-05-25 NOTE — CONSULT
Consult


Consult: 





Mr. Vazquez is an 86 year old man with interstital lung changes, peripheral 

eosinophilia and cutaneous eosinophilia, thrombocytopenia, and a remote history 

of sinusitis and hematuria. Prednisone has helped his rash





Agree with need to work up for possible vasculitis.  Will check CMV titers and 

hypersensitivity pneumonitis panel and Connective tissue panel





He may require a transbronchial biopsy for evaluation as serologies so far are 

not reveling.

## 2018-05-26 NOTE — PN
Subjective


Date of Service: 05/26/18


Interval History: 





Patient has no new complaints. Patient tolerating food well, no abdo pain. 

Report h/o red blood per rectum, in past, when on blood thinner.


He is breathing OK, walked around nursing station "block" and desatured to 88% w

/o symptoms.


He is aware of plans for EGD, and pulmonary testing.


Family History: Unchanged from Admission


Social History: Unchanged from Admission


Past Medical History: Unchanged from Admission





Objective


Active Medications: 








Acetaminophen (Tylenol Tab*)  650 mg PO Q6H PRN


   PRN Reason: pain/fever


   Last Admin: 05/22/18 19:22 Dose:  650 mg


Al Hydrox/Mg Hydrox/Simethicone (Maalox Plus*)  30 ml PO Q6H PRN


   PRN Reason: HEARTBURN


   Last Admin: 05/24/18 10:39 Dose:  30 ml


Aspirin (Aspirin Ec Tab*)  81 mg PO DAILY [facility]


   Last Admin: 05/26/18 09:24 Dose:  81 mg


Atorvastatin Calcium (Lipitor*)  10 mg PO DAILY [facility]


   Last Admin: 05/26/18 09:24 Dose:  10 mg


Cholecalciferol (Vitamin D Tab*)  1,000 units PO DAILY [facility]


   Last Admin: 05/26/18 09:24 Dose:  1,000 units


Ethacrynic Acid (Edecrin Tab (Nf))  25 mg PO DAILY [facility]


   Last Admin: 05/26/18 10:07 Dose:  25 mg


Heparin Sodium (Porcine) (Heparin Vial(*))  5,000 units SUBCUT Q8HR [facility]


   Last Admin: 05/26/18 05:28 Dose:  5,000 units


Hydroxyzine HCl (Atarax Tab*)  25 mg PO Q6H PRN


   PRN Reason: ITCHING


Isosorbide Mononitrate (Imdur Er Tab*)  30 mg PO DAILY [facility]


   Last Admin: 05/26/18 09:23 Dose:  30 mg


Magnesium Oxide (Magox 400 Tab*)  800 mg PO DAILY [facility]


   Last Admin: 05/26/18 09:24 Dose:  800 mg


Metolazone (Zaroxolyn Tab*)  5 mg PO DAILY@0830 [facility]


   Last Admin: 05/26/18 09:23 Dose:  5 mg


Metoprolol Tartrate (Lopressor Tab*)  37.5 mg PO BID [facility]


   Last Admin: 05/26/18 09:24 Dose:  37.5 mg


Metoprolol Tartrate (Lopressor Iv*)  5 mg IV Q6H PRN


   PRN Reason: SBP>180


Montelukast Sodium (Singulair Tab*)  5 mg PO DAILY [facility]


   Last Admin: 05/26/18 09:24 Dose:  5 mg


Morphine Sulfate (Morphine Vial*)  2 mg IV Q4H PRN


   PRN Reason: PAIN


   Last Admin: 05/24/18 14:42 Dose:  2 mg


Omeprazole (Prilosec Cap*)  20 mg PO QAM [facility]


   Last Admin: 05/26/18 09:24 Dose:  20 mg


Prednisone (Deltasone Tab*)  50 mg PO DAILY [facility]


   Last Admin: 05/26/18 09:24 Dose:  50 mg


Psyllium Hydrophilic Mucilloid (Metamucil Oscar*)  1 pkt PO BID [facility]


   Last Admin: 05/26/18 09:25 Dose:  1 pkt








 Vital Signs - 8 hr











  05/26/18 05/26/18 05/26/18





  07:33 08:00 11:35


 


Temperature 36.6 C  37.1 C


 


Pulse Rate 64  57


 


Respiratory 24 22 20





Rate   


 


Blood Pressure 135/53  115/52





(mmHg)   


 


O2 Sat by Pulse 98  94





Oximetry   











Oxygen Devices in Use Now: Nasal Cannula


Appearance: alert, no distress


Eyes: No Scleral Icterus


Ears/Nose/Mouth/Throat: Mucous Membranes Moist


Neck: NL Appearance and Movements; NL JVP, No Thyroid Enlargement, Masses


Respiratory: Clear to Percussion, - - rales at bases bilat


Cardiovascular: RRR, - - 2+ systolic murmur, 2+ edema bilat LE


Abdominal: NL Sounds; No Tenderness; No Distention, No Hepatosplenomegaly


Extremities: No Clubbing, Cyanosis


Neurological: Alert and Oriented x 3


Lines/Tubes/Other Access: Clean, Dry and Intact Peripheral IV


Result Diagrams: 


 05/25/18 05:27





 05/26/18 06:23


Additional Lab and Data: 





 Laboratory Tests











  05/23/18 05/24/18 05/25/18





  03:51 07:28 05:27


 


ESR   83 H 


 


Troponin I  1.32 H*  


 


C-Reactive Protein    93.62 H


 


Vitamin B12   














  05/25/18





  05:27


 


ESR 


 


Troponin I 


 


C-Reactive Protein 


 


Vitamin B12  391











Microbiology and Other Data: 


Microbiology





05/23/18 13:03   Urine   Urine Culture - Final


                              No Growth (<1,000 CFU/mL)


05/23/18 13:03   Urine   Legionella Urinary Antigen - Final


05/23/18 13:03   Urine   Streptococcus pneumoniae Ag Screen - Final


                              Negative Legionella Antigen


                              Negative S. pneumo Antigen


05/22/18 17:30   Nasal   Nasal Screen MRSA (PCR)([facility]ORT[facility]) - Final


                              Mrsa Detected


05/23/18 16:17   Blood Venous   Aerobic Blood Culture - Preliminary


05/23/18 16:17   Blood Venous   Anaerobic Blood Culture - Preliminary


                              No Growth Day 2


                              No Growth Day 2


05/23/18 16:17   Blood Venous   Aerobic Blood Culture - Preliminary


05/23/18 16:17   Blood Venous   Anaerobic Blood Culture - Preliminary


                              No Growth Day 2


                              No Growth Day 2














Assess/Plan/Problems-Billing


Assessment: 


Mr. Vazquez is an 85yo with PMH of CAD s/p/ CABG, HTN, HLD, CKD stage 3, 

paroxysmal Afib, RBBB, recent admission for syncope secondary to orthostatic 

hypotension and JOSE ARMANDO, who presented to ED with dyspnea and edema, found to be in 

acute CHF exacerbation.





- Patient Problems


(1) Acute on chronic diastolic (congestive) heart failure


Current Visit: Yes   Status: Acute   Priority: Medium   Code(s): I50.33 - ACUTE 

ON CHRONIC DIASTOLIC (CONGESTIVE) HEART FAILURE   SNOMED Code(s): 925497688


   Comment: - Echo shows EF 45-50% with mild global hypokinesis.


- Weight stable at 70 kg.


- Continue diuresis with Metolazone. Avoiding Furosemide d/t concern this could 

be the source of his eosinophilia.    





(2) Eosinophilia


Current Visit: Yes   Status: Acute   Priority: High   Code(s): D72.1 - 

EOSINOPHILIA   SNOMED Code(s): 656823579


   Comment: - reviewed hematology, rheumatology notes.


- Trying to find unifying diagnosis to explain his eosinophilia, skin rash, 

abdominal pain, such as eosinophilic granulomatosis with polyangiitis, or DRESS 

sydrome.


- Pulmonology f/u appreciated, will discuss bronchoscopy/biopsy w/ Dr. Burk


- EGD planned Tuesday after holiday weekend.   





(3) V-tach


Current Visit: Yes   Status: Acute   Code(s): I47.2 - VENTRICULAR TACHYCARDIA   

SNOMED Code(s): 33062672


   Comment: -Repleted K/Mg. No further VT


- Continue Metoprolol.   





(4) CKD (chronic kidney disease) stage 3, GFR 30-59 ml/min


Current Visit: No   Status: Acute   Priority: Medium   Code(s): N18.3 - CHRONIC 

KIDNEY DISEASE, STAGE 3 (MODERATE)   SNOMED Code(s): 223352634


   Comment: - creatinine appears at baseline, stable   





(5) DVT prophylaxis


Current Visit: Yes   Status: Acute   Priority: Low   Code(s): EZS2789 -    

SNOMED Code(s): 874976900


   Comment: - SQ Heparin.   


Status and Disposition: 


Inpatient, awaiting testing
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
## 2018-05-28 NOTE — PN
Hospitalist Progress Note


Date of Service: 05/28/18





Pt seen and examined.  Meds and labs reviewed.  S/P EGD today.





ROS:  Complains of pin-like sensation of feet that he mentions has been 

chronic.  Denied HA/dizziness, F/C, N/V, CP, SOB, increased cough, sputum 

production, abd pain, diarrhea, constipation, dysuria, myalgias,  throat pain, 

and new skin lesions.  The rest of the 14 point ROS are unremarkable.





PHYSICAL EXAM:





GEN APPEARANCE: Awake, not in acute distress


HEENT: NC/AT, PERRLA, moist oral mucosa, (-) throat erythema


NECK: Soft, supple, (-) cervical LAD, (-)JVD


HEART: S1S2 WNL, RRR, No MRG


CHEST: CTA, BL, GAE, No W/R/R


ABD: Soft, ND/NT, NABS 4x Q


EXT: No C/C/E


SKIN: Warm to touch


PSYCH: No active psychosis, hallucinations, depression, SI/HI





ASSESSMENT AND PLAN:





#Acute on chronic diastolic (congestive) heart failure:


   - Echo shows EF 45-50% with mild global hypokinesis.


   - Weight down 1 Kg; I/O = -590


   - Continue management with Metolazone; Avoiding Furosemide d/t concern this 

could be the source of his eosinophilia.    





#Elevated troponins likely due to demand ischemia/MI Type II:


   -Appreciate Dr. Mabry evaluation


   -As above





#Eosinophilia:


   - Trying to find unifying diagnosis to explain his eosinophilia, skin rash, 

abdominal pain, such as eosinophilic granulomatosis with polyangiitis, or DRESS 

sydrome.


   - Pulmonology f/u appreciated, discussed bronchoscopy/biopsy w/ Dr. Burk, 

possibly tomorrow 


   - EGD done which showed that stomach was minimally papillated on the surface 

and biopsies of antrum and fundus done today.   





#Chronic LE pain:


   -Possibly due to neuropathy?


   -Will start low dose Gabapentin





#V-tach:


   - No further VT for 48 hrs


   - Continue Metoprolol.


   - potassium low today, will replete further, recheck in AM   





#CKD (chronic kidney disease) stage 3, GFR 30-59 ml/min


   - creatinine appears at baseline, stable   





#DVT prophylaxis


   - SQ Heparin.   





#Status and Disposition: 


   -Inpatient, awaiting testing

## 2018-05-28 NOTE — PRO
DATE:  05/28/18 - ROOM #446                     REFERRING PHYSICIANS:  Cholo Boo; Davey Castro.*

 

PROCEDURE:  Upper gastrointestinal endoscopy and CLOtest and biopsy of gastric 
fundus and antrum and third portion of duodenum.

 

INDICATION:  This 86-year-old man was admitted with respiratory symptoms and 
also to have an eosinophilic syndrome investigated.

In general, he says his appetite is good and weight has been steady or 
increasing, which he attributes to fluid retention.

 

At home, he takes Prilosec OTC 1 to 3 times a week (suspected on the higher end 
usually) for acid indigestion.  He is quite satisfied with that.  He said 
Nexium gave him abdominal pain.  He has not had any vomiting with this.  His 
appetite is good and there is no dysphagia.  He also takes Metamucil chronically
, which thickens up his bowel pattern which tends to be loose.  He started that 
40 or more years ago.

 

Other outpatient medications include prednisone 50 mg daily, montelukast 5 mg, 
metoprolol 37.5, Lipitor 10, aspirin 81, and these have all been continued as 
an inpatient.  He had been on generic Lasix, but has an inpatient has been 
converted to ethacrynic acid 25 mg.

 

Labs today include hemoglobin 9.7, hematocrit 30, MCV 99, white count 14 (the 
last two months between 13.2 and 21), eosinophils 10% as of 05/23/18, the high 
of 48% , 05/17/18.  Chemistries today sodium 133, potassium 3.4, creatinine 1.59
, BUN 26, magnesium 1.7, total bilirubin 0.6. ALT 46 (maximum 85 on 05/22/18). 
Albumin 2.7. INR 1.08 on 05/22/18.  TSH same date 2.19.

24-hour urine, 05/11/18, creatinine clearance 47; total protein, 24 hours, 888.

 

ENDOSCOPIST:  Dr. Torres.                                 MEDICATION:  
Midazolam 3, fentanyl 25. 



FINDINGS:  He is a generally healthy-appearing older man, quite talkative and 
exuberant with poor dentition.  Several teeth are loose. 



EGD: 

Larynx - symmetric limited views with no gross inflammatory changes or Monilia.

Esophagus - some minimal white exudate consistent with Monilia.  It is quite 
mild. Mucosa is normal without any scarring or erosions with the EG junction at 
36 and the hiatus at 38, representing a small-to-medium hiatal hernia.  There 
was no fundic prolapse and no gagging.

 

Stomach - generally normal mucosa in the cardia, fundus, body, and antrum.  
There was a mildly coarsened, almost papillated mucosal pattern in the fundus, 
though it was not erythematous and there were no erosions.  It appeared 
nonspecific. Biopsies were obtained from the fundus and antrum.  The antrum 
appeared normal.

 

Duodenum - the pylorus, bulb, and second through fourth portions appear normal. 
Biopsies were obtained from third portion of duodenum.

 

IMPRESSION:

1.  Small hiatal hernia.

2.  Clinical gastroesophageal reflux disease - no erosive disease and his 
current pattern of taking p.r.n. PPI several times a week would appear fine.

3.  Eosinophilia - no obvious disease, biopsies pending, while taking 50 mg 
prednisone            Addendum: all normal

 

[id number]/[facility] #: [id number]

[facility]

## 2018-05-29 NOTE — PN
Progress Note





- Progress Note


Date of Service: 05/29/18 - Pulm f/u note


Note: 





Pt seen and examined at bedside. Pt denies SOB, chest pain, palpitations. 

Reports being concerned about many tests and many recommendations


 Active Medications











Generic Name Dose Route Start Last Admin





  Trade Name Freq  PRN Reason Stop Dose Admin


 


Acetaminophen  650 mg  05/22/18 17:00  05/22/18 19:22





  Tylenol Tab*  PO   650 mg





  Q6H PRN   Administration





  pain/fever   


 


Al Hydrox/Mg Hydrox/Simethicone  30 ml  05/22/18 18:45  05/24/18 10:39





  Maalox Plus*  PO   30 ml





  Q6H PRN   Administration





  HEARTBURN   


 


Aspirin  81 mg  05/23/18 09:00  05/29/18 08:37





  Aspirin Ec Tab*  PO   81 mg





  DAILY ROBB   Administration


 


Atorvastatin Calcium  10 mg  05/23/18 09:00  05/29/18 08:37





  Lipitor*  PO   10 mg





  DAILY ROBB   Administration


 


Cholecalciferol  1,000 units  05/23/18 09:00  05/29/18 08:37





  Vitamin D Tab*  PO   1,000 units





  DAILY ROBB   Administration


 


Ethacrynic Acid  25 mg  05/25/18 09:00  05/29/18 08:36





  Edecrin Tab (Nf)  PO   25 mg





  DAILY ROBB   Administration


 


Gabapentin  100 mg  05/28/18 14:00  05/29/18 13:36





  Neurontin Cap(*)  PO   100 mg





  TID ROBB   Administration


 


Heparin Sodium (Porcine)  5,000 units  05/25/18 14:00  05/29/18 13:36





  Heparin Vial(*)  SUBCUT   5,000 units





  Q8HR ROBB   Administration


 


Hydroxyzine HCl  25 mg  05/22/18 16:56  





  Atarax Tab*  PO   





  Q6H PRN   





  ITCHING   


 


Isosorbide Mononitrate  30 mg  05/23/18 16:00  05/29/18 08:36





  Imdur Er Tab*  PO   30 mg





  DAILY ROBB   Administration


 


Magnesium Oxide  800 mg  05/23/18 09:00  05/29/18 08:37





  Magox 400 Tab*  PO   800 mg





  DAILY ROBB   Administration


 


Metolazone  5 mg  05/26/18 08:30  05/29/18 08:36





  Zaroxolyn Tab*  PO   5 mg





  DAILY@0830 ORBB   Administration


 


Metoprolol Tartrate  37.5 mg  05/22/18 21:00  05/29/18 08:36





  Lopressor Tab*  PO   37.5 mg





  BID ROBB   Administration


 


Metoprolol Tartrate  5 mg  05/22/18 18:45  





  Lopressor Iv*  IV   





  Q6H PRN   





  SBP>180   


 


Montelukast Sodium  5 mg  05/23/18 09:00  05/29/18 08:36





  Singulair Tab*  PO   5 mg





  DAILY ROBB   Administration


 


Morphine Sulfate  2 mg  05/24/18 14:10  05/24/18 14:42





  Morphine Vial*  IV   2 mg





  Q4H PRN   Administration





  PAIN   


 


Omeprazole  20 mg  05/23/18 09:00  05/29/18 08:37





  Prilosec Cap*  PO   20 mg





  QAM ROBB   Administration


 


Potassium Chloride  10 meq  05/27/18 09:00  05/29/18 08:37





  Klor Con Er Tab*  PO   10 meq





  BID ROBB   Administration


 


Prednisone  50 mg  05/23/18 09:00  05/29/18 08:37





  Deltasone Tab*  PO   50 mg





  DAILY ROBB   Administration


 


Psyllium Hydrophilic Mucilloid  1 pkt  05/22/18 21:00  05/29/18 08:36





  Metamucil Oscar*  PO   1 pkt





  BID ROBB   Administration








 Vital Signs











Temp Pulse Resp BP Pulse Ox


 


 97.7 F   77   20   114/63   94 


 


 05/29/18 19:31  05/29/18 19:31  05/29/18 20:00  05/29/18 19:31  05/29/18 19:31








O/E: Pt in NAD


HEENT: PERRLA, No JVD


Lungs: Clear to auscultation b/l 


CVS: S1, S2+, regular


Abd: Soft, BS+


Skin: Excoriations from prior rash


Neuro: No focal defecits


 Laboratory Results - last 24 hr











  05/29/18 05/29/18





  08:54 08:54


 


WBC  17.4 H 


 


RBC  3.01 L 


 


Hgb  9.5 L 


 


Hct  29 L 


 


MCV  97 H 


 


MCH  32 H 


 


MCHC  33 


 


RDW  15 


 


Plt Count  232 


 


MPV  11.3 H 


 


Sodium   133 L


 


Potassium   3.4 L


 


Chloride   92 L


 


Carbon Dioxide   34 H


 


Anion Gap   7


 


BUN   28 H


 


Creatinine   1.60 H


 


Est GFR ( Amer)   53.0


 


Est GFR (Non-Af Amer)   41.2


 


BUN/Creatinine Ratio   17.5


 


Glucose   118 H


 


Calcium   8.3 L


 


Magnesium   2.1


 


Total Bilirubin   0.40


 


AST   27


 


ALT   36


 


Alkaline Phosphatase   207 H


 


C-Reactive Protein   17.45 H


 


Total Protein   6.8


 


Albumin   3.1 L


 


Globulin   3.7


 


Albumin/Globulin Ratio   0.8 L








I/R: 86 y o m with h/o diastolic and systolic CHF, CRI, anemia, peripheral 

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 

vasculitis, s/p EGD with no significant gross abnormalities, biopsy pending





Eosnophilic pulmonary disease in differential


CTD w/u for [place] morales was negative- ? from being on steroids


Etiology unclear at this time


Abd pain resolved, c/o paresthesias in LE


Will schedule pt for bronchoscopy with transbronchial biopsy tomorrow


Procedure was discussed in detail with pt


Associated risks and benefits were thoroughly explained


Risk of pneumothorax was explained


Pt agreeable to procedure


Will send BAL also to r/o infectious etiology


NPO after midnight tonight





D/w Dr Laura, bedside Radha Vargas RN

## 2018-05-29 NOTE — PN
Hospitalist Progress Note


Date of Service: 05/29/18





Pt seen and examined.  Meds and labs reviewed.  Touched base with Dr. Mckeon 

in AM.  Mentions she will discuss case with Dr. Galicia to see if bronchoscopy 

with biopsy is necessary given data on previous skin biopsy.





ROS:  Denied HA/dizziness, F/C, N/V, CP, SOB, increased cough, sputum production

, abd pain, diarrhea, constipation, dysuria, myalgias, arthralgias, throat pain

, and new skin lesions.  The rest of the 14 point ROS are unremarkable.





PHYSICAL EXAM:





GEN APPEARANCE: Awake, not in acute distress


HEENT: NC/AT, PERRLA, moist oral mucosa, (-) throat erythema


NECK: Soft, supple, (-) cervical LAD, (-)JVD


HEART: S1S2 WNL, RRR, No MRG


CHEST: CTA, BL, GAE, No W/R/R


ABD: Soft, ND/NT, NABS 4x Q


EXT: No C/C/E


SKIN: Warm to touch


PSYCH: No active psychosis, hallucinations, depression, SI/HI





ASSESSMENT AND PLAN:





#Acute on chronic diastolic (congestive) heart failure, improved:


   - Echo shows EF 45-50% with mild global hypokinesis.


   - Weight down 1 Kg; I/O = -590


   - Continue management with Metolazone; Avoiding Furosemide d/t concern this 

could be the source of his eosinophilia.    


   -Will re-check BNP in AM





#Elevated troponins likely due to demand ischemia/MI Type II:


   -Appreciate Dr. Mabry evaluation


   -As above





#Eosinophilia:


- Trying to find unifying diagnosis to explain his eosinophilia, skin rash, 

abdominal pain, such as eosinophilic granulomatosis with polyangiitis, or DRESS 

sydrome.


- Pulmonology f/u appreciated, discussed bronchoscopy/biopsy w/ Dr. Burk, 

possibly tomorrow 


- EGD done which showed that stomach was minimally papillated on the surface 

and biopsies of antrum and fundus done today.   


-Will await decision between Pulmo (Dr. Burk) and rheumatology (Dr. Galicia) 

regarding need of bronchoscopy with biopsy


   -Continue Prednisone





#Chronic LE pain:


   -Possibly due to neuropathy?


   -Will start low dose Gabapentin





#CKD (chronic kidney disease) stage 3, GFR 30-59 ml/min


   - creatinine appears at baseline, stable   





#DVT prophylaxis


   - SQ Heparin.   





#Status and Disposition: 


   -For possible D/C in 1-2 days

## 2018-05-29 NOTE — PN
Subjective





- Subjective


Date of Service: 05/29/18 - Chief complaint : eosinophilia


History: 





Overall Mr. Rhodes feels well; he denied any acute shortness of breath.  Denied 

chest pain or abdominal pain.  He has had no rash but he does have mild bruising

; mild dysesthesias are present in the hands and legs and feet likely related 

to a possible small fiber neuropathy


Active Problems: 


 Active Problems





Acute hypoxemic respiratory failure (Acute) J96.01


 - Patient required 10 liters of O2 in the ED, vapotherm overnight in ICU, but 

is now down to 3 liters. - Source is CHF exacerbation. 


Acute on chronic diastolic (congestive) heart failure (Acute) I50.33


 - Echo shows EF 45-50% with mild global hypokinesis. - Weight down 1 Kg. - 

Continue management with Metolazone. Avoiding Furosemide d/t concern this could 

be the source of his eosinophilia. 


DVT prophylaxis (Acute) DPC3098


 - SQ Heparin. 


Eosinophilia (Acute) D72.1


 - Trying to find unifying diagnosis to explain his eosinophilia, skin rash, 

abdominal pain, such as eosinophilic granulomatosis with polyangiitis, or DRESS 

sydrome. - Pulmonology f/u appreciated, discussed bronchoscopy/biopsy w/ Dr. Burk, she can see patient tomorrow or Tuesday - EGD planned Tuesday after 

holiday weekend w/ Dr. Thomas 


Full code status (Acute) Z78.9


Hypokalemia (Acute) E87.6


 - Replete. Keep >4.0. 


Hypomagnesemia (Acute) E83.42


 - Replete. Keep it >2.0. 


Ileus (Acute) K56.7


 - CT abdome showed findings suggestive of SBO vs ileus. - May benefit of GI 

eval and endoscopy when stable from cardiac point of view. - EGD may be helpful 

if diagnoses eosinophilic gastritis. - Advanced to full liquids as tolerated. 


NSTEMI (non-ST elevated myocardial infarction) (Acute) I21.4


 - Troponin elevation likely secondary to increased demand in the setting of 

CHF and hypoxia. - Cardiology input appreciated. - D/c heparin drip. - Continue 

Aspirin, Metoprolol, and Atorvastatin. 


Transaminitis (Acute) R74.0


 - Suspect secondary to passive liver congestion - resolved. 


V-tach (Acute) I47.2


 - No further VT for 48 hrs - Continue Metoprolol. - potassium low today, will 

replete further, recheck in AM 








Current Medications: 


 Current Medications





Acetaminophen (Tylenol Tab*)  650 mg PO Q6H PRN


   PRN Reason: pain/fever


   Last Admin: 05/22/18 19:22 Dose:  650 mg


Al Hydrox/Mg Hydrox/Simethicone (Maalox Plus*)  30 ml PO Q6H PRN


   PRN Reason: HEARTBURN


   Last Admin: 05/24/18 10:39 Dose:  30 ml


Aspirin (Aspirin Ec Tab*)  81 mg PO DAILY [facility]


   Last Admin: 05/29/18 08:37 Dose:  81 mg


Atorvastatin Calcium (Lipitor*)  10 mg PO DAILY [facility]


   Last Admin: 05/29/18 08:37 Dose:  10 mg


Cholecalciferol (Vitamin D Tab*)  1,000 units PO DAILY [facility]


   Last Admin: 05/29/18 08:37 Dose:  1,000 units


Ethacrynic Acid (Edecrin Tab (Nf))  25 mg PO DAILY [facility]


   Last Admin: 05/29/18 08:36 Dose:  25 mg


Gabapentin (Neurontin Cap(*))  100 mg PO TID [facility]


   Last Admin: 05/29/18 13:36 Dose:  100 mg


Heparin Sodium (Porcine) (Heparin Vial(*))  5,000 units SUBCUT Q8HR [facility]


   Last Admin: 05/29/18 13:36 Dose:  5,000 units


Hydroxyzine HCl (Atarax Tab*)  25 mg PO Q6H PRN


   PRN Reason: ITCHING


Isosorbide Mononitrate (Imdur Er Tab*)  30 mg PO DAILY [facility]


   Last Admin: 05/29/18 08:36 Dose:  30 mg


Magnesium Oxide (Magox 400 Tab*)  800 mg PO DAILY [facility]


   Last Admin: 05/29/18 08:37 Dose:  800 mg


Metolazone (Zaroxolyn Tab*)  5 mg PO DAILY@0830 [facility]


   Last Admin: 05/29/18 08:36 Dose:  5 mg


Metoprolol Tartrate (Lopressor Tab*)  37.5 mg PO BID [facility]


   Last Admin: 05/29/18 08:36 Dose:  37.5 mg


Metoprolol Tartrate (Lopressor Iv*)  5 mg IV Q6H PRN


   PRN Reason: SBP>180


Montelukast Sodium (Singulair Tab*)  5 mg PO DAILY [facility]


   Last Admin: 05/29/18 08:36 Dose:  5 mg


Morphine Sulfate (Morphine Vial*)  2 mg IV Q4H PRN


   PRN Reason: PAIN


   Last Admin: 05/24/18 14:42 Dose:  2 mg


Omeprazole (Prilosec Cap*)  20 mg PO QAM [facility]


   Last Admin: 05/29/18 08:37 Dose:  20 mg


Potassium Chloride (Klor Con Er Tab*)  10 meq PO BID [facility]


   Last Admin: 05/29/18 08:37 Dose:  10 meq


Prednisone (Deltasone Tab*)  50 mg PO DAILY [facility]


   Last Admin: 05/29/18 08:37 Dose:  50 mg


Psyllium Hydrophilic Mucilloid (Metamucil Stevenson*)  1 pkt PO BID [facility]


   Last Admin: 05/29/18 08:36 Dose:  1 pkt











- Review of Systems


Constitutional Symptoms: No: Weight Gain, Weakness, Fatigue, Fever, Night Sweats


Dermatology: Normal: Yes


HEENT: Yes Normal


Thyroid: Positive: Normal


Pulmonary: Positive: Normal


Cardiology: Positive: Normal


Gastroenterology: Positive: Normal


Musculoskeletal: Positive: Joint Stiffness


Neurology: Positive: Normal


Psychiatry: Positive: Normal


Home Medications: 


 Home Medications











 Medication  Instructions  Recorded  Confirmed  Type


 


Atorvastatin* [Lipitor 10 MG*] 10 mg PO DAILY 08/10/16 05/22/18 History


 


Cholecalciferol TAB* [Vitamin D 1,000 unit PO DAILY 08/10/16 05/22/18 History





TAB*]    


 


Aspirin EC TAB* [Ecotrin EC Low 81 mg PO DAILY  tab.ec 03/07/17 05/22/18 Rx





Dose 81 MG*]    


 


Metoprolol Tartrate TAB* 37.5 mg PO BID #90 tab 03/07/17 05/22/18 Rx





[Lopressor TAB*]    


 


Omeprazole CAP* [Prilosec CAP* 20 20 mg PO QAM 02/02/18 05/22/18 History





MG]    


 


Psyllium STEVENSON* [Metamucil STEVENSON*] 1 pkt PO BID 02/02/18 05/22/18 History


 


Magnesium Oxide TAB* [MagOx 400 800 mg PO DAILY #7 tab 05/05/18 05/22/18 Rx





TAB*]    


 


Montelukast Sodium TAB* [Singulair 5 mg PO DAILY #30 tab 05/05/18 05/22/18 Rx





10 MG TAB*]    


 


Sodium Citrate/Citric Acid* 30 ml PO TID #1 bottle 05/05/18 05/22/18 Rx





[Bicitra*]    


 


Potassium Chlor TAB* [Klor Con ER 20 meq PO QPM 05/22/18 05/22/18 History





TAB*]    


 


Potassium Chlor TAB* [Potassium 40 meq PO QAM 05/22/18 05/22/18 History





Chlor TAB 20 MEQ*]    


 


hydrOXYzine HCL TAB* [Atarax 25 MG 25 mg PO Q6H PRN 05/22/18 05/22/18 History





TAB*]    


 


predniSONE TAB* [Deltasone TAB*] 50 mg PO DAILY 05/22/18 05/22/18 History











Allergies: 


 Allergies











Allergy/AdvReac Type Severity Reaction Status Date / Time


 


No Known Allergies Allergy   Verified 05/22/18 13:01














Objective





- Vital Signs


Vital Signs: 


 Vital Signs











  05/28/18 05/28/18 05/28/18





  19:51 20:00 20:02


 


Temperature 97.8 F  


 


Pulse Rate 72  


 


Respiratory 16 16 16





Rate   


 


Blood Pressure 130/56  





(mmHg)   


 


O2 Sat by Pulse 94  





Oximetry   














  05/28/18 05/28/18 05/29/18





  22:05 23:41 00:03


 


Temperature  99.1 F 


 


Pulse Rate  66 68


 


Respiratory 16 16 





Rate   


 


Blood Pressure  98/46 102/58





(mmHg)   


 


O2 Sat by Pulse  94 





Oximetry   














  05/29/18 05/29/18 05/29/18





  03:38 07:58 08:00


 


Temperature 98.5 F 98.4 F 


 


Pulse Rate 60 70 


 


Respiratory 16 16 20





Rate   


 


Blood Pressure 105/68 121/55 





(mmHg)   


 


O2 Sat by Pulse 94 97 





Oximetry   














  05/29/18 05/29/18 05/29/18





  08:37 11:31 13:36


 


Temperature  98.5 F 


 


Pulse Rate  59 


 


Respiratory 20 16 16





Rate   


 


Blood Pressure  117/49 





(mmHg)   


 


O2 Sat by Pulse  96 





Oximetry   














  05/29/18





  15:43


 


Temperature 97.7 F


 


Pulse Rate 71


 


Respiratory 20





Rate 


 


Blood Pressure 118/55





(mmHg) 


 


O2 Sat by Pulse 96





Oximetry 














- Intake and Output


Intake and Output: 


 Intake & Output











 05/27/18 05/28/18 05/29/18 05/30/18





 06:59 06:59 06:59 06:59


 


Intake Total 2880 1070 1025 1082


 


Output Total 3070 2175 2240 925


 


Balance -190 1105 -1215 157


 


Weight 154 lb 1.6 oz 151 lb 14.4 oz 150 lb 11.2 oz 


 


Intake:    


 


  IV Fluids    62


 


    NS/Mg/K+    62


 


  Oral 2880 1070 1025 1020


 


Output:    


 


  Urine 3070 2175 2240 925


 


Other:    


 


  Estimated Void Small Medium Small 


 


  Date of Last Bowel    5/29/18





  Movement    


 


  # Bowel Movements 0 0 0 1


 


  Estimated Stool Amount  Large Large Large


 


  # Voids 1 2 1 





 





ADLs: Meal  Record                                         Start:  05/22/18 17:

38


Freq:   09,13,18                                           Status: Inactive    

  


Protocol:                                                                      

  


 Document     05/22/18 19:57  [id number]  (Rec: 05/22/18 19:57  [id number]  ICU-C15)


 Document     05/23/18 09:00  KKQ7961  (Rec: 05/23/18 13:52  [id number]  ICU-C15)


 Document     05/23/18 13:00  [id number]  (Rec: 05/23/18 13:52  [id number]  ICU-C15)


ADLs: Meal  Record                                         Start:  05/23/18 18:

23


Freq:                                                      Status: Active      

  


Protocol:                                                                      

  


 Document     05/23/18 18:23  [id number]  (Rec: 05/23/18 18:23  [id number]  TELE-C01)


 Created      05/23/18 18:23  [id number]  (Rec: 05/23/18 18:23  [id number]  TELE-C01)


 Document     05/24/18 09:00  PWG5766  (Rec: 05/24/18 14:29  TIY2388  TELE-C01)


 Document     05/24/18 14:29  ZXA1102  (Rec: 05/24/18 14:30  QYT3786  TELE-C01)


 Document     05/24/18 17:45  EEH4600  (Rec: 05/24/18 17:45  [id number]  TELE-C08)


ADLs: Meal  Record                                         Start:  05/25/18 11:

08


Freq:   DAILY@0900,1400,1800                               Status: Active      

  


Protocol:                                                                      

  


 Created      05/25/18 11:08  [id number]  (Rec: 05/25/18 11:08  [id number]  TELE-C07)


 Document     05/25/18 14:00  UNJ7850  (Rec: 05/25/18 14:15  [id number]  TELE-C01)


 Document     05/25/18 18:00  IDC7187  (Rec: 05/25/18 18:01  TNA5725  TELE-C05)


 Document     05/26/18 09:00  TAG7561  (Rec: 05/26/18 13:20  NBJ2585  TELE-C05)


 Document     05/26/18 14:00  CRX8982  (Rec: 05/26/18 14:12  GPY4091  TELE-C05)


 Document     05/26/18 18:00  EXD8691  (Rec: 05/26/18 18:58  UFU5512  TELE-C08)


 Document     05/27/18 09:00  ZHB1083  (Rec: 05/27/18 09:21  EJA1699  TELE-C03)


 Document     05/27/18 13:45  OSD7719  (Rec: 05/27/18 13:48  RDP7169  TELE-C03)


 Document     05/27/18 18:00  KTY3522  (Rec: 05/27/18 19:52  IVI4256  TELE-C01)


 Document     05/28/18 09:00  CXB9152  (Rec: 05/28/18 14:04  EIP5571  TELE-C01)


 Document     05/28/18 14:00  CSO3600  (Rec: 05/28/18 14:06  ETP7844  TELE-C01)


 Document     05/28/18 18:00  AOH2048  (Rec: 05/28/18 20:09  NFK7910  TELE-C13)


 Document     05/29/18 09:00  NWV3833  (Rec: 05/29/18 09:25  HEZ7852  TELE-C01)


 Document     05/29/18 14:00  LIZ6321  (Rec: 05/29/18 14:24  OZH2591  TELE-C01)


Intake and Output                                          Start:  05/22/18 17:

03


Freq:   06,14,2200                                         Status: Active      

  


Protocol:                                                                      

  


 Document     05/22/18 20:20  DQZ1394  (Rec: 05/22/18 20:20  WYG2495  ICU-M20)


 Document     05/23/18 04:52  ANL5089  (Rec: 05/23/18 04:52  [id number]  ICU-C15)


 Document     05/23/18 06:38  AGR1058  (Rec: 05/23/18 06:38  NXP8296  ICU-M20)


 Document     05/23/18 13:08  AIK9639  (Rec: 05/23/18 13:08  HBF9514  ICU-C15)


 Document     05/23/18 14:00  VRD1636  (Rec: 05/23/18 15:57  DOX3561  ICU-C15)


 Document     05/23/18 18:17  ILD7197  (Rec: 05/23/18 18:17  HFY6002  MED-M21)


 Document     05/23/18 22:00  YKL5428  (Rec: 05/23/18 22:12  KHL5752  TELE-C03)


 Document     05/24/18 06:00  YKB3062  (Rec: 05/24/18 07:12  SHT5033  TELE-C35)


 Document     05/24/18 14:00  YRN7082  (Rec: 05/24/18 14:48  QMW5185  TELE-C01)


 Document     05/24/18 22:00  RMW0754  (Rec: 05/24/18 22:54  OTB1534  TELE-C13)


 Document     05/24/18 22:56  PZQ2023  (Rec: 05/24/18 22:56  FSO5836  TELE-C01)


 Document     05/25/18 05:50  RUS8486  (Rec: 05/25/18 05:52  DCE5662  TELE-C05)


 Document     05/25/18 08:13  ERV1823  (Rec: 05/25/18 08:14  YJW8418  TELE-M07)


 Document     05/25/18 14:00  JEF1878  (Rec: 05/25/18 14:15  BTR7195  TELE-C01)


 Document     05/25/18 22:00  WVU8280  (Rec: 05/25/18 22:40  YRK4084  TELE-C03)


 Document     05/26/18 05:32  AGN8300  (Rec: 05/26/18 05:33  RJE1801  TELE-M06)


 Document     05/26/18 06:00  ADK1821  (Rec: 05/26/18 06:52  MQY5101  TELE-C07)


 Document     05/26/18 14:00  FFI9018  (Rec: 05/26/18 14:12  LWV8119  TELE-C05)


 Document     05/26/18 19:38  IDE6650  (Rec: 05/26/18 19:38  CDN6631  TELE-M03)


 Document     05/26/18 22:00  HYH8083  (Rec: 05/26/18 22:51  DXT2051  TELE-C34)


 Document     05/27/18 05:12  ZKQ7418  (Rec: 05/27/18 05:12  IOI4891  TELE-M03)


 Document     05/27/18 11:10  WOC8374  (Rec: 05/27/18 11:10  DQA8557  TELE-C13)


 Document     05/27/18 13:48  OJW0401  (Rec: 05/27/18 13:49  KLE4548  TELE-C03)


 Document     05/27/18 20:50  HRE8937  (Rec: 05/27/18 20:51  REC4624  TELE-C01)


 Document     05/28/18 05:49  TKC6692  (Rec: 05/28/18 05:49  ETI1610  TELE-C35)


 Document     05/28/18 14:00  NEZ6999  (Rec: 05/28/18 14:06  QFE8545  TELE-C01)


 Document     05/28/18 22:00  DLX6837  (Rec: 05/28/18 22:52  FNB9697  TELE-C13)


 Document     05/29/18 06:00  SEQ0683  (Rec: 05/29/18 06:41  BRU4307  TELE-C08)


 Document     05/29/18 09:25  MPA5326  (Rec: 05/29/18 09:25  ZZA0949  TELE-C01)


 Document     05/29/18 14:00  DTE1538  (Rec: 05/29/18 14:24  ZXQ4352  TELE-C01)


 Document     05/29/18 16:00  SOM2536  (Rec: 05/29/18 16:00  ZYR7932  TELE-C01)


Intake and Output                                          Start:  05/22/18 17:

38


Freq:   Q1HR                                               Status: Complete    

  


Protocol:                                                                      

  


 Document     05/22/18 19:00  NTQ4635  (Rec: 05/22/18 19:56  QNA0892  ICU-C15)


 Document     05/22/18 20:00  ACN8504  (Rec: 05/22/18 21:03  NVM2312  ICU-C15)


 Document     05/22/18 21:00  SCZ1080  (Rec: 05/22/18 21:06  WTC9868  ICU-C15)


 Document     05/22/18 21:28  IDA4382  (Rec: 05/22/18 21:29  UMR2474  ICU-M20)


 Document     05/22/18 22:41  NCI1926  (Rec: 05/22/18 22:42  WDZ5443  ICU-C15)


 Document     05/23/18 00:00  RXD1423  (Rec: 05/23/18 00:04  GIU0073  ICU-C15)


 Document     05/23/18 01:00  GCZ4636  (Rec: 05/23/18 01:59  YCE2843  ICU-C15)


 Document     05/23/18 02:12  JZV2533  (Rec: 05/23/18 02:12  CNZ1271  ICU-C15)


 Document     05/23/18 03:00  DQB5223  (Rec: 05/23/18 03:35  BVD4088  ICU-C15)


Intake and Output                                          Start:  05/25/18 11:

08


Freq:   DAILY@0600,1400,2200                               Status: Complete    

  


Protocol:                                                                      

  


 Created      05/25/18 11:08  [id number]  (Rec: 05/25/18 11:08  CFV1238  TELE-C07)


 Document     05/25/18 14:00  VZG4277  (Rec: 05/25/18 14:15  MLO0713  TELE-C01)


 Document     05/25/18 22:00  QMI0612  (Rec: 05/25/18 22:40  [id number]  TELE-C03)











- Physical Exam


General Physical Exam Comment: No acute distress; sitting up


Eye Exam: bilateral: PERRLA


Sinuses: Non-tender Maxillary Sinus


Throat/Oropharyx Exam: Bilateral: Normal


Thyroid Function: Clinically Euthyroid


Lungs and Chest: Yes: Chest Expansion Full, Chest Expansion Symetrica


Heart Rate and Rhythm: Regular


JVP: Not Elevated


Kitzmiller Beat: Non Displaced


Additional Cardiovascular: Yes: Kitzmiller Beat not Displaced, Normal Heart Sounds


Abdominal Exam: Yes: Soft





- Rheumotological System


Additional Musculoskeletal: Kyphosis





- Extremities


Feet Sensation: Abnormal: Sensory


Limbs: Normal Power - Mild echymyosis on his arms





- Neuro


Psychiatric: Normal


Speech: Normal





Results





- Results


Lab Results: 


 Laboratory Results - last 24 hr











  05/29/18 05/29/18





  08:54 08:54


 


WBC  17.4 H 


 


RBC  3.01 L 


 


Hgb  9.5 L 


 


Hct  29 L 


 


MCV  97 H 


 


MCH  32 H 


 


MCHC  33 


 


RDW  15 


 


Plt Count  232 


 


MPV  11.3 H 


 


Sodium   133 L


 


Potassium   3.4 L


 


Chloride   92 L


 


Carbon Dioxide   34 H


 


Anion Gap   7


 


BUN   28 H


 


Creatinine   1.60 H


 


Est GFR ( Amer)   53.0


 


Est GFR (Non-Af Amer)   41.2


 


BUN/Creatinine Ratio   17.5


 


Glucose   118 H


 


Calcium   8.3 L


 


Magnesium   2.1


 


Total Bilirubin   0.40


 


AST   27


 


ALT   36


 


Alkaline Phosphatase   207 H


 


C-Reactive Protein   17.45 H


 


Total Protein   6.8


 


Albumin   3.1 L


 


Globulin   3.7


 


Albumin/Globulin Ratio   0.8 L














Assessment





- Problem List


Assessment: 


Patient Problems





Acute hypoxemic respiratory failure (Acute)


Acute on chronic diastolic (congestive) heart failure (Acute)


DVT prophylaxis (Acute)


Eosinophilia (Acute)


Full code status (Acute)


Hypokalemia (Acute)


Hypomagnesemia (Acute)


Ileus (Acute)


NSTEMI (non-ST elevated myocardial infarction) (Acute)


Transaminitis (Acute)


V-tach (Acute)


JOSE ARMANDO (acute kidney injury) (Acute)


CKD (chronic kidney disease) stage 3, GFR 30-59 ml/min (Acute)


Chronic diastolic (congestive) heart failure (Acute)


DVT prophylaxis (Acute)


Dyslipidemia (Acute)


Fixed drug eruption (Acute)


Full code status (Acute)


GERD (gastroesophageal reflux disease) (Acute)


Leukocytosis (Acute)


Thrombocytopenia (Acute)


Troponin level elevated (Acute)








Plan: 





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 

interstitial lung disease and hematologic abnormalities (but unrevealing BM 

biopsy)  So far his immunology studies are unrevealing as his ND 3 and MPO 

antibodies and RF are negative.





Consider bronchoscopy if it would helpful per pulmonology.  I had requested a 

hypersensitivity pneumonitis panel.  No definitive evidence exists of vasculitis

, there is still in the differentiatl the possibility of  [place]; we 

could follow up first on his GI/ intestinal biopsies as they are still pending; 

other CTD serologies are pending

## 2018-05-30 NOTE — PN
Subjective


Date of Service: 05/30/18 - CC: pre procedure evaluation, SOB


Interval History: 





Anaesthesia asked to evaluate prior to pulmonary procedure/bx requiring GET 

anaesthesia.


The patient and his wife were present in the room at the time of my exam.


The patient states his breathing is much improved c/w admission.  His wife 

attributes this to the aggressive diuresis.


The patient had mild CP on admission, SS.  He never had the arm pain he had pre 

CABG (2017).  No recurrence. 





Medications


Active Medications: 








Acetaminophen (Tylenol Tab*)  650 mg PO Q6H PRN


   PRN Reason: pain/fever


   Last Admin: 05/22/18 19:22 Dose:  650 mg


Al Hydrox/Mg Hydrox/Simethicone (Maalox Plus*)  30 ml PO Q6H PRN


   PRN Reason: HEARTBURN


   Last Admin: 05/24/18 10:39 Dose:  30 ml


Aspirin (Aspirin Ec Tab*)  81 mg PO DAILY [facility]


   Last Admin: 05/30/18 09:54 Dose:  81 mg


Atorvastatin Calcium (Lipitor*)  10 mg PO DAILY [facility]


   Last Admin: 05/30/18 09:54 Dose:  10 mg


Cholecalciferol (Vitamin D Tab*)  1,000 units PO DAILY [facility]


   Last Admin: 05/30/18 09:54 Dose:  1,000 units


Ethacrynic Acid (Edecrin Tab (Nf))  25 mg PO DAILY [facility]


   Last Admin: 05/30/18 09:54 Dose:  25 mg


Gabapentin (Neurontin Cap(*))  100 mg PO TID [facility]


   Last Admin: 05/30/18 09:54 Dose:  100 mg


Heparin Sodium (Porcine) (Heparin Vial(*))  5,000 units SUBCUT Q8HR [facility]


   Last Admin: 05/30/18 05:47 Dose:  5,000 units


Hydroxyzine HCl (Atarax Tab*)  25 mg PO Q6H PRN


   PRN Reason: ITCHING


Isosorbide Mononitrate (Imdur Er Tab*)  30 mg PO DAILY [facility]


   Last Admin: 05/30/18 09:54 Dose:  30 mg


Magnesium Oxide (Magox 400 Tab*)  800 mg PO DAILY [facility]


   Last Admin: 05/30/18 09:54 Dose:  800 mg


Metolazone (Zaroxolyn Tab*)  5 mg PO DAILY@0830 [facility]


   Last Admin: 05/30/18 09:47 Dose:  Not Given


Metoprolol Tartrate (Lopressor Tab*)  37.5 mg PO BID [facility]


   Last Admin: 05/30/18 09:54 Dose:  37.5 mg


Metoprolol Tartrate (Lopressor Iv*)  5 mg IV Q6H PRN


   PRN Reason: SBP>180


Montelukast Sodium (Singulair Tab*)  5 mg PO DAILY [facility]


   Last Admin: 05/30/18 09:54 Dose:  5 mg


Morphine Sulfate (Morphine Vial*)  2 mg IV Q4H PRN


   PRN Reason: PAIN


   Last Admin: 05/24/18 14:42 Dose:  2 mg


Omeprazole (Prilosec Cap*)  20 mg PO QAM [facility]


   Last Admin: 05/30/18 09:54 Dose:  20 mg


Potassium Chloride (Klor Con Er Tab*)  10 meq PO BID [facility]


   Last Admin: 05/30/18 09:54 Dose:  10 meq


Prednisone (Deltasone Tab*)  50 mg PO DAILY [facility]


   Last Admin: 05/30/18 09:54 Dose:  50 mg


Psyllium Hydrophilic Mucilloid (Metamucil Oscar*)  1 pkt PO BID [facility]


   Last Admin: 05/30/18 09:54 Dose:  1 pkt








Objective


Vital Signs:











Temp Pulse Resp BP Pulse Ox


 


 97.9 F   54   16   112/60   95 


 


 05/30/18 11:57  05/30/18 11:57  05/30/18 11:57  05/30/18 11:57  05/30/18 11:57











Appearance: seated in bed, 40 degrees, comfortable.


Eyes: No Scleral Icterus, PERRLA


Ears/Nose/Mouth/Throat: Clear Oropharnyx, Mucous Membranes Moist


Neck: Trachea Midline, No Thyroid Enlargement, Masses


Respiratory: Symmetrical Chest Expansion and Respiratory Effort, - - no rales


Cardiovascular: RRR, - - 2/6 murmur systolic, low pitched diastolic murmer USB,

  sternotomy scar noted


Abdominal: NL Sounds; No Tenderness; No Distention, No Hepatosplenomegaly


Extremities: No Edema, - - Saphenous vein graft scar LLE noted, old.


Neurological: Alert and Oriented x 3


Laboratory Results: 


 





 05/30/18 06:23 





 05/30/18 06:23 





 











INR (Anticoag Therapy)  1.08  (0.77-1.02)  H  05/22/18  14:09    


 


APTT  29.0 seconds (26.0-36.3)   05/24/18  13:22    


 


Total Bilirubin  0.30 mg/dL (0.2-1.0)   05/30/18  06:23    


 


AST  32 U/L (13-39)   05/30/18  06:23    


 


ALT  45 U/L (7-52)   05/30/18  06:23    


 


Alkaline Phosphatase  220 U/L ()  H  05/30/18  06:23    


 


CK-MB (CK-2)  3.9 ng/mL (0.6-6.3)   05/22/18  14:09    


 


B-Natriuretic Peptide  652 pg/mL (-100) H  05/30/18  06:23    


 


Total Protein  6.4 g/dL (6.4-8.9)   05/30/18  06:23    


 


Albumin  3.0 g/dL (3.2-5.2)  L  05/30/18  06:23    


 


Globulin  3.4 g/dL (2-4)   05/30/18  06:23    


 


Albumin/Globulin Ratio  0.9  (1-3)  L  05/30/18  06:23    


 


TSH  2.19 mcIU/mL (0.34-5.60)   05/22/18  14:09    








 











  05/22/18 05/22/18 05/22/18





  17:03 20:16 23:20


 


Troponin I  0.51 H*  1.08 H*  1.49 H*














  05/23/18





  03:51


 


Troponin I  1.32 H*











Diagnostic Imaging: 





echo 5/23/2018: LVEF 45-50%, normal rv size with mild-mod reduced function, 

mild as, moderate ai, mild-moderate MR


EKG Data: 





ekg's NSR, rbbb, with non-specific st/t changes, stable c/w 2018 ECG's





Assessment/Plan


86 year old man admitted with SOB, found in diastolic congestive heart failure, 

known CAD s/p CABG 2017 with a small bump in trops (type 2 small MI) in setting 

of eosinophilic rash, interstitial lung disease, CKD, anemia.





Clinically improved with steroids, diuresis and other medical management.





I reviewed consults from HemeOnc, Rheumatology, pulmonology and GI notes in 

addtion to cardiology.





Currently Dr Burk wishes to perform a lung biopsy to evaluate for possible 

Churge [place]/ evaluate eosinophylic disease and Dr Castro concurs.





-I concur with Dr Severino that the small bump in trops with medical stress 

including CHF is likely demand ischemia.


   I don't recommend a stress test, the patient is not a candidate for 

interventional management now with anemia (although platelets improved).





I feel the patient can undergo the pulmonary procedure with GET anaesthesia. 


   -leave on beta blockers periprocedure.


   -OK to hold Imdur


   -OK to hold anticoagulants.


   -Risk of CHF due to valvular heart disease and diastolic dysfunction, renal 

insufficiency therefore careful fluid management will be important.





1 hour spent on this patient, > 50% of the time 1:1 with patient

## 2018-05-30 NOTE — PN
Hospitalist Progress Note


Date of Service: 05/30/18





Pt seen and examined.  Meds and labs reviewed.  Spoke with pt earlier about the 

reasons for the delay of possible bronchoscopy as well as touched base with ASIA Manning, who was very informative and helpful.  Re-discussed case again with pt 

after my conversation with Dr. Burk.  Pt mentions he is unhappy of why his 

bronchoscopy is delayed.  Pt mentions while he understands the situation, he 

would prefer to stay the night and leave in AM since his wife has already left.

  He is not interested at this point for bronchoscopy on Friday as was 

discussed with Dr. Burk.





ROS:  Denied HA/dizziness, F/C, N/V, CP, SOB, increased cough, sputum production

, abd pain, diarrhea, constipation, dysuria, myalgias, arthralgias, throat pain

, and new skin lesions.  The rest of the 14 point ROS are unremarkable.





PHYSICAL EXAM:


GEN APPEARANCE: Awake, not in acute distress


HEENT: NC/AT, PERRLA, moist oral mucosa, (-) throat erythema


NECK: Soft, supple, (-) cervical LAD, (-)JVD


HEART: S1S2 WNL, RRR, No MRG


CHEST: CTA, BL, GAE, No W/R/R


ABD: Soft, ND/NT, NABS 4x Q


EXT: No C/C/E


SKIN: Warm to touch


PSYCH: No active psychosis, hallucinations, depression, SI/HI





ASSESSMENT AND PLAN:





#Eosinophilia:


   - Trying to find unifying diagnosis to explain his eosinophilia, skin rash, 

abdominal pain, such as eosinophilic granulomatosis with polyangiitis, or DRESS 

sydrome.


   - Pulmonology f/u appreciated, discussed bronchoscopy/biopsy w/ Dr. Burk, 

unfortunately, pt today appears to no longer be interested in the procedure 


   - EGD done which showed that stomach was minimally papillated on the surface 

and biopsies of antrum and fundus done today.   


   -Continue Prednisone





#Acute on chronic diastolic (congestive) heart failure, acute CHF resolved:


   - Echo shows EF 45-50% with mild global hypokinesis.


   - Continue management with Metolazone; Avoiding Furosemide d/t concern this 

could be the source of his eosinophilia.    


   -Will re-check BNP in AM





#Elevated troponins likely due to demand ischemia/MI Type II:


   -Appreciate Dr. Mabry evaluation


   -As above








#Chronic LE pain:


   -Possibly due to neuropathy?


   -Will start low dose Gabapentin





#CKD (chronic kidney disease) stage 3, GFR 30-59 ml/min


   - creatinine appears at baseline, stable   





#DVT prophylaxis


   - SQ Heparin.   





#Status and Disposition: 


   -D/C in AM

## 2018-05-31 NOTE — RAD
INDICATION:  Bilateral lower extremity edema.



COMPARISON:  There are no prior studies available for comparison.



TECHNIQUE:  Multiple real-time, color flow and Doppler tracings of both lower extremities

were obtained.



FINDINGS: The common femoral, femoral, profunda femoral and popliteal veins all

demonstrate normal compressibility, augmentation with compression and phasic response with

respiration.



The posterior tibial and peroneal veins demonstrate normal compressibility and

augmentation with compression. There was slightly limited visualization of the peroneal

veins bilaterally.



IMPRESSION:  SLIGHTLY LIMITED EXAM OF THE CALVES, NO EVIDENCE FOR DEEP VENOUS THROMBOSIS.

## 2018-05-31 NOTE — PN
Progress Note





- Progress Note


Date of Service: 05/31/18 - Pulm f/u note


Note: 


Pt seen and examined at bedside. Pt reports feeling better, eager to go home. 

Denies abd pain, tingling in extremities is improved. Is worried about having 

to take may meds








 Active Medications











Generic Name Dose Route Start Last Admin





  Trade Name Freq  PRN Reason Stop Dose Admin


 


Acetaminophen  650 mg  05/22/18 17:00  05/22/18 19:22





  Tylenol Tab*  PO   650 mg





  Q6H PRN   Administration





  pain/fever   


 


Al Hydrox/Mg Hydrox/Simethicone  30 ml  05/22/18 18:45  05/24/18 10:39





  Maalox Plus*  PO   30 ml





  Q6H PRN   Administration





  HEARTBURN   


 


Aspirin  81 mg  05/23/18 09:00  05/31/18 08:32





  Aspirin Ec Tab*  PO   81 mg





  DAILY ROBB   Administration


 


Atorvastatin Calcium  10 mg  05/23/18 09:00  05/31/18 08:32





  Lipitor*  PO   10 mg





  DAILY ROBB   Administration


 


Cholecalciferol  1,000 units  05/23/18 09:00  05/31/18 08:32





  Vitamin D Tab*  PO   1,000 units





  DAILY ROBB   Administration


 


Gabapentin  100 mg  05/28/18 14:00  05/31/18 15:25





  Neurontin Cap(*)  PO   100 mg





  TID ROBB   Administration


 


Heparin Sodium (Porcine)  5,000 units  05/25/18 14:00  05/31/18 15:21





  Heparin Vial(*)  SUBCUT   Not Given





  Q8HR [facility]   


 


Hydroxyzine HCl  25 mg  05/22/18 16:56  





  Atarax Tab*  PO   





  Q6H PRN   





  ITCHING   


 


Isosorbide Mononitrate  30 mg  05/23/18 16:00  05/31/18 08:31





  Imdur Er Tab*  PO   30 mg





  DAILY ROBB   Administration


 


Magnesium Oxide  800 mg  05/23/18 09:00  05/31/18 08:32





  Magox 400 Tab*  PO   800 mg





  DAILY ROBB   Administration


 


Metolazone  5 mg  05/26/18 08:30  05/31/18 08:32





  Zaroxolyn Tab*  PO   5 mg





  DAILY@0830 ROBB   Administration


 


Metoprolol Tartrate  37.5 mg  05/22/18 21:00  05/31/18 08:32





  Lopressor Tab*  PO   37.5 mg





  BID ROBB   Administration


 


Metoprolol Tartrate  5 mg  05/22/18 18:45  





  Lopressor Iv*  IV   





  Q6H PRN   





  SBP>180   


 


Montelukast Sodium  5 mg  05/23/18 09:00  05/31/18 08:32





  Singulair Tab*  PO   5 mg





  DAILY ROBB   Administration


 


Omeprazole  20 mg  05/23/18 09:00  05/31/18 08:31





  Prilosec Cap*  PO   20 mg





  QAM ROBB   Administration


 


Potassium Chloride  10 meq  05/27/18 09:00  05/31/18 08:31





  Klor Con Er Tab*  PO   10 meq





  BID ROBB   Administration


 


Prednisone  50 mg  05/23/18 09:00  05/31/18 08:32





  Deltasone Tab*  PO   50 mg





  DAILY ROBB   Administration


 


Psyllium Hydrophilic Mucilloid  1 pkt  05/22/18 21:00  05/31/18 08:31





  Metamucil Oscar*  PO   1 pkt





  BID ROBB   Administration








 Vital Signs











Temp Pulse Resp BP Pulse Ox


 


 98.3 F   68   16   96/46   93 


 


 05/31/18 15:59  05/31/18 15:59  05/31/18 15:59  05/31/18 15:59  05/31/18 15:59











O/E: Pt in NAD, alert, awake, orientedx3


HEENT: PERRLA, No JVD, no scleral icterus


Lungs: Clear to auscultation b/l 


CVS: S1, S2+, regular


Abd: Soft, BS+


Skin: Excoriations from prior rash


Neuro: No focal defecits


 Laboratory Results - last 24 hr











  05/29/18 05/31/18 05/31/18





  08:54 09:20 12:09


 


B-Natriuretic Peptide    635 H


 


Urine Color   Straw 


 


Urine Appearance   Clear 


 


Urine pH   7.0 


 


Ur Specific Gravity   1.006 L 


 


Urine Protein   Negative 


 


Urine Ketones   Negative 


 


Urine Blood   Negative 


 


Urine Nitrate   Negative 


 


Urine Bilirubin   Negative 


 


Urine Urobilinogen   Negative 


 


Ur Leukocyte Esterase   Negative 


 


Urine Glucose   Negative 


 


IgA  106  


 


Cyclic Citrull Peptide  <15.6  


 


Anti-Nuclear Antibody  8.6 H  


 


CATARINO Interpretation  Not Reportable  


 


QI-1 Antibody  <0.2  


 


SS-A/Ro Antibody  <0.2  


 


SS-B/La Antibody  <0.2  


 


Sm (Nichole) IgG Ab, Quant  <0.2  


 


Scl-70 Scleroderma Ab  <0.2  


 


Anti-ds DNA IgG Ab  68.1 H  


 


Anti-Ribosomal Ab  <0.2  


 


U1-nRNP Antibody  >8.0 H  


 


Anti-Centromere Ab  <0.2  


 


Glomerular Base Memb Ab  <0.2  


 


Complement C3  104  


 


Complement C4  23  


 


CMV IgG Ab  Positive A  


 


CMV IgM Ab  Negative  


 


T. vulgaris IgG Ab  3.0  


 


A. fumigatus IgG Ab  22.9  


 


M. faeni IgG Ab  2.6  

















I/R: 86 y o m with h/o diastolic and systolic CHF, CRI, anemia, peripheral 

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 

vasculitis, s/p EGD with no significant gross abnormalities, biopsy negative 

for eosnophilic infiltration, mild gastritis noted 








CTD w/u for Churg morales was negative, CATARINO and dsDNA positive suggestive of 

rheumatological disease


Etiology unclear at this time


No need for bronchoscopy


Pt to be started on Plaquinel


c/w GERD therapy


Will need f/u CT chest in 2-3 months to assess treatment response





D/w Dr Castro, Dr Laura

## 2018-05-31 NOTE — PN
Subjective





- Subjective


Date of Service: 05/31/18 - Chief Complaint : eosinophilia, interstitial lung 

disease


History: 





He had slight congestion earlier today but now feels fine; a DVT was ruled out 

earlier


Active Problems: 


 Active Problems





Acute hypoxemic respiratory failure (Acute) J96.01


 - Patient required 10 liters of O2 in the ED, vapotherm overnight in ICU, but 

is now down to 3 liters. - Source is CHF exacerbation. 


Acute on chronic diastolic (congestive) heart failure (Acute) I50.33


 - Echo shows EF 45-50% with mild global hypokinesis. - Weight down 1 Kg. - 

Continue management with Metolazone. Avoiding Furosemide d/t concern this could 

be the source of his eosinophilia. 


DVT prophylaxis (Acute) [id number]


 - SQ Heparin. 


Eosinophilia (Acute) D72.1


 - Trying to find unifying diagnosis to explain his eosinophilia, skin rash, 

abdominal pain, such as eosinophilic granulomatosis with polyangiitis, or DRESS 

sydrome. - Pulmonology f/u appreciated, discussed bronchoscopy/biopsy w/ Dr. Burk, she can see patient tomorrow or Tuesday - EGD planned Tuesday after 

holiday weekend w/ Dr. Thomas 


Full code status (Acute) Z78.9


Hypokalemia (Acute) E87.6


 - Replete. Keep >4.0. 


Hypomagnesemia (Acute) E83.42


 - Replete. Keep it >2.0. 


Ileus (Acute) K56.7


 - CT abdome showed findings suggestive of SBO vs ileus. - May benefit of GI 

eval and endoscopy when stable from cardiac point of view. - EGD may be helpful 

if diagnoses eosinophilic gastritis. - Advanced to full liquids as tolerated. 


NSTEMI (non-ST elevated myocardial infarction) (Acute) I21.4


 - Troponin elevation likely secondary to increased demand in the setting of 

CHF and hypoxia. - Cardiology input appreciated. - D/c heparin drip. - Continue 

Aspirin, Metoprolol, and Atorvastatin. 


Transaminitis (Acute) R74.0


 - Suspect secondary to passive liver congestion - resolved. 


V-tach (Acute) I47.2


 - No further VT for 48 hrs - Continue Metoprolol. - potassium low today, will 

replete further, recheck in AM 








Current Medications: 


 Current Medications





Acetaminophen (Tylenol Tab*)  650 mg PO Q6H PRN


   PRN Reason: pain/fever


   Last Admin: 05/22/18 19:22 Dose:  650 mg


Al Hydrox/Mg Hydrox/Simethicone (Maalox Plus*)  30 ml PO Q6H PRN


   PRN Reason: HEARTBURN


   Last Admin: 05/24/18 10:39 Dose:  30 ml


Aspirin (Aspirin Ec Tab*)  81 mg PO DAILY [facility]


   Last Admin: 05/31/18 08:32 Dose:  81 mg


Atorvastatin Calcium (Lipitor*)  10 mg PO DAILY [facility]


   Last Admin: 05/31/18 08:32 Dose:  10 mg


Cholecalciferol (Vitamin D Tab*)  1,000 units PO DAILY [facility]


   Last Admin: 05/31/18 08:32 Dose:  1,000 units


Gabapentin (Neurontin Cap(*))  100 mg PO TID [facility]


   Last Admin: 05/31/18 15:25 Dose:  100 mg


Heparin Sodium (Porcine) (Heparin Vial(*))  5,000 units SUBCUT Q8HR [facility]


   Last Admin: 05/31/18 15:21 Dose:  Not Given


Hydroxyzine HCl (Atarax Tab*)  25 mg PO Q6H PRN


   PRN Reason: ITCHING


Isosorbide Mononitrate (Imdur Er Tab*)  30 mg PO DAILY [facility]


   Last Admin: 05/31/18 08:31 Dose:  30 mg


Magnesium Oxide (Magox 400 Tab*)  800 mg PO DAILY [facility]


   Last Admin: 05/31/18 08:32 Dose:  800 mg


Metolazone (Zaroxolyn Tab*)  5 mg PO DAILY@0830 [facility]


   Last Admin: 05/31/18 08:32 Dose:  5 mg


Metoprolol Tartrate (Lopressor Tab*)  37.5 mg PO BID [facility]


   Last Admin: 05/31/18 08:32 Dose:  37.5 mg


Metoprolol Tartrate (Lopressor Iv*)  5 mg IV Q6H PRN


   PRN Reason: SBP>180


Montelukast Sodium (Singulair Tab*)  5 mg PO DAILY [facility]


   Last Admin: 05/31/18 08:32 Dose:  5 mg


Omeprazole (Prilosec Cap*)  20 mg PO QAM [facility]


   Last Admin: 05/31/18 08:31 Dose:  20 mg


Potassium Chloride (Klor Con Er Tab*)  10 meq PO BID [facility]


   Last Admin: 05/31/18 08:31 Dose:  10 meq


Prednisone (Deltasone Tab*)  50 mg PO DAILY [facility]


   Last Admin: 05/31/18 08:32 Dose:  50 mg


Psyllium Hydrophilic Mucilloid (Metamucil Oscar*)  1 pkt PO BID [facility]


   Last Admin: 05/31/18 08:31 Dose:  1 pkt











- Review of Systems


General Comments: 





Overall Mr. Vazquez feels well; his breathing is doing well and he is wanting to 

go home


Constitutional Symptoms: No: Weight Gain, Weight Loss, Weakness, Fatigue, Fever

, Night Sweats


Dermatology: Normal: No, Rash: No


Eyes: Positive: Normal


Thyroid: Positive: Normal


Pulmonary: Positive: Normal


Cardiology: Positive: Normal


Gastroenterology: Positive: Normal


Genital - Urinary: Positive: Normal


Musculoskeletal: Positive: Joint Stiffness


Neurology: Positive: Normal


Psychiatry: Positive: Normal


Allergic/Immunologic: Positive: Immunocompromise


Home Medications: 


 Home Medications











 Medication  Instructions  Recorded  Confirmed  Type


 


Atorvastatin* [Lipitor 10 MG*] 10 mg PO DAILY 08/10/16 05/22/18 History


 


Cholecalciferol TAB* [Vitamin D 1,000 unit PO DAILY 08/10/16 05/22/18 History





TAB*]    


 


Aspirin EC TAB* [Ecotrin EC Low 81 mg PO DAILY  tab.ec 03/07/17 05/22/18 Rx





Dose 81 MG*]    


 


Metoprolol Tartrate TAB* 37.5 mg PO BID #90 tab 03/07/17 05/22/18 Rx





[Lopressor TAB*]    


 


Omeprazole CAP* [Prilosec CAP* 20 20 mg PO QAM 02/02/18 05/22/18 History





MG]    


 


Psyllium OSCAR* [Metamucil OSCAR*] 1 pkt PO BID 02/02/18 05/22/18 History


 


Magnesium Oxide TAB* [MagOx 400 800 mg PO DAILY #7 tab 05/05/18 05/22/18 Rx





TAB*]    


 


Montelukast Sodium TAB* [Singulair 5 mg PO DAILY #30 tab 05/05/18 05/22/18 Rx





10 MG TAB*]    


 


Sodium Citrate/Citric Acid* 30 ml PO TID #1 bottle 05/05/18 05/22/18 Rx





[Bicitra*]    


 


hydrOXYzine HCL TAB* [Atarax 25 MG 25 mg PO Q6H PRN 05/22/18 05/22/18 History





TAB*]    


 


Gabapentin CAP(*) [Neurontin 100 100 mg PO TID 30 Days #90 cap 05/31/18  Rx





mg CAP(*)]    


 


Hydroxychloroquine TAB* [Plaquenil 200 mg PO DAILY 30 Days #30 tab 05/31/18  Rx





TAB*]    


 


Potassium Chlor TAB* [Klor Con ER 10 meq PO BID 30 Days #60 tab.er 05/31/18  Rx





TAB 10 MEQ*]    


 


predniSONE TAB* [Deltasone TAB*] 10 mg PO DAILY 70 Days #210 tab 05/31/18  Rx











Allergies: 


 Allergies











Allergy/AdvReac Type Severity Reaction Status Date / Time


 


No Known Allergies Allergy   Verified 05/22/18 13:01














Objective





- Vital Signs


Vital Signs: 


 Vital Signs











  05/30/18 05/30/18 05/30/18





  19:09 20:00 22:22


 


Temperature 97.3 F  


 


Pulse Rate 64  


 


Respiratory 16 16 16





Rate   


 


Blood Pressure 138/58  





(mmHg)   


 


O2 Sat by Pulse 98  





Oximetry   














  05/31/18 05/31/18 05/31/18





  00:40 01:06 04:30


 


Temperature 98.1 F  97.9 F


 


Pulse Rate 55  55


 


Respiratory 16 14 16





Rate   


 


Blood Pressure 126/54  122/49





(mmHg)   


 


O2 Sat by Pulse 97  98





Oximetry   














  05/31/18 05/31/18 05/31/18





  07:52 08:00 08:32


 


Temperature 97.3 F  


 


Pulse Rate 56  


 


Respiratory 14 18 20





Rate   


 


Blood Pressure 123/47  





(mmHg)   


 


O2 Sat by Pulse 94  





Oximetry   














  05/31/18 05/31/18 05/31/18





  11:34 15:25 15:59


 


Temperature 98.4 F  98.3 F


 


Pulse Rate 60  68


 


Respiratory 14 16 16





Rate   


 


Blood Pressure 113/49  96/46





(mmHg)   


 


O2 Sat by Pulse 96  93





Oximetry   














- Intake and Output


Intake and Output: 


 Intake & Output











 05/29/18 05/30/18 05/31/18 06/01/18





 06:59 06:59 06:59 06:59


 


Intake Total 1025 1377 488 720


 


Output Total 2240 1475 1850 750


 


Balance -1215 -98 -1362 -30


 


Weight 150 lb 11.2 oz 150 lb 9.6 oz 147 lb 14.4 oz 


 


Intake:    


 


  IV Fluids  62  


 


    NS/Mg/K+  62  


 


  Oral 1025 1315 488 720


 


Output:    


 


  Urine 2240 1475 1850 750


 


Other:    


 


  Estimated Void Small Small Large 


 


  Date of Last Bowel  5/29/18  





  Movement    


 


  # Bowel Movements 0 0 0 


 


  Estimated Stool Amount Large Large  


 


  # Voids 1 1 1 





 





ADLs: Meal  Record                                         Start:  05/22/18 17:

38


Freq:   09,13,18                                           Status: Inactive    

  


Protocol:                                                                      

  


 Document     05/22/18 19:57  [id number]  (Rec: 05/22/18 19:57  [id number]  ICU-C15)


 Document     05/23/18 09:00  [id number]  (Rec: 05/23/18 13:52  [id number]  ICU-C15)


 Document     05/23/18 13:00  EYL4209  (Rec: 05/23/18 13:52  [id number]  ICU-C15)


ADLs: Meal  Record                                         Start:  05/23/18 18:

23


Freq:                                                      Status: Active      

  


Protocol:                                                                      

  


 Document     05/23/18 18:23  [id number]  (Rec: 05/23/18 18:23  [id number]  TELE-C01)


 Created      05/23/18 18:23  [id number]  (Rec: 05/23/18 18:23  TLW8795  TELE-C01)


 Document     05/24/18 09:00  ZOP5505  (Rec: 05/24/18 14:29  LNG6085  TELE-C01)


 Document     05/24/18 14:29  ZUV2874  (Rec: 05/24/18 14:30  HDN1562  TELE-C01)


 Document     05/24/18 17:45  QXZ6434  (Rec: 05/24/18 17:45  [id number]  TELE-C08)


ADLs: Meal  Record                                         Start:  05/25/18 11:

08


Freq:   DAILY@0900,1400,1800                               Status: Active      

  


Protocol:                                                                      

  


 Created      05/25/18 11:08  [id number]  (Rec: 05/25/18 11:08  JOX0814  TELE-C07)


 Document     05/25/18 14:00  JVO3946  (Rec: 05/25/18 14:15  ZGB8959  TELE-C01)


 Document     05/25/18 18:00  OJV6114  (Rec: 05/25/18 18:01  QYA5223  TELE-C05)


 Document     05/26/18 09:00  KMN1093  (Rec: 05/26/18 13:20  SSW0832  TELE-C05)


 Document     05/26/18 14:00  ZHZ5167  (Rec: 05/26/18 14:12  IUG0597  TELE-C05)


 Document     05/26/18 18:00  CLD1662  (Rec: 05/26/18 18:58  CAB4809  TELE-C08)


 Document     05/27/18 09:00  FNM8645  (Rec: 05/27/18 09:21  YIH9241  TELE-C03)


 Document     05/27/18 13:45  GVC3030  (Rec: 05/27/18 13:48  MMV6794  TELE-C03)


 Document     05/27/18 18:00  QNP8171  (Rec: 05/27/18 19:52  SQN3578  TELE-C01)


 Document     05/28/18 09:00  AYB7543  (Rec: 05/28/18 14:04  NQG3815  TELE-C01)


 Document     05/28/18 14:00  JBZ7632  (Rec: 05/28/18 14:06  ZGG0974  TELE-C01)


 Document     05/28/18 18:00  XOV9372  (Rec: 05/28/18 20:09  XOX2941  TELE-C13)


 Document     05/29/18 09:00  URD8808  (Rec: 05/29/18 09:25  XDP6277  TELE-C01)


 Document     05/29/18 14:00  LQE9565  (Rec: 05/29/18 14:24  HYT6131  TELE-C01)


 Document     05/29/18 18:00  EEH6817  (Rec: 05/29/18 19:44  VPX9330  TELE-C03)


 Document     05/30/18 09:00  JRE2791  (Rec: 05/30/18 10:06  VQD1873  TELE-C05)


 Document     05/30/18 14:00  EZD6714  (Rec: 05/30/18 14:48  MMV2384  TELE-C05)


 Document     05/30/18 18:00  QHV4752  (Rec: 05/30/18 23:23  JQK6295  TELE-C13)


 Document     05/31/18 09:00  DCE0114  (Rec: 05/31/18 10:12  WPY1894  TELE-C07)


 Document     05/31/18 14:00  NWK0311  (Rec: 05/31/18 14:12  BDA1591  TELE-C01)


Intake and Output                                          Start:  05/22/18 17:

03


Freq:   06,14,2200                                         Status: Active      

  


Protocol:                                                                      

  


 Document     05/22/18 20:20  ZRW2741  (Rec: 05/22/18 20:20  GUT7943  ICU-M20)


 Document     05/23/18 04:52  YFK1982  (Rec: 05/23/18 04:52  DIL4800  ICU-C15)


 Document     05/23/18 06:38  DJK0060  (Rec: 05/23/18 06:38  FER4595  ICU-M20)


 Document     05/23/18 13:08  WLO0953  (Rec: 05/23/18 13:08  AGK3476  ICU-C15)


 Document     05/23/18 14:00  SUX5258  (Rec: 05/23/18 15:57  TCR4441  ICU-C15)


 Document     05/23/18 18:17  GRG0046  (Rec: 05/23/18 18:17  UTF5009  MED-M21)


 Document     05/23/18 22:00  GOI9121  (Rec: 05/23/18 22:12  PDJ5752  TELE-C03)


 Document     05/24/18 06:00  VGU2098  (Rec: 05/24/18 07:12  SFN9798  TELE-C35)


 Document     05/24/18 14:00  PTX2902  (Rec: 05/24/18 14:48  SZX2319  TELE-C01)


 Document     05/24/18 22:00  KLN2486  (Rec: 05/24/18 22:54  YJN3887  TELE-C13)


 Document     05/24/18 22:56  KCE5492  (Rec: 05/24/18 22:56  WZU8431  TELE-C01)


 Document     05/25/18 05:50  CRX8089  (Rec: 05/25/18 05:52  OZT6550  TELE-C05)


 Document     05/25/18 08:13  PIJ9198  (Rec: 05/25/18 08:14  GYM3677  TELE-M07)


 Document     05/25/18 14:00  LBV3560  (Rec: 05/25/18 14:15  LBW4765  TELE-C01)


 Document     05/25/18 22:00  CYL6127  (Rec: 05/25/18 22:40  GNH2636  TELE-C03)


 Document     05/26/18 05:32  CUK6416  (Rec: 05/26/18 05:33  BMR8813  TELE-M06)


 Document     05/26/18 06:00  KVG0770  (Rec: 05/26/18 06:52  KGE8056  TELE-C07)


 Document     05/26/18 14:00  ACU7043  (Rec: 05/26/18 14:12  TYW1279  TELE-C05)


 Document     05/26/18 19:38  XFZ9811  (Rec: 05/26/18 19:38  EKJ9593  TELE-M03)


 Document     05/26/18 22:00  RZQ2948  (Rec: 05/26/18 22:51  JHX4515  TELE-C34)


 Document     05/27/18 05:12  EWI9778  (Rec: 05/27/18 05:12  RJH7514  TELE-M03)


 Document     05/27/18 11:10  ERC3019  (Rec: 05/27/18 11:10  GDE7263  TELE-C13)


 Document     05/27/18 13:48  UEC0207  (Rec: 05/27/18 13:49  IOL5908  TELE-C03)


 Document     05/27/18 20:50  OQX7566  (Rec: 05/27/18 20:51  LRH8319  TELE-C01)


 Document     05/28/18 05:49  QFR1771  (Rec: 05/28/18 05:49  KAR7396  TELE-C35)


 Document     05/28/18 14:00  RBH3878  (Rec: 05/28/18 14:06  NVI3712  TELE-C01)


 Document     05/28/18 22:00  BCX6947  (Rec: 05/28/18 22:52  YSV8508  TELE-C13)


 Document     05/29/18 06:00  MNI2703  (Rec: 05/29/18 06:41  BWB1508  TELE-C08)


 Document     05/29/18 09:25  QIY5441  (Rec: 05/29/18 09:25  VSJ8148  TELE-C01)


 Document     05/29/18 14:00  FRB3998  (Rec: 05/29/18 14:24  QEY4154  TELE-C01)


 Document     05/29/18 16:00  DCK7694  (Rec: 05/29/18 16:00  FIS8333  TELE-C01)


 Document     05/29/18 22:00  NLJ6833  (Rec: 05/29/18 22:05  AWN3089  TELE-C08)


 Document     05/30/18 05:41  AHW5656  (Rec: 05/30/18 05:41  IEL2240  TELE-C03)


 Document     05/30/18 14:00  ANW6546  (Rec: 05/30/18 14:50  VZL7053  TELE-C05)


 Document     05/30/18 22:00  MNV0139  (Rec: 05/30/18 22:49  OTA7310  TELE-C05)


 Document     05/31/18 05:19  TQD9828  (Rec: 05/31/18 05:19  WYT7748  TELE-C07)


 Document     05/31/18 14:00  REB0650  (Rec: 05/31/18 14:12  SOK6133  TELE-C01)


Intake and Output                                          Start:  05/22/18 17:

38


Freq:   Q1HR                                               Status: Complete    

  


Protocol:                                                                      

  


 Document     05/22/18 19:00  KYV2977  (Rec: 05/22/18 19:56  DIU7010  ICU-C15)


 Document     05/22/18 20:00  DPE4943  (Rec: 05/22/18 21:03  FRA3065  ICU-C15)


 Document     05/22/18 21:00  YQE0595  (Rec: 05/22/18 21:06  TNK8487  ICU-C15)


 Document     05/22/18 21:28  LET5841  (Rec: 05/22/18 21:29  TXQ0333  ICU-M20)


 Document     05/22/18 22:41  LXB2335  (Rec: 05/22/18 22:42  NQR2328  ICU-C15)


 Document     05/23/18 00:00  LMD6721  (Rec: 05/23/18 00:04  PCF5967  ICU-C15)


 Document     05/23/18 01:00  BRF7832  (Rec: 05/23/18 01:59  JTZ0518  ICU-C15)


 Document     05/23/18 02:12  VBU7606  (Rec: 05/23/18 02:12  EPW0296  ICU-C15)


 Document     05/23/18 03:00  EBL0694  (Rec: 05/23/18 03:35  QPD8031  ICU-C15)


Intake and Output                                          Start:  05/25/18 11:

08


Freq:   DAILY@0600,1400,2200                               Status: Complete    

  


Protocol:                                                                      

  


 Created      05/25/18 11:08  [id number]  (Rec: 05/25/18 11:08  HOH4301  TELE-C07)


 Document     05/25/18 14:00  FIT7345  (Rec: 05/25/18 14:15  ERC2700  TELE-C01)


 Document     05/25/18 22:00  UGZ2864  (Rec: 05/25/18 22:40  ZRY1461  TELE-C03)











- Physical Exam


General Physical Exam Comment: No acute distress; sitting up


Eye Exam: bilateral: PERRLA


Sinuses: Non-tender Maxillary Sinus


Lungs and Chest: Yes: Chest Expansion Full, Chest Expansion Symetrica


Heart Rate and Rhythm: Regular


JVP: Not Elevated


[place] Beat: Non Displaced


Additional Cardiovascular: Yes: [place] Beat not Displaced


Abdominal Exam: Yes: Soft





- Rheumotological System


Joints: Signs of Connective Tissue Disease


Additional Musculoskeletal: Lordosis





- Extremities


Posterior Tibial Pulse: Bilateral Normal


Dorsalis Pedis Pulses: Bilateral Normal


Feet Sensation: Abnormal: Sensory


Limbs: Normal Power, Normal Tone





- Neuro


Psychiatric: Normal


Speech: Normal





Results





- Results


Lab Results: 


 Laboratory Results - last 24 hr











  05/29/18 05/31/18 05/31/18





  08:54 09:20 12:09


 


B-Natriuretic Peptide    635 H


 


Urine Color   Straw 


 


Urine Appearance   Clear 


 


Urine pH   7.0 


 


Ur Specific Gravity   1.006 L 


 


Urine Protein   Negative 


 


Urine Ketones   Negative 


 


Urine Blood   Negative 


 


Urine Nitrate   Negative 


 


Urine Bilirubin   Negative 


 


Urine Urobilinogen   Negative 


 


Ur Leukocyte Esterase   Negative 


 


Urine Glucose   Negative 


 


IgA  106  


 


Cyclic Citrull Peptide  <15.6  


 


Anti-Nuclear Antibody  8.6 H  


 


CATARINO Interpretation  Not Reportable  


 


QI-1 Antibody  <0.2  


 


SS-A/Ro Antibody  <0.2  


 


SS-B/La Antibody  <0.2  


 


Sm (Nichole) IgG Ab, Quant  <0.2  


 


Scl-70 Scleroderma Ab  <0.2  


 


Anti-ds DNA IgG Ab  68.1 H  


 


Anti-Ribosomal Ab  <0.2  


 


U1-nRNP Antibody  >8.0 H  


 


Anti-Centromere Ab  <0.2  


 


Glomerular Base Memb Ab  <0.2  


 


Complement C3  104  


 


Complement C4  23  


 


CMV IgG Ab  Positive A  


 


CMV IgM Ab  Negative  


 


T. vulgaris IgG Ab  3.0  


 


A. fumigatus IgG Ab  22.9  


 


M. faeni IgG Ab  2.6  














Assessment





- Problem List


Assessment: 


Patient Problems





Acute hypoxemic respiratory failure (Acute)


Acute on chronic diastolic (congestive) heart failure (Acute)


DVT prophylaxis (Acute)


Eosinophilia (Acute)


Full code status (Acute)


Hypokalemia (Acute)


Hypomagnesemia (Acute)


Ileus (Acute)


NSTEMI (non-ST elevated myocardial infarction) (Acute)


Transaminitis (Acute)


V-tach (Acute)


JOSE ARMANDO (acute kidney injury) (Acute)


CKD (chronic kidney disease) stage 3, GFR 30-59 ml/min (Acute)


Chronic diastolic (congestive) heart failure (Acute)


DVT prophylaxis (Acute)


Dyslipidemia (Acute)


Fixed drug eruption (Acute)


Full code status (Acute)


GERD (gastroesophageal reflux disease) (Acute)


Leukocytosis (Acute)


Thrombocytopenia (Acute)


Troponin level elevated (Acute)








Plan: 





Mr. Vazquez has an eosinophilic rash, interstital lung changes, hematologic 

abnormalities, myalgias.  While his ANCA was negative, his CATARINO came back 

positive with a positive DSDNA and RNP antibody.  His elevated DSDNA is 

consistent with lupus; also consider mixed connective tissue disease given his 

elevated RNP antibody.  





I discussed his case at length with the patient and his family and Dr. Burk 

and hospitalist.





Advised sun protection.,





We discussed lupus at length.





Would continue prednisone with a very gradual steroid taper of 10mg every 2 

weeks.





I recommend starting Plaquenil 200mg daily and if well tolerated increase to 

400mg daily; I would like to see him within a week of discharge.





I advised opthalmology evaluation and follow up to monitor for Plaquenil 

toxiicity and he will follow up with his opthalmologist; he has had cataract 

surgery in the past.


Coordination of Care: Yes


Discussion with Family/Community Member: Discussed with patient and his family





- Health Maintenance


14 Systems Reviewed as above all others Negative: Yes


Health Maintenance: Yes: Discussion with Primary Care

## 2018-05-31 NOTE — RAD
INDICATION: Leukocytosis. Cardiac disease with history of congestive heart failure. CHF

exacerbation.



COMPARISON: May 24, 2018 CT and May 22, 2018 chest radiograph.



TECHNIQUE:  Dual energy PA and routine lateral views of the chest were obtained.



REPORT:  Very mild LEFT greater than RIGHT mid to lower lung zone alveolar consolidation

and prominence of interstitial markings with thickened peripheral interlobular septa

greatest at the lower lung zones. Small bilateral pleural effusions. Negative for

pneumothorax. Median sternotomy wires and mediastinal vascular clips reflecting previous

coronary artery bypass. LEFT side implanted cardiac event monitor. Upper normal heart

size. Unremarkable central pulmonary vasculature.



IMPRESSION: Mild alveolar and interstitial pulmonary edema and small bilateral pleural

effusions with significant interval improvement compared with the May 22, 2018 exam.

## 2018-06-01 NOTE — DS
CC:  Dr. Cesar Alcantar; Dr. Qutaybeh Maghaydah; Dr. Shawn Boo; Dr. Davey Jha; Dr. uBrk; Dr. Davey Castro; Dr. Cholo Boo

 

DISCHARGE SUMMARY:

 

DATE OF ADMISSION:

 

DATE OF DISCHARGE:  05/31/18

 

DISCHARGE DIAGNOSES:  As follows:

 

1.  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

2.  Acute on chronic diastolic heart failure, acute congestive heart failure 
resolved.

3.  Elevated troponin, likely due to demand ischemia/myocardial infarction type 
2.

4.  Chronic lower extremity pain.

5.  Chronic kidney disease.

 

HISTORY OF PRESENT ILLNESS/HOSPITAL COURSE:  The patient is an 86-year-old 
 gentleman with history of CAD, CHF, GERD, and hypertension with 
recent admission for syncopal episodes due to orthostatic hypotension and acute 
kidney injury with eosinophilia, who presented to the emergency room with 
complaints of shortness of breath on 05/22/18.  He was thought to be in acute 
diastolic congestive heart failure and was given diuretics initially.  He was 
also found to have a mild troponin elevation; however, he did not complain of 
any chest pain at that time.  Cardiology consultation was made and was seen by 
Dr. Severino, who mentioned that he does have some type 2 MI secondary to demand 
ischemia likely secondary to CHF exacerbation.  A transthoracic echocardiogram 
was done on 05/23/18, which revealed mild global hypokinesis of the left 
ventricle with an EF of 45%-50%.  His CHF has subsequently improved; however, 
his eosinophilia has been evaluated and initially was thought to be due to 
DRESS syndrome versus Churg- Amish or without vasculitis.  However, 
subsequent serology prior to his discharge is more suggestive of connective 
tissue disease, possibly lupus according to Dr. Castro.  There has been some 
discussions of possible bronchoscopy with biopsy and given the patient declined 
subsequent biopsy and the results of the serologies is more suggestive of 
connective tissue disease, it was thought to be not essential at this point 
especially the patient has lost interest in having it done.  He was seen by 
both Dr. Burk and Dr. Castro prior to his discharge and he had been placed on 
Plaquenil at 200 mg p.o. daily with slow taper of prednisone from 50 mg every 2 
weeks.  The plan is every 2 weeks, prednisone will be decreased by 10 mg until 
off as he starts his Plaquenil.

 

He had been advised to follow up with his PCP within 3 days post discharge and 
to follow up with Dr. Burk and to call her office to schedule for the best 
time for followup at [phone].  He has also been advised to follow up with Dr. Castro in one week and to call his office to schedule for details if it has not 
already been done at [phone] and he was advised to take his medications as 
prescribed.

 

Patient upon my pre-rounds was also found to have some mild bilateral lower 
extremity edema with no JVD but with mild hepatojugular reflux.  A chest x-ray 
and BNP suggest that he is in mild congestive failure.  I have spoken with Dr. Castro if Lasix, his previous loop diuretic can be reinstituted given there was 
some concern if this may be causing eosinophilia.  He mentions that given the 
above antibody resource more suggestive of connective tissue disease, this is 
more than appropriate to do.  He had been given one time dose of IV Lasix prior 
to his discharge despite the fact that he was not complaining of any shortness 
of breath. However, I believe that he is beginning to have CHF because his 
alternative loop diuretic was withheld due to possible a bronchoscopy that he 
then subsequently declined.

 

PHYSICAL EXAMINATION:  Shows the most recent vital signs of record with 
temperature of 98.3 degrees Fahrenheit, 68 beats per minute heart rate, 16 per 
minute respiratory rate, saturating at 93%, blood pressure of 96/46 from 
previous of 113/49.  General Appearance:  The patient is awake, not in acute 
distress.  HEENT: Normocephalic, atraumatic.  PERRLA, extraocular muscles 
intact.  Negative for icterus.  Moist oral mucosa.  Negative for throat 
erythema.  Neck is soft, supple with no cervical lymphadenopathy.  No JVD.  
Decreased hepatojugular reflux. Abdomen is soft, nondistended, nontender.  
Normoactive bowel sounds x4. Extremities:  No cyanosis, clubbing, and improved 1
+ edema.  Psychiatric:  No active psychosis, depression, suicidal or homicidal 
ideations.  Skin is warm to touch.

 

TIME SPENT:  The total time spent evaluating the patient, reviewing pertinent 
data, and appropriate documentation is greater than 30 minutes.

 

 [id number]/CPS #: [id number]

MTDD

## 2018-06-01 NOTE — PN
*** AMENDED REPORT NOW INCLUDES DATE OF VISIT - ESIGNED BEFORE ADJUSTMENT ***



PROGRESS NOTE:

 

DATE OF VISIT: 05/31/18

 

SUBJECTIVE:  Mr. Vazquez'S gastric biopsies have come back and fortunately, they 
have not been all that helpful with establishing a diagnosis of Churg-Amish 
disease versus eosinophilic gastritis.  At the present time, all we can say is 
that he has a steroid responsive to eosinophilia and that we had been 
successful in his vasculitic rash with the use of high-dose steroids.  He is 
reluctant to proceed along with an open lung biopsy.  I mentioned to him that 
the diagnostic yield of a bronchoscopic biopsy will be less than an open lung 
biopsy.  I also mentioned to him that if we actually were able to confirm a 
diagnosis of Churg-Amish disease that we would probably proceed on with 
treating him with corticosteroids but tapering the dose to the minimally 
effective dose.  However, we would do that anyway because of his vasculitic 
rash.  So, while I have no objection to proceeding along with a bronchoscopy, I 
probably would not want to sustain the risks of an open lung biopsy since it 
would not make much difference to out course of therapy. I will be discussing 
this case with Dr. Laura.

 

 [id number]/CPS #: [id number]

MARIO

## 2018-07-02 NOTE — OP
CC:  Dr. Corcoran *

 

DATE OF OPERATION:  07/02/18 - ROOM #[id number]

 

DATE OF BIRTH:  05/22/32

 

SURGEON:  Davy Faria MD

 

ANESTHESIA:  Local anesthesia with conscious sedation.

 

PRE-OP DIAGNOSES:  Sick sinus syndrome, coronary artery disease, 7-second pause.

 

POST-OP DIAGNOSES:  Sick sinus syndrome, coronary artery disease, 7-second 
pause.

 

OPERATIVE PROCEDURE:

 

INDICATIONS:  The patient is an 86-year-old gentleman with a history of 
coronary artery disease, history of episodes of lightheadedness, who had an 
event monitor placed in March of 2018.  Since then, he has had a couple of 
episodes of 6- to 7-second pauses that were asymptomatic.  Permanent pacemaker 
was recommended.

 

ESTIMATED BLOOD LOSS:  Nil.

 

COMPLICATIONS:  None.

 

DESCRIPTION OF PROCEDURE:  The patient was brought to the procedure room in a 
fasting state.  Informed consent had been obtained prior to the procedure.  All 
labs had been reviewed.  The patient was placed supine on the procedure table.  
His left deltopectoral area was cleaned and draped in the usual fashion.  1% 
lidocaine was used for local anesthesia.  Using ultrasound guidance, the 
axillary vein was entered by modified Seldinger technique and a guidewire was 
placed.  A second guidewire was placed in the same technique.  A 3.5-cm 
incision was made in the pectoral area and blunt dissection was carried down to 
the pectoral fascia.  A small pocket was fashioned for the pacemaker.  Over the 
first guidewire, a 7-Bruneian sheath introducer was placed through which a right 
ventricular lead was advanced to the RV apex.  The right ventricular lead is a 
Medtronic model 5076, serial #[id number] and R-wave sensitivity of 13, 
impedance 739 ohms, threshold 0.5 volts at 0.5 milliseconds.  Over the second 
guidewire, a 7-Bruneian sheath introducer was placed through which a right 
ventricular lead was advanced to the high right atrium.  The right atrial lead 
is a Medtronic model 5076, serial #[id number].  It had a P-wave sensitivity of 
3.1, impedance 548 ohms, threshold 1.8 volts at 0.5 milliseconds.  The pocket 
was flushed with antibiotic-infused normal saline.  A generator was attached 
appropriately to the atrial and ventricular leads.  The generator is a 
Medtronic model W1DR01, serial #[id number].  The device was placed in the 
pocket.  The surgical incision was closed in 3 layers.  The patient was 
returned to the holding area in stable condition.

 

 [id number]/[facility] #: [id number]

MARIO

## 2018-07-02 NOTE — RAD
INDICATION: Device implant



COMPARISON: May 31, 2018

 

TECHNIQUE: An AP portable view obtained at 1342 hours is submitted.



FINDINGS: 



Bones/Soft Tissues: There are no acute bony findings. There is sternotomy with CABG. There

is a cardiac event monitor. There is interval placement of a left-sided cardiac pacemaker.

The generator and wires appear intact.



Cardiomediastinal: The cardiomediastinal silhouette is enlarged. 



Lungs: There are no infiltrates. There is no pneumothorax



Pleura: There are no pleural effusions. 



Other: None



IMPRESSION:  CARDIAC PACEMAKER. NO PNEUMOTHORAX. ENLARGED CARDIAC SILHOUETTE

## 2018-07-03 NOTE — RAD
HISTORY: S/P Device Implant



COMPARISONS: July 02, 2018



VIEWS: 2: Frontal and lateral views of the chest.



FINDINGS:

CARDIOMEDIASTINAL SILHOUETTE: The cardiac silhouette is enlarged. The cardiomediastinal

silhouette is otherwise normal.

RACHEL: The rachel are normal.

PLEURA: The costophrenic angles are sharp. No pleural abnormalities are noted.

LUNG PARENCHYMA: The lungs are clear.

ABDOMEN: The upper abdomen is clear. There is no subphrenic gas.

BONES AND SOFT TISSUES: The patient is status post median sternotomy.

OTHER: None.



IMPRESSION:

CARDIOMEGALY

## 2018-07-03 NOTE — DS
CC:  Dr. Corcoran

 

DISCHARGE SUMMARY:

 

DATE OF ADMISSION:  07/02/18

 

DATE OF DISCHARGE:  07/03/18

 

INDICATION FOR ADMISSION:  Dual chamber pacemaker implantation.

 

Please see my admission history and physical for details of this presentation.

 

HISTORY:  The patient is an 86-year-old gentleman with a history of coronary artery disease, history 
of syncopal episodes, who on an event monitor had noted up to 7 seconds pauses.  Dual chamber pacemak
er was recommended.

 

SUMMARY OF HOSPITAL COURSE:  The patient was brought into the hospital and underwent dual chamber pac
emaker implantation.  He has a Medtronic MRI compatible pacemaker.  The implantation went without iss
ue.  The patient was observed overnight.  He had no problems.

 

PHYSICAL EXAMINATION:  Vital Signs:  Stable.  Cardiac:  S1, S2 without any murmurs, rubs, or gallops.
  Lungs:  Clear to auscultation.  Extremities:  No edema.  His pacemaker site is healing well.  There
 is minimal ecchymosis.  There is no erythema.  There is no hematoma.  A new dressing was applied.

 

Pacemaker interrogation today demonstrated a Medtronic Ligia dual chamber pacemaker, atrial lead had 
a sensing threshold of 0.5 mV, impedance 380 ohms, threshold 0.5 volts at 0.4 msec.  Sensing threshol
d for the right ventricle 16.9, impedance 456 ohms, threshold 0.5 volts at 0.4 msec.  The patient is 
atrial paced 10% of the time, ventricularly paced 7% of the time.

 

DISPOSITION:  The patient will be discharged home.  The patient will follow up in my office in 1 week
 for a wound check and staple removal.

 

DISCHARGE MEDICATIONS:  Same as admission.  He will be going home on Keflex 250 mg 3 times a day for 
3 days.

 

 [id number]/[facility] #: [id number]

## 2018-07-20 NOTE — HP
CC:  Dr. Boo; Dr. Corcoran *

 

HISTORY AND PHYSICAL:

 

DATE OF ADMISSION:  07/20/18

 

PRIMARY CARE PROVIDER:  Dr. Boo.

 

ATTENDING PHYSICIAN WHILE IN THE HOSPITAL:  Dr. Anita Bassett * (report 
dictated by Henrique Grande NP).

 

CHIEF COMPLAINT:

1.  Cough.

2.  Congestion.

 

HISTORY OF PRESENT ILLNESS:  Mr. Vazquez is an 86-year-old male patient.  He has a 
history of CAD; CHF, his last documented EF is 45% to 50%; he has a history of 
GERD; hypertension; hyperlipidemia; CKD, stage 3; AFib.  He has a history of 
right bundle branch block with left anterior fascicular block, history of MI, 
and sick sinus syndrome, status post pacemaker placement approximately 2 weeks 
ago.  He is coming into the ED today stating that last night, he started 
noticing he was having a cough that was nonproductive but he said he had a 
rattle in his chest.  He felt the rattle.  He did not feel short of breath.  He 
had no fevers or chills.  He was actually able to sleep, but when he woke up 
this morning, he continued to have coughing; it was worse than last night.  He 
noticed that his rattle was much worse in his chest.  He just felt congested.  
He was not able to get any mucus production.  He was concerned that he may be 
developing something more serious, so he came into the ER.  He does not have 
any chest pain, he denies feeling short of breath, he denies any orthopnea, no 
recent weight gain.  He says he had been feeling well up until yesterday.  He 
denied any recent sick contacts, but he does state that he was in the hospital 
approximately 2-1/2 weeks ago on 07/03/18  for a pacemaker placement, which he 
underwent with Dr. Faria for sick sinus syndrome.  He said subsequently since 
then, he had been feeling well with the exception in the last 24 hours.  He 
came into the ED, he was evaluated.  He was found to have what appeared to be 
pneumonia in his right middle lobe and we were asked to evaluate for admission.

 

PAST MEDICAL HISTORY:  Significant for:

1.  CAD.

2.  History of CHF, last EF 45% to 50%.

3.  GERD.

4.  Hypertension.

5.  Hyperlipidemia.

6.  CKD, stage 3.

7.  AFib.

8.  Right bundle branch block with left anterior fascicular block.

9.  History of MI.

10.  Sick sinus syndrome, status post pacemaker.

 

PAST SURGICAL HISTORY:  The patient has had:

1.  CABG.

2.  Pacemaker placement 2-1/2 weeks ago with Dr. Faria.

3.  Hernia repair.

 

MEDICATIONS:  Home medications include:

1.  Prednisone 20 mg daily.

2.  Lasix 40 mg daily.

3.  Atarax 25 mg p.o. four times a day as needed.

4.  Plaquenil 400 mg p.o. daily.

5.  Neurontin 100 mg p.o. t.i.d. as needed.

6.  Vitamin D3 1000 units p.o. daily.

7.  Lipitor 10 mg p.o. at bedtime.

8.  Aspirin 81 mg daily.

9.  Potassium chloride 5 mEq p.o. b.i.d.

10.  Prilosec 20 mg p.o. daily in the morning.

11.  Singulair 10 mg daily as needed.

12.  Lopressor 37.5 mg p.o. twice a day.

13.  Magnesium oxide 800 mg daily.

14.  Metamucil 0.52 g p.o. b.i.d.

 

ALLERGIES TO MEDICATIONS:  Include no known drug allergies.

 

FAMILY HISTORY:  Mother had a history of acute renal failure.  Father had 
history of pneumonia.

 

SOCIAL HISTORY:  He does not smoke, he does not drink.  Surrogate decision 
maker is his wife.

 

REVIEW OF SYSTEMS:  He denied any significant weight change.  Denies having any 
fevers or chills.  He did admit to having a cough.  No rhinorrhea, no sore 
throat. Denied any thyroid enlargement.  No chest pain.  Denied any orthopnea, 
no shortness of breath.  No abdominal pain.  No nausea, no vomiting.  There was 
no dysuria, no frequency.  No seizure, no loss of consciousness.  No pruritus 
and no skin ulcerations.  Review of 14 systems completed, all others negative.

 

                               PHYSICAL EXAMINATION

 

GENERAL:  Mr. Vazquez is an 86-year-old male patient, he is sitting in the ED 
stretcher.  He does not appear to be in any acute distress.  He appears to be 
well nourished and well developed.

 

VITAL SIGNS:  Blood pressure 137/79; pulse 67; respirations were documented 
here at 28, but when I was examining him and counting, he was breathing at 20; 
his O2 saturations were 97%, he was on room air; temperature was 97.6.

 

HEENT:  Head:  Atraumatic, normocephalic.  Eyes:  EOMs are intact.  Sclerae are 
anicteric and not pale.  Throat:  Oral mucosa appears to be dry.  No 
oropharyngeal erythema.

 

NECK:  Supple.

 

LUNGS:  He had coarse breath sounds and rhonchi in the right middle lobe.  He 
had no wheezing.  He had equal diaphragmatic expansion.

 

HEART:  Sounds S1, S2.  He had a regular rate and rhythm.  He had no murmurs, 
rubs, or gallops.

 

ABDOMEN:  Soft, it was flat, nontender.  Bowel sounds were present.

 

EXTREMITIES:  Pulses were 2+ throughout.  He had +2 pitting edema pedally 
bilaterally.  He had 5/5 strength.

 

NEUROLOGIC:  He is awake, alert, he is oriented x3.  His tongue is midline.  
 are equal.  He had no gross focal deficits.

 

SKIN:  Grossly intact.

 

 DIAGNOSTIC STUDIES/LAB DATA:  WBC of 21.1, RBC of 3.13, hemoglobin 10.3, 
hematocrit 32, platelet count of 227.  INR 0.90.  His sodium was 131, potassium 
was 3.8, chloride was 94, bicarb 29, BUN 37, creatinine 1.64 which is near his 
baseline, his glucose was 88, lactate 2.1, calcium 8.5.  Total bili 0.4, AST 24
, ALT 27, alk phos 115.  CK 4.1.  Troponin 0.07.  CRP of 4.76.  BNP of 999.  
Albumin was 3.5.

 

He had a chest x-ray obtained today, when I reviewed it does appear that he has 
an infiltrate in the right middle lobe.  Radiology read this as findings most 
consistent with pneumonia, less likely congestive heart failure, recommend 
followup chest x-ray to resolution.

 

He did have an EKG obtained today as well showing a normal sinus rhythm, rate 
of 64, with right bundle branch block with left anterior fascicular block.  It 
was reviewed to the previous EKG, it appears to be unchanged with the exception 
the rate is slower now.  Old medical records were reviewed.

 

ASSESSMENT AND PLAN:  Mr. Vazquez is an 86-year-old male patient coming into the 
emergency department today with complaints of cough and congestion.  On 
evaluation, found to have pneumonia.  He will be admitted under inpatient 
status for:

 

1.  Pneumonia.  At this point, his white count normally does run around 21,000.
  He is followed for chronic leukocytosis with Dr. Boo, but he definitely has 
pneumonia on that x-ray.  So, my plan will be to put him on vanco, Rocephin, 
and azithromycin.  He does not appear to be toxic, so I do not think he needs 
any more extended coverage with Zosyn at this point.  I worry about his acute 
kidney failure with putting him on Zosyn and vanco.  So, I think vanco, Rocephin
, and azithro will be appropriate.  If he deteriorates, we could always broaden 
coverage.  My plan will be to continue his home-dose steroids that he normally 
is on.  I will also go ahead and put him on nebs and inhaled steroids.  We will 
continue with aggressive pulmonary toileting.  We will try to get legionella 
and Strep pneumo antigens if possible and we will also try to get a sputum 
culture if possible as well.  We will continue to follow.  I am holding on 
hydration as he appears to be euvolemic.  He is not hypotensive.  Bolus him as 
needed.  I am holding his diuretics, we will diurese him as needed.  Because of 
his history of heart failure, I do not want to exacerbate this by giving him IV 
fluids.  I do not think he needs them just yet.

2.  Coronary artery disease.  Continue meds as prescribed.  His troponin is 
mildly elevated but this is probably demand ischemia from the acute infection.  
We are going to trend these and place him on telemetry.

3.  Congestive heart failure.  Again, we will diurese as needed.  I held the 
Lasix in the setting of acute illness.

4.  Chronic leukocytosis.  Again, he is following with Dr. Boo for this. 
Etiology is not clear yet.  He may have a component of chronic lymphocytic 
leukemia, but again he is following with Dr. Boo.

5.  Gastroesophageal reflux disease.  Continue PPI therapy.

6.  Hypertension.  I will continue meds as prescribed.

7.  Hyperlipidemia.  Continue with statin therapy.

8.  Chronic kidney disease.  His creatinine is stable.

9.  Atrial fibrillation.  He is in normal sinus rhythm, we will continue his 
beta blocker as prescribed.

10.  History of sick sinus syndrome.  Again, he is status post pacemaker.

11.  DVT prophylaxis.  He will be placed on heparin subcu.

12.  Code status.  He is full code.

13.  Fluids, electrolytes, and nutrition.  He can have a heart-healthy diet.

 

TIME SPENT:  On the admission was 60 minutes, greater than half of the time was 
spent face-to-face with the patient, obtaining my history and physical, the 
other half of the time was spent going over the plan of care with the patient 
and implementing the plan of care.

 

I did discuss plan of care with my attending, Dr. Bassett; she is in agreement.

 

 ____________________________________ HENRIQUE GRANDE, SURYA

 

[id number]/[facility]PS #: [id number]

MARIO

## 2018-07-20 NOTE — XMS REPORT
Carmelo Vazquez JR

 Created on:2018



Patient:JR Vazquez Lloyd R

Sex:Male

:1932

External Reference #:[id number]





Demographics







 Address  [address] [place] [address]

 

 Home Phone  [phone]

 

 Preferred Language  English

 

 Marital Status  Not  Or 

 

 Tenriism Affiliation  Unknown

 

 Race  White

 

 Ethnic Group  Not  Or 









Author







 Organization  [facility]

 

 Address  [address]



   [place] [address]

 

 Phone  [phone]









Support







 Name  Relationship  Address  Phone

 

 Catherine Vazquez  Unavailable  Unavailable  Unavailable









Care Team Providers







 Name  Role  Phone

 

 Cholo Boo MD  Primary Care Physician  Unavailable









Payers







 Type  Date  Identification Numbers  Payment Provider  Subscriber

 

 Medicare Primary  Effective:  Policy Number:  Medicare  Carmelo Vazquez JR



   1999  [id number]    









 PayID: [id number]  [address]









 [place] [address]









 Medigap Part B  Effective:  Policy Number:  [employer]  Carmelo Vazquez,



   1991  [id number]    









 PayID: [id number]  [address]









 [place] [address]









 Medigap Part B  Effective: 1998  Policy Number:   [place] MENDEZ  Catherine Vazquez



     [id number]    









 Expires: 2017  Group Number: [id number]  [address] [address]









 PayID: [id number]  [place] [address]









 Commercial  Effective: 12/15/2016  Policy Number:  [employer]  Carmelo Vazquez JR



     [id number]    









 Expires: 10/23/2018  Group Number: [id number]  [id number] 









 PayID: [id number]  [address] [address]







Problems







 Date  Description  Provider  Status

 

 Onset: 2017  Aortic valve disorder  Tay Corcoran M.D., KIMBER,  Active



     [facility]  

 

 Onset: 2017  Athscl heart disease of native  Tay Corcoran M.D., KIMBER,  
Active



   coronary artery w/o ang pctrs  [facility]  

 

 Onset: 2017  Essential hypertension  Tay Corcoran M.D., KIMBER,  Active



     [facility]  

 

 Onset: 2017  Hyperlipidemia  Tay Corcoran M.D., [facility],  Active



     [facility]  

 

 Onset: 2017  Right bundle branch block  Tay Corcoran M.D., [facility],  Active



     [facility]  

 

 Onset: 2017  Paroxysmal atrial fibrillation  Tay Corcoran M.D., [facility],  
Active



     [facility]  

 

 Onset: 2017  Chronic kidney disease stage 3  Tay Corcoran M.D., [facility],  
Active



     [facility]  

 

 Onset: 2018  Syncope and collapse  Tay Corcoran M.D., [facility],  Active



     [facility]  







Family History







 Date  Family Member(s)  Problem(s)  Comments

 

   General  Arthritis  







Social History







 Type  Date  Description  Comments

 

 Marital Status      

 

 Occupation    Retired  

 

 ETOH Use    Drinks Alcoholic Beverages Rarely  once a year

 

 Smoking    Patient has never smoked  

 

 Recreational Drug Use    Denies Drug Use  

 

 Daily Caffeine    Does Not Consume Caffeine  

 

 Exercise Type/Frequency    Exercises rarely  







Allergies, Adverse Reactions, Alerts







 Date  Description  Reaction  Status  Severity  Comments

 

 2017  NKDA    active    







Medications







 Medication  Date  Status  Form  Strength  Qnty  SIG  Indications  Ordering



                 Provider

 

 Compression    Active  Misc    2unit  wear on  R60.9  Aura S.



 Stockings  /        s  both legs    Foster,



             daily and    N.P.



             remove at    



             night    

 

 Metamucil    Active  Capsules  0.52gm    1 cap by    Unknown



   /2017          mouth twice    



             a day with    



             water    

 

 Lipitor    Active  Tablets  10mg  90tab  1 by mouth            s  every night    Nilo,



             at bedtime    M.D.,



                 [facility],



                 [facility]

 

 Metoprolol    Active  Tablets  37.5mg    one ta bid    Tay Druand                BEHZAD Corcoran,



                 [facility]

 

 Aspirin Adult Low    Active  Tablets  81mg    1 by mouth    Unknown



 Dose Ec  /0000    DR      every day    

 

 Omeprazole    Active  Capsules  20mg    1 by mouth    Unknown



   /0000    DR      every day    

 

 Potassium Chloride    Active  Tablets  10Meq    1 by mouth    Unknown



 ER  /0000    ER      every    



             day(taking    



             1/2 tab    



             Am,1/2 tab    



             PM)    

 

 Magnesium Oxide    Active  Tablets  400mg    2 by mouth    Unknown



   /0000          every day    

 

 Montelukast Sodium    Active  Tablets  10mg    1 by mouth    Unknown



   0000          every day    



             as needed    

 

 Gabapentin    Active  Capsules  100mg    take 1    Unknown



   0000          three times    



             a day    

 

 Prednisone    Active  Tablets  10mg  360ta  Please take            bs  4 tabs    Gloria,



             daily    M.D.



             ongoing,    



             taper by    



             10mg every    



             14 days    

 

 Hydroxychloroquine    Active  Tablets  200mg  180ta  take 2    Davey



 Sulfate  /        bs  tablet    Gloria,



             daily    M.D.

 

 Vitamin D-3    Active  Capsules  1000Unit    1 by mouth    Unknown



   /0000          every day    

 

 Furosemide  00  Active  Tablets  40mg    1 tab by    Unknown



   /0000          mouth every    



             day    

 

                 

 

 Keflex    Hx  Capsules  250mg  9caps  3 times a              day for 3    D. Bienvenido,



   -          days    M.D.



                 

 

 Benzonatate    Hx  Capsules  100mg  45cap  1 capsule 3            s  times daily    DMaureen Faria,



   -          as needed    M.D.



                 

 

 Cozaar    Hx  Tablets  25mg    1 by mouth              twice    Nilo,



   -          daily(pt    M.DMaureen,



             unsure if    [facility]          he is    [facility]



             taking    



             this)    

 

 Multi Complete    Hx  Capsules      daily    Unknown



   /2017              



   -              



                 

 

 Potassium Chloride    Hx  Solution  20Meq/50M    twice daily    Unknown



   /2017      L        



   -              



                 

 

 Digoxin    Hx  Tablets  125mcg  30tab  1 by mouth    Tay



           s  every day    Ben Corcoran M.D.,



                 [facility]              [facility]

 

 Cozaar    Hx  Tablets  50mg  30tab  2 by mouth    Tay



           s  every day    Ben Corcoran M.D.,



                 [facility],



                 [facility]

 

 Lasix    Hx  Tablets  40mg    1 by mouth    Unknown



   /0000          every day    



   -              



                 

 

 Norco    Hx  Tablets  5-325mg    one to two    Unknown



   /0000          tabs by    



   -          mouth every    



             4-6 hours    



   /          as needed    



             pain    

 

 Coumadin    Hx  Tablets  1mg    take as    Unknown



   /0000          directed    



   -              



                 

 

 Vitamin D  0000  Hx  Tablets  1000Unit    by mouth    Unknown



   /0000          everyday    



   -              



                 

 

 Digoxin    Hx  Tablets  125mcg    1 by mouth    Unknown



   /0000          every day    



   -              



                 

 

 Sod Citrate-Citric    Hx  Solution  500-334mg    30 ml's 3    Unknown



 Acid  /0000      /5ML    times a    



   -          day.    



                 

 

 Torsemide    Hx  Tablets  20mg    take 2    Unknown



   /0000          tablets Up    



   -          To Once A    



             Day as    



   /2018          Needed For    



             Congestive    



             Heart    



             Failure    







Vital Signs







 Date  Vital  Result  Comment

 

 2018  Height  66 inches  5'6"









 Weight  163.00 lb  w/ shoes

 

 Heart Rate  68 /min  

 

 BP Systolic Sitting  124 mmHg  lue regg cuff

 

 BP Diastolic Sitting  54 mmHg  lue regg cuff

 

 BP Systolic Standing  136 mmHg  lue regg cuff

 

 BP Diastolic Standing  54 mmHg  lue regg cuff

 

 Respiratory Rate  24 /min  

 

 BMI (Body Mass Index)  26.3 kg/m2  

 

 Ejection Fraction  45-50%  Echo 2018  Height  66 inches  5'6"









 Weight  161.00 lb  with shoes

 

 Heart Rate  64 /min  

 

 BP Systolic Sitting  122 mmHg  Lue reg cuff

 

 BP Diastolic Sitting  50 mmHg  Lue reg cuff

 

 BP Systolic Standing  126 mmHg  Lue reg cuff

 

 BP Diastolic Standing  54 mmHg  Lue reg cuff

 

 Respiratory Rate  16 /min  

 

 BMI (Body Mass Index)  26.0 kg/m2  

 

 Ejection Fraction  45-50%  date 18 ECHO









 2018  Height  66 inches  5'6"









 Weight  159.50 lb  

 

 Heart Rate  63 /min  

 

 BP Systolic Sitting  120 mmHg  

 

 BP Diastolic Sitting  55 mmHg  

 

 Respiratory Rate  14 /min  

 

 Pain Level  5  

 

 BMI (Body Mass Index)  25.7 kg/m2  









 2018  Height  66 inches  5'6"









 Weight  155.00 lb  at home w/o clothes

 

 Heart Rate  66 /min  

 

 BP Systolic Sitting  110 mmHg  lue rg cuff

 

 BP Diastolic Sitting  46 mmHg  lue rg cuff

 

 Respiratory Rate  18 /min  

 

 BMI (Body Mass Index)  25.0 kg/m2  

 

 Ejection Fraction  50-55%  echo 2018  Height  66 inches  5'6"









 Weight  156.00 lb  w/ shoes

 

 Heart Rate  56 /min  

 

 BP Systolic Standing  115 mmHg  lue reg cuff

 

 BP Diastolic Standing  50 mmHg  lue reg cuff

 

 Respiratory Rate  18 /min  

 

 BMI (Body Mass Index)  25.2 kg/m2  

 

 Ejection Fraction  50-55%  echo 2017  Height  66 inches  5'6"









 Weight  165.00 lb  with shoes

 

 Heart Rate  60 /min  sit and 68 stand HR

 

 BP Systolic Sitting  146 mmHg  Rue reg cuff

 

 BP Diastolic Sitting  60 mmHg  Rue reg cuff

 

 BP Systolic Standing  152 mmHg  Rue reg cuff

 

 BP Diastolic Standing  80 mmHg  Rue reg cuff

 

 Respiratory Rate  17 /min  

 

 BMI (Body Mass Index)  26.6 kg/m2  









 2017  Height  66 inches  5'6"









 Weight  164.50 lb  with shoes

 

 Heart Rate  62 /min  

 

 BP Systolic Sitting  108 mmHg  LA reg cuff

 

 BP Diastolic Sitting  58 mmHg  LA reg cuff

 

 BP Systolic Standing  112 mmHg  LA reg cuff

 

 BP Diastolic Standing  62 mmHg  LA reg cuff

 

 BMI (Body Mass Index)  26.5 kg/m2  

 

 Ejection Fraction  60%  echo 17







Results







 Test  Date  Test  Result  H/L  Range  Note

 

 Comp Metabolic Panel  2018  Sodium  128 mmol/L  Low  135-145  









 Potassium  4.3 mmol/L    3.5-5.0  

 

 Chloride  92 mmol/L  Low  101-111  

 

 Co2 Carbon Dioxide  27 mmol/L    22-32  

 

 Anion Gap  9 mmol/L    2-11  

 

 Glucose  90 mg/dL      

 

 Blood Urea Nitrogen  31 mg/dL  High  6-24  

 

 Creatinine  1.57 mg/dL  High  0.67-1.17  

 

 One Over Creatinine  0.63 mg/dL  Low  0.67-1.17  

 

 BUN/Creatinine Ratio  19.7    8-20  

 

 Calcium  8.7 mg/dL    8.6-10.3  

 

 Total Protein  6.1 g/dL  Low  6.4-8.9  

 

 Albumin  3.5 g/dL    3.2-5.2  

 

 Globulin  2.6 g/dL    2-4  

 

 Albumin/Globulin Ratio  1.3    1-3  

 

 Total Bilirubin  0.40 mg/dL    0.2-1.0  

 

 Alkaline Phosphatase  93 U/L      

 

 Alt  22 U/L    7-52  

 

 Ast  19 U/L    13-39  

 

 Egfr Non-  42.1    >60  

 

 Egfr   50.9    >60  1









 CBC Auto Diff  2018  White Blood Count  19.1 10^3/uL  High  3.5-10.8  









 Red Blood Count  2.81 10^6/uL  Low  4.00-5.40  

 

 Hemoglobin  9.3 g/dL  Low  14.0-18.0  

 

 Hematocrit  29 %  Low  42-52  

 

 Mean Corpuscular Volume  102 fL  High  80-94  

 

 Mean Corpuscular Hemoglobin  33 pg  High  27-31  

 

 Mean Corpuscular HGB Conc  32 g/dL    31-36  

 

 Red Cell Distribution Width  15 %    10.5-15  

 

 Platelet Count  222 10^3/uL    150-450  

 

 Mean Platelet Volume  9.3 um3    7.4-10.4  

 

 Abs Neutrophils  16.4 10^3/uL  High  1.5-7.7  

 

 Abs Lymphocytes  1.7 10^3/uL    1.0-4.8  

 

 Abs Monocytes  0.9 10^3/uL  High  0-0.8  

 

 Abs Eosinophils  0 10^3/uL    0-0.6  

 

 Abs Basophils  0.1 10^3/uL    0-0.2  

 

 Abs Nucleated RBC  0 10^3/uL      

 

 Granulocyte %  85.7 %  High  38-83  

 

 Lymphocyte %  8.9 %  Low  25-47  

 

 Monocyte %  4.7 %    0-7  

 

 Eosinophil %  0.2 %    0-6  

 

 Basophil %  0.5 %    0-2  

 

 Nucleated Red Blood Cells %  0      









 Laboratory test finding  06/15/2018  Erythrocyte Sed Rate  9 mm/Hr    0-40  









 C Reactive Protein  2.55 mg/L    < 5.00  2









 CBC Auto Diff  06/15/2018  White Blood Count  18.0 10^3/uL  High  3.5-10.8  









 Red Blood Count  2.93 10^6/uL  Low  4.00-5.40  

 

 Hemoglobin  9.8 g/dL  Low  14.0-18.0  

 

 Hematocrit  30 %  Low  42-52  

 

 Mean Corpuscular Volume  102 fL  High  80-94  

 

 Mean Corpuscular Hemoglobin  33 pg  High  27-31  

 

 Mean Corpuscular HGB Conc  33 g/dL    31-36  

 

 Red Cell Distribution Width  15 %    10.5-15  

 

 Platelet Count  160 10^3/uL    150-450  

 

 Mean Platelet Volume  9.9 um3    7.4-10.4  

 

 Abs Neutrophils  16.9 10^3/uL  High  1.5-7.7  

 

 Abs Lymphocytes  0.6 10^3/uL  Low  1.0-4.8  

 

 Abs Monocytes  0.4 10^3/uL    0-0.8  

 

 Abs Eosinophils  0 10^3/uL    0-0.6  

 

 Abs Basophils  0 10^3/uL    0-0.2  

 

 Abs Nucleated RBC  0 10^3/uL      

 

 Granulocyte %  94.0 %  High  38-83  

 

 Lymphocyte %  3.5 %  Low  25-47  

 

 Monocyte %  2.2 %    0-7  

 

 Eosinophil %  0.1 %    0-6  

 

 Basophil %  0.2 %    0-2  

 

 Nucleated Red Blood Cells %  0      









 Comp Metabolic Panel  06/15/2018  Sodium  129 mmol/L  Low  135-145  









 Potassium  4.4 mmol/L    3.5-5.0  

 

 Chloride  95 mmol/L  Low  101-111  

 

 Co2 Carbon Dioxide  28 mmol/L    22-32  

 

 Anion Gap  6 mmol/L    2-11  

 

 Glucose  133 mg/dL  High    

 

 Blood Urea Nitrogen  24 mg/dL    6-24  

 

 Creatinine  1.42 mg/dL  High  0.67-1.17  

 

 BUN/Creatinine Ratio  16.9    8-20  

 

 Calcium  8.3 mg/dL  Low  8.6-10.3  

 

 Total Protein  5.9 g/dL  Low  6.4-8.9  

 

 Albumin  3.2 g/dL    3.2-5.2  

 

 Globulin  2.7 g/dL    2-4  

 

 Albumin/Globulin Ratio  1.2    1-3  

 

 Total Bilirubin  0.60 mg/dL    0.2-1.0  

 

 Alkaline Phosphatase  121 U/L  High    

 

 Alt  30 U/L    7-52  

 

 Ast  22 U/L    13-39  

 

 Egfr Non-  47.3    >60  

 

 Egfr   60.8    >60  3









 Laboratory test finding  06/15/2018  Complement C3  99 mg/dL    75 - 175  4









 Complement C4  16 mg/dL    14 - 40  5

 

 Anti Double Stranded Dna AB  43.0 IU/mL      6

 

 Miscellaneous Test  See Comment      7









 Nuclear AB (Nia) By Ifa  06/15/2018  Nuclear Ab (Nia) by Ifa,  Positive 1:640 
     8



 Igg    IgG        









 Nia Titer:  1:640      

 

 Nia Pattern:  Homogeneous      9









 Laboratory test finding  2017  Alt (SGPT)  31 U/L    7-52  









 Ast (Sgot)  34 U/L    13-39  









 Lipid Profile (Trig/Chol/HDL)  2017  Triglycerides  99 mg/dL      10









 Cholesterol  112 mg/dL      11

 

 HDL Cholesterol  20.2 mg/dL      12

 

 LDL Cholesterol  72 mg/dL      13









 Basic Metabolic Panel  2017  Sodium  136 mmol/L    133-145  









 Potassium  4.0 mmol/L    3.5-5.0  

 

 Chloride  104 mmol/L    101-111  

 

 Co2 Carbon Dioxide  23 mmol/L    22-32  

 

 Anion Gap  9 mmol/L    2-11  

 

 Glucose  101 mg/dL  High    

 

 Blood Urea Nitrogen  19 mg/dL    6-24  

 

 Creatinine  1.41 mg/dL  High  0.67-1.17  

 

 BUN/Creatinine Ratio  13.5    8-20  

 

 Calcium  8.6 mg/dL    8.6-10.3  

 

 Egfr Non-  47.8    >60  

 

 Egfr   61.4    >60  14









 1  *******Because ethnic data is not always readily available,



   this report includes an eGFR for both -Americans and



   non- Americans.****



   The National Kidney Disease Education Program (NKDEP) does



   not endorse the use of the MDRD equation for patients that



   are not between the ages of 18 and 70, are pregnant, have



   extremes of body size, muscle mass, or nutritional status,



   or are non- or non-.



   According to the National Kidney Foundation, irrespective of



   diagnosis, the stage of the disease is based on the level of



   kidney function:



   Stage Description                      GFR(mL/min/1.73 m(2))



   1     Kidney damage with normal or decreased GFR       90



   2     Kidney damage with mild decrease in GFR          60-89



   3     Moderate decrease in GFR                         30-59



   4     Severe decrease in GFR                           15-29



   5     Kidney failure                       <15 (or dialysis)

 

 2  Acute inflammation:  >10.00

 

 3  *******Because ethnic data is not always readily available,



   this report includes an eGFR for both -Americans and



   non- Americans.****



   The National Kidney Disease Education Program (NKDEP) does



   not endorse the use of the MDRD equation for patients that



   are not between the ages of 18 and 70, are pregnant, have



   extremes of body size, muscle mass, or nutritional status,



   or are non- or non-.



   According to the National Kidney Foundation, irrespective of



   diagnosis, the stage of the disease is based on the level of



   kidney function:



   Stage Description                      GFR(mL/min/1.73 m(2))



   1     Kidney damage with normal or decreased GFR       90



   2     Kidney damage with mild decrease in GFR          60-89



   3     Moderate decrease in GFR                         30-59



   4     Severe decrease in GFR                           15-29



   5     Kidney failure                       <15 (or dialysis)

 

 4  Test Performed by:



   [place] [id number]

 

 5  Test Performed by:



   [place] [address]

 

 6  Interpretation: Borderline (30.0-75.0)



   -------------------REFERENCE VALUE--------------------------



   <30.0 (Negative)



   Test Performed by:



   [place] [address]

 

 7  Test                        Result    Flag  Unit   RefValue



   ------------------------------------------------------------



   Vitamin B12 and Folate, S



   Vitamin B12 Assay, S      557             ng/L   180 - 914



   Folate, S                 14.4            mcg/L  >=4.0



   Test Performed by:



   [facility]



   [address] [place] [address]



   



   



   ******CORRECTED REPORT******



   ---  Corrected on 18 1238 ---



   Ref Lab Test previously reported as:



   SEE COMMENTS



   Test                        Result    Flag  Unit   RefValue



   ------------------------------------------------------------



   Vitamin B12 and Folate, S



   Vitamin B12 Assay, S      557             ng/L   180 - 914



   



   Folate, S                 15.9            mcg/L  >=4.0



   



   Test Performed by:



   [facility] - [address] [address]

 

 8  -------------------REFERENCE VALUE--------------------------



   <1:80 (Negative)

 

 9  Test Performed by:



   [facility] - [address] [address]

 

 10  Desirable <150



   Borderline high 150-199



   High 200-499



   Very High >500

 

 11  Desirable <200



   Borderline high 200-239



   High >239

 

 12  Low <40



   Desirable: 40-60



   High: >60

 

 13  Desirable: <100 mg/dL



   Near Optimal: 100-129 mg/dL



   Borderline High: 130-159 mg/dL



   High: 160-189 mg/dL



   Very High: >189 mg/dL

 

 14  *******Because ethnic data is not always readily available,



   this report includes an eGFR for both -Americans and



   non- Americans.****



   The National Kidney Disease Education Program (NKDEP) does



   not endorse the use of the MDRD equation for patients that



   are not between the ages of 18 and 70, are pregnant, have



   extremes of body size, muscle mass, or nutritional status,



   or are non- or non-.



   According to the National Kidney Foundation, irrespective of



   diagnosis, the stage of the disease is based on the level of



   kidney function:



   Stage Description                      GFR(mL/min/1.73 m(2))



   1     Kidney damage with normal or decreased GFR       90



   2     Kidney damage with mild decrease in GFR          60-89



   3     Moderate decrease in GFR                         30-59



   4     Severe decrease in GFR                           15-29



   5     Kidney failure                       <15 (or dialysis)







Procedures







 Date  CPT Code  Description  Status

 

 2018  25356  Pace Maker Eval W/Iterative Adjment Dual Lead  Completed

 

 2018  32352  Perm Pacemaker Av Sequential Atrial And Ventricular  
Completed

 

 2018  18308  ECHO Transthorasic Realtime 2D W Doppler & Color Flow  
Completed



     Hosp  

 

 2018  14248  Each Additional Biopsy  Completed

 

 2018  98901  Biopsy Skin Lesion Single  Completed

 

 2018  69641  EKG Tracing & Interpretation  Completed

 

 2018  72892  Implant Cardiac Loop Recorder  Completed

 

 2018  78435  Echocardiogram, Limited Study  Completed

 

 2018  60659  EKG, Interpretation Only  Completed

 

 2018  45038  ECHO Transthorasic Realtime 2D W Doppler & Color Flow  
Completed



     Hosp  

 

 2017  31402  EKG Tracing & Interpretation  Completed

 

 2017  67052  EKG Tracing & Interpretation  Completed

 

 2017  69851  EKG, Interpretation Only  Completed

 

 2017  02726  Left Heart Cath. Incl S/I Coronaries, Angio S/I V Gram  
Completed



     If Done  

 

 2006  81995  Color Doppler  Completed

 

 2006  15005  Pulse Doppler & Continuous Wave  Completed

 

 2006  24246  Pulse Doppler & Continuous Wave  Completed

 

 2006  36012  Echocardiogram  Completed







Encounters







 Type  Date  Location  Provider  CPT E/M  Dx

 

 Office Visit  2018  [facility]  Aura Almonte,  [id number]  
I49.5



   10:30a  Felicia  N.P.    









 Z95.0

 

 I25.10

 

 R60.9









 Office Visit  2018  3:00p  [facility]  Davy Faria,  
[address]  I49.5



     Felicia WEN    









 I25.10

 

 I46.9









 Office Visit  2018  4:00p  [facility]  Davey Castro,  
[address]  D72.1



     Felicia FIGUEROA.    









 M31.8

 

 Z79.899

 

 Z79.52

 

 M35.9









 Office Visit  2018  7:00a  [facility]  [place]y Castro,  
[address]4811  R76.0



     Felicia WEN    









 D72.1

 

 R21









 Office Visit  2018 10:31a  [facility]  Janelle Burk MD  
75198  D72.1



     [facility]      

 

 Office Visit  2018  9:07a  [facility]  [address]  I50.33



     [facility]  MD Valentín    









 I21.4

 

 M31.8









 Office Visit  2018  9:57a  [facility]  Keely Vazquez M.D.  
32406  Z01.810



     [facility]      









 D72.1

 

 I21.A1

 

 I50.30

 

 I25.10

 

 Z95.1









 Office Visit  2018  9:06a  [facility]  [address]  I50.33



     [facility] MD    









 I21.4

 

 M31.8









 Office Visit  2018  7:00a  [facility]  [facility] Castro,  
[address]69  D72.1



     Felicia WEN    









 R21









 Office Visit  2018 10:30a  [facility]  Janelle Burk MD  
86573  D72.1



     [facility]      

 

 Office Visit  2018  9:06a  [facility]  [address]684  I50.33



     Assoc, Jackson Laura MD    









 I21.4

 

 M31.8









 Office Visit  2018  9:05a  [facility]  85536  I50.33



     [facility] MD    









 I21.4

 

 M31.8









 Office Visit  2018  9:04a  [facility] ,daphney Galicia,  
[address]204  M31.8



     Hospitalists  M.D.,FACP    









 I50.33









 Office Visit  2018  9:03a  [facility]  Hira Galicia,  [address]  
I50.33



     [facility] Hospital[facility]  M.D.,FACP    









 M31.8









 Office Visit  2018  9:03a  [facility]  Jennifer Graham,  
[address]  I50.33



     Hospitalists  M.D.    









 I21.4









 Office Visit  2018  7:00a  [facility]  Davey Gutiérrezdor,  
[address]879  D72.1



     Cma  M.D.    









 R21









 Office Visit  2018  3:16p  [facility]  Qutaybeh S. Maghaydah,  
[address]68  I50.9



       M.D.    









 I25.10

 

 I42.9

 

 D64.9









 Office Visit  2018  9:03a  [facility],  Jennifer Graham,  
[address]597  I50.33



     Hospitalists  M.D.    









 I21.4









 Office Visit  2018  3:09p  [facility]  Qutaybeh S. Magharoxyah,  
[address]  I50.9



       M.D.    









 I42.9

 

 I25.10

 

 Z95.1









 Office Visit  2018  3:20p  [facility]  Janelle Burk MD  
[address]72  D72.1



     [facility]      

 

 Office Visit  2018  9:02a  [facility],  Jennifer Graham,  
[address]122  I50.33



     Hospitalists  M.D.    









 I21.4









 Office Visit  2018  3:03p  [facility]  Davian Severino,   
24823  I21.A1



     [facility]    









 I50.31

 

 R07.9









 Office Visit  2018  9:01a  [facility],  Jennifer Graham,  
54817  I50.33



     Hospitalists  M.D.    









 I21.4









 Office Visit  2018  3:41p  [facility]  Qutaybeh S. Magshaheed,  
85560  I11.0



       M.D.    









 I50.9

 

 I25.10

 

 E78.5

 

 I45.10

 

 E87.6

 

 E83.42

 

 R79.89









 Office Visit  2018  1:40p  [facility]  Tay Corcoran M.D.,  
88051  I25.10



     [facility]    









 I10

 

 I45.19

 

 R55

 

 E78.5









 Office Visit  2018  9:25a  [facility],  Anita Bassett M.D.
  01178  R55



     Hospitalists      









 R79.89

 

 R21

 

 I25.10









 Office Visit  2018  9:23a  [facility]  Anita Bassett M.D.
  50161  R55



     Hospitalists      









 R79.89

 

 R21

 

 I25.10









 Office Visit  2018  9:22a  [facility]  Abbie Cardenas N.P.  
84995  R55



     Hospitalists      









 R79.89

 

 R21

 

 I25.10









 Office Visit  2018  9:18a  [facility]  Le Garcias,  
80656  R55



     Hospitalists  D.OMaureen    









 R79.89

 

 R21

 

 I25.10









 Office Visit  2018  2:15p  [facility]  Davy Faria,  
[address]341  R55



     [place] [facility]  [place]    









 I10

 

 I45.19

 

 Z95.818

 

 S01.01xA

 

 Z48.02









 Office Visit  2018  9:30a  [facility]  Davy Faria,  
93783  R55



       [place]    

 

 Office Visit  2018  9:03a  [facility]  Tatyana Caldwell NP  
36008  R55



     Hospitalists      









 S01.01xA

 

 I50.32

 

 I10









 Office Visit  2018  9:02a  [facility]  Char Sanchez  
25942  R55



     Hospitalists  SURYA Zaman    









 S01.01xA

 

 I50.32

 

 I10









 Office Visit  2018  9:29a  [facility]  Eze Baltazar MD  [address]
  I50.9



     Hospitalists      









 J18.9

 

 E87.8

 

 N18.3









 Office Visit  2018  9:28a  [facility]  Char Sanchez  
16770  I50.9



     Hospitalists  SURYA Zaman    









 J18.9

 

 E87.8

 

 N18.3









 Office Visit  2018  9:27a  [facility]  Natividad Camacho DO  
87069  I50.9



     Hospitalists      









 J18.9

 

 M79.602









 Office Visit  2018  9:26a  [facility]  Lakhwinder David,  
19526  M79.602



     Hospitalists  M.D.    









 I50.9

 

 J18.9

 

 E87.8









 Office Visit  2018  9:25a  [facility]  Lakhwinder Willamlashay,  
64498  M79.602



     Hospitalists  M.D.    









 I50.9

 

 J18.9

 

 E87.8









 Office Visit  2018  9:24a  [facility]  Lakhwinder Willamlashay,  
04963  M79.602



     Hospitalists  M.D.    









 I50.9

 

 J18.9

 

 E87.8









 Office Visit  2018  9:22a  [facility]  [address]  
M79.602



     Assoc, Hospitalists  SURYA Zaman    









 I50.9

 

 J18.9

 

 E87.8









 Office Visit  2018  9:56a  [facility]  Qutaybeh SMaureen Bran,  
84750  R50.9



       M.D.    









 R53.83

 

 D72.829

 

 R94.31

 

 I45.19

 

 I44.4

 

 I25.10

 

 I10

 

 R07.9









 Office Visit  2018  7:10a  [facility]  Lakhwinder David,  
43176  I50.9



     Hospitalists  M.D.    









 R06.09

 

 I25.10









 Office Visit  2018  7:09a  [facility]  Lakhwinder David,  
19910  I50.9



     Hospitalists  M.D.    









 R06.09

 

 I25.10









 Office Visit  2018  7:09a  [facility]  Anita Bassett,  
50138  I50.9



     Hospitalists  M.D.    









 I25.10

 

 R06.09









 Office Visit  01/15/2018  7:08a  [facility],  Eze Baltazar MD  [address]
  I50.9



     Hospitalists      









 R06.09

 

 I25.10









 Office Visit  2017 11:20a  [facility]  Tay Corcoran M.D.,  
46622  I25.10



     [facility]    









 I35.0

 

 I12.9

 

 E78.5

 

 N18.3

 

 I48.0









 Office Visit  2017 11:20a  [facility] Rashel Corcoran M.D.,  
77141  I25.10



     [facility]    









 I35.0

 

 I35.1

 

 I10

 

 E78.5

 

 I45.10

 

 I48.0









 Office Visit  2017 10:16a  [facility]  Tay Corcoran M.D.,  
99420  R07.9



     [facility]    









 R94.31







Plan of Care

Future Appointment(s):2018  9:40 am - Ica Pacer Schedule at [facility]2018 10:20 am - Davey Castro M.D. at [facility]2018 - JOVANY WernerP.I49.5 Sick sinus uzxfjxymB57.0 
Presence of cardiac pacemakerFollow up:3 weeks PO interrogation XAVI Corcoran as 
scheduled.Recommendations:Call Lisa at [phone] if you have questions about 
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 
mcbrbZ33.9 Edema, unspecifiedNew Medication:Compression Stockings

## 2018-07-20 NOTE — XMS REPORT
Carmelo Vazquez JR

 Created on:July 3, 2018



Patient:JR Vazquez Lloyd R

Sex:Male

:1932

External Reference #:[id number]





Demographics







 Address  [address] [place] [address]

 

 Home Phone  [phone]

 

 Preferred Language  English

 

 Marital Status  Not  Or 

 

 Scientologist Affiliation  Unknown

 

 Race  White

 

 Ethnic Group  Not  Or 









Author







 Organization  [facility]

 

 Address  [address] [place] [address] B



   [place] [address]

 

 Phone  [phone]









Support







 Name  Relationship  Address  Phone

 

 Catherine Vazquez  Unavailable  Unavailable  Unavailable









Care Team Providers







 Name  Role  Phone

 

 Cholo Boo MD  Primary Care Physician  Unavailable









Payers







 Type  Date  Identification Numbers  Payment Provider  Subscriber

 

 Medicare Primary  Effective:  Policy Number:  Medicare  Carmelo Vazquez JR



   1999  [id number]    









 PayID: [id number]  [address]









 [place] [address]









 Medigap Part B  Effective:  Policy Number:  [employer]  Carmelo Vazquez,



   1991  [id number]    









 PayID: [id number]  [address]









 [place] [address]









 Medigap Part B  Effective: 1998  Policy Number:   Paris MENDEZ  Catherine Vazquez



     [id number]    









 Expires: 2017  Group Number: [id number]  [address] [address]









 PayID: [id number]  [place] [address]









 Commercial  Effective: 12/15/2016  Policy Number:  [employer]  Carmelo Vazquez JR



     [id number]    









 Expires: 10/23/2018  Group Number: [id number]  [id number] 









 PayID: [id number]  [address] [address]







Problems







 Date  Description  Provider  Status

 

 Onset: 2017  Aortic valve disorder  Tay Corcoran M.D., [place],  Active



     [facility]  

 

 Onset: 2017  Athscl heart disease of native  Tay Corcoran M.D., [place],  
Active



   coronary artery w/o ang pctrs  [facility]  

 

 Onset: 2017  Essential hypertension  Tay Corcoran M.D., [place],  Active



     [facility]  

 

 Onset: 2017  Hyperlipidemia  Tay Corcoran M.D., [facility],  Active



     [facility]  

 

 Onset: 2017  Right bundle branch block  Tay Corcoran M.D., [facility],  Active



     [facility]  

 

 Onset: 2017  Paroxysmal atrial fibrillation  Tay Corcoran M.D., [facility],  
Active



     [facility]  

 

 Onset: 2017  Chronic kidney disease stage 3  Tay Corcoran M.D., [facility],  
Active



     [facility]  

 

 Onset: 2018  Syncope and collapse  Tay Corcoran M.D., [facility],  Active



     [facility]  







Family History







 Date  Family Member(s)  Problem(s)  Comments

 

   General  Arthritis  







Social History







 Type  Date  Description  Comments

 

 Marital Status      

 

 Occupation    Retired  

 

 ETOH Use    Drinks Alcoholic Beverages Rarely  once a year

 

 Smoking    Patient has never smoked  

 

 Recreational Drug Use    Denies Drug Use  

 

 Daily Caffeine    Does Not Consume Caffeine  

 

 Exercise Type/Frequency    Exercises rarely  







Allergies, Adverse Reactions, Alerts







 Date  Description  Reaction  Status  Severity  Comments

 

 2017  NKDA    active    







Medications







 Medication  Date  Status  Form  Strength  Qnty  SIG  Indications  Ordering



                 Provider

 

 Metamucil    Active  Capsules  0.52gm    1 cap by    Unknown



   /2017          mouth twice    



             a day with    



             water    

 

 Lipitor    Active  Tablets  10mg  90tab  1 by mouth            s  every night    Nilo,



             at bedtime    M.D.,



                 [facility],



                 [facility]

 

 Metoprolol    Active  Tablets  37.5mg    one ta bid    Tay



 Tar              BEHZAD Corcoran,



                 [facility]

 

 Aspirin Adult Low    Active  Tablets  81mg    1 by mouth    Unknown



 Dose Ec  /0000    DR      every day    

 

 Omeprazole    Active  Capsules  20mg    1 by mouth    Unknown



   /    DR      every day    

 

 Potassium Chloride    Active  Tablets  10Meq    1 by mouth    Unknown



 ER  /0000    ER      every    



             day(taking    



             1/2 tab    



             Am,1/2 tab    



             PM)    

 

 Magnesium Oxide    Active  Tablets  400mg    2 by mouth    Unknown



   /          every day    

 

 Montelukast Sodium    Active  Tablets  10mg    1 by mouth    Unknown



   /          every day    



             as needed    

 

 Gabapentin    Active  Capsules  100mg    take 1    Unknown



             three times    



             a day    

 

 Prednisone    Active  Tablets  10mg  360ta  Please take    Davey



   /0000        bs  4 tabs    Gloria,



             daily    M.D.



             ongoing,    



             taper by    



             10mg every    



             14 days    

 

 Hydroxychloroquine    Active  Tablets  200mg  180ta  take 2    Davey



 Sulfate          bs  tablet    Gloria,



             daily    M.D.

 

 Vitamin D-3    Active  Capsules  1000Unit    1 by mouth    Unknown



   /0000          every day    

 

 Furosemide    Active  Tablets  20mg    1/2 by    Unknown



   /0000          mouth every    



             day    

 

                 

 

 Benzonatate    Hx  Capsules  100mg  45cap  1 capsule 3            s  times daily    TESHA Faria,



   -          as needed    M.DMaureen



                 

 

 Cozaar    Hx  Tablets  25mg    1 by mouth              twice    José Manuelfeheena,



   -          daily(pt    M.D.,



             unsure if    [facility]          he is    [facility]



             taking    



             this)    

 

 Multi Complete    Hx  Capsules      daily    Unknown



   /2017              



   -              



                 

 

 Potassium Chloride    Hx  Solution  20Meq/50M    twice daily    Unknown



   /2017      L        



   -              



                 

 

 Digoxin    Hx  Tablets  125mcg  30tab  1 by mouth    Tay



           s  every day    Ben Corcoran M.D.,



                 [facility]              [facility]

 

 Cozaar    Hx  Tablets  50mg  30tab  2 by mouth    Tay



           s  every day    Ben Corcoran M.D.,



                 [facility]              [facility]

 

 Lasix    Hx  Tablets  40mg    1 by mouth    Unknown



   /0000          every day    



   -              



                 

 

 Norco    Hx  Tablets  5-325mg    one to two    Unknown



   /0000          tabs by    



   -          mouth every    



             4-6 hours    



   /          as needed    



             pain    

 

 Coumadin    Hx  Tablets  1mg    take as    Unknown



   /0000          directed    



   -              



                 

 

 Vitamin D    Hx  Tablets  1000Unit    by mouth    Unknown



   /0000          everyday    



   -              



                 

 

 Digoxin    Hx  Tablets  125mcg    1 by mouth    Unknown



   /0000          every day    



   -              



                 

 

 Sod Citrate-Citric    Hx  Solution  500-334mg    30 ml's 3    Unknown



 Acid  /0000      /5ML    times a    



   -          day.    



                 

 

 Torsemide    Hx  Tablets  20mg    take 2    Unknown



   /0000          tablets Up    



   -          To Once A    



             Day as    



   /2018          Needed For    



             Congestive    



             Heart    



             Failure    







Vital Signs







 Date  Vital  Result  Comment

 

 2018  Height  66 inches  5'6"









 Weight  161.00 lb  with shoes

 

 Heart Rate  64 /min  

 

 BP Systolic Sitting  122 mmHg  Lue reg cuff

 

 BP Diastolic Sitting  50 mmHg  Lue reg cuff

 

 BP Systolic Standing  126 mmHg  Lue reg cuff

 

 BP Diastolic Standing  54 mmHg  Lue reg cuff

 

 Respiratory Rate  16 /min  

 

 BMI (Body Mass Index)  26.0 kg/m2  

 

 Ejection Fraction  45-50%  date 18 ECHO









 2018  Height  66 inches  5'6"









 Weight  159.50 lb  

 

 Heart Rate  63 /min  

 

 BP Systolic Sitting  120 mmHg  

 

 BP Diastolic Sitting  55 mmHg  

 

 Respiratory Rate  14 /min  

 

 Pain Level  5  

 

 BMI (Body Mass Index)  25.7 kg/m2  









 2018  Height  66 inches  5'6"









 Weight  155.00 lb  at home w/o clothes

 

 Heart Rate  66 /min  

 

 BP Systolic Sitting  110 mmHg  lue rg cuff

 

 BP Diastolic Sitting  46 mmHg  lue rg cuff

 

 Respiratory Rate  18 /min  

 

 BMI (Body Mass Index)  25.0 kg/m2  

 

 Ejection Fraction  50-55%  echo 2018  Height  66 inches  5'6"









 Weight  156.00 lb  w/ shoes

 

 Heart Rate  56 /min  

 

 BP Systolic Standing  115 mmHg  lue reg cuff

 

 BP Diastolic Standing  50 mmHg  lue reg cuff

 

 Respiratory Rate  18 /min  

 

 BMI (Body Mass Index)  25.2 kg/m2  

 

 Ejection Fraction  50-55%  echo 2017  Height  66 inches  5'6"









 Weight  165.00 lb  with shoes

 

 Heart Rate  60 /min  sit and 68 stand HR

 

 BP Systolic Sitting  146 mmHg  Rue reg cuff

 

 BP Diastolic Sitting  60 mmHg  Rue reg cuff

 

 BP Systolic Standing  152 mmHg  Rue reg cuff

 

 BP Diastolic Standing  80 mmHg  Rue reg cuff

 

 Respiratory Rate  17 /min  

 

 BMI (Body Mass Index)  26.6 kg/m2  









 2017  Height  66 inches  5'6"









 Weight  164.50 lb  with shoes

 

 Heart Rate  62 /min  

 

 BP Systolic Sitting  108 mmHg  LA reg cuff

 

 BP Diastolic Sitting  58 mmHg  LA reg cuff

 

 BP Systolic Standing  112 mmHg  LA reg cuff

 

 BP Diastolic Standing  62 mmHg  LA reg cuff

 

 BMI (Body Mass Index)  26.5 kg/m2  

 

 Ejection Fraction  60%  echo 17







Results







 Test  Date  Test  Result  H/L  Range  Note

 

 Nuclear AB (Nia) By  06/15/2018  Nuclear Ab (Nia) by  Positive 1:640      1



 Ifa Igg    Ifa, IgG        









 Nia Titer:  1:640      

 

 Nia Pattern:  Homogeneous      2









 Laboratory test finding  06/15/2018  Erythrocyte Sed Rate  9 mm/Hr    0-40  









 C Reactive Protein  2.55 mg/L    < 5.00  3









 CBC Auto Diff  06/15/2018  White Blood Count  18.0 10^3/uL  High  3.5-10.8  









 Red Blood Count  2.93 10^6/uL  Low  4.00-5.40  

 

 Hemoglobin  9.8 g/dL  Low  14.0-18.0  

 

 Hematocrit  30 %  Low  42-52  

 

 Mean Corpuscular Volume  102 fL  High  80-94  

 

 Mean Corpuscular Hemoglobin  33 pg  High  27-31  

 

 Mean Corpuscular HGB Conc  33 g/dL    31-36  

 

 Red Cell Distribution Width  15 %    10.5-15  

 

 Platelet Count  160 10^3/uL    150-450  

 

 Mean Platelet Volume  9.9 um3    7.4-10.4  

 

 Abs Neutrophils  16.9 10^3/uL  High  1.5-7.7  

 

 Abs Lymphocytes  0.6 10^3/uL  Low  1.0-4.8  

 

 Abs Monocytes  0.4 10^3/uL    0-0.8  

 

 Abs Eosinophils  0 10^3/uL    0-0.6  

 

 Abs Basophils  0 10^3/uL    0-0.2  

 

 Abs Nucleated RBC  0 10^3/uL      

 

 Granulocyte %  94.0 %  High  38-83  

 

 Lymphocyte %  3.5 %  Low  25-47  

 

 Monocyte %  2.2 %    0-7  

 

 Eosinophil %  0.1 %    0-6  

 

 Basophil %  0.2 %    0-2  

 

 Nucleated Red Blood Cells %  0      









 Comp Metabolic Panel  06/15/2018  Sodium  129 mmol/L  Low  135-145  









 Potassium  4.4 mmol/L    3.5-5.0  

 

 Chloride  95 mmol/L  Low  101-111  

 

 Co2 Carbon Dioxide  28 mmol/L    22-32  

 

 Anion Gap  6 mmol/L    2-11  

 

 Glucose  133 mg/dL  High    

 

 Blood Urea Nitrogen  24 mg/dL    6-24  

 

 Creatinine  1.42 mg/dL  High  0.67-1.17  

 

 BUN/Creatinine Ratio  16.9    8-20  

 

 Calcium  8.3 mg/dL  Low  8.6-10.3  

 

 Total Protein  5.9 g/dL  Low  6.4-8.9  

 

 Albumin  3.2 g/dL    3.2-5.2  

 

 Globulin  2.7 g/dL    2-4  

 

 Albumin/Globulin Ratio  1.2    1-3  

 

 Total Bilirubin  0.60 mg/dL    0.2-1.0  

 

 Alkaline Phosphatase  121 U/L  High    

 

 Alt  30 U/L    7-52  

 

 Ast  22 U/L    13-39  

 

 Egfr Non-  47.3    >60  

 

 Egfr   60.8    >60  4









 Laboratory test finding  06/15/2018  Complement C3  99 mg/dL    75 - 175  5









 Complement C4  16 mg/dL    14 - 40  6

 

 Anti Double Stranded Dna AB  43.0 IU/mL      7

 

 Miscellaneous Test  See Comment      8









 Laboratory test finding  2017  Alt (SGPT)  31 U/L    7-52  









 Ast (Sgot)  34 U/L    13-39  









 Lipid Profile (Trig/Chol/HDL)  2017  Triglycerides  99 mg/dL      9









 Cholesterol  112 mg/dL      10

 

 HDL Cholesterol  20.2 mg/dL      11

 

 LDL Cholesterol  72 mg/dL      12









 Basic Metabolic Panel  2017  Sodium  136 mmol/L    133-145  









 Potassium  4.0 mmol/L    3.5-5.0  

 

 Chloride  104 mmol/L    101-111  

 

 Co2 Carbon Dioxide  23 mmol/L    22-32  

 

 Anion Gap  9 mmol/L    2-11  

 

 Glucose  101 mg/dL  High    

 

 Blood Urea Nitrogen  19 mg/dL    6-24  

 

 Creatinine  1.41 mg/dL  High  0.67-1.17  

 

 BUN/Creatinine Ratio  13.5    8-20  

 

 Calcium  8.6 mg/dL    8.6-10.3  

 

 Egfr Non-  47.8    >60  

 

 Egfr   61.4    >60  13









 1  -------------------REFERENCE VALUE--------------------------



   <1:80 (Negative)

 

 2  Test Performed by:



   [place] [address]

 

 3  Acute inflammation:  >10.00

 

 4  *******Because ethnic data is not always readily available,



   this report includes an eGFR for both -Americans and



   non- Americans.****



   The National Kidney Disease Education Program (NKDEP) does



   not endorse the use of the MDRD equation for patients that



   are not between the ages of 18 and 70, are pregnant, have



   extremes of body size, muscle mass, or nutritional status,



   or are non- or non-.



   According to the National Kidney Foundation, irrespective of



   diagnosis, the stage of the disease is based on the level of



   kidney function:



   Stage Description                      GFR(mL/min/1.73 m(2))



   1     Kidney damage with normal or decreased GFR       90



   2     Kidney damage with mild decrease in GFR          60-89



   3     Moderate decrease in GFR                         30-59



   4     Severe decrease in GFR                           15-29



   5     Kidney failure                       <15 (or dialysis)

 

 5  Test Performed by:



   [place] [address]

 

 6  Test Performed by:



   [place] [address]

 

 7  Interpretation: Borderline (30.0-75.0)



   -------------------REFERENCE VALUE--------------------------



   <30.0 (Negative)



   Test Performed by:



   [address] [address]

 

 8  Test                        Result    Flag  Unit   RefValue



   ------------------------------------------------------------



   Vitamin B12 and Folate, S



   Vitamin B12 Assay, S      557             ng/L   180 - 914



   Folate, S                 14.4            mcg/L  >=4.0



   Test Performed by:



   [address] [address]



   



   



   ******CORRECTED REPORT******



   ---  Corrected on 18 1238 ---



   Ref Lab Test previously reported as:



   SEE COMMENTS



   Test                        Result    Flag  Unit   RefValue



   ------------------------------------------------------------



   Vitamin B12 and Folate, S



   Vitamin B12 Assay, S      557             ng/L   180 - 914



   



   Folate, S                 15.9            mcg/L  >=4.0



   



   Test Performed by:



   [address] [address]

 

 9  Desirable <150



   Borderline high 150-199



   High 200-499



   Very High >500

 

 10  Desirable <200



   Borderline high 200-239



   High >239

 

 11  Low <40



   Desirable: 40-60



   High: >60

 

 12  Desirable: <100 mg/dL



   Near Optimal: 100-129 mg/dL



   Borderline High: 130-159 mg/dL



   High: 160-189 mg/dL



   Very High: >189 mg/dL

 

 13  *******Because ethnic data is not always readily available,



   this report includes an eGFR for both -Americans and



   non- Americans.****



   The National Kidney Disease Education Program (NKDEP) does



   not endorse the use of the MDRD equation for patients that



   are not between the ages of 18 and 70, are pregnant, have



   extremes of body size, muscle mass, or nutritional status,



   or are non- or non-.



   According to the National Kidney Foundation, irrespective of



   diagnosis, the stage of the disease is based on the level of



   kidney function:



   Stage Description                      GFR(mL/min/1.73 m(2))



   1     Kidney damage with normal or decreased GFR       90



   2     Kidney damage with mild decrease in GFR          60-89



   3     Moderate decrease in GFR                         30-59



   4     Severe decrease in GFR                           15-29



   5     Kidney failure                       <15 (or dialysis)







Procedures







 Date  CPT Code  Description  Status

 

 2018  12390  ECHO Transthorasic Realtime 2D W Doppler & Color Flow  
Completed



     Hosp  

 

 2018  18173  Each Additional Biopsy  Completed

 

 2018  02514  Biopsy Skin Lesion Single  Completed

 

 2018  52173  EKG Tracing & Interpretation  Completed

 

 2018  11735  Implant Cardiac Loop Recorder  Completed

 

 2018  05129  Echocardiogram, Limited Study  Completed

 

 2018  17321  EKG, Interpretation Only  Completed

 

 2018  43201  ECHO Transthorasic Realtime 2D W Doppler & Color Flow  
Completed



     Hosp  

 

 2017  32348  EKG Tracing & Interpretation  Completed

 

 2017  55665  EKG Tracing & Interpretation  Completed

 

 2017  34393  EKG, Interpretation Only  Completed

 

 2017  57630  Left Heart Cath. Incl S/I Coronaries, Angio S/I V Gram  
Completed



     If Done  

 

 2006  46144  Color Doppler  Completed

 

 2006  01263  Pulse Doppler & Continuous Wave  Completed

 

 2006  34719  Pulse Doppler & Continuous Wave  Completed

 

 2006  05577  Echocardiogram  Completed







Encounters







 Type  Date  Location  Provider  CPT E/M  Dx

 

 Office Visit  2018  Rheumatology Services  Davey Castro M.D.  [address]34  
D72.1



   4:00p  Of [facility]      









 M31.8

 

 Z79.899

 

 Z79.52

 

 M35.9









 Office Visit  2018  7:00a  [facility]  Davey Castro  
94518  R76.0



     Felicia WEN    









 D72.1

 

 R21









 Office Visit  2018 10:31a  [facility]  Janelle Burk MD  
11726  D72.1



     Services [facility]      

 

 Office Visit  2018  9:07a  [facility]  Tate Fagan  [address]603  I50.33



     [facility] Hospitalists  MD Valentín    









 I21.4

 

 M31.8









 Office Visit  2018  9:06a  [facility]  Tate Fagan  08484  I50.33



     Assoc[facility]pc Hospitaljohanna Laura MD    









 I21.4

 

 M31.8









 Office Visit  2018  9:57a  [facility]  Keely Vazquez M.D.  
11238  Z01.810



     [facility]      









 D72.1

 

 I21.A1

 

 I50.30

 

 I25.10

 

 Z95.1









 Office Visit  2018  7:00a  [facility]  Davey Castro  
66476  D72.1



     Felicia WEN    









 R21









 Office Visit  2018 10:30a  [facility]  Janelle Burk MD  
38081  D72.1



     [facility]      

 

 Office Visit  2018  9:06a  [facility]  [address]223  I50.33



     Assoc, Hospitaljohanna Laura MD    









 I21.4

 

 M31.8









 Office Visit  2018  9:05a  [facility]  Tate Fagan  [address]67  I50.33



     Assoc, Hospitaljohanna Laura MD    









 I21.4

 

 M31.8









 Office Visit  2018  9:04a  [facility]  Hira Galicia,  
23236  M31.8



     Hospitalists  M.D.,FACP    









 I50.33









 Office Visit  2018  9:03a  [facility]  Hira Galicia,  [address]  
I50.33



     [facility] Hospital[facility]  M.D.,FACP    









 M31.8









 Office Visit  2018  3:16p  [facility]  Qutaybeh S. Maghaydah,  
82668  I50.9



       M.D.    









 I25.10

 

 I42.9

 

 D64.9









 Office Visit  2018  9:03a  [facility],daphney Graham,  
35622  I50.33



     Hospitalists  M.D.    









 I21.4









 Office Visit  2018  7:00a  [facility]  Davey Castro,  
64327  D72.1



     Cma  M.TESHA    









 R21









 Office Visit  2018  9:03a  [facility],daphney Graham,  
88347  I50.33



     Hospitalists  M.D.    









 I21.4









 Office Visit  2018  3:09p  [facility]  Qutaybeh S. Maghaydah,  
[address]67  I50.9



       M.D.    









 I42.9

 

 I25.10

 

 Z95.1









 Office Visit  2018  3:20p  [facility]  Janelle Burk MD  
26017  D72.1



     [facility]      

 

 Office Visit  2018  9:02a  [facility],daphney Graham,  
[address]69  I50.33



     Hospitalists  M.D.    









 I21.4









 Office Visit  2018  3:03p  [facility]  Davian Severino,   
34547  I21.A1



     [facility]    









 I50.31

 

 R07.9









 Office Visit  2018  9:01a  [facility],daphney Graham,  
80710  I50.33



     Hospitalists  M.D.    









 I21.4









 Office Visit  2018  3:41p  [facility]  Qutaybeh S. Maghaydah,  
20149  I11.0



       M.D.    









 I50.9

 

 I25.10

 

 E78.5

 

 I45.10

 

 E87.6

 

 E83.42

 

 R79.89









 Office Visit  2018  1:40p  [facility]  Tay Corcoran M.D.,  
43448  I25.10



     [facility] AT [facility], [facility]    









 I10

 

 I45.19

 

 R55

 

 E78.5









 Office Visit  2018  9:25a  [facility],daphney Bassett M.D.
  11204  R55



     Hospitalists      









 R79.89

 

 R21

 

 I25.10









 Office Visit  2018  9:23a  [facility]  Anita Bassett M.D.
  70842  R55



     Hospitalists      









 R79.89

 

 R21

 

 I25.10









 Office Visit  2018  9:22a  [facility]  Abbie Cardenas N.P.  
00035  R55



     Hospitalists      









 R79.89

 

 R21

 

 I25.10









 Office Visit  2018  9:18a  [facility]  Le Garcias,  
[address]  R55



     Hospitalists  D.OMaureen    









 R79.89

 

 R21

 

 I25.10









 Office Visit  2018  2:15p  [facility]  aDvy Faria,  
[address]  R55



     AT [facility]  M.TESHA    









 I10

 

 I45.19

 

 Z95.818

 

 S01.01xA

 

 Z48.02









 Office Visit  2018  9:30a  [facility]  Davy Faria,  
67365  R55



       M.TESHA    

 

 Office Visit  2018  9:03a  [facility]  Tatyana Caldwell NP  
83970  R55



     Hospitalists      









 S01.01xA

 

 I50.32

 

 I10









 Office Visit  2018  9:02a  [facility],  Char Sanchez  
49794  R55



     Hospitalists  SURYA Zaman    









 S01.01xA

 

 I50.32

 

 I10









 Office Visit  2018  9:29a  [facility]  Eze Baltazar MD  89744
  I50.9



     Hospitalists      









 J18.9

 

 E87.8

 

 N18.3









 Office Visit  2018  9:28a  [facility],  Char Sanchez  
35071  I50.9



     Hospitalists  SURYA Zaman    









 J18.9

 

 E87.8

 

 N18.3









 Office Visit  2018  9:27a  [facility]  Natividad Camacho DO  
54592  I50.9



     Hospitalists      









 J18.9

 

 M79.602









 Office Visit  2018  9:26a  [facility]  Lakhwinder David  
60584  M79.602



     Hospitalists  M.D.    









 I50.9

 

 J18.9

 

 E87.8









 Office Visit  2018  9:25a  [facility]  Lakhwinder David  
68622  M79.602



     Hospitalists  M.D.    









 I50.9

 

 J18.9

 

 E87.8









 Office Visit  2018  9:24a  [facility]  Lakhwinder David,  
26279  M79.602



     Hospitalists  M.D.    









 I50.9

 

 J18.9

 

 E87.8









 Office Visit  2018  9:22a  [facility]  [address]  
M79.602



     [facility] Hospital[facility]  SURYA Zaman    









 I50.9

 

 J18.9

 

 E87.8









 Office Visit  2018  9:56a  [facility]  Qutaybeh SMaureen Perezteeteematheus,  
25561  R50.9



       M.D.    









 R53.83

 

 D72.829

 

 R94.31

 

 I45.19

 

 I44.4

 

 I25.10

 

 I10

 

 R07.9









 Office Visit  2018  7:10a  [facility]  Lakhwinder David,  
18889  I50.9



     Hospitalists  M.D.    









 R06.09

 

 I25.10









 Office Visit  2018  7:09a  [facility]  Lakhwinder David,  
11502  I50.9



     Hospitalists  M.D.    









 R06.09

 

 I25.10









 Office Visit  2018  7:09a  [facility]  Anita Bassett,  
68305  I50.9



     Hospitalists  M.D.    









 I25.10

 

 R06.09









 Office Visit  01/15/2018  7:08a  [facility],  Eze Baltazar MD  [address]86
  I50.9



     Hospitalists      









 R06.09

 

 I25.10









 Office Visit  2017 11:20a  [facility] Rashel Corcoran M.D.,  
30319  I25.10



     [facility] AT [facility], [facility]    









 I35.0

 

 I12.9

 

 E78.5

 

 N18.3

 

 I48.0









 Office Visit  2017 11:20a  [facility] Rashel Corcoran M.D.,  
20996  I25.10



     [facility] AT [facility], [facility]    









 I35.0

 

 I35.1

 

 I10

 

 E78.5

 

 I45.10

 

 I48.0









 Office Visit  2017 10:16a  [facility]  Tay Corcoran M.D.,  
[id number]  R07.9



     [facility], FSCAI    









 R94.31







Plan of Care

Future Appointment(s):2018 10:30 am - Aura Almonte N.P. at [place] 
[facility]2018 10:20 am - Davey Castro M.D. at [facility] 
[facility] Of [facility]2018 - Davy Faria M.D.I49.5 Sick sinus syndromeNew 
Orders:Implant Pacemaker

## 2018-07-20 NOTE — XMS REPORT
Carmelo Vazquez JR

 Created on:July 3, 2018



Patient:JR Vazquez Lloyd R

Sex:Male

:1932

External Reference #:[id number]





Demographics







 Address  [address] [place] [address]

 

 Home Phone  [phone]

 

 Preferred Language  English

 

 Marital Status  Not  Or 

 

 Anglican Affiliation  Unknown

 

 Race  White

 

 Ethnic Group  Not  Or 









Author







 Organization  [facility]

 

 Address  [address] [place] [address] B



   [place] [address]

 

 Phone  [phone]









Support







 Name  Relationship  Address  Phone

 

 Catherine Vazquez  Unavailable  Unavailable  Unavailable









Care Team Providers







 Name  Role  Phone

 

 Cholo Boo MD  Primary Care Physician  Unavailable









Payers







 Type  Date  Identification Numbers  Payment Provider  Subscriber

 

 Medicare Primary  Effective:  Policy Number:  Medicare  Carmelo Vazquez JR



   1999  [id number]    









 PayID: [id number]  [address]









 [place] [address]









 Medigap Part B  Effective:  Policy Number:  [employer]  Carmelo Vazquez,



   1991  [id number]    









 PayID: [id number]  [address]









 [place] [address]









 Medigap Part B  Effective: 1998  Policy Number:   Paris MENDEZ  Catherine Vazquez



     [id number]    









 Expires: 2017  Group Number: [id number]  [address] [address]









 PayID: [id number]  [place] [address]









 Commercial  Effective: 12/15/2016  Policy Number:  [employer]  Carmelo Vazquez JR



     [id number]    









 Expires: 10/23/2018  Group Number: [id number]  [id number] 









 PayID: [id number]  [address] [address]







Problems







 Date  Description  Provider  Status

 

 Onset: 2017  Aortic valve disorder  Tay Corcoran M.D., KIMBER,  Active



     [facility]  

 

 Onset: 2017  Athscl heart disease of native  Tay Corcoran M.D., KIMBER,  
Active



   coronary artery w/o ang pctrs  [facility]  

 

 Onset: 2017  Essential hypertension  Tay Corcoran M.D., KIMBER,  Active



     [facility]  

 

 Onset: 2017  Hyperlipidemia  Tay Corcoran M.D., [facility],  Active



     [facility]  

 

 Onset: 2017  Right bundle branch block  Tay Corcoran M.D., [facility],  Active



     [facility]  

 

 Onset: 2017  Paroxysmal atrial fibrillation  Tay Corcoran M.D., [facility],  
Active



     [facility]  

 

 Onset: 2017  Chronic kidney disease stage 3  Tay Corcoran M.D., [facility],  
Active



     [facility]  

 

 Onset: 2018  Syncope and collapse  Tay Corcoran M.D., [facility],  Active



     [facility]  







Family History







 Date  Family Member(s)  Problem(s)  Comments

 

   General  Arthritis  







Social History







 Type  Date  Description  Comments

 

 Marital Status      

 

 Occupation    Retired  

 

 ETOH Use    Drinks Alcoholic Beverages Rarely  once a year

 

 Smoking    Patient has never smoked  

 

 Recreational Drug Use    Denies Drug Use  

 

 Daily Caffeine    Does Not Consume Caffeine  

 

 Exercise Type/Frequency    Exercises rarely  







Allergies, Adverse Reactions, Alerts







 Date  Description  Reaction  Status  Severity  Comments

 

 2017  NKDA    active    







Medications







 Medication  Date  Status  Form  Strength  Qnty  SIG  Indications  Ordering



                 Provider

 

 Metamucil    Active  Capsules  0.52gm    1 cap by    Unknown



   /2017          mouth twice    



             a day with    



             water    

 

 Lipitor    Active  Tablets  10mg  90tab  1 by mouth            s  every night    Nilo,



             at bedtime    M.D.,



                 [facility],



                 [facility]

 

 Metoprolol    Active  Tablets  37.5mg    one ta bid    Tay



 Tar              BEHZAD Corcoran,



                 [facility]

 

 Aspirin Adult Low    Active  Tablets  81mg    1 by mouth    Unknown



 Dose Ec  /0000    DR      every day    

 

 Omeprazole    Active  Capsules  20mg    1 by mouth    Unknown



   /    DR      every day    

 

 Potassium Chloride    Active  Tablets  10Meq    1 by mouth    Unknown



 ER  /0000    ER      every    



             day(taking    



             1/2 tab    



             Am,1/2 tab    



             PM)    

 

 Magnesium Oxide    Active  Tablets  400mg    2 by mouth    Unknown



   /          every day    

 

 Montelukast Sodium    Active  Tablets  10mg    1 by mouth    Unknown



   /          every day    



             as needed    

 

 Gabapentin    Active  Capsules  100mg    take 1    Unknown



             three times    



             a day    

 

 Prednisone    Active  Tablets  10mg  360ta  Please take    Davey



   /0000        bs  4 tabs    Gloria,



             daily    M.D.



             ongoing,    



             taper by    



             10mg every    



             14 days    

 

 Hydroxychloroquine    Active  Tablets  200mg  180ta  take 2    Davey



 Sulfate          bs  tablet    Gloria,



             daily    M.D.

 

 Vitamin D-3    Active  Capsules  1000Unit    1 by mouth    Unknown



   /0000          every day    

 

 Furosemide    Active  Tablets  20mg    1/2 by    Unknown



   /0000          mouth every    



             day    

 

                 

 

 Benzonatate    Hx  Capsules  100mg  45cap  1 capsule 3            s  times daily    TESHA Faria,



   -          as needed    M.DMaureen



                 

 

 Cozaar    Hx  Tablets  25mg    1 by mouth              twice    José Manuelfeheena,



   -          daily(pt    M.D.,



             unsure if    [facility]          he is    [facility]



             taking    



             this)    

 

 Multi Complete    Hx  Capsules      daily    Unknown



   /2017              



   -              



                 

 

 Potassium Chloride    Hx  Solution  20Meq/50M    twice daily    Unknown



   /2017      L        



   -              



                 

 

 Digoxin    Hx  Tablets  125mcg  30tab  1 by mouth    Tay



           s  every day    Ben Corcoran M.D.,



                 [facility]              [facility]

 

 Cozaar    Hx  Tablets  50mg  30tab  2 by mouth    Tay



           s  every day    Ben Corcoran M.D.,



                 [facility]              [facility]

 

 Lasix    Hx  Tablets  40mg    1 by mouth    Unknown



   /0000          every day    



   -              



                 

 

 Norco    Hx  Tablets  5-325mg    one to two    Unknown



   /0000          tabs by    



   -          mouth every    



             4-6 hours    



   /          as needed    



             pain    

 

 Coumadin    Hx  Tablets  1mg    take as    Unknown



   /0000          directed    



   -              



                 

 

 Vitamin D    Hx  Tablets  1000Unit    by mouth    Unknown



   /0000          everyday    



   -              



                 

 

 Digoxin    Hx  Tablets  125mcg    1 by mouth    Unknown



   /0000          every day    



   -              



                 

 

 Sod Citrate-Citric    Hx  Solution  500-334mg    30 ml's 3    Unknown



 Acid  /0000      /5ML    times a    



   -          day.    



                 

 

 Torsemide    Hx  Tablets  20mg    take 2    Unknown



   /0000          tablets Up    



   -          To Once A    



             Day as    



   /2018          Needed For    



             Congestive    



             Heart    



             Failure    







Vital Signs







 Date  Vital  Result  Comment

 

 2018  Height  66 inches  5'6"









 Weight  161.00 lb  with shoes

 

 Heart Rate  64 /min  

 

 BP Systolic Sitting  122 mmHg  Lue reg cuff

 

 BP Diastolic Sitting  50 mmHg  Lue reg cuff

 

 BP Systolic Standing  126 mmHg  Lue reg cuff

 

 BP Diastolic Standing  54 mmHg  Lue reg cuff

 

 Respiratory Rate  16 /min  

 

 BMI (Body Mass Index)  26.0 kg/m2  

 

 Ejection Fraction  45-50%  date 18 ECHO









 2018  Height  66 inches  5'6"









 Weight  159.50 lb  

 

 Heart Rate  63 /min  

 

 BP Systolic Sitting  120 mmHg  

 

 BP Diastolic Sitting  55 mmHg  

 

 Respiratory Rate  14 /min  

 

 Pain Level  5  

 

 BMI (Body Mass Index)  25.7 kg/m2  









 2018  Height  66 inches  5'6"









 Weight  155.00 lb  at home w/o clothes

 

 Heart Rate  66 /min  

 

 BP Systolic Sitting  110 mmHg  lue rg cuff

 

 BP Diastolic Sitting  46 mmHg  lue rg cuff

 

 Respiratory Rate  18 /min  

 

 BMI (Body Mass Index)  25.0 kg/m2  

 

 Ejection Fraction  50-55%  echo 2018  Height  66 inches  5'6"









 Weight  156.00 lb  w/ shoes

 

 Heart Rate  56 /min  

 

 BP Systolic Standing  115 mmHg  lue reg cuff

 

 BP Diastolic Standing  50 mmHg  lue reg cuff

 

 Respiratory Rate  18 /min  

 

 BMI (Body Mass Index)  25.2 kg/m2  

 

 Ejection Fraction  50-55%  echo 2017  Height  66 inches  5'6"









 Weight  165.00 lb  with shoes

 

 Heart Rate  60 /min  sit and 68 stand HR

 

 BP Systolic Sitting  146 mmHg  Rue reg cuff

 

 BP Diastolic Sitting  60 mmHg  Rue reg cuff

 

 BP Systolic Standing  152 mmHg  Rue reg cuff

 

 BP Diastolic Standing  80 mmHg  Rue reg cuff

 

 Respiratory Rate  17 /min  

 

 BMI (Body Mass Index)  26.6 kg/m2  









 2017  Height  66 inches  5'6"









 Weight  164.50 lb  with shoes

 

 Heart Rate  62 /min  

 

 BP Systolic Sitting  108 mmHg  LA reg cuff

 

 BP Diastolic Sitting  58 mmHg  LA reg cuff

 

 BP Systolic Standing  112 mmHg  LA reg cuff

 

 BP Diastolic Standing  62 mmHg  LA reg cuff

 

 BMI (Body Mass Index)  26.5 kg/m2  

 

 Ejection Fraction  60%  echo 17







Results







 Test  Date  Test  Result  H/L  Range  Note

 

 Nuclear AB (Nia) By  06/15/2018  Nuclear Ab (Nia) by  Positive 1:640      1



 Ifa Igg    Ifa, IgG        









 Nia Titer:  1:640      

 

 Nia Pattern:  Homogeneous      2









 Laboratory test finding  06/15/2018  Erythrocyte Sed Rate  9 mm/Hr    0-40  









 C Reactive Protein  2.55 mg/L    < 5.00  3









 CBC Auto Diff  06/15/2018  White Blood Count  18.0 10^3/uL  High  3.5-10.8  









 Red Blood Count  2.93 10^6/uL  Low  4.00-5.40  

 

 Hemoglobin  9.8 g/dL  Low  14.0-18.0  

 

 Hematocrit  30 %  Low  42-52  

 

 Mean Corpuscular Volume  102 fL  High  80-94  

 

 Mean Corpuscular Hemoglobin  33 pg  High  27-31  

 

 Mean Corpuscular HGB Conc  33 g/dL    31-36  

 

 Red Cell Distribution Width  15 %    10.5-15  

 

 Platelet Count  160 10^3/uL    150-450  

 

 Mean Platelet Volume  9.9 um3    7.4-10.4  

 

 Abs Neutrophils  16.9 10^3/uL  High  1.5-7.7  

 

 Abs Lymphocytes  0.6 10^3/uL  Low  1.0-4.8  

 

 Abs Monocytes  0.4 10^3/uL    0-0.8  

 

 Abs Eosinophils  0 10^3/uL    0-0.6  

 

 Abs Basophils  0 10^3/uL    0-0.2  

 

 Abs Nucleated RBC  0 10^3/uL      

 

 Granulocyte %  94.0 %  High  38-83  

 

 Lymphocyte %  3.5 %  Low  25-47  

 

 Monocyte %  2.2 %    0-7  

 

 Eosinophil %  0.1 %    0-6  

 

 Basophil %  0.2 %    0-2  

 

 Nucleated Red Blood Cells %  0      









 Comp Metabolic Panel  06/15/2018  Sodium  129 mmol/L  Low  135-145  









 Potassium  4.4 mmol/L    3.5-5.0  

 

 Chloride  95 mmol/L  Low  101-111  

 

 Co2 Carbon Dioxide  28 mmol/L    22-32  

 

 Anion Gap  6 mmol/L    2-11  

 

 Glucose  133 mg/dL  High    

 

 Blood Urea Nitrogen  24 mg/dL    6-24  

 

 Creatinine  1.42 mg/dL  High  0.67-1.17  

 

 BUN/Creatinine Ratio  16.9    8-20  

 

 Calcium  8.3 mg/dL  Low  8.6-10.3  

 

 Total Protein  5.9 g/dL  Low  6.4-8.9  

 

 Albumin  3.2 g/dL    3.2-5.2  

 

 Globulin  2.7 g/dL    2-4  

 

 Albumin/Globulin Ratio  1.2    1-3  

 

 Total Bilirubin  0.60 mg/dL    0.2-1.0  

 

 Alkaline Phosphatase  121 U/L  High    

 

 Alt  30 U/L    7-52  

 

 Ast  22 U/L    13-39  

 

 Egfr Non-  47.3    >60  

 

 Egfr   60.8    >60  4









 Laboratory test finding  06/15/2018  Complement C3  99 mg/dL    75 - 175  5









 Complement C4  16 mg/dL    14 - 40  6

 

 Anti Double Stranded Dna AB  43.0 IU/mL      7

 

 Miscellaneous Test  See Comment      8









 Laboratory test finding  2017  Alt (SGPT)  31 U/L    7-52  









 Ast (Sgot)  34 U/L    13-39  









 Lipid Profile (Trig/Chol/HDL)  2017  Triglycerides  99 mg/dL      9









 Cholesterol  112 mg/dL      10

 

 HDL Cholesterol  20.2 mg/dL      11

 

 LDL Cholesterol  72 mg/dL      12









 Basic Metabolic Panel  2017  Sodium  136 mmol/L    133-145  









 Potassium  4.0 mmol/L    3.5-5.0  

 

 Chloride  104 mmol/L    101-111  

 

 Co2 Carbon Dioxide  23 mmol/L    22-32  

 

 Anion Gap  9 mmol/L    2-11  

 

 Glucose  101 mg/dL  High    

 

 Blood Urea Nitrogen  19 mg/dL    6-24  

 

 Creatinine  1.41 mg/dL  High  0.67-1.17  

 

 BUN/Creatinine Ratio  13.5    8-20  

 

 Calcium  8.6 mg/dL    8.6-10.3  

 

 Egfr Non-  47.8    >60  

 

 Egfr   61.4    >60  13









 1  -------------------REFERENCE VALUE--------------------------



   <1:80 (Negative)

 

 2  Test Performed by:



   [place] [address]

 

 3  Acute inflammation:  >10.00

 

 4  *******Because ethnic data is not always readily available,



   this report includes an eGFR for both -Americans and



   non- Americans.****



   The National Kidney Disease Education Program (NKDEP) does



   not endorse the use of the MDRD equation for patients that



   are not between the ages of 18 and 70, are pregnant, have



   extremes of body size, muscle mass, or nutritional status,



   or are non- or non-.



   According to the National Kidney Foundation, irrespective of



   diagnosis, the stage of the disease is based on the level of



   kidney function:



   Stage Description                      GFR(mL/min/1.73 m(2))



   1     Kidney damage with normal or decreased GFR       90



   2     Kidney damage with mild decrease in GFR          60-89



   3     Moderate decrease in GFR                         30-59



   4     Severe decrease in GFR                           15-29



   5     Kidney failure                       <15 (or dialysis)

 

 5  Test Performed by:



   [place] [address]

 

 6  Test Performed by:



   [place] [address]

 

 7  Interpretation: Borderline (30.0-75.0)



   -------------------REFERENCE VALUE--------------------------



   <30.0 (Negative)



   Test Performed by:



   [address] [address]

 

 8  Test                        Result    Flag  Unit   RefValue



   ------------------------------------------------------------



   Vitamin B12 and Folate, S



   Vitamin B12 Assay, S      557             ng/L   180 - 914



   Folate, S                 14.4            mcg/L  >=4.0



   Test Performed by:



   [address] [address]



   



   



   ******CORRECTED REPORT******



   ---  Corrected on 18 1238 ---



   Ref Lab Test previously reported as:



   SEE COMMENTS



   Test                        Result    Flag  Unit   RefValue



   ------------------------------------------------------------



   Vitamin B12 and Folate, S



   Vitamin B12 Assay, S      557             ng/L   180 - 914



   



   Folate, S                 15.9            mcg/L  >=4.0



   



   Test Performed by:



   [address] [address]

 

 9  Desirable <150



   Borderline high 150-199



   High 200-499



   Very High >500

 

 10  Desirable <200



   Borderline high 200-239



   High >239

 

 11  Low <40



   Desirable: 40-60



   High: >60

 

 12  Desirable: <100 mg/dL



   Near Optimal: 100-129 mg/dL



   Borderline High: 130-159 mg/dL



   High: 160-189 mg/dL



   Very High: >189 mg/dL

 

 13  *******Because ethnic data is not always readily available,



   this report includes an eGFR for both -Americans and



   non- Americans.****



   The National Kidney Disease Education Program (NKDEP) does



   not endorse the use of the MDRD equation for patients that



   are not between the ages of 18 and 70, are pregnant, have



   extremes of body size, muscle mass, or nutritional status,



   or are non- or non-.



   According to the National Kidney Foundation, irrespective of



   diagnosis, the stage of the disease is based on the level of



   kidney function:



   Stage Description                      GFR(mL/min/1.73 m(2))



   1     Kidney damage with normal or decreased GFR       90



   2     Kidney damage with mild decrease in GFR          60-89



   3     Moderate decrease in GFR                         30-59



   4     Severe decrease in GFR                           15-29



   5     Kidney failure                       <15 (or dialysis)







Procedures







 Date  CPT Code  Description  Status

 

 2018  30076  ECHO Transthorasic Realtime 2D W Doppler & Color Flow  
Completed



     Hosp  

 

 2018  59497  Each Additional Biopsy  Completed

 

 2018  32258  Biopsy Skin Lesion Single  Completed

 

 2018  67039  EKG Tracing & Interpretation  Completed

 

 2018  77837  Implant Cardiac Loop Recorder  Completed

 

 2018  39477  Echocardiogram, Limited Study  Completed

 

 2018  00552  EKG, Interpretation Only  Completed

 

 2018  68477  ECHO Transthorasic Realtime 2D W Doppler & Color Flow  
Completed



     Hosp  

 

 2017  91099  EKG Tracing & Interpretation  Completed

 

 2017  69101  EKG Tracing & Interpretation  Completed

 

 2017  06723  EKG, Interpretation Only  Completed

 

 2017  29498  Left Heart Cath. Incl S/I Coronaries, Angio S/I V Gram  
Completed



     If Done  

 

 2006  71049  Color Doppler  Completed

 

 2006  70088  Pulse Doppler & Continuous Wave  Completed

 

 2006  51459  Pulse Doppler & Continuous Wave  Completed

 

 2006  11154  Echocardiogram  Completed







Encounters







 Type  Date  Location  Provider  CPT E/M  Dx

 

 Office Visit  2018  Rheumatology Services  Davey Castro M.D.  [address]126  
D72.1



   4:00p  [facility]      









 M31.8

 

 Z79.899

 

 Z79.52

 

 M35.9









 Office Visit  2018  7:00a  [facility]  Davey Castro  
[address]502  R76.0



     Felicia WEN    









 D72.1

 

 R21









 Office Visit  2018 10:31a  [facility]  Janelle Burk MD  
[address]641  D72.1



     [facility]      

 

 Office Visit  2018  9:07a  [facility]  Tate Fagan  [address]121  I50.33



     [facility]  MD Valentín    









 I21.4

 

 M31.8









 Office Visit  2018  9:06a  [facility]  Tate Fagan  [address]182  I50.33



     [facility] Hospital[facility]ohanna Laura MD    









 I21.4

 

 M31.8









 Office Visit  2018  9:57a  [facility]  Keely Vazquez M.D.  
92139  Z01.810



     [facility]      









 D72.1

 

 I21.A1

 

 I50.30

 

 I25.10

 

 Z95.1









 Office Visit  2018  7:00a  [facility]  Davey Castro  
07637  D72.1



     Felicia WEN    









 R21









 Office Visit  2018 10:30a  [facility]  Janelle Burk MD  
80248  D72.1



     [facility]      

 

 Office Visit  2018  9:06a  [facility] Fagan  [address]  I50.33



     Assoc, Hospitaljohanna Laura MD    









 I21.4

 

 M31.8









 Office Visit  2018  9:05a  [facility]  Tate Fagan  38763  I50.33



     Assoc, Hospitaljohanna Laura MD    









 I21.4

 

 M31.8









 Office Visit  2018  9:04a  [facility]  Hira Galicia,  
[address]808  M31.8



     Hospitalists  M.D.,FACP    









 I50.33









 Office Visit  2018  9:03a  [facility]  Hira Galicia,  [address]  
I50.33



     [facility]  M.D.,FACP    









 M31.8









 Office Visit  2018  3:16p  [facility]  Qutaybeh S. Maghaydah,  
[address]56  I50.9



       M.D.    









 I25.10

 

 I42.9

 

 D64.9









 Office Visit  2018  9:03a  [facility],daphney Graham,  
[address]4860  I50.33



     Hospitalists  M.D.    









 I21.4









 Office Visit  2018  7:00a  [facility]  Davey Castro,  
09177  D72.1



     Cma  M.TESHA    









 R21









 Office Visit  2018  9:03a  [facility],daphney Graham,  
03920  I50.33



     Hospitalists  M.D.    









 I21.4









 Office Visit  2018  3:09p  [facility]  Qutaybeh S. Maghaydah,  
24928  I50.9



       M.D.    









 I42.9

 

 I25.10

 

 Z95.1









 Office Visit  2018  3:20p  [facility]  Janelle Burk MD  
58931  D72.1



     [facility]      

 

 Office Visit  2018  9:02a  [facility],daphney Graham,  
24037  I50.33



     Hospitalists  M.D.    









 I21.4









 Office Visit  2018  3:03p  [facility]  Davian Severino,   
91262  I21.A1



     [facility]    









 I50.31

 

 R07.9









 Office Visit  2018  9:01a  [facility],daphney Graham,  
[address]30  I50.33



     Hospitalists  M.D.    









 I21.4









 Office Visit  2018  3:41p  [facility]  Qutaybeh S. Maghaydah,  
30976  I11.0



       M.D.    









 I50.9

 

 I25.10

 

 E78.5

 

 I45.10

 

 E87.6

 

 E83.42

 

 R79.89









 Office Visit  2018  1:40p  [facility]  Tay Corcoran M.D.,  
35154  I25.10



     [facility] AT [facility], [facility]    









 I10

 

 I45.19

 

 R55

 

 E78.5









 Office Visit  2018  9:25a  [facility],daphney Bassett M.D.
  11464  R55



     Hospitalists      









 R79.89

 

 R21

 

 I25.10









 Office Visit  2018  9:23a  [facility]  Anita Bassett M.D.
  18281  R55



     Hospitalists      









 R79.89

 

 R21

 

 I25.10









 Office Visit  2018  9:22a  [facility]  Abbie Cardenas N.P.  
57607  R55



     Hospitalists      









 R79.89

 

 R21

 

 I25.10









 Office Visit  2018  9:18a  [facility]  Le Garcias,  
[address]23  R55



     Hospitalists  D.OMaureen    









 R79.89

 

 R21

 

 I25.10









 Office Visit  2018  2:15p  [facility]  Davy Faria,  
48455  R55



     AT [facility]  M.TESHA    









 I10

 

 I45.19

 

 Z95.818

 

 S01.01xA

 

 Z48.02









 Office Visit  2018  9:30a  [facility]  Davy Faria,  
68862  R55



       M.TESHA    

 

 Office Visit  2018  9:03a  [facility]  Tatyana Caldwell NP  
67737  R55



     Hospitalists      









 S01.01xA

 

 I50.32

 

 I10









 Office Visit  2018  9:02a  [facility],  Char Sanchez  
30270  R55



     Hospitalists  SURYA Zaman    









 S01.01xA

 

 I50.32

 

 I10









 Office Visit  2018  9:29a  [facility]  Eez Baltazar MD  [address]
  I50.9



     Hospitalists      









 J18.9

 

 E87.8

 

 N18.3









 Office Visit  2018  9:28a  [facility],  Char Sanchez  
55368  I50.9



     Hospitalists  SURYA Zaman    









 J18.9

 

 E87.8

 

 N18.3









 Office Visit  2018  9:27a  [facility]  Natividad Camacho DO  
12844  I50.9



     Hospitalists      









 J18.9

 

 M79.602









 Office Visit  2018  9:26a  [facility]  Lakhwinder David  
03173  M79.602



     Hospitalists  M.D.    









 I50.9

 

 J18.9

 

 E87.8









 Office Visit  2018  9:25a  [facility]  Lakhwinder David  
77931  M79.602



     Hospitalists  M.D.    









 I50.9

 

 J18.9

 

 E87.8









 Office Visit  2018  9:24a  [facility]  Lakhwinder David,  
05404  M79.602



     Hospitalists  M.D.    









 I50.9

 

 J18.9

 

 E87.8









 Office Visit  2018  9:22a  [facility]  [address]645  
M79.602



     [facility]  SURYA Zaman    









 I50.9

 

 J18.9

 

 E87.8









 Office Visit  2018  9:56a  [facility]  Qutaybeh SMaureen Perezteeteematheus,  
37259  R50.9



       M.D.    









 R53.83

 

 D72.829

 

 R94.31

 

 I45.19

 

 I44.4

 

 I25.10

 

 I10

 

 R07.9









 Office Visit  2018  7:10a  [facility]  Lakhwinder David,  
07092  I50.9



     Hospitalists  M.D.    









 R06.09

 

 I25.10









 Office Visit  2018  7:09a  [facility]  Lakhwinder David,  
03872  I50.9



     Hospitalists  M.D.    









 R06.09

 

 I25.10









 Office Visit  2018  7:09a  [facility]  Aniat Bassett,  
23099  I50.9



     Hospitalists  M.D.    









 I25.10

 

 R06.09









 Office Visit  01/15/2018  7:08a  [facility],  Eze Baltazar MD  [address]48
  I50.9



     Hospitalists      









 R06.09

 

 I25.10









 Office Visit  2017 11:20a  [facility] Rashel Corcoran M.D.,  
00553  I25.10



     C[facility] AT [facility], [facility]    









 I35.0

 

 I12.9

 

 E78.5

 

 N18.3

 

 I48.0









 Office Visit  2017 11:20a  [facility] Rashel Corcoran M.D.,  
91701  I25.10



     Cma AT [facility], [facility]    









 I35.0

 

 I35.1

 

 I10

 

 E78.5

 

 I45.10

 

 I48.0









 Office Visit  2017 10:16a  [facility]  Tay Corcoran M.D.,  
83591  R07.9



     [facility] AT [facility], [facility]    









 R94.31







Plan of Care

Future Appointment(s):2018 10:30 am - Aura Almonte N.P. at [facility] 
[facility]2018 10:20 am - Davey Castro M.D. at [facility] 
[facility]2018 - Davy Faria M.D.I49.5 Sick sinus syndromeNew 
Orders:Implant Pacemaker

## 2018-07-20 NOTE — XMS REPORT
Carmelo Vazquez JR

 Created on:July 3, 2018



Patient:JR Vazquez Lloyd R

Sex:Male

:1932

External Reference #:[id number]





Demographics







 Address  [address] [place] [address]

 

 Home Phone  [phone]

 

 Preferred Language  English

 

 Marital Status  Not  Or 

 

 Judaism Affiliation  Unknown

 

 Race  White

 

 Ethnic Group  Not  Or 









Author







 Organization  [facility]

 

 Address  [address] [place] [address] B



   [place] [address]

 

 Phone  [phone]









Support







 Name  Relationship  Address  Phone

 

 Catherine Vazquez  Unavailable  Unavailable  Unavailable









Care Team Providers







 Name  Role  Phone

 

 Cholo Boo MD  Primary Care Physician  Unavailable









Payers







 Type  Date  Identification Numbers  Payment Provider  Subscriber

 

 Medicare Primary  Effective:  Policy Number:  Medicare  Carmelo Vazquez JR



   1999  [id number]    









 PayID: [id number]  [address]









 [place] [address]









 Medigap Part B  Effective:  Policy Number:  [employer]  Carmelo Vazquez,



   1991  [id number]    









 PayID: [id number]  [address]









 [place] [address]









 Medigap Part B  Effective: 1998  Policy Number:   [place] MENDEZ  Catherine Vazquez



     [id number]    









 Expires: 2017  Group Number: [id number]  [address] [address]









 PayID: [id number]  [place] [address]









 Commercial  Effective: 12/15/2016  Policy Number:  [employer]  Carmelo Vazquez JR



     [id number]    









 Expires: 10/23/2018  Group Number: [id number]  [address] 









 PayID: [id number]  [address] [address]







Problems







 Date  Description  Provider  Status

 

 Onset: 2017  Aortic valve disorder  Tay Corcoran M.D., KIMBER,  Active



     [facility]  

 

 Onset: 2017  Athscl heart disease of native  Tay Corcoran M.D., KIMBER,  
Active



   coronary artery w/o ang pctrs  [facility]  

 

 Onset: 2017  Essential hypertension  Tay Corcoran M.D., KIMBER,  Active



     [facility]  

 

 Onset: 2017  Hyperlipidemia  Tay Corcoran M.D., [facility],  Active



     [facility]  

 

 Onset: 2017  Right bundle branch block  Tay Corcoran M.D., [facility],  Active



     [facility]  

 

 Onset: 2017  Paroxysmal atrial fibrillation  Tay Corcoran M.D., [facility],  
Active



     [facility]  

 

 Onset: 2017  Chronic kidney disease stage 3  Tay Corcoran M.D., [facility],  
Active



     [facility]  

 

 Onset: 2018  Syncope and collapse  Tay Corcoran M.D., [facility],  Active



     [facility]  







Family History







 Date  Family Member(s)  Problem(s)  Comments

 

   General  Arthritis  







Social History







 Type  Date  Description  Comments

 

 Marital Status      

 

 Occupation    Retired  

 

 ETOH Use    Drinks Alcoholic Beverages Rarely  once a year

 

 Smoking    Patient has never smoked  

 

 Recreational Drug Use    Denies Drug Use  

 

 Daily Caffeine    Does Not Consume Caffeine  

 

 Exercise Type/Frequency    Exercises rarely  







Allergies, Adverse Reactions, Alerts







 Date  Description  Reaction  Status  Severity  Comments

 

 2017  NKDA    active    







Medications







 Medication  Date  Status  Form  Strength  Qnty  SIG  Indications  Ordering



                 Provider

 

 Metamucil    Active  Capsules  0.52gm    1 cap by    Unknown



   /2017          mouth twice    



             a day with    



             water    

 

 Lipitor    Active  Tablets  10mg  90tab  1 by mouth            s  every night    Nilo,



             at bedtime    M.D.,



                 [facility],



                 [facility]

 

 Metoprolol    Active  Tablets  37.5mg    one ta bid    Tay



 Tar              BEHZAD Corcoran,



                 [facility]

 

 Aspirin Adult Low    Active  Tablets  81mg    1 by mouth    Unknown



 Dose Ec  /0000    DR      every day    

 

 Omeprazole    Active  Capsules  20mg    1 by mouth    Unknown



   /    DR      every day    

 

 Potassium Chloride    Active  Tablets  10Meq    1 by mouth    Unknown



 ER  /0000    ER      every    



             day(taking    



             1/2 tab    



             Am,1/2 tab    



             PM)    

 

 Magnesium Oxide    Active  Tablets  400mg    2 by mouth    Unknown



   /          every day    

 

 Montelukast Sodium    Active  Tablets  10mg    1 by mouth    Unknown



   /          every day    



             as needed    

 

 Gabapentin    Active  Capsules  100mg    take 1    Unknown



             three times    



             a day    

 

 Prednisone    Active  Tablets  10mg  360ta  Please take    Davey



   /0000        bs  4 tabs    Gloria,



             daily    M.D.



             ongoing,    



             taper by    



             10mg every    



             14 days    

 

 Hydroxychloroquine    Active  Tablets  200mg  180ta  take 2    Davey



 Sulfate          bs  tablet    Gloria,



             daily    M.D.

 

 Vitamin D-3    Active  Capsules  1000Unit    1 by mouth    Unknown



   /0000          every day    

 

 Furosemide    Active  Tablets  20mg    1/2 by    Unknown



   /0000          mouth every    



             day    

 

                 

 

 Benzonatate    Hx  Capsules  100mg  45cap  1 capsule 3            s  times daily    TESHA Faria,



   -          as needed    M.DMaureen



                 

 

 Cozaar    Hx  Tablets  25mg    1 by mouth              twice    José Manuelfeheena,



   -          daily(pt    M.D.,



             unsure if    [facility]          he is    [facility]



             taking    



             this)    

 

 Multi Complete    Hx  Capsules      daily    Unknown



   /2017              



   -              



                 

 

 Potassium Chloride    Hx  Solution  20Meq/50M    twice daily    Unknown



   /2017      L        



   -              



                 

 

 Digoxin    Hx  Tablets  125mcg  30tab  1 by mouth    Tay



           s  every day    Ben Corcoran M.D.,



                 [facility]              [facility]

 

 Cozaar    Hx  Tablets  50mg  30tab  2 by mouth    Tay



           s  every day    Ben Corcoran M.D.,



                 [facility]              [facility]

 

 Lasix    Hx  Tablets  40mg    1 by mouth    Unknown



   /0000          every day    



   -              



                 

 

 Norco    Hx  Tablets  5-325mg    one to two    Unknown



   /0000          tabs by    



   -          mouth every    



             4-6 hours    



   /          as needed    



             pain    

 

 Coumadin    Hx  Tablets  1mg    take as    Unknown



   /0000          directed    



   -              



                 

 

 Vitamin D    Hx  Tablets  1000Unit    by mouth    Unknown



   /0000          everyday    



   -              



                 

 

 Digoxin    Hx  Tablets  125mcg    1 by mouth    Unknown



   /0000          every day    



   -              



                 

 

 Sod Citrate-Citric    Hx  Solution  500-334mg    30 ml's 3    Unknown



 Acid  /0000      /5ML    times a    



   -          day.    



                 

 

 Torsemide    Hx  Tablets  20mg    take 2    Unknown



   /0000          tablets Up    



   -          To Once A    



             Day as    



   /2018          Needed For    



             Congestive    



             Heart    



             Failure    







Vital Signs







 Date  Vital  Result  Comment

 

 2018  Height  66 inches  5'6"









 Weight  161.00 lb  with shoes

 

 Heart Rate  64 /min  

 

 BP Systolic Sitting  122 mmHg  Lue reg cuff

 

 BP Diastolic Sitting  50 mmHg  Lue reg cuff

 

 BP Systolic Standing  126 mmHg  Lue reg cuff

 

 BP Diastolic Standing  54 mmHg  Lue reg cuff

 

 Respiratory Rate  16 /min  

 

 BMI (Body Mass Index)  26.0 kg/m2  

 

 Ejection Fraction  45-50%  date 18 ECHO









 2018  Height  66 inches  5'6"









 Weight  159.50 lb  

 

 Heart Rate  63 /min  

 

 BP Systolic Sitting  120 mmHg  

 

 BP Diastolic Sitting  55 mmHg  

 

 Respiratory Rate  14 /min  

 

 Pain Level  5  

 

 BMI (Body Mass Index)  25.7 kg/m2  









 2018  Height  66 inches  5'6"









 Weight  155.00 lb  at home w/o clothes

 

 Heart Rate  66 /min  

 

 BP Systolic Sitting  110 mmHg  lue rg cuff

 

 BP Diastolic Sitting  46 mmHg  lue rg cuff

 

 Respiratory Rate  18 /min  

 

 BMI (Body Mass Index)  25.0 kg/m2  

 

 Ejection Fraction  50-55%  echo 2018  Height  66 inches  5'6"









 Weight  156.00 lb  w/ shoes

 

 Heart Rate  56 /min  

 

 BP Systolic Standing  115 mmHg  lue reg cuff

 

 BP Diastolic Standing  50 mmHg  lue reg cuff

 

 Respiratory Rate  18 /min  

 

 BMI (Body Mass Index)  25.2 kg/m2  

 

 Ejection Fraction  50-55%  echo 2017  Height  66 inches  5'6"









 Weight  165.00 lb  with shoes

 

 Heart Rate  60 /min  sit and 68 stand HR

 

 BP Systolic Sitting  146 mmHg  Rue reg cuff

 

 BP Diastolic Sitting  60 mmHg  Rue reg cuff

 

 BP Systolic Standing  152 mmHg  Rue reg cuff

 

 BP Diastolic Standing  80 mmHg  Rue reg cuff

 

 Respiratory Rate  17 /min  

 

 BMI (Body Mass Index)  26.6 kg/m2  









 2017  Height  66 inches  5'6"









 Weight  164.50 lb  with shoes

 

 Heart Rate  62 /min  

 

 BP Systolic Sitting  108 mmHg  LA reg cuff

 

 BP Diastolic Sitting  58 mmHg  LA reg cuff

 

 BP Systolic Standing  112 mmHg  LA reg cuff

 

 BP Diastolic Standing  62 mmHg  LA reg cuff

 

 BMI (Body Mass Index)  26.5 kg/m2  

 

 Ejection Fraction  60%  echo 17







Results







 Test  Date  Test  Result  H/L  Range  Note

 

 Nuclear AB (Nia) By  06/15/2018  Nuclear Ab (Nia) by  Positive 1:640      1



 Ifa Igg    Ifa, IgG        









 Nia Titer:  1:640      

 

 Nia Pattern:  Homogeneous      2









 Laboratory test finding  06/15/2018  Erythrocyte Sed Rate  9 mm/Hr    0-40  









 C Reactive Protein  2.55 mg/L    < 5.00  3









 CBC Auto Diff  06/15/2018  White Blood Count  18.0 10^3/uL  High  3.5-10.8  









 Red Blood Count  2.93 10^6/uL  Low  4.00-5.40  

 

 Hemoglobin  9.8 g/dL  Low  14.0-18.0  

 

 Hematocrit  30 %  Low  42-52  

 

 Mean Corpuscular Volume  102 fL  High  80-94  

 

 Mean Corpuscular Hemoglobin  33 pg  High  27-31  

 

 Mean Corpuscular HGB Conc  33 g/dL    31-36  

 

 Red Cell Distribution Width  15 %    10.5-15  

 

 Platelet Count  160 10^3/uL    150-450  

 

 Mean Platelet Volume  9.9 um3    7.4-10.4  

 

 Abs Neutrophils  16.9 10^3/uL  High  1.5-7.7  

 

 Abs Lymphocytes  0.6 10^3/uL  Low  1.0-4.8  

 

 Abs Monocytes  0.4 10^3/uL    0-0.8  

 

 Abs Eosinophils  0 10^3/uL    0-0.6  

 

 Abs Basophils  0 10^3/uL    0-0.2  

 

 Abs Nucleated RBC  0 10^3/uL      

 

 Granulocyte %  94.0 %  High  38-83  

 

 Lymphocyte %  3.5 %  Low  25-47  

 

 Monocyte %  2.2 %    0-7  

 

 Eosinophil %  0.1 %    0-6  

 

 Basophil %  0.2 %    0-2  

 

 Nucleated Red Blood Cells %  0      









 Comp Metabolic Panel  06/15/2018  Sodium  129 mmol/L  Low  135-145  









 Potassium  4.4 mmol/L    3.5-5.0  

 

 Chloride  95 mmol/L  Low  101-111  

 

 Co2 Carbon Dioxide  28 mmol/L    22-32  

 

 Anion Gap  6 mmol/L    2-11  

 

 Glucose  133 mg/dL  High    

 

 Blood Urea Nitrogen  24 mg/dL    6-24  

 

 Creatinine  1.42 mg/dL  High  0.67-1.17  

 

 BUN/Creatinine Ratio  16.9    8-20  

 

 Calcium  8.3 mg/dL  Low  8.6-10.3  

 

 Total Protein  5.9 g/dL  Low  6.4-8.9  

 

 Albumin  3.2 g/dL    3.2-5.2  

 

 Globulin  2.7 g/dL    2-4  

 

 Albumin/Globulin Ratio  1.2    1-3  

 

 Total Bilirubin  0.60 mg/dL    0.2-1.0  

 

 Alkaline Phosphatase  121 U/L  High    

 

 Alt  30 U/L    7-52  

 

 Ast  22 U/L    13-39  

 

 Egfr Non-  47.3    >60  

 

 Egfr   60.8    >60  4









 Laboratory test finding  06/15/2018  Complement C3  99 mg/dL    75 - 175  5









 Complement C4  16 mg/dL    14 - 40  6

 

 Anti Double Stranded Dna AB  43.0 IU/mL      7

 

 Miscellaneous Test  See Comment      8









 Laboratory test finding  2017  Alt (SGPT)  31 U/L    7-52  









 Ast (Sgot)  34 U/L    13-39  









 Lipid Profile (Trig/Chol/HDL)  2017  Triglycerides  99 mg/dL      9









 Cholesterol  112 mg/dL      10

 

 HDL Cholesterol  20.2 mg/dL      11

 

 LDL Cholesterol  72 mg/dL      12









 Basic Metabolic Panel  2017  Sodium  136 mmol/L    133-145  









 Potassium  4.0 mmol/L    3.5-5.0  

 

 Chloride  104 mmol/L    101-111  

 

 Co2 Carbon Dioxide  23 mmol/L    22-32  

 

 Anion Gap  9 mmol/L    2-11  

 

 Glucose  101 mg/dL  High    

 

 Blood Urea Nitrogen  19 mg/dL    6-24  

 

 Creatinine  1.41 mg/dL  High  0.67-1.17  

 

 BUN/Creatinine Ratio  13.5    8-20  

 

 Calcium  8.6 mg/dL    8.6-10.3  

 

 Egfr Non-  47.8    >60  

 

 Egfr   61.4    >60  13









 1  -------------------REFERENCE VALUE--------------------------



   <1:80 (Negative)

 

 2  Test Performed by:



   [place] [address]

 

 3  Acute inflammation:  >10.00

 

 4  *******Because ethnic data is not always readily available,



   this report includes an eGFR for both -Americans and



   non- Americans.****



   The National Kidney Disease Education Program (NKDEP) does



   not endorse the use of the MDRD equation for patients that



   are not between the ages of 18 and 70, are pregnant, have



   extremes of body size, muscle mass, or nutritional status,



   or are non- or non-.



   According to the National Kidney Foundation, irrespective of



   diagnosis, the stage of the disease is based on the level of



   kidney function:



   Stage Description                      GFR(mL/min/1.73 m(2))



   1     Kidney damage with normal or decreased GFR       90



   2     Kidney damage with mild decrease in GFR          60-89



   3     Moderate decrease in GFR                         30-59



   4     Severe decrease in GFR                           15-29



   5     Kidney failure                       <15 (or dialysis)

 

 5  Test Performed by:



   [place] [address]

 

 6  Test Performed by:



   [place] [address]

 

 7  Interpretation: Borderline (30.0-75.0)



   -------------------REFERENCE VALUE--------------------------



   <30.0 (Negative)



   Test Performed by:



   [address] [address]

 

 8  Test                        Result    Flag  Unit   RefValue



   ------------------------------------------------------------



   Vitamin B12 and Folate, S



   Vitamin B12 Assay, S      557             ng/L   180 - 914



   Folate, S                 14.4            mcg/L  >=4.0



   Test Performed by:



   [address] [address]



   



   



   ******CORRECTED REPORT******



   ---  Corrected on 18 1238 ---



   Ref Lab Test previously reported as:



   SEE COMMENTS



   Test                        Result    Flag  Unit   RefValue



   ------------------------------------------------------------



   Vitamin B12 and Folate, S



   Vitamin B12 Assay, S      557             ng/L   180 - 914



   



   Folate, S                 15.9            mcg/L  >=4.0



   



   Test Performed by:



   [address] [address]

 

 9  Desirable <150



   Borderline high 150-199



   High 200-499



   Very High >500

 

 10  Desirable <200



   Borderline high 200-239



   High >239

 

 11  Low <40



   Desirable: 40-60



   High: >60

 

 12  Desirable: <100 mg/dL



   Near Optimal: 100-129 mg/dL



   Borderline High: 130-159 mg/dL



   High: 160-189 mg/dL



   Very High: >189 mg/dL

 

 13  *******Because ethnic data is not always readily available,



   this report includes an eGFR for both -Americans and



   non- Americans.****



   The National Kidney Disease Education Program (NKDEP) does



   not endorse the use of the MDRD equation for patients that



   are not between the ages of 18 and 70, are pregnant, have



   extremes of body size, muscle mass, or nutritional status,



   or are non- or non-.



   According to the National Kidney Foundation, irrespective of



   diagnosis, the stage of the disease is based on the level of



   kidney function:



   Stage Description                      GFR(mL/min/1.73 m(2))



   1     Kidney damage with normal or decreased GFR       90



   2     Kidney damage with mild decrease in GFR          60-89



   3     Moderate decrease in GFR                         30-59



   4     Severe decrease in GFR                           15-29



   5     Kidney failure                       <15 (or dialysis)







Procedures







 Date  CPT Code  Description  Status

 

 2018  09539  ECHO Transthorasic Realtime 2D W Doppler & Color Flow  
Completed



     Hosp  

 

 2018  83602  Each Additional Biopsy  Completed

 

 2018  30871  Biopsy Skin Lesion Single  Completed

 

 2018  68843  EKG Tracing & Interpretation  Completed

 

 2018  39639  Implant Cardiac Loop Recorder  Completed

 

 2018  21465  Echocardiogram, Limited Study  Completed

 

 2018  84487  EKG, Interpretation Only  Completed

 

 2018  94829  ECHO Transthorasic Realtime 2D W Doppler & Color Flow  
Completed



     Hosp  

 

 2017  83347  EKG Tracing & Interpretation  Completed

 

 2017  69903  EKG Tracing & Interpretation  Completed

 

 2017  03655  EKG, Interpretation Only  Completed

 

 2017  31914  Left Heart Cath. Incl S/I Coronaries, Angio S/I V Gram  
Completed



     If Done  

 

 2006  26288  Color Doppler  Completed

 

 2006  22156  Pulse Doppler & Continuous Wave  Completed

 

 2006  05804  Pulse Doppler & Continuous Wave  Completed

 

 2006  22061  Echocardiogram  Completed







Encounters







 Type  Date  Location  Provider  CPT E/M  Dx

 

 Office Visit  2018  Rheumatology Services  Davey Castro M.D.  14559  
D72.1



   4:00p  Of [facility]      









 M31.8

 

 Z79.899

 

 Z79.52

 

 M35.9









 Office Visit  2018  7:00a  [facility] Of  Davey Castro  
79158  R76.0



     Felicia WEN    









 D72.1

 

 R21









 Office Visit  2018 10:31a  P[facility]ology [facility]  Janelle Burk MD  
95433  D72.1



     Services Of [facility]      

 

 Office Visit  2018  9:07a  [facility]  Tate Fagan  10052  I50.33



     [facility] Hospital[facility]  MD Valentín    









 I21.4

 

 M31.8









 Office Visit  2018  9:06a  [facility]  Tate Fagan  19102  I50.33



     [facility]oc[facility] Hospitaljohanna Laura MD    









 I21.4

 

 M31.8









 Office Visit  2018  9:57a  [facility]  Keely Vazquez M.D.  
11163  Z01.810



     [facility]      









 D72.1

 

 I21.A1

 

 I50.30

 

 I25.10

 

 Z95.1









 Office Visit  2018  7:00a  [facility]  Davey Castro  
14183  D72.1



     Felicia WEN    









 R21









 Office Visit  2018 10:30a  [facility]  Janelle Burk MD  
33357  D72.1



     Services [facility]      

 

 Office Visit  2018  9:06a  [facility]  74277  I50.33



     Assoc, Hospitaljohanna Laura MD    









 I21.4

 

 M31.8









 Office Visit  2018  9:05a  [facility]  Tate Fagan  98823  I50.33



     Assoc, Hospitaljohanna Laura MD    









 I21.4

 

 M31.8









 Office Visit  2018  9:04a  [facility]  Hira Galicia,  
50177  M31.8



     Hospitalists  M.D.,FACP    









 I50.33









 Office Visit  2018  9:03a  [facility]  Hira Galicia,  08636  
I50.33



     [facility]  M.D.,FACP    









 M31.8









 Office Visit  2018  3:16p  [facility]  Qutaybeh S. Maghaydah,  
00702  I50.9



       M.D.    









 I25.10

 

 I42.9

 

 D64.9









 Office Visit  2018  9:03a  [facility],daphney Graham,  
00794  I50.33



     Hospitalists  M.D.    









 I21.4









 Office Visit  2018  7:00a  [facility]  Davey Castro,  
77519  D72.1



     Cma  M.TESHA    









 R21









 Office Visit  2018  9:03a  [facility],daphney Graham,  
61605  I50.33



     Hospitalists  M.D.    









 I21.4









 Office Visit  2018  3:09p  [facility]  Qutaybeh S. Maghaydah,  
08810  I50.9



       M.D.    









 I42.9

 

 I25.10

 

 Z95.1









 Office Visit  2018  3:20p  [facility]  Janelle Burk MD  
99253  D72.1



     Services [facility]      

 

 Office Visit  2018  9:02a  [facility],daphney Graham,  
49482  I50.33



     Hospitalists  M.D.    









 I21.4









 Office Visit  2018  3:03p  [facility]  Davian Severino,   
84842  I21.A1



     [facility]    









 I50.31

 

 R07.9









 Office Visit  2018  9:01a  [facility],daphney Graham,  
93857  I50.33



     Hospitalists  M.D.    









 I21.4









 Office Visit  2018  3:41p  [facility]  Qutaybeh S. Maghaydah,  
11773  I11.0



       M.D.    









 I50.9

 

 I25.10

 

 E78.5

 

 I45.10

 

 E87.6

 

 E83.42

 

 R79.89









 Office Visit  2018  1:40p  [facility]  Tay Corcoran M.D.,  
05642  I25.10



     Cma AT [facility], [facility]    









 I10

 

 I45.19

 

 R55

 

 E78.5









 Office Visit  2018  9:25a  [facility],daphney Bassett M.D.
  48856  R55



     Hospitalists      









 R79.89

 

 R21

 

 I25.10









 Office Visit  2018  9:23a  [facility]  Anita Bassett M.D.
  05267  R55



     Hospitalists      









 R79.89

 

 R21

 

 I25.10









 Office Visit  2018  9:22a  [facility]  Abbie Cardenas N.P.  
19091  R55



     Hospitalists      









 R79.89

 

 R21

 

 I25.10









 Office Visit  2018  9:18a  [facility]  Le Garcias,  
87578  R55



     Hospitalists  D.OMaureen    









 R79.89

 

 R21

 

 I25.10









 Office Visit  2018  2:15p  [facility]  Davy Faria,  
37212  R55



     AT [facility]  M.TESHA    









 I10

 

 I45.19

 

 Z95.818

 

 S01.01xA

 

 Z48.02









 Office Visit  2018  9:30a  [facility]  Davy Faria,  
57666  R55



       M.TESHA    

 

 Office Visit  2018  9:03a  [facility]  Tatyana Caldwell NP  
93322  R55



     Hospitalists      









 S01.01xA

 

 I50.32

 

 I10









 Office Visit  2018  9:02a  [facility],  Char Sanchez  
98159  R55



     Hospitalists  SURYA Zaman    









 S01.01xA

 

 I50.32

 

 I10









 Office Visit  2018  9:29a  [facility]  Eze Baltazar MD  44241
  I50.9



     Hospitalists      









 J18.9

 

 E87.8

 

 N18.3









 Office Visit  2018  9:28a  [facility],  Char Sanchez  
78606  I50.9



     Hospitalists  SURYA Zaman    









 J18.9

 

 E87.8

 

 N18.3









 Office Visit  2018  9:27a  [facility]  Natividad Camacho DO  
24947  I50.9



     Hospitalists      









 J18.9

 

 M79.602









 Office Visit  2018  9:26a  [facility]  Lakhwinder David  
84330  M79.602



     Hospitalists  M.D.    









 I50.9

 

 J18.9

 

 E87.8









 Office Visit  2018  9:25a  [facility]  Lakhwinder David  
46532  M79.602



     Hospitalists  M.D.    









 I50.9

 

 J18.9

 

 E87.8









 Office Visit  2018  9:24a  [facility]  Lakhwinder David,  
25974  M79.602



     Hospitalists  M.D.    









 I50.9

 

 J18.9

 

 E87.8









 Office Visit  2018  9:22a  [facility]  41575  
M79.602



     Assoc[facility] Hospitalists  SURYA Zaman    









 I50.9

 

 J18.9

 

 E87.8









 Office Visit  2018  9:56a  [facility]  Qutaybeh SMaureen Perezteeteematheus,  
52686  R50.9



       M.D.    









 R53.83

 

 D72.829

 

 R94.31

 

 I45.19

 

 I44.4

 

 I25.10

 

 I10

 

 R07.9









 Office Visit  2018  7:10a  [facility]  Lakhwinder David,  
93625  I50.9



     Hospitalists  M.D.    









 R06.09

 

 I25.10









 Office Visit  2018  7:09a  [facility]  Lakhwinder David,  
10026  I50.9



     Hospitalists  M.D.    









 R06.09

 

 I25.10









 Office Visit  2018  7:09a  [facility]  Anita Bassett,  
06333  I50.9



     Hospitalists  M.D.    









 I25.10

 

 R06.09









 Office Visit  01/15/2018  7:08a  [facility],  Eze Baltazar MD  47401
  I50.9



     Hospitalists      









 R06.09

 

 I25.10









 Office Visit  2017 11:20a  [facility] Rashel Corcoran M.D.,  
82078  I25.10



     Cma AT [facility], [facility]    









 I35.0

 

 I12.9

 

 E78.5

 

 N18.3

 

 I48.0









 Office Visit  2017 11:20a  [facility] Rashel Corcoran M.D.,  
88372  I25.10



     Cma AT [facility], [facility]    









 I35.0

 

 I35.1

 

 I10

 

 E78.5

 

 I45.10

 

 I48.0









 Office Visit  2017 10:16a  [facility]  Tay Corcoran M.D.,  
[id number]  R07.9



     [facility] AT [facility], FSCAI    









 R94.31







Plan of Care

Future Appointment(s):2018 10:30 am - Aura Almonte N.P. at [place] 
[facility]2018 10:20 am - Davey Castro M.D. at [facility] 
[facility]2018 - Davy Faria M.D.I49.5 Sick sinus syndromeNew 
Orders:Implant Pacemaker

## 2018-07-20 NOTE — XMS REPORT
Carmelo Vazquez JR

 Created on:2018



Patient:JR Vazquez Lloyd R

Sex:Male

:1932

External Reference #:[id number]





Demographics







 Address  [address] [place] [address]

 

 Home Phone  [phone]

 

 Preferred Language  English

 

 Marital Status  Not  Or 

 

 Protestant Affiliation  Unknown

 

 Race  White

 

 Ethnic Group  Not  Or 









Author







 Organization  [facility]

 

 Address  [address] [place] [address] [address]



   [place] [address]

 

 Phone  [phone]









Support







 Name  Relationship  Address  Phone

 

 Catherine Vazquez  Unavailable  Unavailable  Unavailable









Care Team Providers







 Name  Role  Phone

 

 Cholo Boo MD  Primary Care Physician  Unavailable









Payers







 Type  Date  Identification Numbers  Payment Provider  Subscriber

 

 Medicare Primary  Effective:  Policy Number:  Medicare  Carmelo Vazquez JR



   1999  [id number]    









 PayID: [id number]  [address]









 [place] [address]









 Medigap Part B  Effective:  Policy Number:  [employer]  Carmelo Vazquez,



   1991  [id number]    









 PayID: [id number]  [address]









 [place] [address]









 Medigap Part B  Effective: 1998  Policy Number:   [place] MENDEZ  Catherine Vazquez



     [id number]    









 Expires: 2017  Group Number: [id number]  [address] [address]









 PayID: [id number]  [place] [address]









 Commercial  Effective: 12/15/2016  Policy Number:  [employer]  Carmelo Vazquez JR



     [id number]    









 Expires: 10/23/2018  Group Number: [id number]  [id number] 









 PayID: [id number]  [address] [address]







Problems







 Date  Description  Provider  Status

 

 Onset: 2017  Aortic valve disorder  Tay Corcoran M.D., KIMBER,  Active



     [facility]  

 

 Onset: 2017  Athscl heart disease of native  Tay Corcoran M.D., KIMBER,  
Active



   coronary artery w/o ang pctrs  [facility]  

 

 Onset: 2017  Essential hypertension  Tay Corcoran M.D., KIMBER,  Active



     [facility]  

 

 Onset: 2017  Hyperlipidemia  Tay Corcoran M.D., [facility],  Active



     [facility]  

 

 Onset: 2017  Right bundle branch block  Tay Corcoran M.D., [facility],  Active



     [facility]  

 

 Onset: 2017  Paroxysmal atrial fibrillation  Tay Corcoran M.D., [facility],  
Active



     [facility]  

 

 Onset: 2017  Chronic kidney disease stage 3  Tay Corcoran M.D., [facility],  
Active



     [facility]  

 

 Onset: 2018  Syncope and collapse  Tay Corcoran M.D., [facility],  Active



     [facility]  







Family History







 Date  Family Member(s)  Problem(s)  Comments

 

   General  Arthritis  







Social History







 Type  Date  Description  Comments

 

 Marital Status      

 

 Occupation    Retired  

 

 ETOH Use    Drinks Alcoholic Beverages Rarely  once a year

 

 Smoking    Patient has never smoked  

 

 Recreational Drug Use    Denies Drug Use  

 

 Daily Caffeine    Does Not Consume Caffeine  

 

 Exercise Type/Frequency    Exercises rarely  







Allergies, Adverse Reactions, Alerts







 Date  Description  Reaction  Status  Severity  Comments

 

 2017  NKDA    active    







Medications







 Medication  Date  Status  Form  Strength  Qnty  SIG  Indications  Ordering



                 Provider

 

 Metamucil    Active  Capsules  0.52gm    1 cap by    Unknown



   /2017          mouth twice    



             a day with    



             water    

 

 Lipitor    Active  Tablets  10mg  90tab  1 by mouth            s  every night    Nilo,



             at bedtime    M.D.,



                 [facility],



                 [facility]

 

 Metoprolol    Active  Tablets  37.5mg    one ta bid    Tay



 Tar              BEHZAD Corcoran,



                 [facility]

 

 Aspirin Adult Low    Active  Tablets  81mg    1 by mouth    Unknown



 Dose Ec  /0000    DR      every day    

 

 Omeprazole    Active  Capsules  20mg    1 by mouth    Unknown



   /    DR      every day    

 

 Potassium Chloride    Active  Tablets  10Meq    1 by mouth    Unknown



 ER  /0000    ER      every    



             day(taking    



             1/2 tab    



             Am,1/2 tab    



             PM)    

 

 Magnesium Oxide    Active  Tablets  400mg    2 by mouth    Unknown



   /          every day    

 

 Montelukast Sodium    Active  Tablets  10mg    1 by mouth    Unknown



   /          every day    



             as needed    

 

 Gabapentin    Active  Capsules  100mg    take 1    Unknown



             three times    



             a day    

 

 Prednisone    Active  Tablets  10mg  360ta  Please take    Davey



   /0000        bs  4 tabs    Gloria,



             daily    M.D.



             ongoing,    



             taper by    



             10mg every    



             14 days    

 

 Hydroxychloroquine    Active  Tablets  200mg  180ta  take 2    Davey



 Sulfate          bs  tablet    Gloria,



             daily    M.D.

 

 Vitamin D-3    Active  Capsules  1000Unit    1 by mouth    Unknown



   /0000          every day    

 

 Furosemide    Active  Tablets  20mg    1/2 by    Unknown



   /0000          mouth every    



             day    

 

                 

 

 Benzonatate    Hx  Capsules  100mg  45cap  1 capsule 3            s  times daily    TESHA Faria,



   -          as needed    M.DMaureen



                 

 

 Cozaar    Hx  Tablets  25mg    1 by mouth              twice    José Manuelfeheena,



   -          daily(pt    M.D.,



             unsure if    [facility]          he is    [facility]



             taking    



             this)    

 

 Multi Complete    Hx  Capsules      daily    Unknown



   /2017              



   -              



                 

 

 Potassium Chloride    Hx  Solution  20Meq/50M    twice daily    Unknown



   /2017      L        



   -              



                 

 

 Digoxin    Hx  Tablets  125mcg  30tab  1 by mouth    Tay



           s  every day    Ben Corcoran M.D.,



                 [facility]              [facility]

 

 Cozaar    Hx  Tablets  50mg  30tab  2 by mouth    Tay



           s  every day    Ben Corcoran M.D.,



                 [facility]              [facility]

 

 Lasix    Hx  Tablets  40mg    1 by mouth    Unknown



   /0000          every day    



   -              



                 

 

 Norco    Hx  Tablets  5-325mg    one to two    Unknown



   /0000          tabs by    



   -          mouth every    



             4-6 hours    



   /          as needed    



             pain    

 

 Coumadin    Hx  Tablets  1mg    take as    Unknown



   /0000          directed    



   -              



                 

 

 Vitamin D    Hx  Tablets  1000Unit    by mouth    Unknown



   /0000          everyday    



   -              



                 

 

 Digoxin    Hx  Tablets  125mcg    1 by mouth    Unknown



   /0000          every day    



   -              



                 

 

 Sod Citrate-Citric    Hx  Solution  500-334mg    30 ml's 3    Unknown



 Acid  /0000      /5ML    times a    



   -          day.    



                 

 

 Torsemide    Hx  Tablets  20mg    take 2    Unknown



   /0000          tablets Up    



   -          To Once A    



             Day as    



   /2018          Needed For    



             Congestive    



             Heart    



             Failure    







Vital Signs







 Date  Vital  Result  Comment

 

 2018  Height  66 inches  5'6"









 Weight  161.00 lb  with shoes

 

 Heart Rate  64 /min  

 

 BP Systolic Sitting  122 mmHg  Lue reg cuff

 

 BP Diastolic Sitting  50 mmHg  Lue reg cuff

 

 BP Systolic Standing  126 mmHg  Lue reg cuff

 

 BP Diastolic Standing  54 mmHg  Lue reg cuff

 

 Respiratory Rate  16 /min  

 

 BMI (Body Mass Index)  26.0 kg/m2  

 

 Ejection Fraction  45-50%  date 18 ECHO









 2018  Height  66 inches  5'6"









 Weight  159.50 lb  

 

 Heart Rate  63 /min  

 

 BP Systolic Sitting  120 mmHg  

 

 BP Diastolic Sitting  55 mmHg  

 

 Respiratory Rate  14 /min  

 

 Pain Level  5  

 

 BMI (Body Mass Index)  25.7 kg/m2  









 2018  Height  66 inches  5'6"









 Weight  155.00 lb  at home w/o clothes

 

 Heart Rate  66 /min  

 

 BP Systolic Sitting  110 mmHg  lue rg cuff

 

 BP Diastolic Sitting  46 mmHg  lue rg cuff

 

 Respiratory Rate  18 /min  

 

 BMI (Body Mass Index)  25.0 kg/m2  

 

 Ejection Fraction  50-55%  echo 2018  Height  66 inches  5'6"









 Weight  156.00 lb  w/ shoes

 

 Heart Rate  56 /min  

 

 BP Systolic Standing  115 mmHg  lue reg cuff

 

 BP Diastolic Standing  50 mmHg  lue reg cuff

 

 Respiratory Rate  18 /min  

 

 BMI (Body Mass Index)  25.2 kg/m2  

 

 Ejection Fraction  50-55%  echo 2017  Height  66 inches  5'6"









 Weight  165.00 lb  with shoes

 

 Heart Rate  60 /min  sit and 68 stand HR

 

 BP Systolic Sitting  146 mmHg  Rue reg cuff

 

 BP Diastolic Sitting  60 mmHg  Rue reg cuff

 

 BP Systolic Standing  152 mmHg  Rue reg cuff

 

 BP Diastolic Standing  80 mmHg  Rue reg cuff

 

 Respiratory Rate  17 /min  

 

 BMI (Body Mass Index)  26.6 kg/m2  









 2017  Height  66 inches  5'6"









 Weight  164.50 lb  with shoes

 

 Heart Rate  62 /min  

 

 BP Systolic Sitting  108 mmHg  LA reg cuff

 

 BP Diastolic Sitting  58 mmHg  LA reg cuff

 

 BP Systolic Standing  112 mmHg  LA reg cuff

 

 BP Diastolic Standing  62 mmHg  LA reg cuff

 

 BMI (Body Mass Index)  26.5 kg/m2  

 

 Ejection Fraction  60%  echo 17







Results







 Test  Date  Test  Result  H/L  Range  Note

 

 Nuclear AB (Nia) By  06/15/2018  Nuclear Ab (Nia) by  Positive 1:640      1



 Ifa Igg    Ifa, IgG        









 Nia Titer:  1:640      

 

 Nia Pattern:  Homogeneous      2









 Laboratory test finding  06/15/2018  Erythrocyte Sed Rate  9 mm/Hr    0-40  









 C Reactive Protein  2.55 mg/L    < 5.00  3









 CBC Auto Diff  06/15/2018  White Blood Count  18.0 10^3/uL  High  3.5-10.8  









 Red Blood Count  2.93 10^6/uL  Low  4.00-5.40  

 

 Hemoglobin  9.8 g/dL  Low  14.0-18.0  

 

 Hematocrit  30 %  Low  42-52  

 

 Mean Corpuscular Volume  102 fL  High  80-94  

 

 Mean Corpuscular Hemoglobin  33 pg  High  27-31  

 

 Mean Corpuscular HGB Conc  33 g/dL    31-36  

 

 Red Cell Distribution Width  15 %    10.5-15  

 

 Platelet Count  160 10^3/uL    150-450  

 

 Mean Platelet Volume  9.9 um3    7.4-10.4  

 

 Abs Neutrophils  16.9 10^3/uL  High  1.5-7.7  

 

 Abs Lymphocytes  0.6 10^3/uL  Low  1.0-4.8  

 

 Abs Monocytes  0.4 10^3/uL    0-0.8  

 

 Abs Eosinophils  0 10^3/uL    0-0.6  

 

 Abs Basophils  0 10^3/uL    0-0.2  

 

 Abs Nucleated RBC  0 10^3/uL      

 

 Granulocyte %  94.0 %  High  38-83  

 

 Lymphocyte %  3.5 %  Low  25-47  

 

 Monocyte %  2.2 %    0-7  

 

 Eosinophil %  0.1 %    0-6  

 

 Basophil %  0.2 %    0-2  

 

 Nucleated Red Blood Cells %  0      









 Comp Metabolic Panel  06/15/2018  Sodium  129 mmol/L  Low  135-145  









 Potassium  4.4 mmol/L    3.5-5.0  

 

 Chloride  95 mmol/L  Low  101-111  

 

 Co2 Carbon Dioxide  28 mmol/L    22-32  

 

 Anion Gap  6 mmol/L    2-11  

 

 Glucose  133 mg/dL  High    

 

 Blood Urea Nitrogen  24 mg/dL    6-24  

 

 Creatinine  1.42 mg/dL  High  0.67-1.17  

 

 BUN/Creatinine Ratio  16.9    8-20  

 

 Calcium  8.3 mg/dL  Low  8.6-10.3  

 

 Total Protein  5.9 g/dL  Low  6.4-8.9  

 

 Albumin  3.2 g/dL    3.2-5.2  

 

 Globulin  2.7 g/dL    2-4  

 

 Albumin/Globulin Ratio  1.2    1-3  

 

 Total Bilirubin  0.60 mg/dL    0.2-1.0  

 

 Alkaline Phosphatase  121 U/L  High    

 

 Alt  30 U/L    7-52  

 

 Ast  22 U/L    13-39  

 

 Egfr Non-  47.3    >60  

 

 Egfr   60.8    >60  4









 Laboratory test finding  06/15/2018  Complement C3  99 mg/dL    75 - 175  5









 Complement C4  16 mg/dL    14 - 40  6

 

 Anti Double Stranded Dna AB  43.0 IU/mL      7

 

 Miscellaneous Test  See Comment      8









 Laboratory test finding  2017  Alt (SGPT)  31 U/L    7-52  









 Ast (Sgot)  34 U/L    13-39  









 Lipid Profile (Trig/Chol/HDL)  2017  Triglycerides  99 mg/dL      9









 Cholesterol  112 mg/dL      10

 

 HDL Cholesterol  20.2 mg/dL      11

 

 LDL Cholesterol  72 mg/dL      12









 Basic Metabolic Panel  2017  Sodium  136 mmol/L    133-145  









 Potassium  4.0 mmol/L    3.5-5.0  

 

 Chloride  104 mmol/L    101-111  

 

 Co2 Carbon Dioxide  23 mmol/L    22-32  

 

 Anion Gap  9 mmol/L    2-11  

 

 Glucose  101 mg/dL  High    

 

 Blood Urea Nitrogen  19 mg/dL    6-24  

 

 Creatinine  1.41 mg/dL  High  0.67-1.17  

 

 BUN/Creatinine Ratio  13.5    8-20  

 

 Calcium  8.6 mg/dL    8.6-10.3  

 

 Egfr Non-  47.8    >60  

 

 Egfr   61.4    >60  13









 1  -------------------REFERENCE VALUE--------------------------



   <1:80 (Negative)

 

 2  Test Performed by:



   [place] [address]

 

 3  Acute inflammation:  >10.00

 

 4  *******Because ethnic data is not always readily available,



   this report includes an eGFR for both -Americans and



   non- Americans.****



   The National Kidney Disease Education Program (NKDEP) does



   not endorse the use of the MDRD equation for patients that



   are not between the ages of 18 and 70, are pregnant, have



   extremes of body size, muscle mass, or nutritional status,



   or are non- or non-.



   According to the National Kidney Foundation, irrespective of



   diagnosis, the stage of the disease is based on the level of



   kidney function:



   Stage Description                      GFR(mL/min/1.73 m(2))



   1     Kidney damage with normal or decreased GFR       90



   2     Kidney damage with mild decrease in GFR          60-89



   3     Moderate decrease in GFR                         30-59



   4     Severe decrease in GFR                           15-29



   5     Kidney failure                       <15 (or dialysis)

 

 5  Test Performed by:



   [place] [address]

 

 6  Test Performed by:



   [place] [address]

 

 7  Interpretation: Borderline (30.0-75.0)



   -------------------REFERENCE VALUE--------------------------



   <30.0 (Negative)



   Test Performed by:



   [address] [address]

 

 8  Test                        Result    Flag  Unit   RefValue



   ------------------------------------------------------------



   Vitamin B12 and Folate, S



   Vitamin B12 Assay, S      557             ng/L   180 - 914



   Folate, S                 14.4            mcg/L  >=4.0



   Test Performed by:



   [address] [address]



   



   



   ******CORRECTED REPORT******



   ---  Corrected on 18 1238 ---



   Ref Lab Test previously reported as:



   SEE COMMENTS



   Test                        Result    Flag  Unit   RefValue



   ------------------------------------------------------------



   Vitamin B12 and Folate, S



   Vitamin B12 Assay, S      557             ng/L   180 - 914



   



   Folate, S                 15.9            mcg/L  >=4.0



   



   Test Performed by:



   [address] [address]

 

 9  Desirable <150



   Borderline high 150-199



   High 200-499



   Very High >500

 

 10  Desirable <200



   Borderline high 200-239



   High >239

 

 11  Low <40



   Desirable: 40-60



   High: >60

 

 12  Desirable: <100 mg/dL



   Near Optimal: 100-129 mg/dL



   Borderline High: 130-159 mg/dL



   High: 160-189 mg/dL



   Very High: >189 mg/dL

 

 13  *******Because ethnic data is not always readily available,



   this report includes an eGFR for both -Americans and



   non- Americans.****



   The National Kidney Disease Education Program (NKDEP) does



   not endorse the use of the MDRD equation for patients that



   are not between the ages of 18 and 70, are pregnant, have



   extremes of body size, muscle mass, or nutritional status,



   or are non- or non-.



   According to the National Kidney Foundation, irrespective of



   diagnosis, the stage of the disease is based on the level of



   kidney function:



   Stage Description                      GFR(mL/min/1.73 m(2))



   1     Kidney damage with normal or decreased GFR       90



   2     Kidney damage with mild decrease in GFR          60-89



   3     Moderate decrease in GFR                         30-59



   4     Severe decrease in GFR                           15-29



   5     Kidney failure                       <15 (or dialysis)







Procedures







 Date  CPT Code  Description  Status

 

 2018  80312  ECHO Transthorasic Realtime 2D W Doppler & Color Flow  
Completed



     Hosp  

 

 2018  55635  Each Additional Biopsy  Completed

 

 2018  92306  Biopsy Skin Lesion Single  Completed

 

 2018  91966  EKG Tracing & Interpretation  Completed

 

 2018  00180  Implant Cardiac Loop Recorder  Completed

 

 2018  32119  Echocardiogram, Limited Study  Completed

 

 2018  89748  EKG, Interpretation Only  Completed

 

 2018  40059  ECHO Transthorasic Realtime 2D W Doppler & Color Flow  
Completed



     Hosp  

 

 2017  68144  EKG Tracing & Interpretation  Completed

 

 2017  01785  EKG Tracing & Interpretation  Completed

 

 2017  57977  EKG, Interpretation Only  Completed

 

 2017  89103  Left Heart Cath. Incl S/I Coronaries, Angio S/I V Gram  
Completed



     If Done  

 

 2006  87598  Color Doppler  Completed

 

 2006  85696  Pulse Doppler & Continuous Wave  Completed

 

 2006  65559  Pulse Doppler & Continuous Wave  Completed

 

 2006  59053  Echocardiogram  Completed







Encounters







 Type  Date  Location  Provider  CPT E/M  Dx

 

 Office Visit  2018  Rheumatology Services  Davey Castro M.D.  [address]459  
D72.1



   4:00p  [facility]      









 M31.8

 

 Z79.899

 

 Z79.52

 

 M35.9









 Office Visit  2018  7:00a  [facility]  Davey Castro  
16876  R76.0



     Felicia WEN    









 D72.1

 

 R21









 Office Visit  2018 10:31a  [facility]  Janelle Burk MD  
30695  D72.1



     [facility]      

 

 Office Visit  2018  9:07a  [facility]  Tate Fagan  [address]60  I50.33



     [facility]  MD Valentín    









 I21.4

 

 M31.8









 Office Visit  2018  9:06a  [facility]  Tate Fagan  13074  I50.33



     [facility] Hospital[facility]anna Laura MD    









 I21.4

 

 M31.8









 Office Visit  2018  9:57a  [facility]  Keely Vazquez M.D.  
26017  Z01.810



     [facility]      









 D72.1

 

 I21.A1

 

 I50.30

 

 I25.10

 

 Z95.1









 Office Visit  2018  7:00a  [facility]  Davey Castro  
78238  D72.1



     Felicia WEN    









 R21









 Office Visit  2018 10:30a  [facility]  Janelle Burk MD  
36823  D72.1



     [facility]      

 

 Office Visit  2018  9:06a  [facility]  [address]886  I50.33



     [facility] Hospital[facility]anna Laura MD    









 I21.4

 

 M31.8









 Office Visit  2018  9:05a  [facility]  Tate Fagan  01097  I50.33



     Assoc, Hospitaljohanna Laura MD    









 I21.4

 

 M31.8









 Office Visit  2018  9:04a  [facility]  Hira Galicia,  
47089  M31.8



     Hospitalists  M.D.,FACP    









 I50.33









 Office Visit  2018  9:03a  [facility]  Hira Galicia,  50562  
I50.33



     [facility]  M.D.,FACP    









 M31.8









 Office Visit  2018  3:16p  [facility]  Qutaybeh S. Maghaydah,  
[address]52  I50.9



       M.D.    









 I25.10

 

 I42.9

 

 D64.9









 Office Visit  2018  9:03a  [facility],daphney Graham,  
[address]358  I50.33



     Hospitalists  M.D.    









 I21.4









 Office Visit  2018  7:00a  [facility]  Davey Castro,  
99391  D72.1



     Cma  M.TESHA    









 R21









 Office Visit  2018  9:03a  [facility],daphney Graham,  
85353  I50.33



     Hospitalists  M.D.    









 I21.4









 Office Visit  2018  3:09p  [facility]  Qutaybeh S. Maghaydah,  
73965  I50.9



       M.D.    









 I42.9

 

 I25.10

 

 Z95.1









 Office Visit  2018  3:20p  [facility]  Janelle Burk MD  
33930  D72.1



     [facility]      

 

 Office Visit  2018  9:02a  [facility],daphney Graham,  
57595  I50.33



     Hospitalists  M.D.    









 I21.4









 Office Visit  2018  3:03p  [facility]  Davian Severino,   
99259  I21.A1



     [facility]    









 I50.31

 

 R07.9









 Office Visit  2018  9:01a  [facility],daphney Graham,  
03816  I50.33



     Hospitalists  M.D.    









 I21.4









 Office Visit  2018  3:41p  [facility]  Qutaybeh S. Maghaydah,  
64278  I11.0



       M.D.    









 I50.9

 

 I25.10

 

 E78.5

 

 I45.10

 

 E87.6

 

 E83.42

 

 R79.89









 Office Visit  2018  1:40p  [facility]  Tay Corcoran M.D.,  
88630  I25.10



     [facility] AT [facility], [facility]    









 I10

 

 I45.19

 

 R55

 

 E78.5









 Office Visit  2018  9:25a  [facility],daphney Bassett M.D.
  18427  R55



     Hospitalists      









 R79.89

 

 R21

 

 I25.10









 Office Visit  2018  9:23a  [facility]  Anita Bassett M.D.
  57827  R55



     Hospitalists      









 R79.89

 

 R21

 

 I25.10









 Office Visit  2018  9:22a  [facility]  Abbie Cardenas N.P.  
98191  R55



     Hospitalists      









 R79.89

 

 R21

 

 I25.10









 Office Visit  2018  9:18a  [facility]  Le Garcias,  
71924  R55



     Hospitalists  D.OMaureen    









 R79.89

 

 R21

 

 I25.10









 Office Visit  2018  2:15p  [facility]  Davy Faria,  
29322  R55



     AT [facility]  M.TESHA    









 I10

 

 I45.19

 

 Z95.818

 

 S01.01xA

 

 Z48.02









 Office Visit  2018  9:30a  [facility]  Davy Faria,  
14547  R55



       M.TESHA    

 

 Office Visit  2018  9:03a  [facility]  Tatyana Caldwell NP  
98532  R55



     Hospitalists      









 S01.01xA

 

 I50.32

 

 I10









 Office Visit  2018  9:02a  [facility],  Char Sanchez  
03684  R55



     Hospitalists  SURYA Zaman    









 S01.01xA

 

 I50.32

 

 I10









 Office Visit  2018  9:29a  [facility]  Eze Baltazar MD  76197
  I50.9



     Hospitalists      









 J18.9

 

 E87.8

 

 N18.3









 Office Visit  2018  9:28a  [facility],  Char Sanchez  
83939  I50.9



     Hospitalists  SURYA Zaman    









 J18.9

 

 E87.8

 

 N18.3









 Office Visit  2018  9:27a  [facility]  Natividad Camacho DO  
35412  I50.9



     Hospitalists      









 J18.9

 

 M79.602









 Office Visit  2018  9:26a  [facility]  Lakhwinder David  
03007  M79.602



     Hospitalists  M.D.    









 I50.9

 

 J18.9

 

 E87.8









 Office Visit  2018  9:25a  [facility]  Lakhwinder David  
92124  M79.602



     Hospitalists  M.D.    









 I50.9

 

 J18.9

 

 E87.8









 Office Visit  2018  9:24a  [facility]  Lakhwinder David,  
77197  M79.602



     Hospitalists  M.D.    









 I50.9

 

 J18.9

 

 E87.8









 Office Visit  2018  9:22a  [facility]  [address]1970  
M79.602



     [facility]  SURYA Zaman    









 I50.9

 

 J18.9

 

 E87.8









 Office Visit  2018  9:56a  [facility]  Qutaybeh SMaureen Perezteeteematheus,  
34297  R50.9



       M.D.    









 R53.83

 

 D72.829

 

 R94.31

 

 I45.19

 

 I44.4

 

 I25.10

 

 I10

 

 R07.9









 Office Visit  2018  7:10a  [facility]  Lakhwinder David,  
74953  I50.9



     Hospitalists  M.D.    









 R06.09

 

 I25.10









 Office Visit  2018  7:09a  [facility]  Lakhwinder David,  
88362  I50.9



     Hospitalists  M.D.    









 R06.09

 

 I25.10









 Office Visit  2018  7:09a  [facility]  Anita Bassett,  
31479  I50.9



     Hospitalists  M.D.    









 I25.10

 

 R06.09









 Office Visit  01/15/2018  7:08a  [facility],  Eze Baltazar MD  [address]396
  I50.9



     Hospitalists      









 R06.09

 

 I25.10









 Office Visit  2017 11:20a  [facility] Rashel Corcoran M.D.,  
21642  I25.10



     [facility] AT [facility], [facility]    









 I35.0

 

 I12.9

 

 E78.5

 

 N18.3

 

 I48.0









 Office Visit  2017 11:20a  [facility] Rashel Corcoran M.D.,  
76139  I25.10



     Cma AT [facility], FSC[facility]    









 I35.0

 

 I35.1

 

 I10

 

 E78.5

 

 I45.10

 

 I48.0









 Office Visit  2017 10:16a  [facility]  Tay Corcoran M.D.,  
46433  R07.9



     [facility] [facility], FSCAI    









 R94.31







Plan of Care

Future Appointment(s):2018 10:30 am - Shanel Almonte N.P. at [facility] 
[facility]2018  9:00 am - Davy Faria M.D. at [facility] 
[facility]2018 10:20 am - Davey Castro M.D. at [facility] 
[facility]2018 - Davy Faria M.D.I25.10 Athscl heart disease 
of native coronary artery w/o ang pctrsFollow up:1 week after procedure  (ok 
with shanel)I10 Essential (primary) [id number] Sick sinus syndromeNew 
Orders:Implant Pacemaker

## 2018-07-20 NOTE — ED
Shortness of Breath





- HPI Summary


HPI Summary: 


Patient is an 86-year-old male with a complicated PMH of hypertension, CHF, AKD 

III, CAD, CABG in 2017, recent admission of syncopal episodes from orthostatic 

hypotension exacerbated from CHF and recent pacemaker placement 2 weeks ago.  

Currently on furosemide and this dose was recently increased from 20 mg to 40 

mg.  However he endorses a worsening feeling of "crackling" in his lungs with 

some shortness of breath, worse with lying flat, better with sitting upright.  

Endorses a "tickle in my throat" this morning with a mild persistent cough.  He 

endorses a 2 pound weight gain over the past day and increased swelling in the 

bilateral lower extremities.  He states he is feeling otherwise well, denying 

any fevers, sweats, chills, abdominal pain, urinary symptoms.  Temp 97.6, heart 

rate 65, respirations 19, 95% on room air and BP is 155/54.








- History of Current Complaint


Chief Complaint: EDShortnessOfBreath


Time Seen by Provider: 07/20/18 08:30


Hx Obtained From: Patient


Onset/Duration: Sudden Onset


Timing: Constant


Current Severity: None


Associated Signs & Symptoms: Cough (Nonproductive), Chest Pain w/Cough





- Risk Factors


Pulmonary Embolism: Recent Surgery


Cardiac: Prior MI, CAD, Elevated lipids, Diabetes, Hypertension, CHF, Family 

History


Pseudomonas: Chronic Steriod Use Past 3 Months





- Allergy/Home Medications


Allergies/Adverse Reactions: 


 Allergies











Allergy/AdvReac Type Severity Reaction Status Date / Time


 


No Known Allergies Allergy   Verified 07/20/18 08:37











Home Medications: 


 Home Medications





Gabapentin CAP(*) [Neurontin 100 mg CAP(*)] 100 mg PO TID PRN 07/20/18 [History 

Confirmed 07/20/18]


hydrOXYzine HCL TAB* [Atarax 25 MG TAB*] 25 mg PO QID PRN 07/20/18 [History 

Confirmed 07/20/18]


predniSONE TAB* [Deltasone 10 MG TAB*] 20 mg PO DAILY 07/20/18 [History 

Confirmed 07/20/18]











PMH/Surg Hx/FS Hx/Imm Hx


Previously Healthy: No


Endocrine/Hematology History: 


   Denies: Hx Diabetes


Cardiovascular History: Reports: Hx Congestive Heart Failure, Hx Coronary 

Artery Disease, Hx Hypercholesterolemia, Hx Hypertension, Hx Pacemaker/ICD


Respiratory History: 


   Denies: Hx Chronic Obstructive Pulmonary Disease (COPD)


GI History: Reports: Hx Gastroesophageal Reflux Disease


 History: Reports: Hx Renal Disease - abnormal


   Denies: Hx Dialysis


Musculoskeletal History: Reports: Hx Arthritis, Hx Back Problems - spinal 

stenosis


Sensory History: Reports: Hx Cataracts, Hx Contacts or Glasses


   Denies: Hx Hearing Aid


Opthamlomology History: Reports: Hx Cataracts, Hx Contacts or Glasses


Neurological History: 


   Denies: Hx Developmental Delay


Psychiatric History: 


   Denies: Hx Panic Disorder





- Surgical History


Surgery Procedure, Year, and Place: HERNIA SURGERY 1965-CATARACTS BILATERAL 

EYES 2011,cardiac bypass, tonsillectomy





- Immunization History


Hx Pertussis Vaccination: No


Immunizations Up to Date: No


Infectious Disease History: No


Infectious Disease History: 


   Denies: Traveled Outside the [place] in Last 30 Days





- Family History


Known Family History: 


   Negative: Cardiac Disease





- Social History


Occupation: Unemployed


Lives: With Family


Alcohol Use: None


Hx Substance Use: No


Substance Use Type: Reports: None


Smoking Status (MU): Never Smoked Tobacco


Have You Smoked in the Last Year: No





Review of Systems


Constitutional: Negative


Negative: Fever, Chills, Fatigue, Skin Diaphoresis


Positive: Chest Pain.  Negative: Palpitations


Positive: Shortness Of Breath, Cough


Negative: Abdominal Pain, Vomiting, Diarrhea, Nausea


Negative: Arthralgia, Myalgia


Neurological: Negative


Psychological: Normal


All Other Systems Reviewed And Are Negative: Yes





Physical Exam


Triage Information Reviewed: Yes


Vital Signs On Initial Exam: 


 Initial Vitals











Temp Pulse Resp BP Pulse Ox


 


 97.6 F   65   19   155/54   95 


 


 07/20/18 08:22  07/20/18 08:22  07/20/18 08:22  07/20/18 08:22  07/20/18 08:22











Vital Signs Reviewed: Yes


Appearance: Positive: Well-Appearing, Well-Nourished


Skin: Positive: Warm, Skin Color Reflects Adequate Perfusion


Head/Face: Positive: Normal Head/Face Inspection


Eyes: Positive: EOMI, AMOS, Conjunctiva Clear


Neck: Positive: Supple, No Lymphadenopathy


Respiratory/Lung Sounds: Positive: Rhonchi, Wheezes, Fatigue


Cardiovascular: Positive: Normal - paced rhythm, Leg Edema Left - +3, Leg Edema 

Right - +3


Musculoskeletal: Positive: Normal, Strength/ROM Intact


Neurological: Positive: Speech Normal


Psychiatric: Positive: Normal, Affect/Mood Appropriate





Diagnostics





- Vital Signs


 Vital Signs











  Temp Pulse Resp BP Pulse Ox


 


 07/20/18 08:32   66  21  166/70  96


 


 07/20/18 08:31   65  9   96


 


 07/20/18 08:22  97.6 F  65  19  155/54  95














- Laboratory


Lab Results: 


 Lab Results











  07/20/18 07/20/18 Range/Units





  09:30 09:30 


 


WBC  21.1 H   (3.5-10.8)  10^3/ul


 


RBC  3.13 L   (4.00-5.40)  10^6/ul


 


Hgb  10.3 L   (14.0-18.0)  g/dl


 


Hct  32 L   (42-52)  %


 


MCV  101 H   (80-94)  fL


 


MCH  33 H   (27-31)  pg


 


MCHC  33   (31-36)  g/dl


 


RDW  15   (10.5-15)  %


 


Plt Count  227   (150-450)  10^3/ul


 


MPV  8.5   (7.4-10.4)  um3


 


Neut % (Auto)  Pending   


 


Lymph % (Auto)  Pending   


 


Mono % (Auto)  Pending   


 


Eos % (Auto)  Pending   


 


Baso % (Auto)  Pending   


 


Absolute Neuts (auto)  Pending   


 


Absolute Lymphs (auto)  Pending   


 


Absolute Monos (auto)  Pending   


 


Absolute Eos (auto)  Pending   


 


Absolute Basos (auto)  Pending   


 


Absolute Nucleated RBC  Pending   


 


Nucleated RBC %  Pending   


 


INR (Anticoag Therapy)   0.90  (0.77-1.02)  











Result Diagrams: 


 07/20/18 09:30





 07/20/18 09:30


Lab Statement: Any lab studies that have been ordered have been reviewed, and 

results considered in the medical decision making process.





Course/Dx





- Course


Course Of Treatment: During the course of treatment, the patient is evaluated 

for PNA vs. CHF exacerbation.  Denies any fevers, sweats, chills.  Endorses 

some crackling and rhonchi in the bilateral lung bases, worse to the left.  He 

endorses worsening feeling when lying down, better with sitting upright.  All 

symptoms began approximately one day ago.  He states he is feeling otherwise at 

his baseline.  Pacemaker placed 2 weeks ago with no apparent issues.  He states 

the only medication change was 2 weeks ago when changed from 20 mg furosemide 

to 40 mg furosemide.  On physical examination, there is rhonchi in the 

bilateral lung bases with crackles throughout.  Bilateral lower extremity edema 

+3.  Elevated bilateral extremities above heart at this time.  Labs obtained 

which show a  which is elevated from his previous visit.  Troponin 0.07 

which is at his baseline.  EKG shows normal sinus rhythm, prolonged UT interval

, left atrial enlargement, no change from previous EKG.  Chest x-ray obtained 

which shows a right middle lobe pneumonia with some small pleural effusions 

with no exacerbation of CHF appearing.  WBC at 21.1 however this is chronically 

elevated.  Absolute neutrophil count at 18.0.  Due to new diagnosis of PNA and 

significant comorbidities, he will be admitted to the hospitalist service. 

Spoke with Dr. Bassett at 11am who agrees to admit patient.  Zosyn and Levaquin 

ordered as antibiotics for CAP with comorbidities.  Vital signs remain stable.





- Diagnoses


Differential Diagnosis/HQI/PQRI: Positive: CHF, Pneumonia, Other - CHF 

exacerbation


Provider Diagnoses: 


 PNA (pneumonia)








Discharge





- Sign-Out/Discharge


Documenting (check all that apply): Patient Departure





- Discharge Plan


Condition: Stable


Disposition: ADMITTED TO [facility]


Referrals: 


Cholo Boo MD [Primary Care Provider] - 





- Billing Disposition and Condition


Condition: STABLE


Disposition: Admitted to [facility]

## 2018-07-20 NOTE — XMS REPORT
Carmelo Vazquez JR

 Created on:July 3, 2018



Patient:JR Vazquez Lloyd R

Sex:Male

:1932

External Reference #:[id number]





Demographics







 Address  [address] [place] [address]

 

 Home Phone  [phone]

 

 Preferred Language  English

 

 Marital Status  Not  Or 

 

 Judaism Affiliation  Unknown

 

 Race  White

 

 Ethnic Group  Not  Or 









Author







 Organization  [facility]

 

 Address  [address] [place] [address] B



   [place] [address]

 

 Phone  [phone]









Support







 Name  Relationship  Address  Phone

 

 Catherine Vazquez  Unavailable  Unavailable  Unavailable









Care Team Providers







 Name  Role  Phone

 

 Cholo Boo MD  Primary Care Physician  Unavailable









Payers







 Type  Date  Identification Numbers  Payment Provider  Subscriber

 

 Medicare Primary  Effective:  Policy Number:  Medicare  Carmelo Vazquez JR



   1999  [id number]    









 PayID: [id number]  [address]









 [place] [address]









 Medigap Part B  Effective:  Policy Number:  [employer]  Carmelo Vazquez,



   1991  [id number]    









 PayID: [id number]  [address]









 [place] [address]









 Medigap Part B  Effective: 1998  Policy Number:   Paris MENDEZ  Catherine Vazquez



     [id number]    









 Expires: 2017  Group Number: [id number]  [address] [address]









 PayID: [id number]  [place] [address]









 Commercial  Effective: 12/15/2016  Policy Number:  [employer]  Carmelo Vazquez JR



     [id number]    









 Expires: 10/23/2018  Group Number: [id number]  [address] 









 PayID: [id number]  [address] [address]







Problems







 Date  Description  Provider  Status

 

 Onset: 2017  Aortic valve disorder  Tay Corcoran M.D., KIMBER,  Active



     [facility]  

 

 Onset: 2017  Athscl heart disease of native  Tay Corcoran M.D., KIMBER,  
Active



   coronary artery w/o ang pctrs  [facility]  

 

 Onset: 2017  Essential hypertension  Tay Corcoran M.D., KIMBER,  Active



     [facility]  

 

 Onset: 2017  Hyperlipidemia  Tay Corcoran M.D., [facility],  Active



     [facility]  

 

 Onset: 2017  Right bundle branch block  Tay Corcoran M.D., [facility],  Active



     [facility]  

 

 Onset: 2017  Paroxysmal atrial fibrillation  Tay Corcoran M.D., [facility],  
Active



     [facility]  

 

 Onset: 2017  Chronic kidney disease stage 3  Tay Corcoran M.D., [facility],  
Active



     [facility]  

 

 Onset: 2018  Syncope and collapse  Tay Corcoran M.D., [facility],  Active



     [facility]  







Family History







 Date  Family Member(s)  Problem(s)  Comments

 

   General  Arthritis  







Social History







 Type  Date  Description  Comments

 

 Marital Status      

 

 Occupation    Retired  

 

 ETOH Use    Drinks Alcoholic Beverages Rarely  once a year

 

 Smoking    Patient has never smoked  

 

 Recreational Drug Use    Denies Drug Use  

 

 Daily Caffeine    Does Not Consume Caffeine  

 

 Exercise Type/Frequency    Exercises rarely  







Allergies, Adverse Reactions, Alerts







 Date  Description  Reaction  Status  Severity  Comments

 

 2017  NKDA    active    







Medications







 Medication  Date  Status  Form  Strength  Qnty  SIG  Indications  Ordering



                 Provider

 

 Metamucil    Active  Capsules  0.52gm    1 cap by    Unknown



   /2017          mouth twice    



             a day with    



             water    

 

 Lipitor    Active  Tablets  10mg  90tab  1 by mouth            s  every night    Nilo,



             at bedtime    M.D.,



                 [facility],



                 [facility]

 

 Metoprolol    Active  Tablets  37.5mg    one ta bid    Tay



 Tar              BEHZAD Corcoran,



                 [facility]

 

 Aspirin Adult Low    Active  Tablets  81mg    1 by mouth    Unknown



 Dose Ec  /0000    DR      every day    

 

 Omeprazole    Active  Capsules  20mg    1 by mouth    Unknown



   /    DR      every day    

 

 Potassium Chloride    Active  Tablets  10Meq    1 by mouth    Unknown



 ER  /0000    ER      every    



             day(taking    



             1/2 tab    



             Am,1/2 tab    



             PM)    

 

 Magnesium Oxide    Active  Tablets  400mg    2 by mouth    Unknown



   /          every day    

 

 Montelukast Sodium    Active  Tablets  10mg    1 by mouth    Unknown



   /          every day    



             as needed    

 

 Gabapentin    Active  Capsules  100mg    take 1    Unknown



             three times    



             a day    

 

 Prednisone    Active  Tablets  10mg  360ta  Please take    Davey



   /0000        bs  4 tabs    Gloria,



             daily    M.D.



             ongoing,    



             taper by    



             10mg every    



             14 days    

 

 Hydroxychloroquine    Active  Tablets  200mg  180ta  take 2    Davey



 Sulfate          bs  tablet    Gloria,



             daily    M.D.

 

 Vitamin D-3    Active  Capsules  1000Unit    1 by mouth    Unknown



   /0000          every day    

 

 Furosemide    Active  Tablets  20mg    1/2 by    Unknown



   /0000          mouth every    



             day    

 

                 

 

 Benzonatate    Hx  Capsules  100mg  45cap  1 capsule 3            s  times daily    TESHA Faria,



   -          as needed    M.DMaureen



                 

 

 Cozaar    Hx  Tablets  25mg    1 by mouth              twice    José Manuelfeheena,



   -          daily(pt    M.D.,



             unsure if    [facility]          he is    [facility]



             taking    



             this)    

 

 Multi Complete    Hx  Capsules      daily    Unknown



   /2017              



   -              



                 

 

 Potassium Chloride    Hx  Solution  20Meq/50M    twice daily    Unknown



   /2017      L        



   -              



                 

 

 Digoxin    Hx  Tablets  125mcg  30tab  1 by mouth    Tay



           s  every day    Ben Corcoran M.D.,



                 [facility]              [facility]

 

 Cozaar    Hx  Tablets  50mg  30tab  2 by mouth    Tay



           s  every day    Ben Corcoran M.D.,



                 [facility]              [facility]

 

 Lasix    Hx  Tablets  40mg    1 by mouth    Unknown



   /0000          every day    



   -              



                 

 

 Norco    Hx  Tablets  5-325mg    one to two    Unknown



   /0000          tabs by    



   -          mouth every    



             4-6 hours    



   /          as needed    



             pain    

 

 Coumadin    Hx  Tablets  1mg    take as    Unknown



   /0000          directed    



   -              



                 

 

 Vitamin D    Hx  Tablets  1000Unit    by mouth    Unknown



   /0000          everyday    



   -              



                 

 

 Digoxin    Hx  Tablets  125mcg    1 by mouth    Unknown



   /0000          every day    



   -              



                 

 

 Sod Citrate-Citric    Hx  Solution  500-334mg    30 ml's 3    Unknown



 Acid  /0000      /5ML    times a    



   -          day.    



                 

 

 Torsemide    Hx  Tablets  20mg    take 2    Unknown



   /0000          tablets Up    



   -          To Once A    



             Day as    



   /2018          Needed For    



             Congestive    



             Heart    



             Failure    







Vital Signs







 Date  Vital  Result  Comment

 

 2018  Height  66 inches  5'6"









 Weight  161.00 lb  with shoes

 

 Heart Rate  64 /min  

 

 BP Systolic Sitting  122 mmHg  Lue reg cuff

 

 BP Diastolic Sitting  50 mmHg  Lue reg cuff

 

 BP Systolic Standing  126 mmHg  Lue reg cuff

 

 BP Diastolic Standing  54 mmHg  Lue reg cuff

 

 Respiratory Rate  16 /min  

 

 BMI (Body Mass Index)  26.0 kg/m2  

 

 Ejection Fraction  45-50%  date 18 ECHO









 2018  Height  66 inches  5'6"









 Weight  159.50 lb  

 

 Heart Rate  63 /min  

 

 BP Systolic Sitting  120 mmHg  

 

 BP Diastolic Sitting  55 mmHg  

 

 Respiratory Rate  14 /min  

 

 Pain Level  5  

 

 BMI (Body Mass Index)  25.7 kg/m2  









 2018  Height  66 inches  5'6"









 Weight  155.00 lb  at home w/o clothes

 

 Heart Rate  66 /min  

 

 BP Systolic Sitting  110 mmHg  lue rg cuff

 

 BP Diastolic Sitting  46 mmHg  lue rg cuff

 

 Respiratory Rate  18 /min  

 

 BMI (Body Mass Index)  25.0 kg/m2  

 

 Ejection Fraction  50-55%  echo 2018  Height  66 inches  5'6"









 Weight  156.00 lb  w/ shoes

 

 Heart Rate  56 /min  

 

 BP Systolic Standing  115 mmHg  lue reg cuff

 

 BP Diastolic Standing  50 mmHg  lue reg cuff

 

 Respiratory Rate  18 /min  

 

 BMI (Body Mass Index)  25.2 kg/m2  

 

 Ejection Fraction  50-55%  echo 2017  Height  66 inches  5'6"









 Weight  165.00 lb  with shoes

 

 Heart Rate  60 /min  sit and 68 stand HR

 

 BP Systolic Sitting  146 mmHg  Rue reg cuff

 

 BP Diastolic Sitting  60 mmHg  Rue reg cuff

 

 BP Systolic Standing  152 mmHg  Rue reg cuff

 

 BP Diastolic Standing  80 mmHg  Rue reg cuff

 

 Respiratory Rate  17 /min  

 

 BMI (Body Mass Index)  26.6 kg/m2  









 2017  Height  66 inches  5'6"









 Weight  164.50 lb  with shoes

 

 Heart Rate  62 /min  

 

 BP Systolic Sitting  108 mmHg  LA reg cuff

 

 BP Diastolic Sitting  58 mmHg  LA reg cuff

 

 BP Systolic Standing  112 mmHg  LA reg cuff

 

 BP Diastolic Standing  62 mmHg  LA reg cuff

 

 BMI (Body Mass Index)  26.5 kg/m2  

 

 Ejection Fraction  60%  echo 17







Results







 Test  Date  Test  Result  H/L  Range  Note

 

 Nuclear AB (Nia) By  06/15/2018  Nuclear Ab (Nia) by  Positive 1:640      1



 Ifa Igg    Ifa, IgG        









 Nia Titer:  1:640      

 

 Nia Pattern:  Homogeneous      2









 Laboratory test finding  06/15/2018  Erythrocyte Sed Rate  9 mm/Hr    0-40  









 C Reactive Protein  2.55 mg/L    < 5.00  3









 CBC Auto Diff  06/15/2018  White Blood Count  18.0 10^3/uL  High  3.5-10.8  









 Red Blood Count  2.93 10^6/uL  Low  4.00-5.40  

 

 Hemoglobin  9.8 g/dL  Low  14.0-18.0  

 

 Hematocrit  30 %  Low  42-52  

 

 Mean Corpuscular Volume  102 fL  High  80-94  

 

 Mean Corpuscular Hemoglobin  33 pg  High  27-31  

 

 Mean Corpuscular HGB Conc  33 g/dL    31-36  

 

 Red Cell Distribution Width  15 %    10.5-15  

 

 Platelet Count  160 10^3/uL    150-450  

 

 Mean Platelet Volume  9.9 um3    7.4-10.4  

 

 Abs Neutrophils  16.9 10^3/uL  High  1.5-7.7  

 

 Abs Lymphocytes  0.6 10^3/uL  Low  1.0-4.8  

 

 Abs Monocytes  0.4 10^3/uL    0-0.8  

 

 Abs Eosinophils  0 10^3/uL    0-0.6  

 

 Abs Basophils  0 10^3/uL    0-0.2  

 

 Abs Nucleated RBC  0 10^3/uL      

 

 Granulocyte %  94.0 %  High  38-83  

 

 Lymphocyte %  3.5 %  Low  25-47  

 

 Monocyte %  2.2 %    0-7  

 

 Eosinophil %  0.1 %    0-6  

 

 Basophil %  0.2 %    0-2  

 

 Nucleated Red Blood Cells %  0      









 Comp Metabolic Panel  06/15/2018  Sodium  129 mmol/L  Low  135-145  









 Potassium  4.4 mmol/L    3.5-5.0  

 

 Chloride  95 mmol/L  Low  101-111  

 

 Co2 Carbon Dioxide  28 mmol/L    22-32  

 

 Anion Gap  6 mmol/L    2-11  

 

 Glucose  133 mg/dL  High    

 

 Blood Urea Nitrogen  24 mg/dL    6-24  

 

 Creatinine  1.42 mg/dL  High  0.67-1.17  

 

 BUN/Creatinine Ratio  16.9    8-20  

 

 Calcium  8.3 mg/dL  Low  8.6-10.3  

 

 Total Protein  5.9 g/dL  Low  6.4-8.9  

 

 Albumin  3.2 g/dL    3.2-5.2  

 

 Globulin  2.7 g/dL    2-4  

 

 Albumin/Globulin Ratio  1.2    1-3  

 

 Total Bilirubin  0.60 mg/dL    0.2-1.0  

 

 Alkaline Phosphatase  121 U/L  High    

 

 Alt  30 U/L    7-52  

 

 Ast  22 U/L    13-39  

 

 Egfr Non-  47.3    >60  

 

 Egfr   60.8    >60  4









 Laboratory test finding  06/15/2018  Complement C3  99 mg/dL    75 - 175  5









 Complement C4  16 mg/dL    14 - 40  6

 

 Anti Double Stranded Dna AB  43.0 IU/mL      7

 

 Miscellaneous Test  See Comment      8









 Laboratory test finding  2017  Alt (SGPT)  31 U/L    7-52  









 Ast (Sgot)  34 U/L    13-39  









 Lipid Profile (Trig/Chol/HDL)  2017  Triglycerides  99 mg/dL      9









 Cholesterol  112 mg/dL      10

 

 HDL Cholesterol  20.2 mg/dL      11

 

 LDL Cholesterol  72 mg/dL      12









 Basic Metabolic Panel  2017  Sodium  136 mmol/L    133-145  









 Potassium  4.0 mmol/L    3.5-5.0  

 

 Chloride  104 mmol/L    101-111  

 

 Co2 Carbon Dioxide  23 mmol/L    22-32  

 

 Anion Gap  9 mmol/L    2-11  

 

 Glucose  101 mg/dL  High    

 

 Blood Urea Nitrogen  19 mg/dL    6-24  

 

 Creatinine  1.41 mg/dL  High  0.67-1.17  

 

 BUN/Creatinine Ratio  13.5    8-20  

 

 Calcium  8.6 mg/dL    8.6-10.3  

 

 Egfr Non-  47.8    >60  

 

 Egfr   61.4    >60  13









 1  -------------------REFERENCE VALUE--------------------------



   <1:80 (Negative)

 

 2  Test Performed by:



   [place] [address]

 

 3  Acute inflammation:  >10.00

 

 4  *******Because ethnic data is not always readily available,



   this report includes an eGFR for both -Americans and



   non- Americans.****



   The National Kidney Disease Education Program (NKDEP) does



   not endorse the use of the MDRD equation for patients that



   are not between the ages of 18 and 70, are pregnant, have



   extremes of body size, muscle mass, or nutritional status,



   or are non- or non-.



   According to the National Kidney Foundation, irrespective of



   diagnosis, the stage of the disease is based on the level of



   kidney function:



   Stage Description                      GFR(mL/min/1.73 m(2))



   1     Kidney damage with normal or decreased GFR       90



   2     Kidney damage with mild decrease in GFR          60-89



   3     Moderate decrease in GFR                         30-59



   4     Severe decrease in GFR                           15-29



   5     Kidney failure                       <15 (or dialysis)

 

 5  Test Performed by:



   [place] [address]

 

 6  Test Performed by:



   [place] [address]

 

 7  Interpretation: Borderline (30.0-75.0)



   -------------------REFERENCE VALUE--------------------------



   <30.0 (Negative)



   Test Performed by:



   [address] [address]

 

 8  Test                        Result    Flag  Unit   RefValue



   ------------------------------------------------------------



   Vitamin B12 and Folate, S



   Vitamin B12 Assay, S      557             ng/L   180 - 914



   Folate, S                 14.4            mcg/L  >=4.0



   Test Performed by:



   [address] [address]



   



   



   ******CORRECTED REPORT******



   ---  Corrected on 18 1238 ---



   Ref Lab Test previously reported as:



   SEE COMMENTS



   Test                        Result    Flag  Unit   RefValue



   ------------------------------------------------------------



   Vitamin B12 and Folate, S



   Vitamin B12 Assay, S      557             ng/L   180 - 914



   



   Folate, S                 15.9            mcg/L  >=4.0



   



   Test Performed by:



   [address] [address]

 

 9  Desirable <150



   Borderline high 150-199



   High 200-499



   Very High >500

 

 10  Desirable <200



   Borderline high 200-239



   High >239

 

 11  Low <40



   Desirable: 40-60



   High: >60

 

 12  Desirable: <100 mg/dL



   Near Optimal: 100-129 mg/dL



   Borderline High: 130-159 mg/dL



   High: 160-189 mg/dL



   Very High: >189 mg/dL

 

 13  *******Because ethnic data is not always readily available,



   this report includes an eGFR for both -Americans and



   non- Americans.****



   The National Kidney Disease Education Program (NKDEP) does



   not endorse the use of the MDRD equation for patients that



   are not between the ages of 18 and 70, are pregnant, have



   extremes of body size, muscle mass, or nutritional status,



   or are non- or non-.



   According to the National Kidney Foundation, irrespective of



   diagnosis, the stage of the disease is based on the level of



   kidney function:



   Stage Description                      GFR(mL/min/1.73 m(2))



   1     Kidney damage with normal or decreased GFR       90



   2     Kidney damage with mild decrease in GFR          60-89



   3     Moderate decrease in GFR                         30-59



   4     Severe decrease in GFR                           15-29



   5     Kidney failure                       <15 (or dialysis)







Procedures







 Date  CPT Code  Description  Status

 

 2018  22401  ECHO Transthorasic Realtime 2D W Doppler & Color Flow  
Completed



     Hosp  

 

 2018  95673  Each Additional Biopsy  Completed

 

 2018  00756  Biopsy Skin Lesion Single  Completed

 

 2018  01738  EKG Tracing & Interpretation  Completed

 

 2018  91256  Implant Cardiac Loop Recorder  Completed

 

 2018  12423  Echocardiogram, Limited Study  Completed

 

 2018  50749  EKG, Interpretation Only  Completed

 

 2018  50681  ECHO Transthorasic Realtime 2D W Doppler & Color Flow  
Completed



     Hosp  

 

 2017  37812  EKG Tracing & Interpretation  Completed

 

 2017  91595  EKG Tracing & Interpretation  Completed

 

 2017  52132  EKG, Interpretation Only  Completed

 

 2017  62141  Left Heart Cath. Incl S/I Coronaries, Angio S/I V Gram  
Completed



     If Done  

 

 2006  03684  Color Doppler  Completed

 

 2006  27543  Pulse Doppler & Continuous Wave  Completed

 

 2006  36059  Pulse Doppler & Continuous Wave  Completed

 

 2006  54376  Echocardiogram  Completed







Encounters







 Type  Date  Location  Provider  CPT E/M  Dx

 

 Office Visit  2018  Rheumatology Services  Davey Castro M.D.  [address]  
D72.1



   4:00p  Of [facility]      









 M31.8

 

 Z79.899

 

 Z79.52

 

 M35.9









 Office Visit  2018  7:00a  [facility] Of  Davey Castro  
23523  R76.0



     Felicia WEN    









 D72.1

 

 R21









 Office Visit  2018 10:31a  [facility]  Janelle Burk MD  
21808  D72.1



     [facility]      

 

 Office Visit  2018  9:07a  [facility]  Tate Fagan  [address]  I50.33



     [facility] Hospital[facility]  MD Valentín    









 I21.4

 

 M31.8









 Office Visit  2018  9:06a  [facility]  Tate Fagan  16297  I50.33



     Assoc[facility]pc Hospitaljohanna Laura MD    









 I21.4

 

 M31.8









 Office Visit  2018  9:57a  [facility]  Keely Vazquez M.D.  
54012  Z01.810



     [facility]      









 D72.1

 

 I21.A1

 

 I50.30

 

 I25.10

 

 Z95.1









 Office Visit  2018  7:00a  [facility]  Davey Castro  
94203  D72.1



     Felicia WEN    









 R21









 Office Visit  2018 10:30a  [facility]  Janelle Burk MD  
79722  D72.1



     [facility]      

 

 Office Visit  2018  9:06a  [place] [facility] Fagan  [address]  I50.33



     Assoc, Hospitaljohanna Laura MD    









 I21.4

 

 M31.8









 Office Visit  2018  9:05a  [facility]  Tate Fagan  [address]855  I50.33



     Assoc, Hospitaljohanna Laura MD    









 I21.4

 

 M31.8









 Office Visit  2018  9:04a  [facility]  Hira Galicia,  
[address]  M31.8



     Hospitalists  M.D.,FACP    









 I50.33









 Office Visit  2018  9:03a  [facility]  Hira Galicia,  [address]  
I50.33



     [facility] Hospital[facility]  M.D.,FACP    









 M31.8









 Office Visit  2018  3:16p  [facility]  Qutaybeh S. Maghaydah,  
[address]055  I50.9



       M.D.    









 I25.10

 

 I42.9

 

 D64.9









 Office Visit  2018  9:03a  [facility],daphney Graham,  
23211  I50.33



     Hospitalists  M.D.    









 I21.4









 Office Visit  2018  7:00a  [facility]  Davey Castro,  
49911  D72.1



     Cma  M.TESHA    









 R21









 Office Visit  2018  9:03a  [facility],daphney Graham,  
[address]333  I50.33



     Hospitalists  M.D.    









 I21.4









 Office Visit  2018  3:09p  [facility]  Qutaybeh S. Maghaydah,  
[address]  I50.9



       M.D.    









 I42.9

 

 I25.10

 

 Z95.1









 Office Visit  2018  3:20p  [facility]  Janelle Burk MD  
51069  D72.1



     [facility]      

 

 Office Visit  2018  9:02a  [facility],daphney Graham,  
73434  I50.33



     Hospitalists  M.D.    









 I21.4









 Office Visit  2018  3:03p  [facility]  Davian Severino,   
14171  I21.A1



     formerly [facility]    









 I50.31

 

 R07.9









 Office Visit  2018  9:01a  [facility],daphney Graham,  
33933  I50.33



     Hospitalists  M.D.    









 I21.4









 Office Visit  2018  3:41p  [facility]  Qutaybeh S. Maghaydah,  
03022  I11.0



       M.D.    









 I50.9

 

 I25.10

 

 E78.5

 

 I45.10

 

 E87.6

 

 E83.42

 

 R79.89









 Office Visit  2018  1:40p  [facility]  Tay Corcoran M.D.,  
41212  I25.10



     Cma AT [facility], [facility]    









 I10

 

 I45.19

 

 R55

 

 E78.5









 Office Visit  2018  9:25a  [facility],daphney Bassett M.D.
  78225  R55



     Hospitalists      









 R79.89

 

 R21

 

 I25.10









 Office Visit  2018  9:23a  [facility]  Anita Bassett M.D.
  64861  R55



     Hospitalists      









 R79.89

 

 R21

 

 I25.10









 Office Visit  2018  9:22a  [facility]  Abbie Cardenas N.P.  
48901  R55



     Hospitalists      









 R79.89

 

 R21

 

 I25.10









 Office Visit  2018  9:18a  [facility]  Le Garcias,  
12411  R55



     Hospitalists  D.OMaureen    









 R79.89

 

 R21

 

 I25.10









 Office Visit  2018  2:15p  [facility]  Davy Faria,  
00799  R55



     AT [facility]  M.TESHA    









 I10

 

 I45.19

 

 Z95.818

 

 S01.01xA

 

 Z48.02









 Office Visit  2018  9:30a  [facility]  Davy Faria,  
52360  R55



       M.TESHA    

 

 Office Visit  2018  9:03a  [facility]  Tatyana Caldwell NP  
77866  R55



     Hospitalists      









 S01.01xA

 

 I50.32

 

 I10









 Office Visit  2018  9:02a  [facility],  Char Sanchez  
42179  R55



     Hospitalists  SURYA Zaman    









 S01.01xA

 

 I50.32

 

 I10









 Office Visit  2018  9:29a  [facility]  Eze Baltazar MD  [address]
  I50.9



     Hospitalists      









 J18.9

 

 E87.8

 

 N18.3









 Office Visit  2018  9:28a  [facility],  Char Sanchez  
25422  I50.9



     Hospitalists  SURYA Zaman    









 J18.9

 

 E87.8

 

 N18.3









 Office Visit  2018  9:27a  [facility]  Natividad Camacho DO  
87676  I50.9



     Hospitalists      









 J18.9

 

 M79.602









 Office Visit  2018  9:26a  [facility]  Lakhwinder David  
[address]015  M79.602



     Hospitalists  M.D.    









 I50.9

 

 J18.9

 

 E87.8









 Office Visit  2018  9:25a  [facility]  Lakhwinder David  
41924  M79.602



     Hospitalists  M.D.    









 I50.9

 

 J18.9

 

 E87.8









 Office Visit  2018  9:24a  [facility]  Lakhwinder David,  
28808  M79.602



     Hospitalists  M.D.    









 I50.9

 

 J18.9

 

 E87.8









 Office Visit  2018  9:22a  [facility]  [address]  
M79.602



     [facility]oc[facility] Hospital[facility]  SURYA Zaman    









 I50.9

 

 J18.9

 

 E87.8









 Office Visit  2018  9:56a  [facility]  Qutaybeh SMaureen Perezteeteematheus,  
21398  R50.9



       M.D.    









 R53.83

 

 D72.829

 

 R94.31

 

 I45.19

 

 I44.4

 

 I25.10

 

 I10

 

 R07.9









 Office Visit  2018  7:10a  [facility]  Lakhwinder David,  
86745  I50.9



     Hospitalists  M.D.    









 R06.09

 

 I25.10









 Office Visit  2018  7:09a  [facility]  Lakhwinder David,  
[address]  I50.9



     Hospitalists  M.D.    









 R06.09

 

 I25.10









 Office Visit  2018  7:09a  [facility]  Anita Bassett,  
13572  I50.9



     Hospitalists  M.D.    









 I25.10

 

 R06.09









 Office Visit  01/15/2018  7:08a  [facility],  Eze Baltazar [place]  [address]
  I50.9



     Hospitalists      









 R06.09

 

 I25.10









 Office Visit  2017 11:20a  [facility] Rashel Corcoran M.D.,  
[address]2828  I25.10



     Cma AT [facility], [facility]    









 I35.0

 

 I12.9

 

 E78.5

 

 N18.3

 

 I48.0









 Office Visit  2017 11:20a  [facility] Rashel Corcoran M.D.,  
[address]96  I25.10



     Cma AT [facility], [facility]    









 I35.0

 

 I35.1

 

 I10

 

 E78.5

 

 I45.10

 

 I48.0









 Office Visit  2017 10:16a  [facility]  Tay Corcoran M.D.,  
[id number]  R07.9



     [facility] AT [facility], [facility]    









 R94.31







Plan of Care

Future Appointment(s):2018 10:30 am - Aura Almonte N.P. at [facility]2018 10:20 am - Davey Castro M.D. at [facility]2018 - Davy Faria M.D.I49.5 Sick sinus syndromeNew 
Orders:Implant Pacemaker

## 2018-07-20 NOTE — RAD
INDICATION:  Shortness of breath.



COMPARISON:  Comparison is made with a prior study from July 03, 2018.



TECHNIQUE: Dual-energy PA  and lateral views of the chest were obtained.



FINDINGS:   The patient appears to be status post coronary artery bypass surgery. There is

a dual-chamber transvenous pacemaker present. The heart is moderately enlarged and

unchanged.



The lungs are hyperinflated. There is diffuse prominence of the interstitial markings with

a more focal patchy infiltrate present in the right midlung. There are trace bilateral

pleural effusions. 



IMPRESSION:  FINDINGS MOST CONSISTENT WITH PNEUMONIA LESS LIKELY CONGESTIVE HEART FAILURE.

RECOMMEND FOLLOW-UP CHEST X-RAYS TO RESOLUTION.

## 2018-07-21 NOTE — PN
Subjective


Date of Service: 07/21/18


Interval History: 








Afebrile, 


Frustrated by leaking IV and diet order snafus. 


Rattle in chest improved. 


WBC 21.1 to 13.1 


MRSA nares positive. 


Leg swelling and weight increased from baseline











Objective


Active Medications: 








Acetaminophen (Tylenol Tab*)  650 mg PO Q4H PRN


   PRN Reason: FEVER/PAIN


Albuterol (Ventolin 2.5 Mg/3 Ml Neb.Sol*)  2.5 mg INH Q2H PRN


   PRN Reason: SOB/WHEEZING


Aspirin (Aspirin Ec Tab*)  81 mg PO DAILY [facility]


   Last Admin: 07/21/18 08:48 Dose:  81 mg


Atorvastatin Calcium (Lipitor*)  10 mg PO BEDTIME [facility]


   Last Admin: 07/20/18 22:04 Dose:  10 mg


Furosemide (Lasix Tab*)  40 mg PO DAILY [facility]


Gabapentin (Neurontin Cap(*))  100 mg PO TID PRN


   PRN Reason: PAIN


Heparin Sodium (Porcine) (Heparin Vial(*))  5,000 units SUBCUT Q12HR [facility]


   Last Admin: 07/21/18 08:53 Dose:  5,000 units


Hydroxychloroquine Sulfate (Plaquenil Tab*)  400 mg PO DAILY [facility]


   Last Admin: 07/21/18 08:47 Dose:  400 mg


Ceftriaxone Sodium 1 gm/ (Sodium Chloride)  50 mls @ 200 mls/hr IVPB Q24H [facility]


   Last Admin: 07/21/18 08:54 Dose:  200 mls/hr


Azithromycin 500 mg/ Sodium (Chloride)  250 mls @ 250 mls/hr IVPB Q24H [facility]


   Last Admin: 07/21/18 09:22 Dose:  250 mls/hr


Vancomycin HCl 1,000 mg/ (Sodium Chloride)  250 mls @ 166.667 mls/hr IVPB Q12H 

[facility]


   Last Admin: 07/21/18 13:55 Dose:  166.667 mls/hr


Magnesium Oxide (Magox 400 Tab*)  800 mg PO DAILY [facility]


   Last Admin: 07/21/18 08:48 Dose:  800 mg


Metoprolol Tartrate (Lopressor Tab*)  37.5 mg PO BID [facility]


   Last Admin: 07/21/18 08:48 Dose:  37.5 mg


Mometasone Furoate/Formoterol Fumar (Dulera 200/5 Mdi*)  2 puff INH BID [facility]


   Last Admin: 07/21/18 07:52 Dose:  2 puff


Montelukast Sodium (Singulair Tab*)  10 mg PO DAILY PRN


   PRN Reason: Allergy Symptoms


Omeprazole (Prilosec Cap*)  20 mg PO QAM@0730 [facility]


   Last Admin: 07/21/18 08:48 Dose:  20 mg


Ondansetron HCl (Zofran Inj*)  4 mg IV Q6H PRN


   PRN Reason: NAUSEA


Pharmacy Consult (Vancomycin Per Pharmacy*)  1 note FOLLOW UP .VANC PER 

PHARMACY [facility]


Pharmacy Profile Note (Vancomycin Trough Check)  1 note FOLLOW UP ONCE ONE


   Stop: 07/22/18 13:31


Potassium Chloride (Klor Con Er Tab*)  10 meq PO DAILY [facility]


   Last Admin: 07/21/18 08:48 Dose:  10 meq


Prednisone (Deltasone Tab*)  20 mg PO DAILY [facility]


   Last Admin: 07/21/18 08:48 Dose:  20 mg


Psyllium Hydrophilic Mucilloid (Metamucil Oscar*)  1 pkt PO BID [facility]


   Last Admin: 07/21/18 08:51 Dose:  1 pkt








 Vital Signs - 8 hr











  07/21/18





  11:43


 


Temperature 97.4 F


 


Pulse Rate 60


 


Respiratory 16





Rate 


 


Blood Pressure 126/61





(mmHg) 


 


O2 Sat by Pulse 98





Oximetry 











Oxygen Devices in Use Now: None


Appearance: NAD, sitting on edge of bed.


Eyes: No Scleral Icterus


Ears/Nose/Mouth/Throat: NL Teeth, Lips, Gums


Neck: NL Appearance and Movements; NL JVP, Trachea Midline


Respiratory: Symmetrical Chest Expansion and Respiratory Effort, - - slight 

rales right base. 


Cardiovascular: NL Sounds; No Murmurs; No JVD, RRR


Extremities: - - 2-3+ edema b/l lower extremities ( in dependent position for 

last several hours)


Skin: No Rash or Ulcers


Neurological: Alert and Oriented x 3, NL Sensation, NL Muscle Strength and Tone


Nutrition: Taking PO's


Result Diagrams: 


 07/21/18 05:44





 07/21/18 05:44


Additional Lab and Data: 








 Laboratory Results - last 24 hr











  07/20/18 07/20/18 07/20/18





  15:53 15:53 21:34


 


WBC   


 


RBC   


 


Hgb   


 


Hct   


 


MCV   


 


MCH   


 


MCHC   


 


RDW   


 


Plt Count   


 


MPV   


 


Neut % (Auto)   


 


Lymph % (Auto)   


 


Mono % (Auto)   


 


Eos % (Auto)   


 


Baso % (Auto)   


 


Absolute Neuts (auto)   


 


Absolute Lymphs (auto)   


 


Absolute Monos (auto)   


 


Absolute Eos (auto)   


 


Absolute Basos (auto)   


 


Absolute Nucleated RBC   


 


Nucleated RBC %   


 


INR (Anticoag Therapy)   


 


Sodium   


 


Potassium   


 


Chloride   


 


Carbon Dioxide   


 


Anion Gap   


 


BUN   


 


Creatinine   


 


Est GFR ( Amer)   


 


Est GFR (Non-Af Amer)   


 


BUN/Creatinine Ratio   


 


Glucose   


 


Lactic Acid  2.9 H*  


 


Calcium   


 


Troponin I   0.10 H*  0.09 H*














  07/21/18 07/21/18 07/21/18





  00:57 05:44 05:44


 


WBC   13.1 H 


 


RBC   2.68 L 


 


Hgb   9.0 L 


 


Hct   27 L 


 


MCV   101 H 


 


MCH   34 H 


 


MCHC   33 


 


RDW   15 


 


Plt Count   185 


 


MPV   8.3 


 


Neut % (Auto)   76.2 


 


Lymph % (Auto)   15.1 L 


 


Mono % (Auto)   8.0 H 


 


Eos % (Auto)   0.4 


 


Baso % (Auto)   0.3 


 


Absolute Neuts (auto)   10.0 H 


 


Absolute Lymphs (auto)   2.0 


 


Absolute Monos (auto)   1.0 H 


 


Absolute Eos (auto)   0.1 


 


Absolute Basos (auto)   0 


 


Absolute Nucleated RBC   0 


 


Nucleated RBC %   0 


 


INR (Anticoag Therapy)    1.07 H


 


Sodium   


 


Potassium   


 


Chloride   


 


Carbon Dioxide   


 


Anion Gap   


 


BUN   


 


Creatinine   


 


Est GFR ( Amer)   


 


Est GFR (Non-Af Amer)   


 


BUN/Creatinine Ratio   


 


Glucose   


 


Lactic Acid  1.2  


 


Calcium   


 


Troponin I   














  07/21/18





  05:44


 


WBC 


 


RBC 


 


Hgb 


 


Hct 


 


MCV 


 


MCH 


 


MCHC 


 


RDW 


 


Plt Count 


 


MPV 


 


Neut % (Auto) 


 


Lymph % (Auto) 


 


Mono % (Auto) 


 


Eos % (Auto) 


 


Baso % (Auto) 


 


Absolute Neuts (auto) 


 


Absolute Lymphs (auto) 


 


Absolute Monos (auto) 


 


Absolute Eos (auto) 


 


Absolute Basos (auto) 


 


Absolute Nucleated RBC 


 


Nucleated RBC % 


 


INR (Anticoag Therapy) 


 


Sodium  128 L


 


Potassium  4.4


 


Chloride  94 L


 


Carbon Dioxide  26


 


Anion Gap  8


 


BUN  30 H


 


Creatinine  1.56 H


 


Est GFR ( Amer)  51.3


 


Est GFR (Non-Af Amer)  42.4


 


BUN/Creatinine Ratio  19.2


 


Glucose  101 H


 


Lactic Acid 


 


Calcium  8.0 L


 


Troponin I 




















Microbiology and Other Data: 








 Microbiology





07/20/18 15:53   Blood Venous   Aerobic Blood Culture - Preliminary


                            No Growth Day 1


07/20/18 15:53   Blood Venous   Anaerobic Blood Culture - Preliminary


                            No Growth Day 1


07/20/18 12:04   Blood Venous   Aerobic Blood Culture - Preliminary


                            No Growth Day 1


07/20/18 12:04   Blood Venous   Anaerobic Blood Culture - Preliminary


                            No Growth Day 1


07/20/18 13:35   Nasal   Nasal Screen MRSA (PCR) - Final


                            Mrsa Detected

















Assess/Plan/Problems-Billing


Assessment: 





85 yo male PMH CHF, HTN, COPD, Afib, SSS s/p recent PPM (7/2/18), HLD, CKD p/w 

Sepsis 2/2 RML pneumonia. MRSA +. 





- Patient Problems


(1) Sepsis


Current Visit: Yes   Status: Acute   Comment: SIRS (tachypnea, leukocytosis), 

lactic acidosis. source: RML pneumonia. 


Antbiotics as below. 


WBC improving. 


   





(2) Pneumonia


Current Visit: No   Status: Resolved   Code(s): J18.9 - PNEUMONIA, UNSPECIFIED 

ORGANISM   SNOMED Code(s): 755938492


   Comment: MRSA nares positive. Plan ID consult on Monday AM with ID returns. 

May need 2 weeks IV vanc. f/u sputum culture if able to produce.  


Continue vancomycin, ceftriaxone, azithromycin 


Clinically improving. 


f/u Strep Pna and Legionella Urine Antigens


BCx NGTD x1. 





Continue home dulera, nebs, spiriva, prednisone. Will try to clarify if PFTs 

confirm e/o  COPD





   





(3) CAD (coronary artery disease)


Current Visit: Yes   Status: Acute   Code(s): I25.10 - ATHSCL HEART DISEASE OF 

NATIVE CORONARY ARTERY W/O ANG PCTRS   SNOMED Code(s): 93725645


   Comment: continue aspirin 81mg daily. 


Troponin peaked 0.10. Likely demand ischemia in setting of sepsis. Never chest 

pain.  No EKG changes. RBBB   





(4) CKD (chronic kidney disease) stage 3, GFR 30-59 ml/min


Current Visit: No   Status: Acute   Priority: Medium   Code(s): N18.3 - CHRONIC 

KIDNEY DISEASE, STAGE 3 (MODERATE)   SNOMED Code(s): 689158034


   Comment: - creatinine at baseline, stable   





(5) Chronic diastolic (congestive) heart failure


Current Visit: No   Status: Acute   Code(s): I50.32 - CHRONIC DIASTOLIC (

CONGESTIVE) HEART FAILURE   SNOMED Code(s): 197631030


   Comment: prominent dependent edema and increasing weights. 


Restart diuretics. 40IV lasix today and home 40po daily starting tomorrow. 


daily weights


strict ios


 on admission.    





(6) Dyslipidemia


Current Visit: No   Status: Acute   Code(s): E78.5 - HYPERLIPIDEMIA, 

UNSPECIFIED   SNOMED Code(s): 073036254


   Comment: 


 - Continue statin    


Status and Disposition: 





medicine inpatient. Needing IV abx currently. PT ordered.

## 2018-07-22 NOTE — PN
Subjective


Date of Service: 07/22/18


Interval History: 











Pt feeling better. Afebrile. no cough or shortness of breath. 


WBC 13.9 from 13.1. hgb 8.5 from 6.0


leg edema slightly down. 


wanting to leave tomorrow. 











Objective


Active Medications: 








Acetaminophen (Tylenol Tab*)  650 mg PO Q4H PRN


   PRN Reason: FEVER/PAIN


Albuterol (Ventolin 2.5 Mg/3 Ml Neb.Sol*)  2.5 mg INH Q2H PRN


   PRN Reason: SOB/WHEEZING


Aspirin (Aspirin Ec Tab*)  81 mg PO DAILY [facility]


   Last Admin: 07/22/18 08:42 Dose:  81 mg


Atorvastatin Calcium (Lipitor*)  10 mg PO BEDTIME [facility]


   Last Admin: 07/21/18 20:42 Dose:  10 mg


Furosemide (Lasix Tab*)  40 mg PO DAILY [facility]


   Last Admin: 07/22/18 08:42 Dose:  40 mg


Gabapentin (Neurontin Cap(*))  100 mg PO TID PRN


   PRN Reason: PAIN


Heparin Sodium (Porcine) (Heparin Vial(*))  5,000 units SUBCUT Q12HR [facility]


   Last Admin: 07/22/18 08:44 Dose:  5,000 units


Hydroxychloroquine Sulfate (Plaquenil Tab*)  400 mg PO DAILY [facility]


   Last Admin: 07/22/18 08:42 Dose:  400 mg


Ceftriaxone Sodium 1 gm/ (Sodium Chloride)  50 mls @ 200 mls/hr IVPB Q24H [facility]


   Last Admin: 07/22/18 07:40 Dose:  200 mls/hr


Azithromycin 500 mg/ Sodium (Chloride)  250 mls @ 250 mls/hr IVPB Q24H [facility]


   Last Admin: 07/22/18 08:41 Dose:  250 mls/hr


Vancomycin HCl 750 mg/ Sodium (Chloride)  250 mls @ 166.667 mls/hr IVPB Q12H [facility]


   Last Admin: 07/22/18 16:29 Dose:  166.667 mls/hr


Magnesium Oxide (Magox 400 Tab*)  800 mg PO DAILY [facility]


   Last Admin: 07/22/18 08:42 Dose:  800 mg


Metoprolol Tartrate (Lopressor Tab*)  37.5 mg PO BID [facility]


   Last Admin: 07/22/18 08:42 Dose:  37.5 mg


Mometasone Furoate/Formoterol Fumar (Dulera 200/5 Mdi*)  2 puff INH BID [facility]


   Last Admin: 07/22/18 07:13 Dose:  2 puff


Montelukast Sodium (Singulair Tab*)  10 mg PO DAILY PRN


   PRN Reason: Allergy Symptoms


Omeprazole (Prilosec Cap*)  20 mg PO QAM@0730 [facility]


   Last Admin: 07/22/18 07:40 Dose:  20 mg


Ondansetron HCl (Zofran Inj*)  4 mg IV Q6H PRN


   PRN Reason: NAUSEA


Pharmacy Consult (Vancomycin Per Pharmacy*)  1 note FOLLOW UP .VANC PER 

PHARMACY [facility]


Pharmacy Profile Note (Vancomycin Trough Check)  1 note FOLLOW UP 1530 ONE


   Stop: 07/25/18 15:31


Potassium Chloride (Klor Con Er Tab*)  10 meq PO DAILY [facility]


   Last Admin: 07/22/18 08:42 Dose:  10 meq


Prednisone (Deltasone Tab*)  20 mg PO DAILY [facility]


   Last Admin: 07/22/18 08:41 Dose:  20 mg


Psyllium Hydrophilic Mucilloid (Metamucil Oscar*)  1 pkt PO BID [facility]


   Last Admin: 07/22/18 10:12 Dose:  1 pkt








Oxygen Devices in Use Now: None


Appearance: NAD, smiling in bed.


Respiratory: Symmetrical Chest Expansion and Respiratory Effort, Clear to 

Auscultation


Cardiovascular: NL Sounds; No Murmurs; No JVD


Abdominal: NL Sounds; No Tenderness; No Distention, No Hepatosplenomegaly


Extremities: No Edema, - - 1+ edema. 


Neurological: Alert and Oriented x 3


Result Diagrams: 


 07/22/18 13:31





 07/22/18 05:51


Additional Lab and Data: 








  Laboratory Results - last 24 hr











  07/22/18 07/22/18 07/22/18





  05:51 13:31 13:31


 


WBC    13.9 H


 


RBC    2.56 L


 


Hgb    8.5 L


 


Hct    26 L


 


MCV    101 H


 


MCH    33 H


 


MCHC    33


 


RDW    16 H


 


Plt Count    180


 


MPV    8.8


 


Neut % (Auto)    Not Reportable


 


Lymph % (Auto)    Not Reportable


 


Mono % (Auto)    Not Reportable


 


Eos % (Auto)    Not Reportable


 


Baso % (Auto)    Not Reportable


 


Absolute Neuts (auto)    12.7 H


 


Absolute Lymphs (auto)    0.7 L


 


Absolute Monos (auto)    0.4


 


Absolute Eos (auto)    0


 


Absolute Basos (auto)    0


 


Absolute Nucleated RBC    Not Reportable


 


Immature Gran %    4


 


Neutrophils %    89 H


 


Lymphocytes %    6 L


 


Monocytes %    1


 


Eosinophils %    0


 


Basophils %    0


 


Metamyelocytes %    2


 


Myelocytes %    2 H


 


Nucleated RBC %    Not Reportable


 


Abs Neuts (Manual)    12.4 H


 


Abs Lymphs (Manual)    0.8 L


 


Abs Monocytes (Manual)    0.1


 


Absolute Eos (Manual)    0


 


Abs Basophils (Manual)    0


 


Normal RBC Morphology    Not Reportable


 


Macrocytosis    1+


 


BUN  31 H  


 


Creatinine  1.49 H  


 


Est GFR ( Amer)  54.1  


 


Est GFR (Non-Af Amer)  44.7  


 


Vancomycin Trough   20.9 




















Microbiology and Other Data: 








  Microbiology





07/20/18 15:53   Blood Venous   Aerobic Blood Culture - Preliminary


                            No Growth Day 2


07/20/18 15:53   Blood Venous   Anaerobic Blood Culture - Preliminary


                            No Growth Day 2


07/20/18 12:04   Blood Venous   Aerobic Blood Culture - Preliminary


                            No Growth Day 2


07/20/18 12:04   Blood Venous   Anaerobic Blood Culture - Preliminary


                            No Growth Day 2


07/20/18 13:35   Nasal   Nasal Screen MRSA (PCR) - Final


                            Mrsa Detected

















Assess/Plan/Problems-Billing


Assessment: 





87 yo male PMH CHF, HTN, COPD, Afib, SSS s/p recent PPM (7/2/18), HLD, CKD p/w 

Sepsis  2/2 RML pneumonia. MRSA +. 





- Patient Problems


(1) Sepsis


Current Visit: Yes   Status: Acute   Comment: SIRS (tachypnea, leukocytosis), 

lactic acidosis. source: RML pneumonia. 


Antbiotics as below. 


WBC improved but still elevated. 


procalcitonin 0.1





   





(2) Pneumonia


Current Visit: No   Status: Resolved   Code(s): J18.9 - PNEUMONIA, UNSPECIFIED 

ORGANISM   SNOMED Code(s): 714361159


   Comment: MRSA nares positive. ID consult placed. May need home going IV 

vanc. f/u sputum culture if able to produce.  


Continue vancomycin, ceftriaxone, azithromycin 


Clinically improving. 


f/u Strep Pna and Legionella Urine Antigens (just obtained today)


BCx NGTD x2. 





Continue home dulera, nebs, spiriva, prednisone. Will try to clarify if PFTs 

confirm e/o  COPD





   





(3) CAD (coronary artery disease)


Current Visit: Yes   Status: Acute   Code(s): I25.10 - ATHSCL HEART DISEASE OF 

NATIVE CORONARY ARTERY W/O ANG PCTRS   SNOMED Code(s): 84909861


   Comment: continue aspirin 81mg daily. 


Troponin peaked 0.10. Likely demand ischemia in setting of sepsis. Never chest 

pain.  No EKG changes. RBBB   





(4) CKD (chronic kidney disease) stage 3, GFR 30-59 ml/min


Current Visit: No   Status: Acute   Priority: Medium   Code(s): N18.3 - CHRONIC 

KIDNEY DISEASE, STAGE 3 (MODERATE)   SNOMED Code(s): 095877277


   Comment: - creatinine at baseline, stable   





(5) Chronic diastolic (congestive) heart failure


Current Visit: No   Status: Acute   Code(s): I50.32 - CHRONIC DIASTOLIC (

CONGESTIVE) HEART FAILURE   SNOMED Code(s): 248898510


   Comment: prominent dependent edema and increasing weights. 


continue home 40po daily 


daily weights


strict ios


 on admission.    





(6) Dyslipidemia


Current Visit: No   Status: Acute   Code(s): E78.5 - HYPERLIPIDEMIA, 

UNSPECIFIED   SNOMED Code(s): 722713178


   Comment: 


 - Continue statin    


Status and Disposition: 





medicine inpatient. Needing IV abx currently. Not needing LINH per PT.  ID 

consulted

## 2018-07-23 NOTE — RAD
INDICATION:  Right middle lobe pneumonia.



COMPARISON:  Comparison is made with a prior chest x-ray study from July 20, 2018.



TECHNIQUE: Dual-energy PA  and lateral views of the chest were obtained.



FINDINGS:   The patient appears to be status post coronary artery bypass surgery. There

are multiple sternal sutures and mediastinal clips. There is a dual-chamber transvenous

pacemaker present. The heart is mildly enlarged and unchanged from the prior exam.



The lungs are hyperinflated. There is diffuse prominence of the interstitial markings and

blunting of both costophrenic angles. The patchy infiltrate in the right midlung appears

less prominent.



IMPRESSION:  

1. RESOLVING RIGHT LUNG INFILTRATE.

2. POSSIBLE SUPERIMPOSED PULMONARY EDEMA, UNCHANGED.

## 2018-07-23 NOTE — CONS
CONSULTATION REPORT:

 

DATE OF CONSULT:  07/23/18

 

REQUESTING PHYSICIAN:  Eze Baltazar MD.

 

CONSULTING SERVICE:  Infectious disease.

 

REASON FOR CONSULT:  Pneumonia.

 

IMPRESSION:

1.  Cough, fever, right upper ashutosh field infiltrate.  He does have a PCR of his 
naris which was positive for methicillin-resistant Staphylococcus aureus, which 
was detected back in May as well.  His pneumonia could be viral, could be 
bacterial and it is bacterial, it could be MRSA or sensitive staph or strep or 
gram negative like haemophilus.  He is significantly improved after few days of 
IV antibiotics.

2.  Sick sinus syndrome, status post pacemaker placement, 07/02/18.  The 
pacemaker site has healed and not inflamed.  His blood cultures here are 
negative.

3.  Coronary artery disease and history of congestive heart failure.

4.  Chronic kidney disease, stage 3.

 

RECOMMENDATIONS:  Doxycycline 100 mg by mouth twice a day and Keflex 500 mg by 
mouth three times a day for seven more days to complete a 10-day course of 
antibiotics and follow up with me in one to two weeks and chest x-ray in a 
month. We discussed that he will need to return to the emergency room if return 
fevers, worsening shortness of breath.

 

HISTORY OF PRESENT ILLNESS:  This is an 86-year-old man who had a pacemaker 
placed early July, which has been uneventful and then for a couple of days 
before coming to the hospital, he developed malaise, decreased appetite, some 
chills, and fevers, and then what he described as a rattling in the chest, 
there was a lot of wheezing and occasional nonproductive cough.  He came to the 
hospital on 07/20/18.  He had a chest x-ray that showed infiltrate in right mid 
lung fields and trace bilateral pleural effusions.  He was started on a dose of 
Levaquin and Zosyn in the ER and then changed to vancomycin, ceftriaxone, 
azithromycin, which he has tolerated well. His fevers and chills are gone.  His 
cough is better. The wheezes gone.  He has been on room air the whole time.  
His white count was 21,000 on admission was down to 13 yesterday and 15 today.  
He has got no fevers, chills, or sweats.  He has no pain at the pacemaker site.

 

PAST MEDICAL HISTORY:

1.  Coronary artery disease, status post CABG.

2.  History of congestive heart failure, most recent ejection fraction 45 to 50%
.

3.  Gastroesophageal reflux disease.

4.  Hypertension.

5.  Hyperlipidemia.

6.  Stage 3 chronic kidney disease.

7.  Atrial fibrillation.

8.  Sick sinus syndrome.

9.  Status post pacemaker placement.

10.  Right bundle branch block with left anterior fascicular block.

11.  Status post hernia repair.

 

MEDICATIONS:

1.  Tylenol.

2.  Albuterol.

3.  Aspirin.

4.  Lipitor.

5.  Lasix.

6.  Gabapentin.

7.  Heparin subcutaneous injection.

8.  Plaquenil.

9.  Magnesium.

10.  Metoprolol.

11.  Omeprazole.

12.  Prednisone 20 mg a day.

13.  Ceftriaxone 1 g a day.

14.  Vancomycin 750 mg every 12 hours.

 

ALLERGIES:  No known drug allergies.

 

FAMILY HISTORY:  No recurrent infections or tuberculosis.

 

SOCIAL HISTORY:  He lives in [place] with his wife.  He has no travel or sick 
contacts.

 

REVIEW OF SYSTEMS:  All negative except as noted above in the history of 
present illness.

 

PHYSICAL EXAM:  Vital Signs:  Temperature 36.3, heart rate 75, respiratory rate 
16, blood pressure 166/72, oxygen saturation 98% on room air.  General:  He is 
awake and not in distress.  Neurologic:  He is oriented x3.  Follows all 
commands.  Moves all extremities.  HEENT:  There is no conjunctival hemorrhage.
  Oropharynx without thrush.  Neck:  Supple without mass.  Lymph Nodes:  There 
is no cervical, supraclavicular, inguinal, axillary, or epitrochlear 
lymphadenopathy.  Heart: Regular rate and rhythm without murmurs, rubs, or 
gallops.  Lungs:  Scattered rales at the bases without wheeze or rhonchi.  
Abdomen:  Soft, nontender, and nondistended.  There is bowel sounds present.  
Chest:  The left chest pacemaker site is healed.  There is no erythema or 
tenderness.  Skin:  There is no rashes or splinter hemorrhage.  Musculoskeletal
:  No spinal tenderness to palpation.

 

DIAGNOSTIC STUDIES/LAB DATA:  White blood cell count 15, hemoglobin 9, and 
platelets 178, .  Creatinine is 1.3.  CRP was 4.7 on admission.

 

Please see impression and recommendations outlined above, which I have 
discussed with Dr. Baltazar.

 

Thanks for asking me to see Mr. Vazquez in consultation.

 

 [id number]/CPS #: [id number]

MARIO

## 2018-07-23 NOTE — PN
Subjective


Date of Service: 07/23/18


Interval History: 








Infiltration of peripheral IV this AM, new one placed on right arm. ID 

consulted. 


WBC travis to 15.0. Afebrile. Denies SOB or cough. No other complaint. 














Objective


Active Medications: 








Acetaminophen (Tylenol Tab*)  650 mg PO Q4H PRN


   PRN Reason: FEVER/PAIN


Albuterol (Ventolin 2.5 Mg/3 Ml Neb.Sol*)  2.5 mg INH Q2H PRN


   PRN Reason: SOB/WHEEZING


Aspirin (Aspirin Ec Tab*)  81 mg PO DAILY [facility]


   Last Admin: 07/23/18 09:22 Dose:  81 mg


Atorvastatin Calcium (Lipitor*)  10 mg PO BEDTIME [facility]


   Last Admin: 07/22/18 22:04 Dose:  10 mg


Furosemide (Lasix Tab*)  40 mg PO DAILY [facility]


   Last Admin: 07/23/18 09:22 Dose:  40 mg


Gabapentin (Neurontin Cap(*))  100 mg PO TID PRN


   PRN Reason: PAIN


Heparin Sodium (Porcine) (Heparin Vial(*))  5,000 units SUBCUT Q12HR [facility]


   Last Admin: 07/23/18 09:22 Dose:  5,000 units


Hydroxychloroquine Sulfate (Plaquenil Tab*)  400 mg PO DAILY [facility]


   Last Admin: 07/23/18 09:22 Dose:  400 mg


Ceftriaxone Sodium 1 gm/ (Sodium Chloride)  50 mls @ 200 mls/hr IVPB Q24H [facility]


   Last Admin: 07/23/18 08:07 Dose:  200 mls/hr


Vancomycin HCl 750 mg/ Sodium (Chloride)  250 mls @ 166.667 mls/hr IVPB Q12H [facility]


   Last Admin: 07/23/18 06:20 Dose:  166.667 mls/hr


Magnesium Oxide (Magox 400 Tab*)  800 mg PO DAILY [facility]


   Last Admin: 07/23/18 09:22 Dose:  800 mg


Metoprolol Tartrate (Lopressor Tab*)  37.5 mg PO BID [facility]


   Last Admin: 07/23/18 09:22 Dose:  37.5 mg


Mometasone Furoate/Formoterol Fumar (Dulera 200/5 Mdi*)  2 puff INH BID [facility]


   Last Admin: 07/23/18 09:59 Dose:  2 puff


Montelukast Sodium (Singulair Tab*)  10 mg PO DAILY [facility]


   PRN Reason: Allergy Symptoms


Omeprazole (Prilosec Cap*)  20 mg PO QAM@0730 [facility]


   Last Admin: 07/23/18 08:07 Dose:  20 mg


Ondansetron HCl (Zofran Inj*)  4 mg IV Q6H PRN


   PRN Reason: NAUSEA


Pharmacy Consult (Vancomycin Per Pharmacy*)  1 note FOLLOW UP .VANC PER 

PHARMACY [facility]


Pharmacy Profile Note (Vancomycin Trough Check)  1 note FOLLOW UP 1530 ONE


   Stop: 07/25/18 15:31


Potassium Chloride (Klor Con Er Tab*)  10 meq PO DAILY [facility]


   Last Admin: 07/23/18 09:22 Dose:  10 meq


Prednisone (Deltasone Tab*)  20 mg PO DAILY [facility]


   Last Admin: 07/23/18 09:22 Dose:  20 mg


Psyllium Hydrophilic Mucilloid (Metamucil Oscar*)  1 pkt PO BID [facility]


   Last Admin: 07/23/18 09:15 Dose:  Not Given








 Vital Signs - 8 hr











  07/23/18 07/23/18 07/23/18





  04:31 08:16 08:45


 


Temperature 97.7 F 97.4 F 


 


Pulse Rate 73 75 


 


Respiratory 26 16 16





Rate   


 


Blood Pressure 143/82 166/72 





(mmHg)   


 


O2 Sat by Pulse 98 98 





Oximetry   














  07/23/18





  10:05


 


Temperature 


 


Pulse Rate 76


 


Respiratory 16





Rate 


 


Blood Pressure 





(mmHg) 


 


O2 Sat by Pulse 96





Oximetry 











Oxygen Devices in Use Now: None


Appearance: NAD


Eyes: No Scleral Icterus, PERRLA


Ears/Nose/Mouth/Throat: NL Teeth, Lips, Gums, Mucous Membranes Moist


Neck: NL Appearance and Movements; NL JVP, Trachea Midline


Respiratory: Symmetrical Chest Expansion and Respiratory Effort, Clear to 

Auscultation


Cardiovascular: NL Sounds; No Murmurs; No JVD, RRR


Abdominal: NL Sounds; No Tenderness; No Distention


Extremities: - - 2+ edema


Skin: No Rash or Ulcers


Neurological: Alert and Oriented x 3, NL Sensation, NL Muscle Strength and Tone


Nutrition: Taking PO's


Result Diagrams: 


 07/23/18 08:01





 07/23/18 08:01


Additional Lab and Data: 








  Laboratory Results - last 24 hr











  07/22/18 07/22/18 07/23/18





  13:31 13:31 08:01


 


WBC   13.9 H  15.0 H


 


RBC   2.56 L  2.79 L


 


Hgb   8.5 L  9.4 L


 


Hct   26 L  28 L


 


MCV   101 H  100 H


 


MCH   33 H  34 H


 


MCHC   33  34


 


RDW   16 H  15


 


Plt Count   180  178


 


MPV   8.8  9.0


 


Neut % (Auto)   Not Reportable  78.5


 


Lymph % (Auto)   Not Reportable  14.0 L


 


Mono % (Auto)   Not Reportable  6.8


 


Eos % (Auto)   Not Reportable  0.2


 


Baso % (Auto)   Not Reportable  0.5


 


Absolute Neuts (auto)   12.7 H  11.8 H


 


Absolute Lymphs (auto)   0.7 L  2.1


 


Absolute Monos (auto)   0.4  1.0 H


 


Absolute Eos (auto)   0  0


 


Absolute Basos (auto)   0  0.1


 


Absolute Nucleated RBC   Not Reportable  0


 


Immature Gran %   4 


 


Neutrophils %   89 H 


 


Lymphocytes %   6 L 


 


Monocytes %   1 


 


Eosinophils %   0 


 


Basophils %   0 


 


Metamyelocytes %   2 


 


Myelocytes %   2 H 


 


Nucleated RBC %   Not Reportable  0


 


Abs Neuts (Manual)   12.4 H 


 


Abs Lymphs (Manual)   0.8 L 


 


Abs Monocytes (Manual)   0.1 


 


Absolute Eos (Manual)   0 


 


Abs Basophils (Manual)   0 


 


Normal RBC Morphology   Not Reportable 


 


Macrocytosis   1+ 


 


Sodium   


 


Potassium   


 


Chloride   


 


Carbon Dioxide   


 


Anion Gap   


 


BUN   


 


Creatinine   


 


Est GFR ( Amer)   


 


Est GFR (Non-Af Amer)   


 


BUN/Creatinine Ratio   


 


Glucose   


 


Calcium   


 


Vancomycin Trough  20.9  














  07/23/18





  08:01


 


WBC 


 


RBC 


 


Hgb 


 


Hct 


 


MCV 


 


MCH 


 


MCHC 


 


RDW 


 


Plt Count 


 


MPV 


 


Neut % (Auto) 


 


Lymph % (Auto) 


 


Mono % (Auto) 


 


Eos % (Auto) 


 


Baso % (Auto) 


 


Absolute Neuts (auto) 


 


Absolute Lymphs (auto) 


 


Absolute Monos (auto) 


 


Absolute Eos (auto) 


 


Absolute Basos (auto) 


 


Absolute Nucleated RBC 


 


Immature Gran % 


 


Neutrophils % 


 


Lymphocytes % 


 


Monocytes % 


 


Eosinophils % 


 


Basophils % 


 


Metamyelocytes % 


 


Myelocytes % 


 


Nucleated RBC % 


 


Abs Neuts (Manual) 


 


Abs Lymphs (Manual) 


 


Abs Monocytes (Manual) 


 


Absolute Eos (Manual) 


 


Abs Basophils (Manual) 


 


Normal RBC Morphology 


 


Macrocytosis 


 


Sodium  130 L


 


Potassium  4.2


 


Chloride  97 L


 


Carbon Dioxide  25


 


Anion Gap  8


 


BUN  25 H


 


Creatinine  1.33 H


 


Est GFR ( Amer)  61.7


 


Est GFR (Non-Af Amer)  51.0


 


BUN/Creatinine Ratio  18.8


 


Glucose  86


 


Calcium  8.9


 


Vancomycin Trough 























Microbiology and Other Data: 





 Microbiology





07/20/18 15:53   Blood Venous   Aerobic Blood Culture - Preliminary


                            No Growth Day 2


07/20/18 15:53   Blood Venous   Anaerobic Blood Culture - Preliminary


                            No Growth Day 2


07/20/18 12:04   Blood Venous   Aerobic Blood Culture - Preliminary


                            No Growth Day 2


07/20/18 12:04   Blood Venous   Anaerobic Blood Culture - Preliminary


                            No Growth Day 2


07/20/18 13:35   Nasal   Nasal Screen MRSA (PCR) - Final


                            Mrsa Detected




















Assess/Plan/Problems-Billing


Assessment: 





87 yo male PMH CHF, HTN, COPD, Afib, SSS s/p recent PPM (7/2/18), HLD, CKD p/w 

Sepsis  2/2 RML pneumonia. MRSA +. Clinically non-toxic appearing. 





- Patient Problems


(1) Sepsis


Current Visit: Yes   Status: Acute   Comment: SIRS (tachypnea(resolved), 

leukocytosis(continues), lactic acidosis (resolved). source: RML pneumonia. 


Antibiotics as below. 


WBC creeping up again  


procalcitonin 0.1





   





(2) Pneumonia


Current Visit: No   Status: Resolved   Code(s): J18.9 - PNEUMONIA, UNSPECIFIED 

ORGANISM   SNOMED Code(s): 640714884


   Comment: MRSA nares positive. ID consulted. May need home going IV vanc (

would require PICC). f/u sputum culture if able to produce.  Alternatively po 

doxy + cephalosporin.


Continue vancomycin, ceftriaxone, azithromycin(switch to po) 


Clinically improved


Repeating CXR


f/u Strep Pna and Legionella Urine Antigens (still not back?, talked to RN again

)


BCx NGTD x2. 





Continue home dulera, nebs, spiriva, prednisone. Will try to clarify if PFTs 

confirm e/o  COPD





   





(3) CAD (coronary artery disease)


Current Visit: Yes   Status: Acute   Code(s): I25.10 - ATHSCL HEART DISEASE OF 

NATIVE CORONARY ARTERY W/O ANG PCTRS   SNOMED Code(s): 05515269


   Comment: continue aspirin 81mg daily. 


Troponin peaked 0.10. Likely demand ischemia in setting of sepsis. Never chest 

pain.  No EKG changes. RBBB   





(4) CKD (chronic kidney disease) stage 3, GFR 30-59 ml/min


Current Visit: No   Status: Acute   Priority: Medium   Code(s): N18.3 - CHRONIC 

KIDNEY DISEASE, STAGE 3 (MODERATE)   SNOMED Code(s): 997696626


   Comment: - creatinine at baseline, stable   





(5) Chronic diastolic (congestive) heart failure


Current Visit: No   Status: Acute   Code(s): I50.32 - CHRONIC DIASTOLIC (

CONGESTIVE) HEART FAILURE   SNOMED Code(s): 842177113


   Comment: prominent dependent edema and increasing weights. 


continue home 40po daily, will give 2 doses today. 


daily weights


strict ios


 on admission.    





(6) Dyslipidemia


Current Visit: No   Status: Acute   Code(s): E78.5 - HYPERLIPIDEMIA, 

UNSPECIFIED   SNOMED Code(s): 376951850


   Comment: 


 - Continue statin    


Status and Disposition: 





medicine inpatient. Needing IV abx currently (?possible PICC for vanc). Not 

needing LINH per PT.  Awaiting ID recs

## 2018-07-24 NOTE — DS
DISCHARGE SUMMARY:

 

DATE OF ADMISSION:  07/20/18

 

DATE OF DISCHARGE:  07/23/18

 

ADMITTING PROVIDER:  Henrique Grande NP

 

PRIMARY CARE PHYSICIAN:  Cholo Boo MD

 

CHIEF COMPLAINT:  Cough, congestion.

 

PRINCIPAL DIAGNOSIS:  Right middle lobe pneumonia; sepsis secondary to 
pneumonia.

 

HISTORY OF PRESENT ILLNESS AND HOSPITAL COURSE:  Carmelo Vazquez is an 86-year-old 
male with past medical history of CAD, CHF (EF 45 to 50%), GERD, hypertension, 
hyperlipidemia, stage III chronic kidney disease, atrial fibrillation, right 
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 
syndrome, status post pacemaker placement.  Two weeks prior to admission, he 
presented to the emergency room with nonproductive cough and chest congestion.  
Please see [facility] for full details.  He denied fevers, chills
, shortness of breath. His initial chest x-ray was concerning for right middle 
lobe infiltrate.  He had leukocytosis of 21.1 and lactic acidosis of 2.1, then 
increased to 2.9.  Troponin 0.07.  BNP of 999.  He was afebrile on admission 
and throughout hospitalization. He has tachypnea up to 28 respirations per 
minute, but never had hypoxic respiratory failure.  He was started on vancomycin
, ceftriaxone, azithromycin with concern for healthcare associated pneumonia, 
especially given his history of MRSA positive nares, which was again confirmed 
on nares testing this admission.  He improved clinically by hospital day #2.  
He was able to ambulate with physical therapy without need for acute skilled 
physical therapy interventions.  He does have a history of chronic leukocytosis 
followed by Dr. Boo and his white count never quite normalized, but did 
improve throughout the course.  He was never febrile.  Infectious disease was 
consulted given the concern for potential MRSA pneumonia that would require IV 
vancomycin and Dr. Bridges recommended completing a total 10-day course with 
doxycycline and Keflex as an outpatient with a followup with him along with 
chest x-ray in 1 month.  His diuretics were held on the first day with concern 
for sepsis, but restarted on hospital #2 due to his increased weight and 
swelling in his lower extremities.

 

DISCHARGE MEDICATIONS:  Include:

1.  Keflex 500 mg p.o. b.i.d. for 10 days (new).

2.  Aspirin 81 mg daily.

3.  Atorvastatin 10 mg q.h.s.

4.  Cholecalciferol 1000 units p.o. daily.

5.  Doxycycline 100 mg p.o. b.i.d. for 10 days (new).

6.  Lasix 40 mg daily.

7.  Gabapentin 100 mg p.o. t.i.d.

8.  Plaquenil 400 mg p.o. daily.

9.  Atarax 25 mg p.o. q.i.d. p.r.n.

10.  Magnesium oxide 800 mg p.o. daily.

11.  Metoprolol tartrate 37.5 mg p.o. b.i.d.

12.  Singulair 10 mg p.o. daily.

13.  Omeprazole 20 mg p.o. q.a.m.

14.  Potassium chloride 5 mEq p.o. b.i.d.

15.  Prednisone 20 mg p.o. daily.

16.  Metamucil 0.52 g p.o. b.i.d.

 

DIET:  Heart healthy unchanged.

 

ACTIVITY LEVEL:  No restrictions other than previous left upper extremity 
restrictions per his recent permanent pacemaker.

 

FOLLOWUP:  Please follow up with Dr. Cholo Boo.  He has already scheduled 
appointment actually on Wednesday, 07/25/18, previously arranged.  He will 
follow up with Dr. Antoni Bridges within 2 weeks and get a repeat chest x-ray 
within 1 month.

 

TIME SPENT:  On discharge 35 minutes.

 

 [id number]/CPS #: [id number]

[email or web address]

## 2018-09-21 NOTE — XMS REPORT
Carmelo Vazquez JR

 Created on:2018



Patient:JR Vazquez Lloyd R

Sex:Male

:1932

External Reference #:[id number]





Demographics







 Address  [address] [place] [address]

 

 Home Phone  [phone]

 

 Preferred Language  English

 

 Marital Status  Not  Or 

 

 Taoism Affiliation  Unknown

 

 Race  White

 

 Ethnic Group  Not  Or 









Author







 Organization  [facility]

 

 Address  [address]



   [place] [address]

 

 Phone  [phone]









Support







 Name  Relationship  Address  Phone

 

 Catherine Vazquez  Unavailable  Unavailable  Unavailable









Care Team Providers







 Name  Role  Phone

 

 Cholo Boo MD  Primary Care Physician  Unavailable









Payers







 Type  Date  Identification Numbers  Payment Provider  Subscriber

 

 Medicare Primary  Effective:  Policy Number:  Medicare  Carmelo Vazquez JR



   1999  [id number]    









 PayID: [id number]  [address]









 [place] [address]









 Medigap Part B  Effective:  Policy Number:  [employer]  Carmelo Vazquez,



   1991  [id number]    









 PayID: [id number]  [address]









 [place] [address]









 Medigap Part B  Effective: 1998  Policy Number:   Paris MENDEZ  Catherine Vazquez



     [id number]    









 Expires: 2017  Group Number: [id number]  [address] [address]









 PayID: [id number]  [place] [address]









 Commercial  Effective: 12/15/2016  Policy Number:  [employer]  Carmelo Vazquez JR



     [id number]    









 Expires: 10/23/2018  Group Number: [id number]  [id number] 









 PayID: [id number]  [address] [address]







Problems







 Date  Description  Provider  Status

 

 Onset: 2017  Aortic valve disorder  Tay Corcoran M.D., KIMBER,  Active



     [facility]  

 

 Onset: 2017  Athscl heart disease of native  Tay Corcoran M.D., KIMBER,  
Active



   coronary artery w/o ang pctrs  [facility]  

 

 Onset: 2017  Essential hypertension  Tay Corcoran M.D., KIMBER,  Active



     [facility]  

 

 Onset: 2017  Hyperlipidemia  Tay Corcoran M.D., [facility],  Active



     [facility]  

 

 Onset: 2017  Right bundle branch block  Tay Corcoran M.D., [facility],  Active



     [facility]  

 

 Onset: 2017  Paroxysmal atrial fibrillation  Tay Corcoran M.D., [facility],  
Active



     [facility]  

 

 Onset: 2017  Chronic kidney disease stage 3  Tay Corcoran M.D., [facility],  
Active



     [facility]  

 

 Onset: 2018  Syncope and collapse  Tay Corcoran M.D., [facility],  Active



     [facility]  

 

 Onset: 2018  Chronic diastolic heart failure  Eze Baltazar MD  Active

 

 Onset: 2018  Sepsis, unspecified organism  Eze Baltazar MD  Active

 

 Onset: 2018  Chronic kidney disease  Nathan Grande N.P.  Active

 

 Onset: 2018  Hyp hrt & chr kdny dis w hrt fail  Nathan Grande N.P.  
Active



   and stg 1-4/unsp chr royceny    

 

 Onset: 2018  Heart failure, unspecified  Nathan Grande, N.P.  Active

 

 Onset: 2018  Pneumonia  Nathan Grande, N.P.  Active







Family History







 Date  Family Member(s)  Problem(s)  Comments

 

   General  Arthritis  







Social History







 Type  Date  Description  Comments

 

 Marital Status      

 

 Occupation    Retired  

 

 ETOH Use    Drinks Alcoholic Beverages Rarely  once a year

 

 Smoking    Patient has never smoked  

 

 Recreational Drug Use    Denies Drug Use  

 

 Daily Caffeine    Does Not Consume Caffeine  

 

 Exercise Type/Frequency    Exercises rarely  







Allergies, Adverse Reactions, Alerts







 Date  Description  Reaction  Status  Severity  Comments

 

 2017  NKDA    active    







Medications







 Medication  Date  Status  Form  Strength  Qnty  SIG  Indications  Ordering



                 Provider

 

 Compression    Active  Misc    2unit  wear on  R60.9  Aura S.



 Stockings  /        s  both legs    Foster,



             daily and    N.P.



             remove at    



             night    

 

 Metamucil    Active  Capsules  0.52gm    1 cap by    Unknown



   /2017          mouth twice    



             a day with    



             water    

 

 Lipitor    Active  Tablets  10mg  90tab  1 by mouth            s  every night    Nilo,



             at bedtime    M.D.,



                 [facility],



                 [facility]

 

 Metoprolol    Active  Tablets  37.5mg    one ta bid    Tay



 Tar              BEHZAD Corcoran,



                 [facility],



                 [facility]

 

 Aspirin Adult Low  00  Active  Tablets  81mg    1 by mouth    Unknown



 Dose Ec  /0000    DR      every day    

 

 Omeprazole    Active  Capsules  20mg    1 by mouth    Unknown



   /0000    DR      every day    

 

 Potassium Chloride    Active  Tablets  10Meq    1 by mouth    Unknown



 ER  /0000    ER      every    



             day(taking    



             1/2 tab    



             Am,1/2 tab    



             PM)    

 

 Magnesium Oxide    Active  Tablets  400mg    2 by mouth    Unknown



   /0000          every day    

 

 Montelukast Sodium    Active  Tablets  10mg    1 by mouth    Unknown



   /0000          every day    



             as needed    

 

 Gabapentin    Active  Capsules  100mg    take 1    Unknown



             three times    



             a day    

 

 Hydroxychloroquine    Active  Tablets  200mg  180ta  take 2    Davey



 Sulfate  /0000        bs  tablet    Gloria,



             daily    M.D.

 

 Vitamin D-3    Active  Capsules  1000Unit    1 by mouth    Unknown



   /          every day    

 

 Furosemide    Active  Tablets  40mg    1 tab by    Unknown



   /          mouth every    



             day    

 

                 

 

 Keflex    Hx  Capsules  250mg  9caps  3 times a              day for 3    D. Bienvenido,



   -          days    M.D.



                 

 

 Benzonatate    Hx  Capsules  100mg  45cap  1 capsule 3            s  times daily    TESHA Faria,



   -          as needed    M.D.



                 

 

 Cozaar    Hx  Tablets  25mg    1 by mouth              twice    Nilo,



   -          daily(pt    M.D.,



             unsure if    [facility]          he is    [facility]



             taking    



             this)    

 

 Multi Complete    Hx  Capsules      daily    Unknown



   /2017              



   -              



                 

 

 Potassium Chloride    Hx  Solution  20Meq/50M    twice daily    Unknown



   /2017      L        



   -              



                 

 

 Digoxin    Hx  Tablets  125mcg  30tab  1 by mouth    Tay



           s  every day    Ben Corcoran M.D.,



                 [facility]              [facility]

 

 Cozaar    Hx  Tablets  50mg  30tab  2 by mouth    Tay



           s  every day    Ben Corcoran M.D.,



                 [facility]              [facility]

 

 Lasix    Hx  Tablets  40mg    1 by mouth    Unknown



   /0000          every day    



   -              



                 

 

 Norco    Hx  Tablets  5-325mg    one to two    Unknown



   /0000          tabs by    



   -          mouth every    



             4-6 hours    



   /          as needed    



             pain    

 

 Coumadin    Hx  Tablets  1mg    take as    Unknown



   /0000          directed    



   -              



                 

 

 Vitamin D    Hx  Tablets  1000Unit    by mouth    Unknown



   /0000          everyday    



   -              



                 

 

 Digoxin    Hx  Tablets  125mcg    1 by mouth    Unknown



   /0000          every day    



   -              



                 

 

 Sod Citrate-Citric    Hx  Solution  500-334mg    30 ml's 3    Unknown



 Acid  /0000      /5ML    times a    



   -          day.    



                 

 

 Prednisone    Hx  Tablets  10mg  60tab  Please take    Davey



           s  one tab by    Gloria,



   -          mouth once    M.D.



   08/21          daily    



   /2018              

 

 Torsemide    Hx  Tablets  20mg    take 2    Unknown



             tablets Up    



   -          To Once A    



             Day as    



   /2018          Needed For    



             Congestive    



             Heart    



             Failure    







Vital Signs







 Date  Vital  Result  Comment

 

 2018  Height  66 inches  5'6"









 Weight  171.00 lb  

 

 Heart Rate  60 /min  

 

 BP Systolic Sitting  128 mmHg  

 

 BP Diastolic Sitting  62 mmHg  

 

 Respiratory Rate  14 /min  

 

 Body Temperature  97.9 F  

 

 O2 % BldC Oximetry  98 %  

 

 BMI (Body Mass Index)  27.6 kg/m2  









 2018  Height  66 inches  5'6"









 Weight  171.12 lb  

 

 Heart Rate  64 /min  

 

 BP Systolic  128 mmHg  

 

 BP Diastolic  62 mmHg  

 

 Pain Level  8  

 

 O2 % BldC Oximetry  98 %  

 

 BMI (Body Mass Index)  27.6 kg/m2  









 2018  Height  66 inches  5'6"









 Weight  163.00 lb  w/ shoes

 

 Heart Rate  68 /min  

 

 BP Systolic Sitting  124 mmHg  lue regg cuff

 

 BP Diastolic Sitting  54 mmHg  lue regg cuff

 

 BP Systolic Standing  136 mmHg  lue regg cuff

 

 BP Diastolic Standing  54 mmHg  lue regg cuff

 

 Respiratory Rate  24 /min  

 

 BMI (Body Mass Index)  26.3 kg/m2  

 

 Ejection Fraction  45-50%  Echo 2018  Height  66 inches  5'6"









 Weight  161.00 lb  with shoes

 

 Heart Rate  64 /min  

 

 BP Systolic Sitting  122 mmHg  Lue reg cuff

 

 BP Diastolic Sitting  50 mmHg  Lue reg cuff

 

 BP Systolic Standing  126 mmHg  Lue reg cuff

 

 BP Diastolic Standing  54 mmHg  Lue reg cuff

 

 Respiratory Rate  16 /min  

 

 BMI (Body Mass Index)  26.0 kg/m2  

 

 Ejection Fraction  45-50%  date 18 ECHO









 2018  Height  66 inches  5'6"









 Weight  159.50 lb  

 

 Heart Rate  63 /min  

 

 BP Systolic Sitting  120 mmHg  

 

 BP Diastolic Sitting  55 mmHg  

 

 Respiratory Rate  14 /min  

 

 Pain Level  5  

 

 BMI (Body Mass Index)  25.7 kg/m2  









 2018  Height  66 inches  5'6"









 Weight  155.00 lb  at home w/o clothes

 

 Heart Rate  66 /min  

 

 BP Systolic Sitting  110 mmHg  lue rg cuff

 

 BP Diastolic Sitting  46 mmHg  lue rg cuff

 

 Respiratory Rate  18 /min  

 

 BMI (Body Mass Index)  25.0 kg/m2  

 

 Ejection Fraction  50-55%  echo 2018  Height  66 inches  5'6"









 Weight  156.00 lb  w/ shoes

 

 Heart Rate  56 /min  

 

 BP Systolic Standing  115 mmHg  lue reg cuff

 

 BP Diastolic Standing  50 mmHg  lue reg cuff

 

 Respiratory Rate  18 /min  

 

 BMI (Body Mass Index)  25.2 kg/m2  

 

 Ejection Fraction  50-55%  echo 2017  Height  66 inches  5'6"









 Weight  165.00 lb  with shoes

 

 Heart Rate  60 /min  sit and 68 stand HR

 

 BP Systolic Sitting  146 mmHg  Rue reg cuff

 

 BP Diastolic Sitting  60 mmHg  Rue reg cuff

 

 BP Systolic Standing  152 mmHg  Rue reg cuff

 

 BP Diastolic Standing  80 mmHg  Rue reg cuff

 

 Respiratory Rate  17 /min  

 

 BMI (Body Mass Index)  26.6 kg/m2  









 2017  Height  66 inches  5'6"









 Weight  164.50 lb  with shoes

 

 Heart Rate  62 /min  

 

 BP Systolic Sitting  108 mmHg  LA reg cuff

 

 BP Diastolic Sitting  58 mmHg  LA reg cuff

 

 BP Systolic Standing  112 mmHg  LA reg cuff

 

 BP Diastolic Standing  62 mmHg  LA reg cuff

 

 BMI (Body Mass Index)  26.5 kg/m2  

 

 Ejection Fraction  60%  echo 17







Results







 Test  Date  Test  Result  H/L  Range  Note

 

 Comp Metabolic Panel  2018  Sodium  128 mmol/L  Low  135-145  









 Potassium  4.3 mmol/L    3.5-5.0  

 

 Chloride  92 mmol/L  Low  101-111  

 

 Co2 Carbon Dioxide  27 mmol/L    22-32  

 

 Anion Gap  9 mmol/L    2-11  

 

 Glucose  90 mg/dL      

 

 Blood Urea Nitrogen  31 mg/dL  High  6-24  

 

 Creatinine  1.57 mg/dL  High  0.67-1.17  

 

 One Over Creatinine  0.63 mg/dL  Low  0.67-1.17  

 

 BUN/Creatinine Ratio  19.7    8-20  

 

 Calcium  8.7 mg/dL    8.6-10.3  

 

 Total Protein  6.1 g/dL  Low  6.4-8.9  

 

 Albumin  3.5 g/dL    3.2-5.2  

 

 Globulin  2.6 g/dL    2-4  

 

 Albumin/Globulin Ratio  1.3    1-3  

 

 Total Bilirubin  0.40 mg/dL    0.2-1.0  

 

 Alkaline Phosphatase  93 U/L      

 

 Alt  22 U/L    7-52  

 

 Ast  19 U/L    13-39  

 

 Egfr Non-  42.1    >60  

 

 Egfr   50.9    >60  1









 CBC Auto Diff  2018  White Blood Count  19.1 10^3/uL  High  3.5-10.8  









 Red Blood Count  2.81 10^6/uL  Low  4.00-5.40  

 

 Hemoglobin  9.3 g/dL  Low  14.0-18.0  

 

 Hematocrit  29 %  Low  42-52  

 

 Mean Corpuscular Volume  102 fL  High  80-94  

 

 Mean Corpuscular Hemoglobin  33 pg  High  27-31  

 

 Mean Corpuscular HGB Conc  32 g/dL    31-36  

 

 Red Cell Distribution Width  15 %    10.5-15  

 

 Platelet Count  222 10^3/uL    150-450  

 

 Mean Platelet Volume  9.3 um3    7.4-10.4  

 

 Abs Neutrophils  16.4 10^3/uL  High  1.5-7.7  

 

 Abs Lymphocytes  1.7 10^3/uL    1.0-4.8  

 

 Abs Monocytes  0.9 10^3/uL  High  0-0.8  

 

 Abs Eosinophils  0 10^3/uL    0-0.6  

 

 Abs Basophils  0.1 10^3/uL    0-0.2  

 

 Abs Nucleated RBC  0 10^3/uL      

 

 Granulocyte %  85.7 %  High  38-83  

 

 Lymphocyte %  8.9 %  Low  25-47  

 

 Monocyte %  4.7 %    0-7  

 

 Eosinophil %  0.2 %    0-6  

 

 Basophil %  0.5 %    0-2  

 

 Nucleated Red Blood Cells %  0      









 Laboratory test finding  06/15/2018  Complement C3  99 mg/dL    75 - 175  2









 Complement C4  16 mg/dL    14 - 40  3

 

 Anti Double Stranded Dna AB  43.0 IU/mL      4

 

 Miscellaneous Test  See Comment      5









 Comp Metabolic Panel  06/15/2018  Sodium  129 mmol/L  Low  135-145  









 Potassium  4.4 mmol/L    3.5-5.0  

 

 Chloride  95 mmol/L  Low  101-111  

 

 Co2 Carbon Dioxide  28 mmol/L    22-32  

 

 Anion Gap  6 mmol/L    2-11  

 

 Glucose  133 mg/dL  High    

 

 Blood Urea Nitrogen  24 mg/dL    6-24  

 

 Creatinine  1.42 mg/dL  High  0.67-1.17  

 

 BUN/Creatinine Ratio  16.9    8-20  

 

 Calcium  8.3 mg/dL  Low  8.6-10.3  

 

 Total Protein  5.9 g/dL  Low  6.4-8.9  

 

 Albumin  3.2 g/dL    3.2-5.2  

 

 Globulin  2.7 g/dL    2-4  

 

 Albumin/Globulin Ratio  1.2    1-3  

 

 Total Bilirubin  0.60 mg/dL    0.2-1.0  

 

 Alkaline Phosphatase  121 U/L  High    

 

 Alt  30 U/L    7-52  

 

 Ast  22 U/L    13-39  

 

 Egfr Non-  47.3    >60  

 

 Egfr   60.8    >60  6









 CBC Auto Diff  06/15/2018  White Blood Count  18.0 10^3/uL  High  3.5-10.8  









 Red Blood Count  2.93 10^6/uL  Low  4.00-5.40  

 

 Hemoglobin  9.8 g/dL  Low  14.0-18.0  

 

 Hematocrit  30 %  Low  42-52  

 

 Mean Corpuscular Volume  102 fL  High  80-94  

 

 Mean Corpuscular Hemoglobin  33 pg  High  27-31  

 

 Mean Corpuscular HGB Conc  33 g/dL    31-36  

 

 Red Cell Distribution Width  15 %    10.5-15  

 

 Platelet Count  160 10^3/uL    150-450  

 

 Mean Platelet Volume  9.9 um3    7.4-10.4  

 

 Abs Neutrophils  16.9 10^3/uL  High  1.5-7.7  

 

 Abs Lymphocytes  0.6 10^3/uL  Low  1.0-4.8  

 

 Abs Monocytes  0.4 10^3/uL    0-0.8  

 

 Abs Eosinophils  0 10^3/uL    0-0.6  

 

 Abs Basophils  0 10^3/uL    0-0.2  

 

 Abs Nucleated RBC  0 10^3/uL      

 

 Granulocyte %  94.0 %  High  38-83  

 

 Lymphocyte %  3.5 %  Low  25-47  

 

 Monocyte %  2.2 %    0-7  

 

 Eosinophil %  0.1 %    0-6  

 

 Basophil %  0.2 %    0-2  

 

 Nucleated Red Blood Cells %  0      









 Laboratory test finding  06/15/2018  Erythrocyte Sed Rate  9 mm/Hr    0-40  









 C Reactive Protein  2.55 mg/L    < 5.00  7









 Nuclear AB (Nia) By Ifa  06/15/2018  Nuclear Ab (Nia) by Ifa,  Positive 1:640 
     8



 Igg    IgG        









 Nia Titer:  1:640      

 

 Nia Pattern:  Homogeneous      9









 Laboratory test finding  2017  Alt (SGPT)  31 U/L    7-52  









 Ast (Sgot)  34 U/L    13-39  









 Lipid Profile (Trig/Chol/HDL)  2017  Triglycerides  99 mg/dL      10









 Cholesterol  112 mg/dL      11

 

 HDL Cholesterol  20.2 mg/dL      12

 

 LDL Cholesterol  72 mg/dL      13









 Basic Metabolic Panel  2017  Sodium  136 mmol/L    133-145  









 Potassium  4.0 mmol/L    3.5-5.0  

 

 Chloride  104 mmol/L    101-111  

 

 Co2 Carbon Dioxide  23 mmol/L    22-32  

 

 Anion Gap  9 mmol/L    2-11  

 

 Glucose  101 mg/dL  High    

 

 Blood Urea Nitrogen  19 mg/dL    6-24  

 

 Creatinine  1.41 mg/dL  High  0.67-1.17  

 

 BUN/Creatinine Ratio  13.5    8-20  

 

 Calcium  8.6 mg/dL    8.6-10.3  

 

 Egfr Non-  47.8    >60  

 

 Egfr   61.4    >60  14









 1  *******Because ethnic data is not always readily available,



   this report includes an eGFR for both -Americans and



   non- Americans.****



   The National Kidney Disease Education Program (NKDEP) does



   not endorse the use of the MDRD equation for patients that



   are not between the ages of 18 and 70, are pregnant, have



   extremes of body size, muscle mass, or nutritional status,



   or are non- or non-.



   According to the National Kidney Foundation, irrespective of



   diagnosis, the stage of the disease is based on the level of



   kidney function:



   Stage Description                      GFR(mL/min/1.73 m(2))



   1     Kidney damage with normal or decreased GFR       90



   2     Kidney damage with mild decrease in GFR          60-89



   3     Moderate decrease in GFR                         30-59



   4     Severe decrease in GFR                           15-29



   5     Kidney failure                       <15 (or dialysis)

 

 2  Test Performed by:



   [place] [id number]

 

 3  Test Performed by:



   [place] [id number]

 

 4  Interpretation: Borderline (30.0-75.0)



   -------------------REFERENCE VALUE--------------------------



   <30.0 (Negative)



   Test Performed by:



   [facility] - [address] [address]

 

 5  Test                        Result    Flag  Unit   RefValue



   ------------------------------------------------------------



   Vitamin B12 and Folate, S



   Vitamin B12 Assay, S      557             ng/L   180 - 914



   Folate, S                 14.4            mcg/L  >=4.0



   Test Performed by:



   [facility] - [address] [address]



   



   



   ******CORRECTED REPORT******



   ---  Corrected on 18 1238 ---



   Ref Lab Test previously reported as:



   SEE COMMENTS



   Test                        Result    Flag  Unit   RefValue



   ------------------------------------------------------------



   Vitamin B12 and Folate, S



   Vitamin B12 Assay, S      557             ng/L   180 - 914



   



   Folate, S                 15.9            mcg/L  >=4.0



   



   Test Performed by:



   [facility] - [address] [address]

 

 6  *******Because ethnic data is not always readily available,



   this report includes an eGFR for both -Americans and



   non- Americans.****



   The National Kidney Disease Education Program (NKDEP) does



   not endorse the use of the MDRD equation for patients that



   are not between the ages of 18 and 70, are pregnant, have



   extremes of body size, muscle mass, or nutritional status,



   or are non- or non-.



   According to the National Kidney Foundation, irrespective of



   diagnosis, the stage of the disease is based on the level of



   kidney function:



   Stage Description                      GFR(mL/min/1.73 m(2))



   1     Kidney damage with normal or decreased GFR       90



   2     Kidney damage with mild decrease in GFR          60-89



   3     Moderate decrease in GFR                         30-59



   4     Severe decrease in GFR                           15-29



   5     Kidney failure                       <15 (or dialysis)

 

 7  Acute inflammation:  >10.00

 

 8  -------------------REFERENCE VALUE--------------------------



   <1:80 (Negative)

 

 9  Test Performed by:



   [facility]



   [address] [place] [address]

 

 10  Desirable <150



   Borderline high 150-199



   High 200-499



   Very High >500

 

 11  Desirable <200



   Borderline high 200-239



   High >239

 

 12  Low <40



   Desirable: 40-60



   High: >60

 

 13  Desirable: <100 mg/dL



   Near Optimal: 100-129 mg/dL



   Borderline High: 130-159 mg/dL



   High: 160-189 mg/dL



   Very High: >189 mg/dL

 

 14  *******Because ethnic data is not always readily available,



   this report includes an eGFR for both -Americans and



   non- Americans.****



   The National Kidney Disease Education Program (NKDEP) does



   not endorse the use of the MDRD equation for patients that



   are not between the ages of 18 and 70, are pregnant, have



   extremes of body size, muscle mass, or nutritional status,



   or are non- or non-.



   According to the National Kidney Foundation, irrespective of



   diagnosis, the stage of the disease is based on the level of



   kidney function:



   Stage Description                      GFR(mL/min/1.73 m(2))



   1     Kidney damage with normal or decreased GFR       90



   2     Kidney damage with mild decrease in GFR          60-89



   3     Moderate decrease in GFR                         30-59



   4     Severe decrease in GFR                           15-29



   5     Kidney failure                       <15 (or dialysis)







Procedures







 Date  CPT Code  Description  Status

 

 2018  64115  Pace Maker Eval W/Iterative Adjment Dual Lead  Completed

 

 2018  92398  Pace Maker Eval W/Iterative Adjment Dual Lead  Completed

 

 2018  68143  Pace Maker Eval W/Iterative Adjment Dual Lead  Completed

 

 2018  72839  Perm Pacemaker Av Sequential Atrial And Ventricular  
Completed

 

 2018  97898  ECHO Transthorasic Realtime 2D W Doppler & Color Flow  
Completed



     Hosp  

 

 2018  60564  Each Additional Biopsy  Completed

 

 2018  63770  Biopsy Skin Lesion Single  Completed

 

 2018  50339  EKG Tracing & Interpretation  Completed

 

 2018  12645  Implant Cardiac Loop Recorder  Completed

 

 2018  50341  EKG, Interpretation Only  Completed

 

 2018  72545  Echocardiogram, Limited Study  Completed

 

 2018  83637  ECHO Transthorasic Realtime 2D W Doppler & Color Flow  
Completed



     Hosp  

 

 2017  84353  EKG Tracing & Interpretation  Completed

 

 2017  71944  EKG Tracing & Interpretation  Completed

 

 2017  26570  EKG, Interpretation Only  Completed

 

 2017  83452  Left Heart Cath. Incl S/I Coronaries, Angio S/I V Gram  
Completed



     If Done  

 

 2006  98663  Color Doppler  Completed

 

 2006  99708  Pulse Doppler & Continuous Wave  Completed

 

 2006  66086  Pulse Doppler & Continuous Wave  Completed

 

 2006  38340  Echocardiogram  Completed







Encounters







 Type  Date  Location  Provider  CPT E/M  Dx

 

 Office Visit  2018  [facility]ology Services  Davey Castro M.D.  [address]  
M35.9



   8:40a  Of [facility]      









 Z79.899

 

 R60.9

 

 N18.9









 Office Visit  2018 12:59p  [facility]  Sha Briseno,  
[address]  J18.9



     Infectious Diseases  M.D.    









 I49.5

 

 Z95.0

 

 N18.3









 Office Visit  2018 10:29a  [facility],  Eze Baltazar [place]  [address]
  A41.9



     Hospitalists      









 J18.9

 

 I13.0

 

 N18.3

 

 I50.32









 [facility]  2018 10:29a  [facility]  E[place]zar [place]  [address]
  J18.9



     Hospitalists      









 I50.32

 

 I13.0

 

 N18.3









 Office Visit  2018 10:28a  [facility],  Eze Baltazar MD  [address]
  A41.9



     Hospitalists      









 J18.9

 

 I50.32

 

 I13.0

 

 N18.3









 Office Visit  2018 10:28a  [facility],  Nathan Grande,  
[address]  J18.9



     Hospitalists  N.P.    









 I50.9

 

 I13.0

 

 N18.9









 Office Visit  2018  3:00p  [facility]   Davy Faria,  
[address]  I49.5



     Felicia WEN    









 I25.10

 

 I46.9









 Office Visit  2018  4:00p  [facility]  Davey Castro,  
[address]  D72.1



     Felicia WEN    









 M31.8

 

 Z79.899

 

 Z79.52

 

 M35.9









 Office Visit  2018  7:00a  [facility]  Davey Castro,  
[address]  R76.0



     Felicia WEN    









 D72.1

 

 R21









 Office Visit  2018 10:31a  [facility]  Janelle Burk MD  
[address]  D72.1



     [facility]      

 

 Office Visit  2018  9:07a  [place] [facility]  [address]  I50.33



     [facility]  MD Valentín    









 I21.4

 

 M31.8









 Office Visit  2018  9:57a  [facility]  Keely Vazquez M.D.  
[address]  Z01.810



     [facility]      









 D72.1

 

 I21.A1

 

 I50.30

 

 I25.10

 

 Z95.1









 Office Visit  2018  9:06a  [place] [facility]  [address]  I50.33



     [facility]    









 I21.4

 

 M31.8









 Office Visit  2018 10:30a  [facility]  Janelle Burk MD  
58111  D72.1



     [facility]      

 

 Office Visit  2018  9:06a  [place] [facility]  [address]319  I50.33



     [facility] MD    









 I21.4

 

 M31.8









 Office Visit  2018  7:00a  [facility]  Davey Castro,  
49406  D72.1



     Felicia WEN    









 R21









 Office Visit  2018  9:05a  [place] [facility]  [address]578  I50.33



     Assoc, Hospitalists  MD Valentín    









 I21.4

 

 M31.8









 Office Visit  2018  9:04a  [facility] ,daphney Galicia,  
08550  M31.8



     Hospitaljohanna WEN,FACP    









 I50.33









 Office Visit  2018  9:03a  [facility]  Hira Galicia,  [address]  
I50.33



     Assoc, Hospitalists  M.D.,FACP    









 M31.8









 Office Visit  2018  7:00a  [facility]  Davey Castro,  
80938  D72.1



     Felicia WEN    









 R21









 Office Visit  2018  9:03a  [facility]  Jennifer Graham,  
13596  I50.33



     Hospitalists  M.DMaureen    









 I21.4









 Office Visit  2018  3:16p  [facility]  Qutaybeh SMaureen Bran,  
33612  I50.9



       MTIFFANY    









 I25.10

 

 I42.9

 

 D64.9









 Office Visit  2018  3:20p  [facility]  Janelle Burk MD  
53741  D72.1



     [facility]      

 

 Office Visit  2018  3:09p  [facility]  Ollietaybronel S.  15612  I50.9



       BEHZAD Bran    









 I42.9

 

 I25.10

 

 Z95.1









 Office Visit  2018  9:03a  [facility],  Jennifer Graham,  
37393  I50.33



     Hospitalists  MMaureenDMaureen    









 I21.4









 Office Visit  2018  9:02a  [facility],daphney Graham,  
04139  I50.33



     Hospitalists  M.DMaureen    









 I21.4









 Office Visit  2018  3:03p  [facility]  Davian Severino DO  
45658  I21.A1



     formerly [facility]    









 I50.31

 

 R07.9









 Office Visit  2018  9:01a  [facility],  Jennifer Graham,  
09817  I50.33



     Hospitalists  M.DMaureen    









 I21.4









 Office Visit  2018  3:41p  [facility]  Qutaybeh S. Yina,  
13243  I11.0



       M.DMaureen    









 I50.9

 

 I25.10

 

 E78.5

 

 I45.10

 

 E87.6

 

 E83.42

 

 R79.89









 Office Visit  2018  1:40p  [facility]  Tay Corcoran M.D.,  
04091  I25.10



     [facility] AT [facility]  FAC, FSCAI    









 I10

 

 I45.19

 

 R55

 

 E78.5









 Office Visit  2018  9:25a  [facility],  Anita Bassett M.D.
  99359  R55



     Hospitalists      









 R79.89

 

 R21

 

 I25.10









 Office Visit  2018  9:23a  [facility]  Anita Bassett M.D.
  57764  R55



     Hospitalists      









 R79.89

 

 R21

 

 I25.10









 Office Visit  2018  9:22a  [facility]  Abbie Cardenas N.P.  
81649  R55



     Hospitalists      









 R79.89

 

 R21

 

 I25.10









 Office Visit  2018  9:18a  [facility]  Le Garcias,  
63361  R55



     Hospitalists  D.O.    









 R79.89

 

 R21

 

 I25.10









 Office Visit  2018  2:15p  [facility]  Davy Faria,  
40951  R55



     AT [facility]  M.D.    









 I10

 

 I45.19

 

 Z95.818

 

 S01.01xA

 

 Z48.02









 Office Visit  2018  9:30a  [facility]  Davy Faria,  
37960  R55



       M.D.    

 

 Office Visit  2018  9:03a  [facility]  Tatyana Caldwell NP  
62273  R55



     Hospitalists      









 S01.01xA

 

 I50.32

 

 I10









 Office Visit  2018  9:02a  [facility],  Char Sanchez  
91046  R55



     Hospitalists  SURYA Zaman    









 S01.01xA

 

 I50.32

 

 I10









 Office Visit  2018  9:29a  [facility]  Eze Baltazar MD  [address]85
  I50.9



     Hospitalists      









 J18.9

 

 E87.8

 

 N18.3









 Office Visit  2018  9:28a  [facility],  Char Sanchez  
12994  I50.9



     Hospitalists  SURYA Zaman    









 J18.9

 

 E87.8

 

 N18.3









 Office Visit  2018  9:27a  [facility]  Natividad Camacho DO  
98315  I50.9



     Hospitalists      









 J18.9

 

 M79.602









 Office Visit  2018  9:26a  [facility],  Lakhwinder David,  
89908  M79.602



     Hospitalists  M.D.    









 I50.9

 

 J18.9

 

 E87.8









 Office Visit  2018  9:25a  [facility]  Lakhwinder David,  
10662  M79.602



     Hospitalists  M.D.    









 I50.9

 

 J18.9

 

 E87.8









 Office Visit  2018  9:24a  [facility]  Lakhwinder David,  
10636  M79.602



     Hospitalists  M.D.    









 I50.9

 

 J18.9

 

 E87.8









 Office Visit  2018  9:22a  [facility]  [address]  
M79.602



     Assoc,pc Hospitalists  SURYA Zaman    









 I50.9

 

 J18.9

 

 E87.8









 Office Visit  2018  9:56a  [facility]  Ollietaybeh SMaureen Bran,  
37838  R50.9



       M.D.    









 R53.83

 

 D72.829

 

 R94.31

 

 I45.19

 

 I44.4

 

 I25.10

 

 I10

 

 R07.9









 Office Visit  2018  7:10a  [facility]  Lakhwinder David,  
32295  I50.9



     Hospitalists  M.D.    









 R06.09

 

 I25.10









 Office Visit  2018  7:09a  [facility]  Lakhwinder David,  
25394  I50.9



     Hospitalists  M.D.    









 R06.09

 

 I25.10









 Office Visit  2018  7:09a  [facility]  Anita Bassett,  
81870  I50.9



     Hospitalists  M.D.    









 I25.10

 

 R06.09









 Office Visit  01/15/2018  7:08a  [facility]  Eze Baltazar MD  99167
  I50.9



     Hospitalists      









 R06.09

 

 I25.10









 Office Visit  2017 11:20a  [facility]  Tay Corcoran M.D.,  
32117  I25.10



     [facility] AT [facility]    









 I35.0

 

 I12.9

 

 E78.5

 

 N18.3

 

 I48.0









 Office Visit  2017 11:20a  [facility] Rashel Corcoran M.D.,  
36077  I25.10



     [facility] AT [facility]  FACC, FSCAI    









 I35.0

 

 I35.1

 

 I10

 

 E78.5

 

 I45.10

 

 I48.0









 Office Visit  2017 10:16a  [facility]  Tay Corcoran M.D.,  
78535  R07.9



     [facility] AT [facility], FSCAI    









 R94.31







Plan of Care

Future Appointment(s):11/15/2018 10:20 am - Davey Castro M.D. at [facility] 
[facility]2018 - Sha Briseno M.D.J18.9 Pneumonia, 
unspecified organismComments:xray to ensure clearing; symptoms resolved

## 2018-09-21 NOTE — ED
HPI Chest Pain





- HPI Summary


HPI Summary: 





An 85 y/o M BIBA presents to ED with c/o CP radiating down bilat arms onset two 

to three days ago. Associated sx: SOB. Denies back pain. He had a CABG in 2017 

in [place].











- History of Current Complaint


Chief Complaint: EDChestPainROMI


Time Seen by Provider: 09/21/18 12:17


Hx Obtained From: Patient


Onset/Duration: Started Days Ago, Still Present


Timing: Constant


Initial Severity: Moderate


Current Severity: Moderate


Pain Intensity: 5


Pain Scale Used: 0-10 Numeric


Chest Pain Location: Diffuse


Chest Pain Radiates: Yes


Chest Pain Radiates To:: Arm - bilat


Associated Signs and Symptoms: Positive: Shortness of Breath





- Additional Pertinent History


Primary Care Physician: JEFFERY





- Allergy/Home Medications


Allergies/Adverse Reactions: 


 Allergies











Allergy/AdvReac Type Severity Reaction Status Date / Time


 


No Known Allergies Allergy   Verified 09/21/18 12:14











Home Medications: 


 Home Medications





Cholecalciferol TAB* [Vitamin D TAB*] 1,000 unit PO DAILY 09/21/18 [History 

Confirmed 09/21/18]


Magnesium Oxide TAB* [MagOx 400 TAB*] 800 mg PO BID 09/21/18 [History Confirmed 

09/21/18]


Metoprolol Tartrate TAB* [Lopressor TAB*] 37.5 mg PO BID 09/21/18 [History 

Confirmed 09/21/18]


Potassium Chlor TAB* [Klor Con ER TAB*] 20 meq PO BID 09/21/18 [History 

Confirmed 09/21/18]











PMH/Surg Hx/FS Hx/Imm Hx


Previously Healthy: No


Endocrine/Hematology History: 


   Denies: Hx Diabetes


Cardiovascular History: Reports: Hx Congestive Heart Failure, Hx Coronary 

Artery Disease, Hx Hypercholesterolemia, Hx Hypertension, Hx Pacemaker/ICD


Respiratory History: 


   Denies: Hx Asthma, Hx Chronic Obstructive Pulmonary Disease (COPD)


GI History: Reports: Hx Gastroesophageal Reflux Disease


 History: Reports: Hx Renal Disease - abnormal


   Denies: Hx Dialysis


Musculoskeletal History: Reports: Hx Arthritis, Hx Back Problems - spinal 

stenosis


Sensory History: Reports: Hx Cataracts, Hx Contacts or Glasses - reading glasses


   Denies: Hx Hearing Aid


Opthamlomology History: Reports: Hx Cataracts, Hx Contacts or Glasses - reading 

glasses


Neurological History: 


   Denies: Hx Developmental Delay


Psychiatric History: 


   Denies: Hx Panic Disorder





- Surgical History


Surgery Procedure, Year, and Place: HERNIA SURGERY 1965-CATARACTS BILATERAL 

EYES 2011,cardiac bypass, tonsillectomy


Infectious Disease History: No


Infectious Disease History: Reports: Hx of Known/Suspected MRSA


   Denies: Traveled Outside the [place] in Last 30 Days





- Family History


Known Family History: 


   Negative: Cardiac Disease





- Social History


Occupation: Retired


Lives: With Family


Alcohol Use: None


Hx Substance Use: No


Substance Use Type: Reports: None


Hx Tobacco Use: No


Smoking Status (MU): Never Smoked Tobacco


Have You Smoked in the Last Year: No





Review of Systems


Negative: Fever, Chills


Negative: Erythema


Negative: Sore Throat


Positive: Chest Pain


Positive: Shortness Of Breath.  Negative: Cough


Negative: Abdominal Pain, Vomiting, Nausea


Negative: dysuria, hematuria


Positive: Arthralgia - pos: back pain.  Negative: Myalgia, Edema


Negative: Rash


Neurological: Other - neg: dizziness


All Other Systems Reviewed And Are Negative: Yes





Physical Exam





- Summary


Physical Exam Summary: 





Constitutional: Well-developed, Well-nourished, Alert. (-) Distressed


Skin: Warm, Dry


HENT: Normocephalic; Atraumatic


Eyes: Conjunctiva normal


Neck: Musculoskeletal ROM normal neck. (-) JVD, (-) Stridor, (-) Tracheal 

deviation


Cardio: Rhythm regular, rate normal, Heart sounds normal; Intact distal pulses; 

The pedal pulses are 2+ and symmetric. Radial pulses are 2+ and symmetric. (-) 

Murmur


Pulmonary/Chest wall: Effort normal. (-) Respiratory distress, (-) Wheezes, (-) 

Rales


Abd: Soft, (-) epigastric tenderness, (-) Distension, (-) Guarding, (-) Rebound


Musculoskeletal: (-) Edema


Lymph: (-) Cervical adenopathy


Neuro: Alert, Oriented x3


Psych: Mood and affect Normal





Triage Information Reviewed: Yes


Vital Signs On Initial Exam: 


 Initial Vitals











Temp Pulse Resp BP Pulse Ox


 


 97.7 F   92   26   154/90   96 


 


 09/21/18 12:16  09/21/18 12:16  09/21/18 12:16  09/21/18 12:16  09/21/18 12:16











Vital Signs Reviewed: Yes





Diagnostics





- Vital Signs


 Vital Signs











  Temp Pulse Resp BP Pulse Ox


 


 09/21/18 12:20   92   154/90  97


 


 09/21/18 12:16  97.7 F  92  26  154/90  96














- Laboratory


Result Diagrams: 


 09/21/18 14:38





 09/21/18 14:38


Lab Statement: Any lab studies that have been ordered have been reviewed, and 

results considered in the medical decision making process.





- Radiology


  ** CXR


Xray Interpretation: Positive (See Comments) - IMPRESSION: Findings suggest 

congestive heart failure. ED provider has reviewed this report.


Radiology Interpretation Completed By: Radiologist





- EKG


  ** 1220


Cardiac Rate: NL - 92 bpm


EKG Rhythm: Sinus Rhythm


EKG Interpretation: no STEMI, ST depression in V5, V6





  ** 1459


EKG Interpretation: EKG has improved, ST depressions are resolved.





Re-Evaluation





- Re-Evaluation


  ** 1


Re-Evaluation Time: 13:57


Change: Improved


Comment: Pt is feeling better after receiving nitro.





Chest Pain Course/Dx





- Diagnoses


Provider Diagnoses: 


 Non-STEMI (non-ST elevated myocardial infarction), CHF exacerbation








- Provider Notifications


Discussed Care Of Patient With: Calvin Benítez - cardiology


Time Discussed With Above Provider: 14:18


Instructed by Provider To: Other - Recommends Lopressor





Discharge





- Sign-Out/Discharge


Documenting (check all that apply): Patient Departure - ADM





- Discharge Plan


Disposition: ADMITTED TO [facility]


Referrals: 


Cholo Boo MD [Primary Care Provider] - 





- Attestation Statements


Document Initiated by Scribe: Yes


Documenting Scribe: Davida Muniz


Provider For Whom Scribe is Documenting (Include Credential): Dr. Fortunato Yoo MD


Scribe Attestation: 


Davida GAMBLE, scribed for Dr. Fortunato Yoo MD on 09/21/18 at 1514. 





Consult


Consult: 





1506: CONSULT WITH DR. LE, HOSPITALIST


Will admit pt.

## 2018-09-21 NOTE — HP
*** AMENDED REPORT NOW INCLUDES COSIGNER DESIGNATION - ESIGNED BEFORE 
ADJUSTMENT ***



C:  Dr. Boo; Dr. Benítez *

 

ADMISSION HISTORY AND PHYSICAL:

 

DATE OF ADMISSION:  09/21/18

 

PATIENT OF ADMITTING HOSPITALIST:  Dr. Tate Laura.* (DICTATED BY [facility] YOO)

 

PRIMARY CARE PHYSICIAN:  Dr. Boo.

 

PRIMARY CARDIOLOGISTS:  Dr. Corcoran and Dr. Faria.

 

CHIEF COMPLAINT:  Chest pain and shortness of breath.

 

HISTORY OF PRESENT ILLNESS:  Mr. Vazquez is an 86-year-old gentleman with past 
medical history significant for coronary artery disease; congestive heart 
failure with last documented ejection fraction around 45% to 50%; _____ history 
of hypertension; hyperlipidemia; CKD, stage 3; atrial fibrillation, who returns 
to the emergency room today with complaints of 2 to 3 days' history of 
worsening chest pain and shortness of breath.  The patient notes that he had 
felt weak and lacking stamina at home to the point that he was unable to get up 
and down to his cellar.  He also has been under a lot of stress fixing his 
septic tank.  He noticed progressive intermittent chest tightness on and off 
for the past 2 to 3 days that now has been more constant and at rest.  He also 
reports associated bilateral ankle edema and increased shortness of breath and 
orthopnea.  He has a history of chronic systolic CHF and has been taking Lasix 
with good results.  His last echocardiogram was done in May of this year and 
noted to have ejection fraction around 45% to 50%.  He has a history of MI and 
coronary artery disease with CABG done years ago.  He also had a pacemaker 
placed with Dr. Faria about 3 months ago.  He was evaluated in the emergency 
room and found to have significantly elevated BNP over 3000 consistent with 
congestive heart failure.  He also had an elevated that continued to trend up 
despite lack of chest pain at the time of admission.  His sodium was also noted 
to be decreased, likely from fluid overload in the setting of CHF.  Given his 
all ongoing symptoms and his known history of congestive heart failure as well 
as elevated troponin, we were asked to see the patient to consider admission 
and to obtain cardiac consultation for the possibility of non-STEMI.

 

PAST MEDICAL HISTORY:  As mentioned above significant coronary artery disease; 
history of chronic congestive heart failure; GERD; hypertension; hyperlipidemia
; chronic kidney disease, stage 3; atrial fibrillation; right bundle branch 
block with left anterior fascicular block, status post pacemaker placement 3 
months ago; sick sinus syndrome; history of MI.

 

PAST SURGICAL HISTORY:  Significant for:

1.  Coronary artery bypass graft.

2.  Pacemaker placement 3 months ago by Dr. Faria.

3.  Hernia repair.

 

CURRENT MEDICATIONS:  His medications at home include:

1.  Lipitor 10 mg p.o. q.h.s.

2.  Multivitamin with calcium 1000 units p.o. daily.

3.  Lasix 40 mg p.o. daily.

4.  Plaquenil 400 mg p.o. daily.

5.  Mag oxide 800 mg p.o. b.i.d.

6.  Lopressor 37.5 mg p.o. b.i.d.

7.  Prilosec 20 mg p.o. daily.

8.  Potassium chloride 20 mEq p.o. b.i.d.

9.  Metamucil 1 capsule p.o. b.i.d.

10.  Aspirin 81 mg p.o. daily.

 

ALLERGIES:  He has no known drug allergies.

 

FAMILY HISTORY:  Significant for acute renal failure in his mother and history 
of pneumonia in his father.

 

SOCIAL HISTORY:  The patient never smoked.  He does not drink alcohol.  His wife
, Catherine, is the surrogate decision maker and he wishes to be a full code.

 

REVIEW OF SYSTEMS:  See HPI.  Otherwise, a 12-point review of systems was 
examined and was essentially negative.

 

                               PHYSICAL EXAMINATION

 

GENERAL:  He is a pleasant, older male, appears comfortable and in no acute 
distress or discomfort at the time of admission.

 

VITAL SIGNS:  Reveal temperature of 97.7, pulse of 70, blood pressure 129/71, 
respirations of 15, with O2 sat of 100% on 2 L oxygen.

 

HEENT:  Head is normocephalic, atraumatic.  Sclerae anicteric.  PERRLA.  EOMs 
intact.  Oropharynx is pink and moist.

 

NECK:  Supple.  Trachea midline.  No cervical adenopathy or thyromegaly.

 

LUNGS:  Decreased breath sounds were noted bilaterally with some bibasilar rales
, but there are no rhonchi or wheezes.

 

HEART:  Regular rate and rhythm without rubs, murmurs, or gallops.

 

BACK:  Normal curvature.  No CVA tenderness.

 

ABDOMEN:  Soft, nontender, and nondistended.  There are no hernias, masses or 
hepatosplenomegaly.  Bowel sounds were active in all quadrants.

 

EXTREMITIES:  There is a +2 pitting edema bilaterally noted.  Pulses were 2+ 
throughout and there is no cyanosis or clubbing.

 

RECTAL:  Exam deferred at this time.

 

NEUROLOGIC:  He is awake, alert, and oriented x3.  Tongue is midline and  
are equal bilaterally, and sensation was intact throughout.

 

 DIAGNOSTIC DATA/LAB DATA:  CBC today with white count of 10,000, hemoglobin of 
8.1, hematocrit of 25, and platelets of 232.  Chemistry panel with sodium of 129
, potassium 5.2, chloride of 101, CO2 of 21, BUN of 33, and creatinine of 1.7 
that was compared to his previous laboratory workup and appears to be at 
baseline.  His troponin earlier today was 0.06, spiked up to 0.16.  BNP 
elevated at 3074.

 

Accessory diagnostic data:  Chest x-ray was done earlier and reviewed findings 
consistent with congestive heart failure.

 

IMPRESSION AND PLAN:  An 86-year-old gentleman with multiple medical issues 
including coronary artery disease, hypertension, hyperlipidemia, chronic kidney 
disease as well as history of chronic congestive heart failure, who presents to 
the emergency room today with 2 to 3 days' history of increased frequent chest 
discomfort and shortness of breath with associated bilateral ankle edema and 
generalized weakness.  He will be admitted to the hospitalist services to 
telemetry unit for the following assessment and plan:

 

1.  Acute on chronic systolic congestive heart failure.  The patient exhibits 
symptoms consistent with acute exacerbation of his congestive heart failure.  
His ankle pitting edema and elevated BNP as well as shortness of breath had 
improved slightly after he was given a dose of Lasix in the emergency room.  
His troponin was elevated today, which I assume likely demand ischemia, but we 
will continue to trend and will repeat his EKG in the morning.  He was started 
on heparin drip for presumed non-ST elevation myocardial infarction and I had 
touched base with Dr. Benítez earlier today, who will see the patient in 
consultation.  I will also repeat his transthoracic echocardiogram, last 
documented one in May with ejection fraction of 45% to 50%.

2.  Coronary artery disease.  Again, we will continue trending his troponin 
since the increase in value and we will place him on telemetry unit for close 
observation, provide oxygen supplement as needed.

3.  Chronic leukocytosis.  It appears to be resolved and not an active issue at 
this time.

4.  Gastroesophageal reflux disease.  We will continue his PPI therapy.

5.  Hypertension.  I will continue all his meds as prescribed including 
metoprolol.

6.  Hyperlipidemia.  We will continue his statin therapy.

7.  Chronic kidney disease.  Again, the patient appears to be at his baseline 
for BUN and creatinine.  I will resume his heart-healthy diet with low sodium, 
strict intake and output with maximum of 2 L p.o. fluid daily as well as daily 
weights.

8.  Atrial fibrillation.  We will continue his beta blocker.

9.  DVT prophylaxis.  The patient is currently on heparin drip.

10.  Code status.  He wishes to be a full code.

 

TIME SPENT:  Approximately, 60 minutes was spent admitting this patient for 
which greater than 50% on taking history and performing physical exam.

 

I went on and discussed the case with my attending, who agreed to plan of care 
and will follow him up accordingly.

 

 ____________________________________ [facility] YOO

 

[id number]/CPS #: [id number]

MARIO

## 2018-09-21 NOTE — RAD
INDICATION:  Chest pain.



COMPARISON:  Comparison is made with a prior chest x-ray study from August 21, 2018.



TECHNIQUE: A portable view of the chest was obtained.



FINDINGS: The patient appears to be status post coronary artery bypass surgery. There are

multiple sternal sutures present. The heart is mildly enlarged and unchanged. There is a

dual-chamber transvenous pacemaker present.



There are diffuse interstitial infiltrates and small bilateral pleural effusions

suggestive of congestive heart failure.



IMPRESSION:  FINDINGS SUGGESTIVE OF CONGESTIVE HEART FAILURE.

## 2018-09-22 NOTE — PN
Subjective


Date of Service: 09/22/18


Interval History: 





Patient feels much better today. Admitted yesterday w/ SOB, chest and arm 

discomfort. Had LE edema for weeks. 


Reports following low sodium diet.


Today says breathing is better, denies orthopnea. Chest discomfort has resolved.


Willing to try ambulating.


Family History: Unchanged from Admission


Social History: Unchanged from Admission


Past Medical History: Unchanged from Admission





Objective


Active Medications: 








Acetaminophen (Tylenol Tab*)  650 mg PO Q4H PRN


   PRN Reason: FEVER/PAIN


Al Hydrox/Mg Hydrox/Simethicone (Maalox Plus*)  30 ml PO Q6H PRN


   PRN Reason: INDIGESTION


Albuterol (Ventolin 2.5 Mg/3 Ml Neb.Sol*)  2.5 mg INH [id number] AWAKE PRN


   PRN Reason: sob/wheezing


Aspirin (Aspirin Ec Tab*)  81 mg PO DAILY [facility]


   Last Admin: 09/22/18 09:11 Dose:  81 mg


Atorvastatin Calcium (Lipitor*)  10 mg PO BEDTIME [facility]


   Last Admin: 09/21/18 22:29 Dose:  10 mg


Cholecalciferol (Vitamin D Tab*)  1,000 units PO DAILY [facility]


   Last Admin: 09/22/18 09:11 Dose:  1,000 units


Docusate Sodium (Colace Cap*)  100 mg PO BID PRN


   PRN Reason: CONSTIPATION


Furosemide (Lasix Iv*)  40 mg IV DAILY [facility]


   Last Admin: 09/22/18 09:11 Dose:  40 mg


Hydroxychloroquine Sulfate (Plaquenil Tab*)  400 mg PO DAILY [facility]


   Last Admin: 09/22/18 09:11 Dose:  400 mg


Magnesium Oxide (Magox 400 Tab*)  800 mg PO BID [facility]


   Last Admin: 09/22/18 09:11 Dose:  800 mg


Metoprolol Succinate (Toprol Xl Tab*)  25 mg PO BID [facility]


   Last Admin: 09/22/18 09:11 Dose:  25 mg


Omeprazole (Prilosec Cap*)  20 mg PO QAM [facility]


   Last Admin: 09/22/18 09:11 Dose:  20 mg


Ondansetron HCl (Zofran Inj*)  4 mg IV Q4H PRN


   PRN Reason: NAUSEA/VOMITING








 Vital Signs - 8 hr











  09/22/18 09/22/18 09/22/18





  03:44 07:39 08:30


 


Temperature 36.2 C  36.5 C


 


Pulse Rate 69  77


 


Respiratory 20 24 20





Rate   


 


Blood Pressure 126/50  135/64





(mmHg)   


 


O2 Sat by Pulse 100  99





Oximetry   











Oxygen Devices in Use Now: Nasal Cannula


Appearance: alert, sitting in chair, no distress


Eyes: No Scleral Icterus


Ears/Nose/Mouth/Throat: Clear Oropharnyx


Neck: NL Appearance and Movements; NL JVP, No Thyroid Enlargement, Masses


Respiratory: Symmetrical Chest Expansion and Respiratory Effort, Clear to 

Auscultation


Cardiovascular: NL Sounds; No Murmurs; No JVD, RRR, - - 2+ pitting edema bilat 

LE


Lymphatic: No Cervical Adenopathy


Neurological: Alert and Oriented x 3


Lines/Tubes/Other Access: Clean, Dry and Intact Peripheral IV


Result Diagrams: 


 09/21/18 14:38





 09/22/18 04:46


Additional Lab and Data: 





 Laboratory Tests











  09/21/18 09/21/18 09/21/18





  12:54 12:54 14:38


 


Hgb  9.1 L   8.1 L


 


Hct  28 L   25 L


 


APTT   


 


Troponin I   


 


B-Natriuretic Peptide   3074 H 


 


Triglycerides   


 


Cholesterol   


 


LDL Cholesterol   


 


HDL Cholesterol   














  09/21/18 09/21/18 09/21/18





  17:46 20:17 20:17


 


Hgb   


 


Hct   


 


APTT   103.0 H* 


 


Troponin I  0.95 H*   1.27 H*


 


B-Natriuretic Peptide   


 


Triglycerides   


 


Cholesterol   


 


LDL Cholesterol   


 


HDL Cholesterol   














  09/22/18 09/22/18





  02:13 04:46


 


Hgb  


 


Hct  


 


APTT  


 


Troponin I  1.56 H*  1.04 H*


 


B-Natriuretic Peptide  


 


Triglycerides   59


 


Cholesterol   67


 


LDL Cholesterol   37


 


HDL Cholesterol   18.3











EKG Data: 





Regular, RBBB, suspect 100% paced





Assess/Plan/Problems-Billing


Assessment: 


86 year old man admitted w/ acute on chronic diastolic CHF, EF in 45% range, 

also w/ acute coronary syndrome, NSTEMI.








- Patient Problems


(1) Acute on chronic diastolic (congestive) heart failure


Current Visit: Yes   Status: Acute   Priority: High   Code(s): I50.33 - ACUTE 

ON CHRONIC DIASTOLIC (CONGESTIVE) HEART FAILURE   SNOMED Code(s): 393082274


   Comment: -I/O and weight appear even


-symptoms improved


-will increase diuretic intensity


-echocardiogram pending.


-appears to be improving, will attempt to ambulate   





(2) NSTEMI (non-ST elevated myocardial infarction)


Current Visit: Yes   Status: Acute   Priority: High   Code(s): I21.4 - NON-ST 

ELEVATION (NSTEMI) MYOCARDIAL INFARCTION   SNOMED Code(s): 474880625


   Comment: - Troponin elevation peaked


- Cardiology input appreciated, non-invasive strategy is planned


- remain on heparin drip.


- Continue Aspirin, Metoprolol, and Atorvastatin.   





(3) CKD (chronic kidney disease) stage 3, GFR 30-59 ml/min


Current Visit: Yes   Status: Chronic   Priority: Medium   Code(s): N18.3 - 

CHRONIC KIDNEY DISEASE, STAGE 3 (MODERATE)   SNOMED Code(s): 853674769


   Comment: - creatinine at baseline, stable   





(4) DVT prophylaxis


Current Visit: Yes   Status: Acute   Priority: Low   Code(s): MKC7266 -    

SNOMED Code(s): 877538982


   Comment: 


 - will use SCDs, may have GI bleeding on IV heparin   





(5) Anemia


Current Visit: Yes   Status: Acute   Priority: Medium   Code(s): D64.9 - ANEMIA

, UNSPECIFIED   SNOMED Code(s): 357336842


   Comment: -discussed w/ Dr Benítez


-heparin discontinued due to possible GI losses


-CKD contributes to anemia.   


Status and Disposition: 





continued inpatient stay due to CHF, monitoring of kidney response to diuresis

## 2018-09-22 NOTE — CONS
CARDIOLOGY CONSULTATION NOTE:

 

DATE OF CONSULT:  18

 

REASON FOR CONSULT:  Chest pain, shortness of breath, heart failure.

 

CONSULTING DOCTOR:  NHI Chaney

 

HISTORY OF PRESENT ILLNESS:  This is an 86-year-old gentleman with longstanding 
history of coronary disease, status post bypass surgery, sick sinus syndrome, 
status post pacemaker, renal insufficiency, and repeated admissions for heart 
failure.  He is limited by back problems and possible spinal stenosis.  He uses 
a walker to get around.  He said that 2 days ago, he had a problem with the 
septic system.  He and his daughter went into his basement, which is the first 
time he done that in many months.  He said it was hard for him to get in the 
basement and then he had to use some PVC glue and he said the fumes were 
overwhelming.  He came up out of the basement after doing some vigorous work 
trying to reestablish function of the septic system.  He said he developed some 
achiness in his arms and in his chest.  He said it lasted on and off for a 
couple of hours at a time over the next 2 days.  This morning he woke up and 
was more short of breath.  Because of his symptoms, he came to the emergency 
room.  There he was found to have an EKG with sinus rhythm at 92 with 
nonspecific IVCD and lateral ST depressions.  This was similar to July but 
somewhat more pronounced at a faster heart rate.  He was treated with nitrates 
and diuretics and had resolution of his pain within an hour. He says his chest 
has been pain free since early this morning.  He denies any orthopnea.  He does 
have chronic edema.  He denies any fevers, chills, sweats or bleeding problems.
  He denies tobacco use.  He denies noncompliance with his medicines but does 
have some seafood salad and chicken salad, which he claims is delivered to 
house and is low salt but it is not clear from his description that it is 
indeed low salt.  He reports that his weight is up 6 pounds over the last 
several days.

 

PAST MEDICAL HISTORY:  Includes sick sinus syndrome with the pacemaker placed 
by Dr. Faria in July.  No further syncope since then.  Bicuspid aortic valve 
with mild LVH.  His echo from January revealed EF of 50-55%, mild right 
ventricular enlargement, tricuspid aortic valve, mild to moderate AI, moderate 
MR, mild dilatation of ascending aorta.  Hypertension, hyperlipidemia, renal 
insufficiency stage 3, paroxysmal atrial fibrillation, right bundle branch 
block. back pain ?spinal stenosis with compromised ability to walk.

 

PAST SURGICAL HISTORY:  Includes coronary bypass grafting in 2017.  At 
that time, he had a LIMA to the LAD, saphenous vein graft to the right 
posterior descending artery and sequential saphenous vein graft to OM1 and OM2.
  He said he had cataract surgery.

 

MEDICATIONS:  His medications as an outpatient include:

 

1.  Psyllium.

2.  Plaquenil 400 mg a day.

3.  Atorvastatin 10 mg a day.

4.  Magnesium oxide 800 mg b.i.d.

5.  Furosemide 40 mg a day.

6.  Metoprolol tartrate 37.5 b.i.d.

7.  Potassium 20 mEq b.i.d.

8.  Aspirin 81 mg a day.

9.  Vitamin D 1000 units a day.

 

As an inpatient, he is on:

 

1.  Aspirin 81 mg a day.

2.  Atorvastatin 10.

3.  Vitamin D 1000 units a day.

4.  Colace 100 mg b.i.d. p.r.n.

5.  Lasix 40 mg IV daily.

6.  IV heparin.

7.  Magnesium 800 mg b.i.d.

8.  Metoprolol 37.5 b.i.d.

9.  Omeprazole 20 mg q.a.m.

10.  Zofran 4 mg IV every day.

 

ALLERGIES:  He has no known drug allergies.

 

SOCIAL HISTORY:  He is .  He has 2 children.  He retired from [employer]. He denies alcohol use or caffeine use.

 

REVIEW OF SYSTEMS:  Review of systems x10 was negative except as above.

 

PHYSICAL EXAM:  He is a well-developed, elderly gentleman, in no apparent 
distress. Blood pressure 136/63, pulse of 80, O2 sats 100% on 2 L.  JVD 
approximately 10 to 12 cm.  Carotids 2+ without bruits.  Cardiac Exam:  S1, S2 
with a 3/6 systolic ejection murmur at the base radiating across the precordium
, widely split S2. Chest:  Rales about a third of the way up.  Abdomen:  Bowel 
sounds present, nontender.  Femoral pulses intact without bruits.  Distal 
pulses intact.  1+ edema of the lower extremities.  Motor strength 5/5 in the 
upper extremities, reduced in the lower extremities.  Deep tendon reflexes 2/4 
in the upper extremities, absent in the lower extremities.  Alert and oriented 
x3.

 

DIAGNOSTIC STUDIES/LAB DATA:  Labs include white count of 10.5, anemia with 
hematocrit of 25, platelet count of 232.  Sodium 129, potassium of 5.2, BUN of 
33, creatinine of 1.71.  Lactic acid of 2.5.  Troponin of 0.06 from noon, 0.16 
from 14:38 and 0.95 from 17:46.  BNP was elevated at 3074.

 

EKG revealed sinus rhythm of first-degree AV block or an atrial paced rhythm, 
right bundle block, and nonspecific lateral ST changes.

 

IMPRESSION:  My impression is Mr. Vazquez appears to have elevated troponins and 
decompensation with congestive heart failure as well as arm and chest 
discomfort of unclear etiology possibly related to over exertion and exposure 
to fumes from glue.  He appears to have decompensation with congestive heart 
failure present.  His prognosis is guarded given his advanced age, renal 
insufficiency, and severe anemia.  I did discuss with him that his options are 
limited.  If indeed, he does have acute coronary syndrome, then repeat cath 
would be at high risk (including the potential for bleeding and renal failure) 
and may be of little benefit given his comorbidities.  For the time being, I 
would recommend the followin.  Continue with diuresis as you are doing.

2.  Monitor his renal function.

3.  Stop his anticoagulation given his drop in hematocrit.

4.  Would continue with nitrate as you are doing.

5.  We will consider cutting his metoprolol to lower dose given his 
decompensated congestive heart failure.

6. repeat echo to assess MR and LV function.

 

Hopefully, once his anemia is corrected and his heart failure is corrected, 
there will be less myocardial oxygen demand and improvement in potential 
ischemia.

Consider transfusion once chf better controlled.

 

I did explain to him the need to avoid overexertion.

 

We discussed the risks and benefits of cardiac catheterization given his 
advanced age and I have recommended a conservative course.  I also recommend he 
consider whether he would want to be resuscitated or have aggressive 
interventions if his cardiac condition deteriorates.

 

He understands and will discuss with his family and will consider a DNR status. 
I would recommend a repeat echo to evaluate the stability of his LV function 
and valvular function.

 

Further recommendation will depend on his clinical course.

 

 [id number]/CPS #: [id number]

[facility] HospitalHUAN

## 2018-09-23 NOTE — PN
Subjective


Date of Service: 09/23/18


Interval History: 





Mr. Vazquez reports feeling much better than on admission.  He denies chest pain 

but reports some dyspnea on exertion.  The swelling in his legs has improved.  

He denies nausea or abdominal pain and is tolerating oral intake well.


Family History: Unchanged from Admission


Social History: Unchanged from Admission


Past Medical History: Unchanged from Admission





Objective


Active Medications: 





Acetaminophen (Tylenol Tab*)  650 mg PO Q4H PRN


Al Hydrox/Mg Hydrox/Simethicone (Maalox Plus*)  30 ml PO Q6H PRN


Albuterol (Ventolin 2.5 Mg/3 Ml Neb.Sol*)  2.5 mg INH [id number] AWAKE PRN


Aspirin (Aspirin Ec Tab*)  81 mg PO DAILY [facility]


Atorvastatin Calcium (Lipitor*)  10 mg PO BEDTIME [facility]


Cholecalciferol (Vitamin D Tab*)  1,000 units PO DAILY [facility]


Docusate Sodium (Colace Cap*)  100 mg PO BID PRN


Furosemide (Lasix Iv*)  40 mg IV DAILY [facility]


Hydroxychloroquine Sulfate (Plaquenil Tab*)  400 mg PO DAILY [facility]


Magnesium Oxide (Magox 400 Tab*)  800 mg PO BID [facility]


Metoprolol Succinate (Toprol Xl Tab*)  25 mg PO BID [facility]


Omeprazole (Prilosec Cap*)  20 mg PO QAM [facility]


Ondansetron HCl (Zofran Inj*)  4 mg IV Q4H PRN





Vital Signs:











Temp Pulse Resp BP Pulse Ox


 


 98.6 F   70   16   124/55   98 


 


 09/23/18 11:53  09/23/18 11:53  09/23/18 11:53  09/23/18 11:53  09/23/18 11:53











Oxygen Devices in Use Now: None


Result Diagrams: 


 09/24/18 05:37





 09/23/18 05:04


Additional Lab and Data: 


.


EKG Data: 


.





Assess/Plan/Problems-Billing


Assessment: 


Mr Vazquez is an 86 year old man admitted w/ acute on chronic diastolic CHF, EF in 

45% range, also w/ acute coronary syndrome, NSTEMI.











- Patient Problems


(1) Acute on chronic diastolic (congestive) heart failure


Comment: 


- Symptoms continue to improve.


- Echocardiogram pending.


- Continue lasix IV, creatinine remains at baseline.   





(2) NSTEMI (non-ST elevated myocardial infarction)


Comment: 


- Troponin elevation peaked


- Cardiology input appreciated, plan for medical management.


- Continue Aspirin, Metoprolol, and Atorvastatin.   





(3) Anemia


Comment: 


- Macrocytic, chronic.


- Heparin discontinued due to possible GI losses


- CKD contributes to anemia.   





(4) CKD (chronic kidney disease) stage 3, GFR 30-59 ml/min


Comment: 


- creatinine at baseline, stable   





(5) Dyslipidemia


Current Visit: No   Comment: 


 - Continue statin    





(6) DVT prophylaxis


Comment: 


- SCDs, concern for GI bleed related to IV heparin.   





(7) Full code status


Comment: 


   


Status and Disposition: 


Continued inpatient stay due to CHF, monitoring of kidney response to diuresis.

## 2018-09-23 NOTE — ECHO
Patient:      BODULIA MONTELONGO   

Med Rec#:     [id number]            :          [id number]          

Date:         2018            Age:          86y                 

Account#:     [id number]          Height:       168 cm / 66.1 in

Accession#:   [id number]           Weight:       74 kg / 163.1 lbs

Sex:          M                     BSA:          1.84

Room#:        433                   

Admit Date#:  2018          

Type:         Inpatient

 

Referring:    Grecia Vasquez

Reading:      Calvin Benítez MD

Sonographer:  Edwina Ramirez,SAADIACS,RDMS

CC:           Cholo Boo MD

______________________________________________________________________

 

Transthoracic Echocardiogram

 

Indication:

CHF

BP:           104/74

HR:           75

Rhythm:       Paced

 

Findings     

History:

CAD, CABG, CHF, AFIB, HTN, HLD, CKD, RBBB, SSS, pacemaker, AOV stenosis.

 

 

Technical Comments:

The study is technically limited due to poor apical windows.  

 

Left Ventricle:

The left ventricular chamber size is normal. Mild concentric left

ventricular hypertrophy is observed. Basal interventricular septum shows

moderate thickening.  There is global hypokinesis of the left ventricle

with minor regional variation. The estimated ejection fraction is

35-40%.  The  mid anterior, mid inferolateral, mid inferior, apical

anterior, and  apical lateral wall segments are hypokinetic (score 2).

Overall wallmotion score index is  1.31 

 

Left Atrium:

The left atrium is moderate to severely dilated.  

 

Right Ventricle:

The right ventricular cavity size is normal. The right ventricular

global systolic function is moderately reduced. A pacemaker wire is

visualized in the right ventricle. 

 

Right Atrium:

The right atrium is mild to moderately dilated.  

 

Aortic Valve:

The aortic valve leaflets are moderately thickened. Systolic excursion

of the aortic valve cusps is reduced. There is aortic annular

calcification. There is moderate aortic regurgitation. There is mild to

moderate aortic stenosis. The mean gradient of the aortic valve is 13

mmHg. Probable low gradient aortic stenosis. The highest aortic valve

velocity was obtained with the standard probe from the A3C view. 

 

Mitral Valve:

Moderate mitral annular calcification present. The mitral valve leaflets

are mildly thickened. There is moderate mitral regurgitation.  There is

mild mitral stenosis.  

 

Tricuspid Valve:

The tricuspid valve leaflets are normal.  There is trace tricuspid

regurgitation.  Unable to estimate the right ventricular systolic

pressure.   

 

Pulmonic Valve:

There is no evidence of pulmonic valve thickening. There is no evidence

of pulmonic regurgitation. 

 

Pericardium:

There is no significant pericardial effusion. 

 

Aorta:

The aortic root appears normal. There is no dilatation of the aortic

arch. 

 

Pulmonary Artery:

The main pulmonary artery is not well visualized. 

 

Venous:

The inferior vena cava appears normal in size. There is less than 50%

respiratory change in the inferior vena cava dimension. 

 

Contrast:

Definity was used to optimize study.  A total of 2 ml was given by

Radha BETANCOURT (patient's nurse). 

 

Conclusions

Mild concentric left ventricular hypertrophy is observed.

Basal interventricular septum shows moderate thickening. 

There is global hypokinesis of the left ventricle with minor regional

variation.

The estimated ejection fraction is 35-40%. 

The left atrium is moderate to severely dilated. 

The right ventricular global systolic function is moderately reduced.

The right atrium is mild to moderately dilated. 

There is mild to moderate aortic stenosis.

The mean gradient of the aortic valve is 13 mmHg. Probable low gradient

aortic stenosis.

Moderate mitral annular calcification present.

There is moderate mitral regurgitation. 

There is mild mitral stenosis. 

Compared to 2018, the EF has decreased from 45-50% then to 35-40% now

and the MR has increased.

 

Measurements     

Name                    Value         Normal Range            

RVIDd (AP) 2D           2.6 cm        (0.9 - 2.6)             

RAd ISD 4CH             5.4 cm        (3.4 - 4.9)             

RA (A4C)W               3.9 cm        (2.9 - 4.6)             

IVSd (2D)               1.5 cm        (0.6 - 1)               

LVPWd (2D)              1.1 cm        (0.6 - 1)               

LVIDd (2D)              5.4 cm        (3.6 - 5.4)             

LVIDs (2D)              3.7 cm        -                        

LV FS (2D)              31 %          (25 - 45)               

Aortic Annulus          2 cm          (1.4 - 2.6)             

Ao root diameter (2D)   3.2 cm        (2.1 - 3.5)             

Ascending Ao            2.8 cm        (2.1 - 3.4)             

Aortic arch             2.8 cm        (1.8 - 3.4)             

LA dimension (AP) 2D    4.8 cm        (2.3 - 3.8)             

LAd ISD 4CH             6.3 cm        (2.9 - 5.3)             

LA ISD 4CH W            5.1 cm        (2.5 - 4.5)             

 

Name                    Value         Normal Range            

LA ESV BP (A/L) index   47 ml/m2      -                        

 

Name                    Value         Normal Range            

MV E-wave Vmax          1.2 m/sec     -                        

MV deceleration time    153 msec      -                        

MV A-wave Vmax          0.8 m/sec     -                        

MV E:A ratio            1.4 ratio     -                        

LV septal e' Vmax       0.02 m/sec    -                        

LV lateral e' Vmax      0.05 m/sec    -                        

LV E:e' septal ratio    60 ratio      -                        

LV E:e' lateral ratio   24 ratio      -                        

 

Name                    Value         Normal Range            

AV Vmax                 2.4 m/sec     -                        

AV VTI                  49 cm         -                        

AV peak gradient        23 mmHg       -                        

AV mean gradient        13 mmHg       -                        

LVOT diameter           2 cm          -                        

LVOT Vmax               1.2 m/sec     -                        

LVOT VTI                24 cm         -                        

LVOT peak gradient      6 mmHg        -                        

LVOT mean gradient      3 mmHg        -                        

FIONA (continuity Vmax)   1.6 cm2       -                        

FIONA (continuity VTI)    1.5 cm2       -                        

AR PHT                  265 msec      -                        

JUAN FRANCISCO Vmax                0.6 m/sec     -                        

 

Name                    Value         Normal Range            

MV Vmax                 1.3 m/sec     -                        

MV VTI                  31 cm         -                        

MV peak gradient        7 mmHg        -                        

MV mean gradient        3 mmHg        -                        

MV PHT                  72 msec       -                        

MVA (PHT)               3.1 cm2       -                        

MVA (continuity VTI)    2.4 cm2       -                        

 

Name                    Value         Normal Range            

RAP                     8 mmHg        -                        

IVC diameter            1.7 cm        -                        

 

Name                    Value         Normal Range            

PV Vmax                 0.6 m/sec     -                        

PV peak gradient        1.4 mmHg      -                        

 

Wallmotion

 

BAS          Normal

BA           Normal

BAL          Normal

AMANDEEP          Normal

BI           Normal

BIS          Normal

MAS          Normal

MA           Hypokinetic

MAL          Normal

MIL          Hypokinetic

MI           Hypokinetic

MIS          Normal

AS           Normal

AA           Hypokinetic

AL           Hypokinetic

AI           Normal

APEX         Akinetic

 

Electronically signed by: Calvin Benítez MD on 2018 14:51:26

## 2018-09-24 NOTE — PN
Subjective


Date of Service: 09/24/18


Interval History: 





Mr. Vazquez continues to feel well and he is eager for discharge to home.  He 

denies chest pain, SOB, nausea, or abdominal pain.  He is tolerating oral 

intake well.





Family History: Unchanged from Admission


Social History: Unchanged from Admission


Past Medical History: Unchanged from Admission





Objective


Active Medications: 





Acetaminophen (Tylenol Tab*)  650 mg PO Q4H PRN


Al Hydrox/Mg Hydrox/Simethicone (Maalox Plus*)  30 ml PO Q6H PRN


Albuterol (Ventolin 2.5 Mg/3 Ml Neb.Sol*)  2.5 mg INH [id number] AWAKE PRN


Aspirin (Aspirin Ec Tab*)  81 mg PO DAILY ROBB


Atorvastatin Calcium (Lipitor*)  10 mg PO BEDTIME ROBB


Cholecalciferol (Vitamin D Tab*)  1,000 units PO DAILY ROBB


Docusate Sodium (Colace Cap*)  100 mg PO BID PRN


Furosemide (Lasix Iv*)  40 mg IV DAILY ROBB


Hydroxychloroquine Sulfate (Plaquenil Tab*)  400 mg PO DAILY ROBB


Magnesium Oxide (Magox 400 Tab*)  800 mg PO BID ROBB


Metoprolol Succinate (Toprol Xl Tab*)  25 mg PO BID ROBB


Omeprazole (Prilosec Cap*)  20 mg PO QAM ROBB


Ondansetron HCl (Zofran Inj*)  4 mg IV Q4H PRN





Vital Signs:











Temp Pulse Resp BP Pulse Ox


 


 98.0 F   64   18   100/48   99 


 


 09/24/18 11:36  09/24/18 11:36  09/24/18 11:36  09/24/18 11:36  09/24/18 11:36











Oxygen Devices in Use Now: None


Appearance: Male sitting up in chair in NAD


Eyes: No Scleral Icterus


Ears/Nose/Mouth/Throat: Mucous Membranes Moist


Neck: Trachea Midline


Respiratory: Symmetrical Chest Expansion and Respiratory Effort, Clear to 

Auscultation


Cardiovascular: NL Sounds; No Murmurs; No JVD


Abdominal: NL Sounds; No Tenderness; No Distention


Lymphatic: No Cervical Adenopathy


Extremities: - - Minimal pitting edema in LEs


Skin: No Rash or Ulcers


Neurological: Alert and Oriented x 3, NL Muscle Strength and Tone


Result Diagrams: 


 09/24/18 05:37





 09/23/18 05:04


Additional Lab and Data: 


.


EKG Data: 


.





Assess/Plan/Problems-Billing


Assessment: 


Mr Vazquez is an 86 year old man admitted w/ acute on chronic diastolic CHF, EF in 

45% range, also w/ acute coronary syndrome, NSTEMI.











- Patient Problems


(1) Acute on chronic diastolic (congestive) heart failure


Comment: 


- Resolving


- Echocardiogram, EF down to 35-40%.


- Switch to po lasix (60mg q day), follow up Dr. Faria and Dr. Boo.    





(2) NSTEMI (non-ST elevated myocardial infarction)


Comment: 


- Troponin elevation peaked, demand related troponin leak due to CHF.


- Cardiology input appreciated, plan for medical management.


- Continue Aspirin, Metoprolol, and Atorvastatin.   





(3) Anemia


Comment: 


- Macrocytic, chronic.


- Heparin discontinued due to possible GI losses


- CKD contributes to anemia.   





(4) CKD (chronic kidney disease) stage 3, GFR 30-59 ml/min


Comment: 


- creatinine at baseline, stable   





(5) Dyslipidemia


Current Visit: No   Comment: 


 - Continue statin    





(6) DVT prophylaxis


Comment: 


- SCDs, concern for GI bleed related to IV heparin.   





(7) Full code status


Comment: 


   


Status and Disposition: 


Discharge to home,  patient refuses services in the home.

## 2018-09-25 NOTE — DS
CC:  Dr. Boo; Dr. Faria *

 

HOSPITAL MEDICINE DISCHARGE SUMMARY:

 

DATE OF ADMISSION:  09/21/18

 

DATE OF DISCHARGE:  09/24/18

 

PRIMARY CARE PHYSICIAN:  Dr. Boo.

 

CARDIOLOGIST:  Dr. Faria.

 

ATTENDING PHYSICIAN:  Dr. Anita Bassett * (dictation provided by Abbie Cardenas NP
).

 

PRIMARY DIAGNOSES:

1.  Acute-on-chronic systolic congestive heart failure.

2.  New reduction ejection fraction from 45%-50% to 35%-40%.

3.  Non-ST elevation myocardial infarction, likely related to demand ischemia 
in the setting of congestive heart failure exacerbation.

 

SECONDARY DIAGNOSES:

1.  History of coronary artery disease with coronary artery bypass graft.

2.  History of suspected interstitial lung disease secondary to autoimmune 
disorder, followed by Dr. Castro, on Plaquenil therapy.

3.  Hypertension.

4.  Hyperlipidemia.

5.  Chronic kidney disease, stage 3.

6.  Atrial fibrillation.

7.  History of pacemaker placement 3 months ago by Dr. Faria.

8.  Hernia repair.

 

OUTPATIENT MEDICATIONS:

1.  Metamucil 0.52 g p.o. b.i.d.

2.  Hydroxychloroquine 400 mg p.o. daily.

3.  Atorvastatin 10 mg p.o. bedtime.

4.  Magnesium oxide 800 mg p.o. b.i.d.

5.  Omeprazole 20 mg p.o. q.a.m.

6.  Potassium chloride 20 mEq p.o. b.i.d.

7.  Aspirin 81 mg p.o. daily.

8.  Cholecalciferol 1000 units p.o. daily.

9.  Metoprolol succinate 25 mg p.o. b.i.d. (new reduced dose).

10.  Furosemide 60 mg p.o. daily (new increased dose).

 

HOSPITAL COURSE:  Mr. Vazquez is an 86-year-old male, who presented to hospital on 
09/21/18 with concern for chest pain and shortness of breath.  Please see the 
dictated H and P from Dr. Tate Laura for complete details.  In brief, 
the patient reported 2 to 3 days of worsening chest pain and shortness of 
breath. He also felt very weak.  He stated he has been taking his Lasix per 
routine.  Mr. Vazquez had labs in the emergency room, which showed chronic anemia 
at baseline.  He had a BNP of 3074.  His first troponin was 0.06, lactic acid 
was 2.5.  He had a potassium of 5.2, sodium 129, BUN 33, creatinine 1.71 (at 
baseline).  He had a chest x-ray that showed "findings suggestive of congestive 
heart failure."

 

Mr. Vazquez was admitted to the hospital for treatment of acute-on-chronic 
systolic congestive heart failure exacerbation and concerned for elevated 
troponin.  His EKG in the emergency room showed nonspecific intraventricular 
conduction problem that was very difficult to interpret for ischemia.  He had a 
transthoracic echocardiogram, which confirmed that his ejection fraction had 
decreased from 45%- 50% to 35%-40% and worsening mitral regurgitation now 
moderate.  He was seen in consultation by Dr. Benítez, who agreed that he was 
having a systolic congestive heart failure exacerbation.  It was unclear what 
was driving the exacerbation as the patient stated that he has been taking his 
medications per routine and eating his normal diet without excessive salt 
intake.  The patient was treated with intravenous Lasix.  His troponin 
ultimately peaked at 1.56 and a discussion with him and Dr. Benítez, plans were 
for medical management only as it is suspected that his elevated troponin was 
secondary to demand ischemia in the setting of CHF.

 

With intravenous Lasix, the patient has been doing well.  He is not hypoxic.  
He is ambulating in his room independently.  The lower extremity edema has 
significantly decreased.

 

Our plans are for discharge to home for Mr. Vazquez today.  He will be discharged 
on a slightly increased dose of Lasix from 40 to 60 mg p.o. daily.  We have 
also decreased his metoprolol from 37.5 mg p.o. b.i.d. to 25 mg p.o. b.i.d.  He 
has been recommended to follow up with Dr. Boo and Dr. Faria.

 

DISPOSITION:  To home.

 

DIET:  Low salt.

 

ACTIVITY:  As tolerated. FOLLOWUP PLANS:

1.  Please follow up with Dr. Boo.  The patient has been asked to provide 
daily weights.

2.  Please follow up with Dr. Faria.  The patient has been asked to provide 
daily weights.

 

TIME SPENT:  Approximately 60 minutes were spent on the discharge of this 
patient, more than half time spent with the patient at the bedside reviewing 
the events leading up to this hospitalization and during this hospitalization, 
performing the physical examination, and reviewing my plan of care.

 

____________________________________ ABBIE CARDENAS NP

 

[id number]/CPS #: [id number]

MARIO

## 2018-10-08 NOTE — HP
CC:  Dr. Boo; Dr. Faria; Dr. Castro *

 

HISTORY AND PHYSICAL:

 

DATE OF ADMISSION:  10/08/18

 

TIME OF EVALUATION:  2:55 p.m.

 

PRIMARY CARE PROVIDER:  Dr. Boo.

 

CARDIOLOGIST:  Dr. Faria.

 

RHEUMATOLOGIST:  Dr. Castro.

 

CHIEF COMPLAINT:  Shortness of breath.

 

HISTORY OF PRESENT ILLNESS:  Mr. Vazquez is an 86-year-old male well known to the 
hospitalist service with a past medical history of systolic congestive heart 
failure, recent non-ST elevation MI, coronary artery disease, status post CABG, 
interstitial lung disease, undetermined connective tissue disorder, hypertension
, hyperlipidemia, CKD stage 3, atrial fibrillation, status post pacemaker 
placement that presented to the emergency room with complaints of shortness of 
breath.

 

The patient was admitted from 09/21/18 to 09/24/18 with acute-on-chronic 
systolic congestive heart failure with new reduction of his ejection fraction 
from 45-50 to 35-40 and also non-ST elevation MI.  The patient was managed 
medically with diuresis and cardiac cath was not pursued due to his chronic 
kidney disease and concern for worsening renal failure and need for dialysis.  
On discharge, the patient's Lasix had been increased from 40 to 60 mg daily and 
when he followed up with Dr. Faria in the office, he was doing well.  Weight 
was 151 pounds and there was no significant lower extremity edema noted.

 

The patient states that he has been compliant with his medications, but diet 
depends on what Meals on Wheels bring him.  He is on a no-added salt diet, but 
sometimes the meals will have salt.  His wife states that sometimes he gets 
sandwiches with cold cuts, sometimes he has lasagna stuffed shells that she 
feels are too salty for them.  He states that he checks his weight at home and 
is around 156.  Three days ago, the wife noted that he was more short of breath 
and had frequent dry cough.  She states that usually these are the first times 
that he is going to have another episode of CHF exacerbation, but he usually 
gets upset if she brings it up and proposes they come to the hospital.  So, she 
waited and his symptoms persisted and today he requested to come to the 
emergency room.

 

PAST MEDICAL HISTORY:

1.  Systolic congestive heart failure with ejection fraction of 35% to 40%.

2.  Recent admission for non-ST elevation MI.

3.  Coronary artery disease, status post CABG.

4.  Interstitial lung disease.

5.  Undifferentiated connective tissue disorder, on Plaquenil, being followed 
by Dr. Castro.

6.  Hypertension.

7.  Hyperlipidemia.

8.  CKD, stage 3.

9.  Atrial fibrillation.

10.  Status post pacemaker.

11.  Status post hernia repair.

 

MEDICATION LIST:

1.  Aspirin 81 mg p.o. daily.

2.  Atorvastatin 10 mg p.o. at bedtime.

3.  Vitamin D 1000 units p.o. daily.

4.  Furosemide 60 mg p.o. daily.

5.  Plaquenil 400 mg p.o. daily.

6.  Magnesium oxide 800 mg p.o. b.i.d.

7.  Metoprolol succinate 25 mg p.o. b.i.d.

8.  Omeprazole 20 mg p.o. q.a.m.

9.  Potassium chloride 20 mEq p.o. b.i.d.

10.  Metamucil 0.52 g p.o. b.i.d.

 

ALLERGIES:  No known drug allergies.

 

FAMILY HISTORY:  Mother had renal failure.

 

SOCIAL HISTORY:  No history of tobacco, alcohol, or drug use.  Surrogate 
decision maker is his wife, Catherine Vazquez, phone number is [phone].

 

REVIEW OF SYSTEMS:  A 14-point review of systems was performed and all the 
pertinent negative and positive findings are in the HPI.

 

                               PHYSICAL EXAMINATION

 

GENERAL:  The patient is a pleasant elderly male, sitting up in the ED stretcher
, not in acute distress.

 

VITAL SIGNS:  Temperature 98.1, heart rate is 77, respiratory rate is 25, 
oxygen saturation is 98% on room air, blood pressure 155/87.

 

CHEST:  Breath sounds present bilaterally with basilar crackles.

 

CVS:  Normal S1, S2.  Regular rate and rhythm.

 

ABDOMEN:  Soft.  Bowel sounds are present.

 

EXTREMITIES:  Bilateral lower extremity edema, 2+, up to his knees.

 

NEUROLOGIC:  He is alert and oriented x3.  He can move all 4 extremities.

 

 DIAGNOSTIC STUDIES/LAB DATA:  The patient had a CBC that showed WBC of 8.4, 
hemoglobin 9.6, hematocrit 29, platelets of 215.  Chemistry showed sodium of 122
, potassium 5.4, chloride of 91, BUN of 28, creatinine of 1.9, glucose of 100, 
lactic acid 1.1, calcium 9.3.  LFTs were normal except for alk phos of 227.  
Troponin was 0.04.  BNP is 3300.

 

Chest x-ray showed cardiomegaly with mild bilateral pleural effusions.  An EKG 
shows sinus rhythm at 72 beats per minute with a nonspecific intraventricular 
conduction delay, no significant change when compared to prior EKG from 
September 2018.

 

ASSESSMENT AND PLAN:  Mr. Vazquez is an 86-year-old male with past medical history 
of systolic congestive heart failure, ejection fraction 35% to 40%, recent 
admission for non-ST elevation MI, CAD, status post CABG, interstitial lung 
disease, undifferentiated connective tissue disorder, hypertension, 
hyperlipidemia, CKD stage 3, atrial fibrillation, status post pacemaker, who 
presented to the emergency room with complaints of shortness of breath and dry 
cough, found to have another episode of heart failure exacerbation.

 

1.  Acute systolic congestive heart failure exacerbation.  Suspect the issue is 
probably dietary at this point with non-intentional, noncompliance, as the 
patient receives his meals from Meals on Wheels.  He states he is compliant 
with his medications.  He will be admitted to the telemetry floor.  We are 
going to diurese him with IV furosemide, monitor I's and O's, daily weights.  
The patient has no complaints of chest pain at this point.  We are going to 
trend troponins, but he does not appear to be having another episode of ACS at 
this time.

2.  Coronary artery disease.  We will continue aspirin, beta-blocker, and 
statin.

3.  Gastroesophageal reflux disease.  We will continue omeprazole.

4.  Chronic kidney disease, stage 3.  We will continue to monitor his renal 
function while we diurese.

5.  Atrial fibrillation.  The patient appears to be in sinus rhythm at this 
time. We will continue his beta-blocker.

6.  DVT prophylaxis:  The patient has a score of 4 on the DVT Prophylaxis Risk 
Assessment Guide and he will be started on subcutaneous heparin.  SCDs are 
contraindicated at this time due to his lower extremity edema and CHF.

7.  Code status is full, but the patient would like to prepare healthcare proxy 
and he is considering becoming do not resuscitate, but he is not ready to fill 
up a MOLST form at this time.  I am going to request a  
consultation to assist with the healthcare proxy process.

 

TIME SPENT:  Approximately 55 minutes were spent with patient and wife interview
, medical records review, physical examination to complete this admission, more 
than half of this time was spent face-to-face with the patient in coordination 
of care.

 

[id number]/CPS #: [id number]

MARIO

## 2018-10-08 NOTE — RAD
HISTORY: sob,h/o chf



COMPARISONS: September 21, 2018 



VIEWS: 2: Frontal and lateral views of the chest.



FINDINGS:

CARDIOMEDIASTINAL SILHOUETTE: The cardiac silhouette is enlarged. The cardiomediastinal

silhouette is otherwise normal.

RACHEL: The rachel are normal.

PLEURA: There is blunting of the costophrenic angles bilaterally. 

LUNG PARENCHYMA: The lungs are clear.

ABDOMEN: The upper abdomen is clear. There is no subphrenic gas.

BONES AND SOFT TISSUES: The patient is status post median sternotomy.

OTHER: A left-sided pacemaker is noted 



IMPRESSION:

CARDIOMEGALY

SMALL BILATERAL PLEURAL EFFUSIONS.

## 2018-10-08 NOTE — ED
Shortness of Breath





- HPI Summary


HPI Summary: 





This patient is a 86 year old female presenting to [facility] accompanied by wife 

with a chief complaint of SOB since a few days ago. Patient has a hx of CHF. 

Patient states that his Lasix dose has been increased recently. Symptoms 

aggravated by nothing. Symptoms alleviated by nothing. Patient additionally 

reports bilateral lower extremity swelling, nonproductive cough, lack of 

appetite. Patient denies fever.





- History of Current Complaint


Chief Complaint: EDShortnessOfBreath


Time Seen by Provider: 10/08/18 13:42


Hx Obtained From: Patient


Onset/Duration: Lasting Days, Still Present


Timing: Constant


Current Severity: Moderate


Dyspnea At: Rest


Aggrevating Factors: Nothing


Alleviating Factors: Nothing


Associated Signs & Symptoms: Negative - fever, Cough (Nonproductive), Calf Pain/

Swelling





- Allergy/Home Medications


Allergies/Adverse Reactions: 


 Allergies











Allergy/AdvReac Type Severity Reaction Status Date / Time


 


No Known Allergies Allergy   Verified 10/08/18 13:12











Home Medications: 


 Home Medications





Furosemide TAB* [Lasix TAB*] 60 mg PO DAILY 10/08/18 [History Confirmed 10/08/18

]











PMH/Surg Hx/FS Hx/Imm Hx


Previously Healthy: No


Endocrine/Hematology History: 


   Denies: Hx Diabetes, Hx Thyroid Disease


Cardiovascular History: Reports: Hx Congestive Heart Failure, Hx Coronary 

Artery Disease, Hx Hypercholesterolemia, Hx Hypertension, Hx Pacemaker/ICD


Respiratory History: 


   Denies: Hx Asthma, Hx Chronic Obstructive Pulmonary Disease (COPD)


GI History: Reports: Hx Gastroesophageal Reflux Disease


   Denies: Hx Ulcer


 History: Reports: Hx Renal Disease - abnormal


   Denies: Hx Dialysis


Musculoskeletal History: Reports: Hx Arthritis, Hx Back Problems - spinal 

stenosis


Sensory History: Reports: Hx Cataracts, Hx Contacts or Glasses - reading glasses


   Denies: Hx Hearing Aid


Opthamlomology History: Reports: Hx Cataracts, Hx Contacts or Glasses - reading 

glasses


Neurological History: 


   Denies: Hx Developmental Delay


Psychiatric History: 


   Denies: Hx Panic Disorder





- Surgical History


Surgery Procedure, Year, and Place: HERNIA SURGERY 1965-CATARACTS BILATERAL 

EYES 2011,cardiac bypass, tonsillectomy


Infectious Disease History: No


Infectious Disease History: Reports: Hx of Known/Suspected MRSA


   Denies: Hx Hepatitis, Hx Human Immunodeficiency Virus (HIV), Traveled 

Outside the [place] in Last 30 Days





- Family History


Known Family History: 


   Negative: Cardiac Disease





- Social History


Lives: With Family


Alcohol Use: None


Hx Substance Use: No


Substance Use Type: Reports: None


Hx Tobacco Use: No


Smoking Status (MU): Never Smoked Tobacco


Have You Smoked in the Last Year: No





Review of Systems


Negative: Fever


Positive: Shortness Of Breath, Cough - nonproductive


Positive: Edema - lower extremity


All Other Systems Reviewed And Are Negative: Yes





Physical Exam





- Summary


Physical Exam Summary: 





Appearance: Well-appearing, Well-nourished, lying in bed comfortably


Skin: Warm, dry, no obvious rash


Eyes: sclera anicteric, no conjunctival pallor


ENT: mucous membranes moist, pharynx appears normal


Neck: Supple, nontender


Respiratory: Mild tachypnea, but no overt respiratory distress, Crackles, No 

signs of effusion


Cardiovascular: Normal S1, S2. No murmurs. Normal distal pulses in tibial and 

radial bilaterally.


Abdomen: Soft, nontender, normal active bowel sounds present


Musculoskeletal: Pitting edema in bilateral lower extremities, about FDC up 

the legs


Neurological: A&Ox3, awake and alert, mentation is normal, speech is fluent and 

appropriate


Psychiatric: affect is normal, does not appear anxious or depressed





Triage Information Reviewed: Yes


Vital Signs On Initial Exam: 


 Initial Vitals











Temp Pulse Resp BP Pulse Ox


 


 98.1 F   72   16   128/58   94 


 


 10/08/18 13:07  10/08/18 13:07  10/08/18 13:07  10/08/18 13:07  10/08/18 13:07











Vital Signs Reviewed: Yes





Diagnostics





- Vital Signs


 Vital Signs











  Temp Pulse Resp BP Pulse Ox


 


 10/08/18 13:07  98.1 F  72  16  128/58  94














- Laboratory


Lab Results: 


 Lab Results











  10/08/18 Range/Units





  13:36 


 


WBC  8.4  (3.5-10.8)  10^3/ul


 


RBC  3.08 L  (4.00-5.40)  10^6/ul


 


Hgb  9.6 L  (14.0-18.0)  g/dl


 


Hct  29 L  (42-52)  %


 


MCV  95 H  (80-94)  fL


 


MCH  31  (27-31)  pg


 


MCHC  33  (31-36)  g/dl


 


RDW  16 H  (10.5-15)  %


 


Plt Count  215  (150-450)  10^3/ul


 


MPV  8.9  (7.4-10.4)  um3


 


Neut % (Auto)  82.1  (38-83)  %


 


Lymph % (Auto)  7.6 L  (25-47)  %


 


Mono % (Auto)  9.8 H  (0-7)  %


 


Eos % (Auto)  0.1  (0-6)  %


 


Baso % (Auto)  0.4  (0-2)  %


 


Absolute Neuts (auto)  6.9  (1.5-7.7)  10^3/ul


 


Absolute Lymphs (auto)  0.6 L  (1.0-4.8)  10^3/ul


 


Absolute Monos (auto)  0.8  (0-0.8)  10^3/ul


 


Absolute Eos (auto)  0  (0-0.6)  10^3/ul


 


Absolute Basos (auto)  0  (0-0.2)  10^3/ul


 


Absolute Nucleated RBC  0  10^3/ul


 


Nucleated RBC %  0  











Result Diagrams: 


 10/08/18 13:36





 10/08/18 13:36


Lab Statement: Any lab studies that have been ordered have been reviewed, and 

results considered in the medical decision making process.





- EKG


  ** 1406


Cardiac Rate: NL


EKG Rhythm: Sinus Rhythm - 72 BPM


EKG Interpretation: RBBB





Course/Dx





- Course


Course Of Treatment: This patient is a 86 year old female presenting to [facility] 

accompanied by wife with a chief complaint of SOB since a few days ago. Patient 

has a hx of CHF. Bloodwork Obtained. In the ED course the patient was given 

Lasix 40mg IV. Test results show a Troponin of 0.04H. CXR reveals, per 

radiologist, IMPRESSION: CARDIOMEGALY, SMALL BILATERAL PLEURAL EFFUSIONS. ED 

physician has reviewed this radiology report. EKG obtained.  We discussed 

patient care with Dr. Graham (Hospitalist) at 1445 and they agreed to accept 

the patient. Patient will be admitted to the hospital with a dx of CHF. The 

patient is agreeable with this plan.





- Physician Notifications


Discussed Care of Patient With: Jennifer Graham - Hospitalist


Time Discussed With Above Provider: 14:45 - We discussed patient care with Dr. Graham (Hospitalist) at 1445 and they agreed to accept the patient. 


Instructed by Provider To: Admit As Inpatient





Discharge





- Sign-Out/Discharge


Documenting (check all that apply): Patient Departure





- Discharge Plan


Condition: Stable


Disposition: ADMITTED TO [facility]


Referrals: 


Cholo Boo MD [Primary Care Provider] - 





- Attestation Statements


Document Initiated by Arielleibe: Yes


Documenting Scribe: Mariano Arias


Provider For Whom Denise is Documenting (Include Credential): Chris Cruz MD


Scribe Attestation: 


Mariano GAMBLE, scribed for Chris Cruz MD on 10/08/18 at 1501.

## 2018-10-08 NOTE — UC
Shortness of Breath HPI





- HPI Summary


HPI Summary: 





86-year-old male comes to clinic with a complaint of shortness of breath.  

Patient has CHF and he is chronically short of breath it started getting worse 

several days ago.  This morning he had a hard time getting up out of bed and 

getting dressed he was so short of breath.  Activity makes it worse.  Rest 

makes it slightly better.  Patient denies any chest pain or fevers or chills or 

sputum production.  He has increasing pedal edema.  He takes Lasix 60 mg daily.

  He has a history of renal injury.





- History of Current Complaint


Chief Complaint: UCChestPain


Stated Complaint: CHEST CONGESTION


Time Seen by Provider: 10/08/18 11:56





- Allergy/Home Medications


Allergies/Adverse Reactions: 


 Allergies











Allergy/AdvReac Type Severity Reaction Status Date / Time


 


No Known Allergies Allergy   Verified 09/21/18 12:14














PMH/Surg Hx/FS Hx/Imm Hx


Cardiovascular History: Congestive Heart Failure





- Surgical History


Surgical History: Yes


Surgery Procedure, Year, and Place: HERNIA SURGERY 1965-CATARACTS BILATERAL 

EYES 2011,cardiac bypass, tonsillectomy





- Family History


Known Family History: 


   Negative: Cardiac Disease





- Social History


Alcohol Use: None


Substance Use Type: None


Smoking Status (MU): Never Smoked Tobacco


Have You Smoked in the Last Year: No





- Immunization History


Most Recent Influenza Vaccination: 2018


Most Recent Tetanus Shot: 2017


Most Recent Pneumonia Vaccination: 2015





Review of Systems


Constitutional: Fatigue


Skin: Negative


Eyes: Negative


ENT: Negative


Respiratory: Shortness Of Breath


Cardiovascular: Negative


Gastrointestinal: Negative


Genitourinary: Negative


Motor: Weakness


Neurovascular: Negative


Musculoskeletal: Edema


Neurological: Negative


Psychological: Negative


Is Patient Immunocompromised?: No


All Other Systems Reviewed And Are Negative: Yes





Physical Exam


Triage Information Reviewed: Yes


Appearance: No Pain Distress, Well-Nourished, Ill-Appearing - mildy sob at rest


Vital Signs: 


 Initial Vital Signs











Temp  98 F   10/08/18 11:58


 


Pulse  77   10/08/18 11:58


 


Resp  24   10/08/18 11:58


 


BP  138/70   10/08/18 11:58


 


Pulse Ox  98   10/08/18 11:58











Vital Signs Reviewed: Yes


Eye Exam: Normal


Eyes: Positive: Conjunctiva Clear


Dental: Positive: Abscess @


Neck: Positive: Supple


Respiratory: Positive: Respiratory distress - mild, Crackles


Cardiovascular: Positive: RRR


Musculoskeletal: Positive: Other: - Bilateral pedal edema


Neurological Exam: Normal


Neurological: Positive: Alert


Psychological Exam: Normal


Psychological: Positive: Normal Response To Family, Age Appropriate Behavior


Skin Exam: Normal





Diagnostics





- EKG


EKG Comments: 





AT 12:01


Cardiac Rate: NL


Cardiac Rhythm: Sinus: Normal - 77 BPM


Ectopy: None


ST Segment: Non-Specific - MINIMAL ST DEPRESSION DIFFUSE LEADS


EKG Comparison: No Significant Change - COMPARED TO EKG 9/22/18





Shortness of Breath Dx





- Course


Course Of Treatment: With the patient's history of congestive heart failure and 

a history of kidney insufficiency I recommend the patient go directly to the 

emergency room for further therapy evaluation and treatment.  The patient 

declined embolus transport his wife plans on taking him to the emergency 

department.  His EKG in clinic was similar to his prior EKG in September.  

Patient declines having chest pain at this time.





- Differential Dx/Diagnosis


Provider Diagnoses: [email or web address]





Discharge





- Sign-Out/Discharge


Documenting (check all that apply): Patient Departure


All imaging exams completed and their final reports reviewed: No Studies





- Discharge Plan


Condition: Stable


Disposition: HOME-RECOMMEND TO ED


Patient Education Materials:  Dyspnea (ED), Heart Failure (ED)


Referrals: 


Cholo Boo MD [Primary Care Provider] - 


Additional Instructions: 


GO DIRECTLY TO THE EMERGENCY DEPARTMENT FOR FURTHER EVALUATION.





- Billing Disposition and Condition


Condition: STABLE


Disposition: Home-Recommend to ED

## 2018-10-08 NOTE — XMS REPORT
Carmelo Vazquez JR

 Created on:2018



Patient:JR Vazquez Lloyd R

Sex:Male

:1932

External Reference #:[id number]





Demographics







 Address  [address] [place] [address]

 

 Home Phone  [phone]

 

 Preferred Language  English

 

 Marital Status  Not  Or 

 

 Congregation Affiliation  Unknown

 

 Race  White

 

 Ethnic Group  Not  Or 









Author







 Organization  [facility]

 

 Address  [address]



   [place] [address]

 

 Phone  [phone]









Support







 Name  Relationship  Address  Phone

 

 Catherine Vazquez  Unavailable  Unavailable  Unavailable









Care Team Providers







 Name  Role  Phone

 

 Cholo Boo MD  Primary Care Physician  Unavailable









Payers







 Type  Date  Identification Numbers  Payment Provider  Subscriber

 

 Medicare Primary  Effective:  Policy Number:  Medicare  Carmelo Vazqeuz JR



   1999  [id number]    









 PayID: [id number]  [address]









 [place] [address]









 Medigap Part B  Effective:  Policy Number:  [employer]  Carmelo Vazquez,



   1991  [id number]    









 PayID: [id number]  [address]









 [place] [address]









 Medigap Part B  Effective: 1998  Policy Number:   Paris MENDEZ  Catherine Vazquez



     [id number]    









 Expires: 2017  Group Number: [id number]  [address] [address]









 PayID: [id number]  [place] [address]









 Commercial  Effective: 12/15/2016  Policy Number:  [employer]  Carmelo Vazquez JR



     [id number]    









 Expires: 10/23/2018  Group Number: [id number]  [id number] 









 PayID: [id number]  [address] [address]







Problems







 Date  Description  Provider  Status

 

 Onset: 2017  Aortic valve disorder  [facility]., [facility]  Active



     [facility]  

 

 Onset: 2017  Athscl heart disease of native  Tay Corcoran M.D., KIMBER,  
Active



   coronary artery w/o ang pctrs  [facility]  

 

 Onset: 2017  Essential hypertension  Tay Corcoran M.D., KIMBER,  Active



     [facility]  

 

 Onset: 2017  Hyperlipidemia  Tay Corcoran M.D., [facility],  Active



     [facility]  

 

 Onset: 2017  Right bundle branch block  Tay Corcoran M.D., [facility],  Active



     [facility]  

 

 Onset: 2017  Paroxysmal atrial fibrillation  Tay Corcoran M.D., [facility],  
Active



     [facility]  

 

 Onset: 2017  Chronic kidney disease stage 3  Tay Corcoran M.D., [facility],  
Active



     [facility]  

 

 Onset: 2018  Syncope and collapse  Tay Corcoran M.D., [facility],  Active



     [facility]  

 

 Onset: 2018  Chronic diastolic heart failure  Eze Baltazar MD  Active

 

 Onset: 2018  Sepsis, unspecified organism  Eze Baltazar MD  Active

 

 Onset: 2018  Chronic kidney disease  Nathan rGande N.P.  Active

 

 Onset: 2018  Hyp hrt & chr kdny dis w hrt fail  Nathan Grande N.P.  
Active



   and stg 1-4/unsp chr royceny    

 

 Onset: 2018  Heart failure, unspecified  Nathan Grande, N.P.  Active

 

 Onset: 2018  Pneumonia  Nathan Grande, N.P.  Active







Family History







 Date  Family Member(s)  Problem(s)  Comments

 

   General  Arthritis  







Social History







 Type  Date  Description  Comments

 

 Marital Status      

 

 Occupation    Retired  

 

 ETOH Use    Drinks Alcoholic Beverages Rarely  once a year

 

 Smoking    Patient has never smoked  

 

 Recreational Drug Use    Denies Drug Use  

 

 Daily Caffeine    Does Not Consume Caffeine  

 

 Exercise Type/Frequency    Exercises rarely  







Allergies, Adverse Reactions, Alerts







 Date  Description  Reaction  Status  Severity  Comments

 

 2017  NKDA    active    







Medications







 Medication  Date  Status  Form  Strength  Qnty  SIG  Indications  Ordering



                 Provider

 

 Compression    Active  Misc    2unit  wear on  R60.9  Aura S.



 Stockings  /        s  both legs    Foster,



             daily and    N.P.



             remove at    



             night    

 

 Metamucil    Active  Capsules  0.52gm    1 cap by    Unknown



   /2017          mouth twice    



             a day with    



             water    

 

 Lipitor    Active  Tablets  10mg  90tab  1 by mouth            s  every night    Nilo,



             at bedtime    M.D.,



                 [facility],



                 [facility]

 

 Metoprolol    Active  Tablets  37.5mg    one ta bid    Tay



 Tar              BEHZAD Corcoran,



                 [facility],



                 [facility]

 

 Aspirin Adult Low  00  Active  Tablets  81mg    1 by mouth    Unknown



 Dose Ec  /0000    DR      every day    

 

 Omeprazole    Active  Capsules  20mg    1 by mouth    Unknown



   /0000    DR      every day    

 

 Potassium Chloride    Active  Tablets  10Meq    1 by mouth    Unknown



 ER  /0000    ER      every    



             day(taking    



             1/2 tab    



             Am,1/2 tab    



             PM)    

 

 Magnesium Oxide    Active  Tablets  400mg    2 by mouth    Unknown



   /0000          every day    

 

 Montelukast Sodium    Active  Tablets  10mg    1 by mouth    Unknown



   /0000          every day    



             as needed    

 

 Gabapentin    Active  Capsules  100mg    take 1    Unknown



             three times    



             a day    

 

 Hydroxychloroquine    Active  Tablets  200mg  180ta  take 2    Davey



 Sulfate  /0000        bs  tablet    Glroia,



             daily    M.D.

 

 Vitamin D-3    Active  Capsules  1000Unit    1 by mouth    Unknown



   /          every day    

 

 Furosemide    Active  Tablets  40mg    1 tab by    Unknown



   /          mouth every    



             day    

 

                 

 

 Keflex    Hx  Capsules  250mg  9caps  3 times a              day for 3    D. Bienvenido,



   -          days    M.D.



                 

 

 Benzonatate    Hx  Capsules  100mg  45cap  1 capsule 3            s  times daily    TESHA Faria,



   -          as needed    M.D.



                 

 

 Cozaar    Hx  Tablets  25mg    1 by mouth              twice    Nilo,



   -          daily(pt    M.D.,



             unsure if    [facility]          he is    [facility]



             taking    



             this)    

 

 Multi Complete    Hx  Capsules      daily    Unknown



   /2017              



   -              



                 

 

 Potassium Chloride    Hx  Solution  20Meq/50M    twice daily    Unknown



   /2017      L        



   -              



                 

 

 Digoxin    Hx  Tablets  125mcg  30tab  1 by mouth    Tay



           s  every day    Ben Corcoran M.D.,



                 [facility]              [facility]

 

 Cozaar    Hx  Tablets  50mg  30tab  2 by mouth    Tay



           s  every day    Ben Corcoran M.D.,



                 [facility]              [facility]

 

 Lasix    Hx  Tablets  40mg    1 by mouth    Unknown



   /0000          every day    



   -              



                 

 

 Norco    Hx  Tablets  5-325mg    one to two    Unknown



   /0000          tabs by    



   -          mouth every    



             4-6 hours    



   /          as needed    



             pain    

 

 Coumadin    Hx  Tablets  1mg    take as    Unknown



   /0000          directed    



   -              



                 

 

 Vitamin D    Hx  Tablets  1000Unit    by mouth    Unknown



   /0000          everyday    



   -              



                 

 

 Digoxin    Hx  Tablets  125mcg    1 by mouth    Unknown



   /0000          every day    



   -              



                 

 

 Sod Citrate-Citric    Hx  Solution  500-334mg    30 ml's 3    Unknown



 Acid  /0000      /5ML    times a    



   -          day.    



                 

 

 Prednisone    Hx  Tablets  10mg  60tab  Please take    Davey



           s  one tab by    Gloria,



   -          mouth once    M.D.



   08/21          daily    



   /2018              

 

 Torsemide    Hx  Tablets  20mg    take 2    Unknown



             tablets Up    



   -          To Once A    



             Day as    



   /2018          Needed For    



             Congestive    



             Heart    



             Failure    







Vital Signs







 Date  Vital  Result  Comment

 

 2018  Height  66 inches  5'6"









 Weight  171.00 lb  

 

 Heart Rate  60 /min  

 

 BP Systolic Sitting  128 mmHg  

 

 BP Diastolic Sitting  62 mmHg  

 

 Respiratory Rate  14 /min  

 

 Body Temperature  97.9 F  

 

 O2 % BldC Oximetry  98 %  

 

 BMI (Body Mass Index)  27.6 kg/m2  









 2018  Height  66 inches  5'6"









 Weight  171.12 lb  

 

 Heart Rate  64 /min  

 

 BP Systolic  128 mmHg  

 

 BP Diastolic  62 mmHg  

 

 Pain Level  8  

 

 O2 % BldC Oximetry  98 %  

 

 BMI (Body Mass Index)  27.6 kg/m2  









 2018  Height  66 inches  5'6"









 Weight  163.00 lb  w/ shoes

 

 Heart Rate  68 /min  

 

 BP Systolic Sitting  124 mmHg  lue regg cuff

 

 BP Diastolic Sitting  54 mmHg  lue regg cuff

 

 BP Systolic Standing  136 mmHg  lue regg cuff

 

 BP Diastolic Standing  54 mmHg  lue regg cuff

 

 Respiratory Rate  24 /min  

 

 BMI (Body Mass Index)  26.3 kg/m2  

 

 Ejection Fraction  45-50%  Echo 2018  Height  66 inches  5'6"









 Weight  161.00 lb  with shoes

 

 Heart Rate  64 /min  

 

 BP Systolic Sitting  122 mmHg  Lue reg cuff

 

 BP Diastolic Sitting  50 mmHg  Lue reg cuff

 

 BP Systolic Standing  126 mmHg  Lue reg cuff

 

 BP Diastolic Standing  54 mmHg  Lue reg cuff

 

 Respiratory Rate  16 /min  

 

 BMI (Body Mass Index)  26.0 kg/m2  

 

 Ejection Fraction  45-50%  date 18 ECHO









 2018  Height  66 inches  5'6"









 Weight  159.50 lb  

 

 Heart Rate  63 /min  

 

 BP Systolic Sitting  120 mmHg  

 

 BP Diastolic Sitting  55 mmHg  

 

 Respiratory Rate  14 /min  

 

 Pain Level  5  

 

 BMI (Body Mass Index)  25.7 kg/m2  









 2018  Height  66 inches  5'6"









 Weight  155.00 lb  at home w/o clothes

 

 Heart Rate  66 /min  

 

 BP Systolic Sitting  110 mmHg  lue rg cuff

 

 BP Diastolic Sitting  46 mmHg  lue rg cuff

 

 Respiratory Rate  18 /min  

 

 BMI (Body Mass Index)  25.0 kg/m2  

 

 Ejection Fraction  50-55%  echo 2018  Height  66 inches  5'6"









 Weight  156.00 lb  w/ shoes

 

 Heart Rate  56 /min  

 

 BP Systolic Standing  115 mmHg  lue reg cuff

 

 BP Diastolic Standing  50 mmHg  lue reg cuff

 

 Respiratory Rate  18 /min  

 

 BMI (Body Mass Index)  25.2 kg/m2  

 

 Ejection Fraction  50-55%  echo 2017  Height  66 inches  5'6"









 Weight  165.00 lb  with shoes

 

 Heart Rate  60 /min  sit and 68 stand HR

 

 BP Systolic Sitting  146 mmHg  Rue reg cuff

 

 BP Diastolic Sitting  60 mmHg  Rue reg cuff

 

 BP Systolic Standing  152 mmHg  Rue reg cuff

 

 BP Diastolic Standing  80 mmHg  Rue reg cuff

 

 Respiratory Rate  17 /min  

 

 BMI (Body Mass Index)  26.6 kg/m2  









 2017  Height  66 inches  5'6"









 Weight  164.50 lb  with shoes

 

 Heart Rate  62 /min  

 

 BP Systolic Sitting  108 mmHg  LA reg cuff

 

 BP Diastolic Sitting  58 mmHg  LA reg cuff

 

 BP Systolic Standing  112 mmHg  LA reg cuff

 

 BP Diastolic Standing  62 mmHg  LA reg cuff

 

 BMI (Body Mass Index)  26.5 kg/m2  

 

 Ejection Fraction  60%  echo 17







Results







 Test  Date  Test  Result  H/L  Range  Note

 

 Comp Metabolic Panel  2018  Sodium  128 mmol/L  Low  135-145  









 Potassium  4.3 mmol/L    3.5-5.0  

 

 Chloride  92 mmol/L  Low  101-111  

 

 Co2 Carbon Dioxide  27 mmol/L    22-32  

 

 Anion Gap  9 mmol/L    2-11  

 

 Glucose  90 mg/dL      

 

 Blood Urea Nitrogen  31 mg/dL  High  6-24  

 

 Creatinine  1.57 mg/dL  High  0.67-1.17  

 

 One Over Creatinine  0.63 mg/dL  Low  0.67-1.17  

 

 BUN/Creatinine Ratio  19.7    8-20  

 

 Calcium  8.7 mg/dL    8.6-10.3  

 

 Total Protein  6.1 g/dL  Low  6.4-8.9  

 

 Albumin  3.5 g/dL    3.2-5.2  

 

 Globulin  2.6 g/dL    2-4  

 

 Albumin/Globulin Ratio  1.3    1-3  

 

 Total Bilirubin  0.40 mg/dL    0.2-1.0  

 

 Alkaline Phosphatase  93 U/L      

 

 Alt  22 U/L    7-52  

 

 Ast  19 U/L    13-39  

 

 Egfr Non-  42.1    >60  

 

 Egfr   50.9    >60  1









 CBC Auto Diff  2018  White Blood Count  19.1 10^3/uL  High  3.5-10.8  









 Red Blood Count  2.81 10^6/uL  Low  4.00-5.40  

 

 Hemoglobin  9.3 g/dL  Low  14.0-18.0  

 

 Hematocrit  29 %  Low  42-52  

 

 Mean Corpuscular Volume  102 fL  High  80-94  

 

 Mean Corpuscular Hemoglobin  33 pg  High  27-31  

 

 Mean Corpuscular HGB Conc  32 g/dL    31-36  

 

 Red Cell Distribution Width  15 %    10.5-15  

 

 Platelet Count  222 10^3/uL    150-450  

 

 Mean Platelet Volume  9.3 um3    7.4-10.4  

 

 Abs Neutrophils  16.4 10^3/uL  High  1.5-7.7  

 

 Abs Lymphocytes  1.7 10^3/uL    1.0-4.8  

 

 Abs Monocytes  0.9 10^3/uL  High  0-0.8  

 

 Abs Eosinophils  0 10^3/uL    0-0.6  

 

 Abs Basophils  0.1 10^3/uL    0-0.2  

 

 Abs Nucleated RBC  0 10^3/uL      

 

 Granulocyte %  85.7 %  High  38-83  

 

 Lymphocyte %  8.9 %  Low  25-47  

 

 Monocyte %  4.7 %    0-7  

 

 Eosinophil %  0.2 %    0-6  

 

 Basophil %  0.5 %    0-2  

 

 Nucleated Red Blood Cells %  0      









 Laboratory test finding  06/15/2018  Complement C3  99 mg/dL    75 - 175  2









 Complement C4  16 mg/dL    14 - 40  3

 

 Anti Double Stranded Dna AB  43.0 IU/mL      4

 

 Miscellaneous Test  See Comment      5









 Comp Metabolic Panel  06/15/2018  Sodium  129 mmol/L  Low  135-145  









 Potassium  4.4 mmol/L    3.5-5.0  

 

 Chloride  95 mmol/L  Low  101-111  

 

 Co2 Carbon Dioxide  28 mmol/L    22-32  

 

 Anion Gap  6 mmol/L    2-11  

 

 Glucose  133 mg/dL  High    

 

 Blood Urea Nitrogen  24 mg/dL    6-24  

 

 Creatinine  1.42 mg/dL  High  0.67-1.17  

 

 BUN/Creatinine Ratio  16.9    8-20  

 

 Calcium  8.3 mg/dL  Low  8.6-10.3  

 

 Total Protein  5.9 g/dL  Low  6.4-8.9  

 

 Albumin  3.2 g/dL    3.2-5.2  

 

 Globulin  2.7 g/dL    2-4  

 

 Albumin/Globulin Ratio  1.2    1-3  

 

 Total Bilirubin  0.60 mg/dL    0.2-1.0  

 

 Alkaline Phosphatase  121 U/L  High    

 

 Alt  30 U/L    7-52  

 

 Ast  22 U/L    13-39  

 

 Egfr Non-  47.3    >60  

 

 Egfr   60.8    >60  6









 CBC Auto Diff  06/15/2018  White Blood Count  18.0 10^3/uL  High  3.5-10.8  









 Red Blood Count  2.93 10^6/uL  Low  4.00-5.40  

 

 Hemoglobin  9.8 g/dL  Low  14.0-18.0  

 

 Hematocrit  30 %  Low  42-52  

 

 Mean Corpuscular Volume  102 fL  High  80-94  

 

 Mean Corpuscular Hemoglobin  33 pg  High  27-31  

 

 Mean Corpuscular HGB Conc  33 g/dL    31-36  

 

 Red Cell Distribution Width  15 %    10.5-15  

 

 Platelet Count  160 10^3/uL    150-450  

 

 Mean Platelet Volume  9.9 um3    7.4-10.4  

 

 Abs Neutrophils  16.9 10^3/uL  High  1.5-7.7  

 

 Abs Lymphocytes  0.6 10^3/uL  Low  1.0-4.8  

 

 Abs Monocytes  0.4 10^3/uL    0-0.8  

 

 Abs Eosinophils  0 10^3/uL    0-0.6  

 

 Abs Basophils  0 10^3/uL    0-0.2  

 

 Abs Nucleated RBC  0 10^3/uL      

 

 Granulocyte %  94.0 %  High  38-83  

 

 Lymphocyte %  3.5 %  Low  25-47  

 

 Monocyte %  2.2 %    0-7  

 

 Eosinophil %  0.1 %    0-6  

 

 Basophil %  0.2 %    0-2  

 

 Nucleated Red Blood Cells %  0      









 Laboratory test finding  06/15/2018  Erythrocyte Sed Rate  9 mm/Hr    0-40  









 C Reactive Protein  2.55 mg/L    < 5.00  7









 Nuclear AB (Nia) By Ifa  06/15/2018  Nuclear Ab (Nia) by Ifa,  Positive 1:640 
     8



 Igg    IgG        









 Nia Titer:  1:640      

 

 Nia Pattern:  Homogeneous      9









 Laboratory test finding  2017  Alt (SGPT)  31 U/L    7-52  









 Ast (Sgot)  34 U/L    13-39  









 Lipid Profile (Trig/Chol/HDL)  2017  Triglycerides  99 mg/dL      10









 Cholesterol  112 mg/dL      11

 

 HDL Cholesterol  20.2 mg/dL      12

 

 LDL Cholesterol  72 mg/dL      13









 Basic Metabolic Panel  2017  Sodium  136 mmol/L    133-145  









 Potassium  4.0 mmol/L    3.5-5.0  

 

 Chloride  104 mmol/L    101-111  

 

 Co2 Carbon Dioxide  23 mmol/L    22-32  

 

 Anion Gap  9 mmol/L    2-11  

 

 Glucose  101 mg/dL  High    

 

 Blood Urea Nitrogen  19 mg/dL    6-24  

 

 Creatinine  1.41 mg/dL  High  0.67-1.17  

 

 BUN/Creatinine Ratio  13.5    8-20  

 

 Calcium  8.6 mg/dL    8.6-10.3  

 

 Egfr Non-  47.8    >60  

 

 Egfr   61.4    >60  14









 1  *******Because ethnic data is not always readily available,



   this report includes an eGFR for both -Americans and



   non- Americans.****



   The National Kidney Disease Education Program (NKDEP) does



   not endorse the use of the MDRD equation for patients that



   are not between the ages of 18 and 70, are pregnant, have



   extremes of body size, muscle mass, or nutritional status,



   or are non- or non-.



   According to the National Kidney Foundation, irrespective of



   diagnosis, the stage of the disease is based on the level of



   kidney function:



   Stage Description                      GFR(mL/min/1.73 m(2))



   1     Kidney damage with normal or decreased GFR       90



   2     Kidney damage with mild decrease in GFR          60-89



   3     Moderate decrease in GFR                         30-59



   4     Severe decrease in GFR                           15-29



   5     Kidney failure                       <15 (or dialysis)

 

 2  Test Performed by:



   [place] [id number]

 

 3  Test Performed by:



   [place] [id number]

 

 4  Interpretation: Borderline (30.0-75.0)



   -------------------REFERENCE VALUE--------------------------



   <30.0 (Negative)



   Test Performed by:



   [facility] - [address] [address]

 

 5  Test                        Result    Flag  Unit   RefValue



   ------------------------------------------------------------



   Vitamin B12 and Folate, S



   Vitamin B12 Assay, S      557             ng/L   180 - 914



   Folate, S                 14.4            mcg/L  >=4.0



   Test Performed by:



   [facility] - [address] [address]



   



   



   ******CORRECTED REPORT******



   ---  Corrected on 18 1238 ---



   Ref Lab Test previously reported as:



   SEE COMMENTS



   Test                        Result    Flag  Unit   RefValue



   ------------------------------------------------------------



   Vitamin B12 and Folate, S



   Vitamin B12 Assay, S      557             ng/L   180 - 914



   



   Folate, S                 15.9            mcg/L  >=4.0



   



   Test Performed by:



   [facility] - [address] [address]

 

 6  *******Because ethnic data is not always readily available,



   this report includes an eGFR for both -Americans and



   non- Americans.****



   The National Kidney Disease Education Program (NKDEP) does



   not endorse the use of the MDRD equation for patients that



   are not between the ages of 18 and 70, are pregnant, have



   extremes of body size, muscle mass, or nutritional status,



   or are non- or non-.



   According to the National Kidney Foundation, irrespective of



   diagnosis, the stage of the disease is based on the level of



   kidney function:



   Stage Description                      GFR(mL/min/1.73 m(2))



   1     Kidney damage with normal or decreased GFR       90



   2     Kidney damage with mild decrease in GFR          60-89



   3     Moderate decrease in GFR                         30-59



   4     Severe decrease in GFR                           15-29



   5     Kidney failure                       <15 (or dialysis)

 

 7  Acute inflammation:  >10.00

 

 8  -------------------REFERENCE VALUE--------------------------



   <1:80 (Negative)

 

 9  Test Performed by:



   [facility]



   [address] [place] [address]

 

 10  Desirable <150



   Borderline high 150-199



   High 200-499



   Very High >500

 

 11  Desirable <200



   Borderline high 200-239



   High >239

 

 12  Low <40



   Desirable: 40-60



   High: >60

 

 13  Desirable: <100 mg/dL



   Near Optimal: 100-129 mg/dL



   Borderline High: 130-159 mg/dL



   High: 160-189 mg/dL



   Very High: >189 mg/dL

 

 14  *******Because ethnic data is not always readily available,



   this report includes an eGFR for both -Americans and



   non- Americans.****



   The National Kidney Disease Education Program (NKDEP) does



   not endorse the use of the MDRD equation for patients that



   are not between the ages of 18 and 70, are pregnant, have



   extremes of body size, muscle mass, or nutritional status,



   or are non- or non-.



   According to the National Kidney Foundation, irrespective of



   diagnosis, the stage of the disease is based on the level of



   kidney function:



   Stage Description                      GFR(mL/min/1.73 m(2))



   1     Kidney damage with normal or decreased GFR       90



   2     Kidney damage with mild decrease in GFR          60-89



   3     Moderate decrease in GFR                         30-59



   4     Severe decrease in GFR                           15-29



   5     Kidney failure                       <15 (or dialysis)







Procedures







 Date  CPT Code  Description  Status

 

 2018  43900  Pace Maker Eval W/Iterative Adjment Dual Lead  Completed

 

 2018  78652  Pace Maker Eval W/Iterative Adjment Dual Lead  Completed

 

 2018  98596  EKG, Interpretation Only  Completed

 

 2018  36521  Pace Maker Eval W/Iterative Adjment Dual Lead  Completed

 

 2018  13004  EKG, Interpretation Only  Completed

 

 2018  06353  EKG, Interpretation Only  Completed

 

 2018  35782  Perm Pacemaker Av Sequential Atrial And Ventricular  
Completed

 

 2018  05340  ECHO Transthorasic Realtime 2D W Doppler & Color Flow  
Completed



     [facility]  

 

 2018  47787  Each Additional Biopsy  Completed

 

 2018  61676  Biopsy Skin Lesion Single  Completed

 

 2018  48442  EKG Tracing & Interpretation  Completed

 

 2018  47593  Implant Cardiac Loop Recorder  Completed

 

 2018  42782  Echocardiogram, Limited Study  Completed

 

 2018  22824  EKG, Interpretation Only  Completed

 

 2018  80702  ECHO Transthorasic Realtime 2D W Doppler & Color Flow  
Completed



     Hosp  

 

 2017  60332  EKG Tracing & Interpretation  Completed

 

 2017  93293  EKG Tracing & Interpretation  Completed

 

 2017  94419  EKG, Interpretation Only  Completed

 

 2017  62352  Left Heart Cath. Incl S/I Coronaries, Angio S/I V Gram  
Completed



     If Done  

 

 2006  16709  Color Doppler  Completed

 

 2006  17553  Pulse Doppler & Continuous Wave  Completed

 

 2006  21964  Pulse Doppler & Continuous Wave  Completed

 

 2006  24987  Echocardiogram  Completed







Encounters







 Type  Date  Location  Provider  CPT E/M  Dx

 

 Office Visit  2018  [facility]  Davey Castro M.D.  [id number]  
M35.9



   8:40a  [facility]      









 Z79.899

 

 R60.9

 

 N18.9









 Office Visit  2018 10:30a  [facility]  Sha Briseno,  
[address]  J18.9



     Infectious Diseases  M.D.    

 

 Office Visit  2018 12:59p  [facility]  Sha Briseno,  
[address]  J18.9



     Infectious Diseases  M.D.    









 I49.5

 

 Z95.0

 

 N18.3









 Office Visit  2018 10:29a  [facility],  Eze Baltazar [place]  [address]
  A41.9



     Hospitalists      









 J18.9

 

 I13.0

 

 N18.3

 

 I50.32









 Office Visit  2018 10:29a  [facility],  Eze Baltazar MD  [address]
  J18.9



     Hospitalists      









 I50.32

 

 I13.0

 

 N18.3









 Office Visit  2018 10:28a  [facility],  Eze Baltazar MD  [address]
  A41.9



     Hospitalists      









 J18.9

 

 I50.32

 

 I13.0

 

 N18.3









 Office Visit  2018 10:28a  [facility]  Nathan Grande,  
[address]  J18.9



     Hospitalists  N.PMaureen    









 I50.9

 

 I13.0

 

 N18.9









 Office Visit  2018  3:00p  [facility]  Davy Faria,  
[address]  I49.5



     Felicia WEN    









 I25.10

 

 I46.9









 Office Visit  2018  4:00p  [facility]  Davey Castro,  
[address]  D72.1



     Felicia WEN    









 M31.8

 

 Z79.899

 

 Z79.52

 

 M35.9









 Office Visit  2018  9:07a  [place] [facility]  [address]  I50.33



     [facility]  MD Valentín    









 I21.4

 

 M31.8









 Office Visit  2018 10:31a  [facility]  Janelle Burk MD  
[address]  D72.1



     [facility]      

 

 Office Visit  2018  7:00a  [facility]  Davey Castro,  
[address]18  R76.0



     Felicia WEN    









 D72.1

 

 R21









 Office Visit  2018  9:06a  Kofi Fagan  [address]558  I50.33



     [facility] Hospital[facility] Laura MD    









 I21.4

 

 M31.8









 Office Visit  2018  9:57a  [facility]  Keely Vazquez M.D.  
08498  Z01.810



     Felicia      









 D72.1

 

 I21.A1

 

 I50.30

 

 I25.10

 

 Z95.1









 Office Visit  2018  7:00a  [facility]  Davey Castro,  
[address]65  D72.1



     Felicia WEN    









 R21









 Office Visit  2018 10:30a  [facility]  Janelle Burk MD  
23236  D72.1



     [facility]      

 

 Office Visit  2018  9:06a  Kofi Fagan  [address]857  I50.33



     [facility] Hospital[facility] Laura MD    









 I21.4

 

 M31.8









 Office Visit  2018  9:05a  Kofi Fagan  [address]234  I50.33



     [facility] Hospital[facility]anna Laura MD    









 I21.4

 

 M31.8









 Office Visit  2018  9:04a  [facility]  Hira DMaureen [place],  
[address]  M31.8



     Hospitalists  M.D.,FACP    









 I50.33









 Office Visit  2018  9:03a  [facility]  Hira DMaureen [place],  [address]  
I50.33



     Assoc, Hospitalists  M.D.,FACP    









 M31.8









 Office Visit  2018  7:00a  [facility]  Davey Castro,  
[address]  D72.1



     [facility]  SABINA.TESHA    









 R21









 Office Visit  2018  9:03a  [facility],  Jennifer Graham,  
[address]  I50.33



     Hospitalists  M.D.    









 I21.4









 Office Visit  2018  3:16p  [facility]  Qutaybeh S. Yina,  
[address]  I50.9



       M.DMaureen    









 I25.10

 

 I42.9

 

 D64.9









 Office Visit  2018  3:20p  [facility]  Janelle Burk MD  
94014  D72.1



     [facility]      

 

 Office Visit  2018  3:09p  [facility]  Qutaybeh S.  [address]  I50.9



       BEHZAD Bran    









 I42.9

 

 I25.10

 

 Z95.1









 Office Visit  2018  9:03a  [facility]  Jennifer Graham,  
[address]93  I50.33



     Hospitalists  M.D.    









 I21.4









 Office Visit  2018  9:02a  [facility],daphney Graham,  
[address]  I50.33



     Hospitalists  M.D.    









 I21.4









 Office Visit  2018  3:03p  [facility]  Davian Severino,   
8[address]  I21.A1



     [facility]  FACC    









 I50.31

 

 R07.9









 Office Visit  2018  9:01a  [facility],  Jennifer Graham,  
[address]140  I50.33



     Hospitalists  M.D.    









 I21.4









 Office Visit  2018  3:41p  [facility]  Qutaybeh S. Magchasydah,  
[address]  I11.0



       M.DMaureen    









 I50.9

 

 I25.10

 

 E78.5

 

 I45.10

 

 E87.6

 

 E83.42

 

 R79.89









 Office Visit  2018  1:40p  [facility]  Tay Corcoran M.D.,  
86718  I25.10



     [facility] AT [facility]  FACC, FSCAI    









 I10

 

 I45.19

 

 R55

 

 E78.5









 Office Visit  2018  9:25a  [facility],daphney Bassett M.D.
  [address]  R55



     Hospitalists      









 R79.89

 

 R21

 

 I25.10









 Office Visit  2018  9:23a  [facility],daphney Bassett M.D.
  26808  R55



     Hospitalists      









 R79.89

 

 R21

 

 I25.10









 Office Visit  2018  9:22a  [facility]  Abbie Cardenas N.P.  
[address]  R55



     Hospitalists      









 R79.89

 

 R21

 

 I25.10









 Office Visit  2018  9:18a  [facility],  [place],  
[address]  R55



     Hospitalists  D.O.    









 R79.89

 

 R21

 

 I25.10









 Office Visit  2018  2:15p  [facility]  Davy Faria,  
[address]  R55



     AT [facility]  M.DMaureen    









 I10

 

 I45.19

 

 Z95.818

 

 S01.01xA

 

 Z48.02









 Office Visit  2018  9:30a  [facility]  Davy Faria,  
10843  R55



       M.D.    

 

 Office Visit  2018  9:03a  [facility]  Tatyana Caldwell NP  
13232  R55



     Hospitalists      









 S01.01xA

 

 I50.32

 

 I10









 Office Visit  2018  9:02a  [facility],daphney Sanchez  
41342  R55



     Hospitalists  SURYA Zaman    









 S01.01xA

 

 I50.32

 

 I10









 Office Visit  2018  9:29a  [facility]  Eze Baltazar MD  [address]
  I50.9



     Hospitalists      









 J18.9

 

 E87.8

 

 N18.3









 Office Visit  2018  9:28a  [facility],daphney Sanchez  
[address]  I50.9



     Hospitalists  SURYA Zaman    









 J18.9

 

 E87.8

 

 N18.3









 Office Visit  2018  9:27a  [facility]  Natividad Camacho DO  
[address]161  I50.9



     Hospitalists      









 J18.9

 

 M79.602









 Office Visit  2018  9:26a  [facility]  Lakhwinder David,  
14910  M79.602



     Hospitalists  M.D.    









 I50.9

 

 J18.9

 

 E87.8









 Office Visit  2018  9:25a  [facility]  Lakhwinder David,  
21772  M79.602



     Hospitalists  M.D.    









 I50.9

 

 J18.9

 

 E87.8









 Office Visit  2018  9:24a  [facility]  Lakhwinder David,  
[address]28  M79.602



     Hospitalists  M.D.    









 I50.9

 

 J18.9

 

 E87.8









 Office Visit  2018  9:22a  [facility]  [address]  
M79.602



     Assoc, Hospitalists  SURYA Zaman    









 I50.9

 

 J18.9

 

 E87.8









 Office Visit  2018  9:56a  [facility]  Qutaybeh DANTE Bran,  
[address]  R50.9



       M.D.    









 R53.83

 

 D72.829

 

 R94.31

 

 I45.19

 

 I44.4

 

 I25.10

 

 I10

 

 R07.9









 Office Visit  2018  7:10a  [facility]  Lakhwinder David,  
[address]  I50.9



     Hospitalists  M.D.    









 R06.09

 

 I25.10









 Office Visit  2018  7:09a  [facility]  Lakhwinder David,  
[address]  I50.9



     Hospitalists  M.D.    









 R06.09

 

 I25.10









 Office Visit  2018  7:09a  [facility]  Anita Bassett,  
[address]14[address]  I50.9



     Hospitalists  M.D.    









 I25.10

 

 R06.09









 Office Visit  01/15/2018  7:08a  [facility]  Eze Baltazar [place]  [address]
  I50.9



     Hospitalists      









 R06.09

 

 I25.10









 Office Visit  2017 11:20a  [facility]  Tay Corcoran M.D.,  
[address]308  I25.10



     [facility] AT [facility], [facility]    









 I35.0

 

 I12.9

 

 E78.5

 

 N18.3

 

 I48.0









 Office Visit  2017 11:20a  [facility]  Tay Corcoran M.D.,  
[address]377  I25.10



     [facility] AT [facility], [facility]SC[facility]    









 I35.0

 

 I35.1

 

 I10

 

 E78.5

 

 I45.10

 

 I48.0









 Office Visit  2017 10:16a  [facility]  Tay Crocoran M.D.,  
[address]27  R07.9



     [facility] AT [facility], [facility]    









 R94.31







Plan of Care

Future Appointment(s):11/15/2018 10:20 am - Davey Castro M.D. at [facility] 
[facility]2018 - Sha Briseno M.D.J18.9 Pneumonia, 
unspecified organismComments:xray to ensure clearing; symptoms resolved

## 2018-10-09 NOTE — PN
Subjective


Date of Service: 10/09/18


Interval History: 


HOSPITALIST PROGRESS NOTE


Patient seen and examined at bedside. Care reviewed and d/w Jimbo Scott RN.


He feels a little better today. Cannot lay flat, but breathing is easier and 

cough is less frequent. Denies CP or palpitations.


Family History: Unchanged from Admission


Social History: Unchanged from Admission


Past Medical History: Unchanged from Admission





Objective


Active Medications: 








Aspirin (Aspirin Ec Tab*)  81 mg PO DAILY [facility]


   Last Admin: 10/09/18 08:09 Dose:  81 mg


Atorvastatin Calcium (Lipitor*)  10 mg PO BEDTIME [facility]


   Last Admin: 10/08/18 21:16 Dose:  10 mg


Cholecalciferol (Vitamin D Tab*)  1,000 units PO DAILY [facility]


   Last Admin: 10/09/18 08:14 Dose:  1,000 units


Furosemide (Lasix Iv*)  40 mg IV SLOW PU DAILY [facility]


   Last Admin: 10/09/18 08:15 Dose:  40 mg


Heparin Sodium (Porcine) (Heparin Vial(*))  5,000 units SUBCUT Q8HR [facility]


   Last Admin: 10/09/18 04:58 Dose:  5,000 units


Hydroxychloroquine Sulfate (Plaquenil Tab*)  400 mg PO DAILY [facility]


   Last Admin: 10/09/18 08:12 Dose:  400 mg


Magnesium Oxide (Magox 400 Tab*)  800 mg PO BID [facility]


   Last Admin: 10/09/18 08:14 Dose:  800 mg


Metoprolol Succinate (Toprol Xl Tab*)  25 mg PO BID [facility]


   Last Admin: 10/09/18 08:13 Dose:  25 mg


Omeprazole (Prilosec Cap*)  20 mg PO 0730 [facility]


   Last Admin: 10/09/18 08:12 Dose:  20 mg


Psyllium Hydrophilic Mucilloid (Metamucil Oscar*)  1 pkt PO BID [facility]


   Last Admin: 10/09/18 08:07 Dose:  1 pkt








 Vital Signs - 8 hr











  10/09/18 10/09/18 10/09/18





  08:11 09:17 11:23


 


Temperature  98.2 F 98.1 F


 


Pulse Rate  64 69


 


Respiratory  20 20





Rate   


 


Blood Pressure 132/58 131/58 127/50





(mmHg)   


 


O2 Sat by Pulse  98 97





Oximetry   











Oxygen Devices in Use Now: None


Appearance: Pleasant elderly gentleman sitting up in bed in NAD.


Eyes: No Scleral Icterus


Ears/Nose/Mouth/Throat: Mucous Membranes Moist


Neck: Trachea Midline


Respiratory: Symmetrical Chest Expansion and Respiratory Effort, - - BS+ 

bilaterally with bibasilar crackles


Cardiovascular: RRR - Normal S1 and S2


Abdominal: NL Sounds; No Tenderness; No Distention


Extremities: - - Bilateral LE pitting edema


Neurological: Alert and Oriented x 3, NL Muscle Strength and Tone


Result Diagrams: 


 10/09/18 05:50





 10/09/18 05:50





Assess/Plan/Problems-Billing


Assessment: 


Mr Vazquez is an 85yo M with PMH of systolic CHF EF 35-40%, recent NSTEMI, CAD s/p 

CABG, ILD, undifferentiated connective tissue disorder, HTN, HLD, CKD stage 3, 

Afib, s/p pacer, who presented to ED with c/o dyspnea, dry cough, found to have 

another episode of CHF exacerbation.





- Patient Problems


(1) Acute on chronic systolic (congestive) heart failure


Comment: - Weight was 152lbs on his outpatient visit with Dr Faria and he's now 

up to 160lbs.


- Suspect unintentional dietary non compliance as he's eating cold cuts sandwich

, lasagna, baked ziti from [employer].


- SW to assist with resources for low salt diet as outpatient.


- Continue diuresis with Furosemide and wrap LE.    





(2) CAD (coronary artery disease)


Comment: - Stable.


- Continue Aspirin, Metoprolol, and Atorvastatin.   





(3) GERD (gastroesophageal reflux disease)


Comment: - Continue PPI.   





(4) CKD (chronic kidney disease) stage 3, GFR 30-59 ml/min


Comment: - Renal function stable, continue to monitor while we diurese him.   





(5) Connective tissue disorder


Comment: - Continue Plaquenil.   





(6) DVT prophylaxis


Comment: - SCDs. Concern for GI bleed with heparin on his recent admission.   





(7) Full code status


Comment: 


   


Status and Disposition: 


Inpatient for management of CHF.

## 2018-10-10 NOTE — PN
Subjective


Date of Service: 10/10/18


Interval History: 


HOSPITALIST PROGRESS NOTE


Patient seen and examined at bedside. Care reviewed and d/w Jimbo Scott RN.


He feels better today, dyspnea continues to improve.


Family History: Unchanged from Admission


Social History: Unchanged from Admission


Past Medical History: Unchanged from Admission





Objective


Active Medications: 








Aspirin (Aspirin Ec Tab*)  81 mg PO DAILY [facility]


   Last Admin: 10/10/18 07:46 Dose:  81 mg


Atorvastatin Calcium (Lipitor*)  10 mg PO BEDTIME [facility]


   Last Admin: 10/09/18 20:58 Dose:  10 mg


Cholecalciferol (Vitamin D Tab*)  1,000 units PO DAILY [facility]


   Last Admin: 10/10/18 07:46 Dose:  1,000 units


Furosemide (Lasix Iv*)  40 mg IV SLOW PU DAILY [facility]


   Last Admin: 10/10/18 07:46 Dose:  40 mg


Hydroxychloroquine Sulfate (Plaquenil Tab*)  400 mg PO DAILY [facility]


   Last Admin: 10/10/18 07:47 Dose:  400 mg


Magnesium Oxide (Magox 400 Tab*)  800 mg PO BID [facility]


   Last Admin: 10/10/18 07:45 Dose:  800 mg


Metoprolol Succinate (Toprol Xl Tab*)  25 mg PO BID [facility]


   Last Admin: 10/10/18 07:46 Dose:  25 mg


Omeprazole (Prilosec Cap*)  20 mg PO 0730 [facility]


   Last Admin: 10/10/18 07:45 Dose:  20 mg


Psyllium Hydrophilic Mucilloid (Metamucil Oscar*)  1 pkt PO BID [facility]


   Last Admin: 10/10/18 07:48 Dose:  1 pkt











 Selected Entries











  10/10/18





  03:35


 


Temperature 98.0 F


 


Pulse Rate 61


 


Respiratory 20





Rate 


 


Blood Pressure 110/49





(mmHg) 


 


O2 Sat by Pulse 94





Oximetry 











Oxygen Devices in Use Now: None


Appearance: Pleasant gentleman sitting up in a chair in NAD.


Eyes: No Scleral Icterus


Ears/Nose/Mouth/Throat: Mucous Membranes Moist


Neck: Trachea Midline


Respiratory: Symmetrical Chest Expansion and Respiratory Effort, - - BS+ 

bilaterally with bibasilar crackles


Cardiovascular: RRR - Normal S1 and S2


Extremities: - - Bilateral LE edema


Neurological: Alert and Oriented x 3, NL Muscle Strength and Tone


Nutrition: Taking PO's


Result Diagrams: 


 10/09/18 05:50





 10/10/18 05:47





Assess/Plan/Problems-Billing


Assessment: 


Mr Vazquez is an 85yo M with PMH of systolic CHF EF 35-40%, recent NSTEMI, CAD s/p 

CABG, ILD, undifferentiated connective tissue disorder, HTN, HLD, CKD stage 3, 

Afib, s/p pacer, who presented to ED with c/o dyspnea, dry cough, found to have 

another episode of CHF exacerbation.





- Patient Problems


(1) Acute on chronic systolic (congestive) heart failure


Comment: - Weight was 152lbs on his outpatient visit with Dr Faria and was up 

to 160lbs on admission.


- Weight 157lbs today.


- Suspect unintentional dietary non compliance as he's eating cold cuts sandwich

, lasagna, baked ziti from [employer].


- SW to assist with resources for low salt diet as outpatient.


- Continue diuresis with Furosemide and wrap LE.    





(2) CAD (coronary artery disease)


Comment: - Stable.


- Continue Aspirin, Metoprolol, and Atorvastatin.   





(3) GERD (gastroesophageal reflux disease)


Comment: - Continue PPI.   





(4) CKD (chronic kidney disease) stage 3, GFR 30-59 ml/min


Comment: - Renal function stable, continue to monitor while we diurese him.   





(5) Connective tissue disorder


Comment: - Continue Plaquenil.   





(6) DVT prophylaxis


Comment: - SCDs. Concern for GI bleed with heparin on his recent admission.   





(7) Full code status


Comment: 


   


Status and Disposition: 


Inpatient for management of CHF.

## 2018-10-11 NOTE — CONSULT
Consult


Consult: 





PCCM Consult





CC: Hyponatremia





HPI: 86M with htn, hld, cad s/p cabg s/p mi, CHF ef 35%, afib s/p ppm, ckd, ILD

, mixed connective tissue disease presnts with shortness of breath. He had a 

recent admission for acute on chronic chf with an NSTEMI where he was treated 

medically. He began to have worsening shortness of breath over the past 3 days 

with a dry cough. He had weighed himself at home and he was several lbs heavier 

than his dry weight. The patient denies any fever or chills. No chest pain. He 

does of lower extremity edema. He was admitted to the hospital and diuresis was 

started with lasix. He was negative on day 1 but has been net positive on day 2 

and 3. Since May he has been hyponatremic to about 130.  His sodium was 123 at 

this admission but has dropped to 116.





ROS - as per HPI





PMHx - htn, hld, cad s/p cabg s/p mi, CHF ef 35%, afib s/p ppm, ckd, ILD, mixed 

connective tissue disease


PSHx - s/p ppm, s/p hernia repair


All - nkda


SocHx - no drugs, no etoh, no tobacco


FamHx - renal failure





PE


Vital Signs:











Temp Pulse Resp BP Pulse Ox


 


 97.6 F   60   20   117/59   100 


 


 10/11/18 11:15  10/11/18 11:15  10/11/18 11:15  10/11/18 11:15  10/11/18 11:15








Gen - nad


heent - ncat, eomi, perrl 


neck - no jvd


cv - s1/s2


pulm - cta, dec bs at bases


abd - soft, nt


ext - +pedal edema


neuro - nonfocal





Labs


 Laboratory Results - last 24 hr











  10/11/18 10/11/18 10/11/18





  05:32 05:32 05:32


 


WBC  7.2  


 


RBC  2.58 L  


 


Hgb  8.0 L  


 


Hct  24 L  


 


MCV  93  


 


MCH  31  


 


MCHC  33  


 


RDW  16 H  


 


Plt Count  186  


 


MPV  9.0  


 


Neut % (Auto)  73.6  


 


Lymph % (Auto)  12.8 L  


 


Mono % (Auto)  12.3 H  


 


Eos % (Auto)  0.8  


 


Baso % (Auto)  0.5  


 


Absolute Neuts (auto)  5.3  


 


Absolute Lymphs (auto)  0.9 L  


 


Absolute Monos (auto)  0.9 H  


 


Absolute Eos (auto)  0.1  


 


Absolute Basos (auto)  0  


 


Absolute Nucleated RBC  0  


 


Nucleated RBC %  0.2  


 


Sodium   116 L* 


 


Potassium   4.6 


 


Chloride   86 L 


 


Carbon Dioxide   24 


 


Anion Gap   6 


 


BUN   32 H 


 


Creatinine   2.09 H 


 


Est GFR ( Amer)   36.6 


 


Est GFR (Non-Af Amer)   30.3 


 


BUN/Creatinine Ratio   15.3 


 


Glucose   96 


 


Serum Osmolality    261 L


 


Calcium   8.4 L 


 


Magnesium   2.2 














  10/11/18





  06:52


 


WBC 


 


RBC 


 


Hgb 


 


Hct 


 


MCV 


 


MCH 


 


MCHC 


 


RDW 


 


Plt Count 


 


MPV 


 


Neut % (Auto) 


 


Lymph % (Auto) 


 


Mono % (Auto) 


 


Eos % (Auto) 


 


Baso % (Auto) 


 


Absolute Neuts (auto) 


 


Absolute Lymphs (auto) 


 


Absolute Monos (auto) 


 


Absolute Eos (auto) 


 


Absolute Basos (auto) 


 


Absolute Nucleated RBC 


 


Nucleated RBC % 


 


Sodium  116 L*


 


Potassium 


 


Chloride 


 


Carbon Dioxide 


 


Anion Gap 


 


BUN 


 


Creatinine 


 


Est GFR ( Amer) 


 


Est GFR (Non-Af Amer) 


 


BUN/Creatinine Ratio 


 


Glucose 


 


Serum Osmolality 


 


Calcium 


 


Magnesium 








Imaging


CXR 10/8/18


IMPRESSION:


CARDIOMEGALY


SMALL BILATERAL PLEURAL EFFUSIONS.





Impression


86M with htn, hld, cad s/p cabg s/p mi, CHF ef 35%, afib s/p ppm, ckd, ILD, 

mixed connective tissue disease presents with acute on chronic chf, hyponatremia





Plan


Neuro - alert and oriented





CV - htn, hld, cad, chf, afib


- c/w asa/statin/betablocker


- appears mildly volume overloaded


- would continue diuresis after etiology of hyponatremia is determined


- not on ac for afib





Pulm - dyspnea


- 2/2 chf and ILD


- supplemental o2 prn





ID - no evidence of infection





GI - gerd 


- c/w ppi





Renal - ckd, hyponatremia


- suspect hypervolemic hyponatremia


- check serum osm


- check urine lytes


- serial bmp


- strict i/o, daily weights


- renal consult





Heme - monitor cbc





Endo - hyponatremia


- check tsh/coritsol





Rheum - MCTD


- c/w plaquenil





Lines - piv





PPx - gi/dvt





DNR with Trial of intubation if needed





Critical Care Time: 60 mins

## 2018-10-11 NOTE — PN
Subjective


Date of Service: 10/11/18


Interval History: 


HOSPITALIST PROGRESS NOTE


Patient seen and examined at bedside. Care reviewed and d/w Eloina Strong RN.


He's not feeling so well today. Dyspnea is worse, was able to sleep last night, 

but not comfortable. Denies CP, palpitations. Did not void after Furosemide IV 

today.


Family History: Unchanged from Admission


Social History: Unchanged from Admission


Past Medical History: Unchanged from Admission





Objective


Active Medications: 








Aspirin (Aspirin Ec Tab*)  81 mg PO DAILY [facility]


   Last Admin: 10/11/18 08:00 Dose:  81 mg


Atorvastatin Calcium (Lipitor*)  10 mg PO BEDTIME [facility]


   Last Admin: 10/10/18 20:29 Dose:  10 mg


Cholecalciferol (Vitamin D Tab*)  1,000 units PO DAILY [facility]


   Last Admin: 10/11/18 08:01 Dose:  1,000 units


Furosemide (Lasix Iv*)  40 mg IV SLOW PU DAILY [facility]


   Last Admin: 10/11/18 08:01 Dose:  40 mg


Hydroxychloroquine Sulfate (Plaquenil Tab*)  400 mg PO DAILY [facility]


   Last Admin: 10/11/18 08:00 Dose:  400 mg


Magnesium Oxide (Magox 400 Tab*)  800 mg PO BID [facility]


   Last Admin: 10/11/18 08:01 Dose:  800 mg


Metolazone (Zaroxolyn Tab*)  5 mg PO DAILY [facility]


Metoprolol Succinate (Toprol Xl Tab*)  25 mg PO BID [facility]


   Last Admin: 10/11/18 08:00 Dose:  25 mg


Omeprazole (Prilosec Cap*)  20 mg PO 0730 [facility]


   Last Admin: 10/11/18 08:00 Dose:  20 mg


Psyllium Hydrophilic Mucilloid (Metamucil Oscar*)  1 pkt PO BID [facility]


   Last Admin: 10/11/18 08:01 Dose:  1 pkt








 Vital Signs - 8 hr











  10/11/18 10/11/18 10/11/18





  07:36 07:40 07:49


 


Temperature 98.2 F  


 


Pulse Rate 60 62 


 


Respiratory 20  20





Rate   


 


Blood Pressure 122/39 124/52 





(mmHg)   


 


O2 Sat by Pulse 97  





Oximetry   














  10/11/18





  11:15


 


Temperature 97.6 F


 


Pulse Rate 60


 


Respiratory 20





Rate 


 


Blood Pressure 117/59





(mmHg) 


 


O2 Sat by Pulse 100





Oximetry 











Oxygen Devices in Use Now: None


Appearance: Elderly gentleman sitting up in bed in NAD.


Eyes: No Scleral Icterus


Ears/Nose/Mouth/Throat: Mucous Membranes Moist


Neck: Trachea Midline


Respiratory: Symmetrical Chest Expansion and Respiratory Effort, - - BS+ 

bilaterally with bibasilar crackles


Cardiovascular: RRR - Normal S1 and S2


Extremities: - - Bilateral LE edema


Neurological: Alert and Oriented x 3, NL Muscle Strength and Tone


Result Diagrams: 


 10/11/18 05:32





 10/11/18 06:52





Assess/Plan/Problems-Billing


Assessment: 


Mr Vazquez is an 85yo M with PMH of systolic CHF EF 35-40%, recent NSTEMI, CAD s/p 

CABG, ILD, undifferentiated connective tissue disorder, HTN, HLD, CKD stage 3, 

Afib, s/p pacer, who presented to ED with c/o dyspnea, dry cough, found to have 

another episode of CHF exacerbation.





- Patient Problems


(1) Acute on chronic systolic (congestive) heart failure


Comment: - Weight was 152lbs on his outpatient visit with Dr Faria and was up 

to 160lbs on admission.


- Weight up to 160lbs today, dyspnea is worse.


- Not responding to diuretics as expected - gained weight, but renal function 

and sodium are worse - Nephrology consult requested.   





(2) Hyponatremia


Comment: - Na down to 116 - he's not confused, but not as mentally sharp as 

yesterday.


- Transfer to ICU - Critical care consult requested re: hypertonic saline use. 

  





(3) CAD (coronary artery disease)


Comment: - Stable.


- Continue Aspirin, Metoprolol, and Atorvastatin.   





(4) GERD (gastroesophageal reflux disease)


Comment: - Continue PPI.   





(5) CKD (chronic kidney disease) stage 3, GFR 30-59 ml/min


Comment: - Creatinine trending up.   





(6) Connective tissue disorder


Comment: - Continue Plaquenil.   





(7) DVT prophylaxis


Comment: - SCDs. Concern for GI bleed with heparin on his recent admission.   


Status and Disposition: 


Inpatient for management of CHF. Wife, daughter, and grandaughter updated at 

bedside. Long conversation about diagnosis and prognosis. Patient is not sure 

how aggressive he want us to be about his treatment. Inclined to be DNR - I 

left a MOLST form in his room while he decides.

## 2018-10-12 NOTE — PN
Date of Service: 10/12/18


Critical Care Services: 





86M with htn, hld, cad s/p cabg s/p mi, CHF ef 35%, afib s/p ppm, ckd, ILD, 

mixed connective tissue disease presents with acute on chronic chf, hyponatremia





10/12: Given lasix and metolazone yesterday. Diuresed 800. Sodium 115 this am.





Vital Signs: 











Temp Pulse Resp BP SpO2 FiO2


 


98.5 F 60 16 137/98 100 


 


10/12/18 07:51 10/12/18 06:00 10/12/18 06:00 10/12/18 05:00 10/12/18 06:00 











Physical Exam: 





Gen - nad


heent - ncat, eomi, perrl 


neck - no jvd


cv - s1/s2


pulm - cta, dec bs at bases


abd - soft, nt


ext - +pedal edema


neuro - nonfocal





Fluid Balance (Past 24 Hours): 


I=     O=     Net 


 Intake & Output











 10/10/18 10/11/18 10/12/18 10/13/18





 06:59 06:59 06:59 06:59


 


Intake Total 1390 1385 650 


 


Output Total  625


 


Balance 540 1035 -825 -625


 


Weight 71.441 kg 72.983 kg 73.6 kg 


 


Intake:    


 


  IV Fluids  60  


 


    Magnesium  60  


 


  Oral 1390 1325 650 


 


Output:    


 


  Urine  625


 


Other:    


 


  Estimated Void   Medium 


 


  # Bowel Movements 0 0 1 


 


  Estimated Stool Amount   Small 


 


  # Voids 0   





 








Labs: 


 Laboratory Results - last 24 hr











  10/11/18 10/11/18 10/11/18





  05:32 05:32 06:52


 


WBC   


 


RBC   


 


Hgb   


 


Hct   


 


MCV   


 


MCH   


 


MCHC   


 


RDW   


 


Plt Count   


 


MPV   


 


Neut % (Auto)   


 


Lymph % (Auto)   


 


Mono % (Auto)   


 


Eos % (Auto)   


 


Baso % (Auto)   


 


Absolute Neuts (auto)   


 


Absolute Lymphs (auto)   


 


Absolute Monos (auto)   


 


Absolute Eos (auto)   


 


Absolute Basos (auto)   


 


Absolute Nucleated RBC   


 


Nucleated RBC %   


 


Sodium  116 L*   116 L*


 


Potassium  4.6  


 


Chloride  86 L  


 


Carbon Dioxide  24  


 


Anion Gap  6  


 


BUN  32 H  


 


Creatinine  2.09 H  


 


Est GFR ( Amer)  36.6  


 


Est GFR (Non-Af Amer)  30.3  


 


BUN/Creatinine Ratio  15.3  


 


Glucose  96  


 


Serum Osmolality   261 L 


 


Calcium  8.4 L  


 


Phosphorus   


 


Magnesium  2.2  


 


TSH  2.21  


 


Free T4  1.62 H  


 


Cortisol  16.75  


 


Urine Color   


 


Urine Appearance   


 


Urine pH   


 


Ur Specific Gravity   


 


Urine Protein   


 


Urine Ketones   


 


Urine Blood   


 


Urine Nitrate   


 


Urine Bilirubin   


 


Urine Urobilinogen   


 


Ur Leukocyte Esterase   


 


Urine WBC (Auto)   


 


Urine RBC (Auto)   


 


Ur Squamous Epith Cells   


 


Urine Bacteria   


 


Hyaline Casts   


 


Urine Osmolality   


 


Ur Random Creatinine   


 


Ur Random Sodium   


 


Ur Random Urea Nitrogn   


 


Urine Glucose   














  10/11/18 10/11/18 10/11/18





  10:58 10:58 18:23


 


WBC   


 


RBC   


 


Hgb   


 


Hct   


 


MCV   


 


MCH   


 


MCHC   


 


RDW   


 


Plt Count   


 


MPV   


 


Neut % (Auto)   


 


Lymph % (Auto)   


 


Mono % (Auto)   


 


Eos % (Auto)   


 


Baso % (Auto)   


 


Absolute Neuts (auto)   


 


Absolute Lymphs (auto)   


 


Absolute Monos (auto)   


 


Absolute Eos (auto)   


 


Absolute Basos (auto)   


 


Absolute Nucleated RBC   


 


Nucleated RBC %   


 


Sodium   


 


Potassium   


 


Chloride   


 


Carbon Dioxide   


 


Anion Gap   


 


BUN   


 


Creatinine   


 


Est GFR ( Amer)   


 


Est GFR (Non-Af Amer)   


 


BUN/Creatinine Ratio   


 


Glucose   


 


Serum Osmolality   


 


Calcium   


 


Phosphorus   


 


Magnesium   


 


TSH   


 


Free T4   


 


Cortisol   


 


Urine Color    Straw


 


Urine Appearance    Clear


 


Urine pH    5.0


 


Ur Specific Gravity    1.005 L


 


Urine Protein    Negative


 


Urine Ketones    Negative


 


Urine Blood    2+ A


 


Urine Nitrate    Negative


 


Urine Bilirubin    Negative


 


Urine Urobilinogen    Negative


 


Ur Leukocyte Esterase    Negative


 


Urine WBC (Auto)    Trace(0-5/hpf)


 


Urine RBC (Auto)    2+(6-10/hpf) A


 


Ur Squamous Epith Cells    Present A


 


Urine Bacteria    Absent


 


Hyaline Casts    Present A


 


Urine Osmolality  301  


 


Ur Random Creatinine   78.14 


 


Ur Random Sodium   27 


 


Ur Random Urea Nitrogn   346 


 


Urine Glucose    Negative














  10/12/18 10/12/18





  05:43 05:43


 


WBC   7.8


 


RBC   2.63 L


 


Hgb   8.2 L


 


Hct   24 L


 


MCV   93


 


MCH   31


 


MCHC   34


 


RDW   16 H


 


Plt Count   192


 


MPV   8.9


 


Neut % (Auto)   72.7


 


Lymph % (Auto)   14.4 L


 


Mono % (Auto)   12.1 H


 


Eos % (Auto)   0.4


 


Baso % (Auto)   0.4


 


Absolute Neuts (auto)   5.7


 


Absolute Lymphs (auto)   1.1


 


Absolute Monos (auto)   0.9 H


 


Absolute Eos (auto)   0


 


Absolute Basos (auto)   0


 


Absolute Nucleated RBC   0


 


Nucleated RBC %   0.1


 


Sodium  115 L* 


 


Potassium  4.1 


 


Chloride  82 L 


 


Carbon Dioxide  26 


 


Anion Gap  7 


 


BUN  35 H 


 


Creatinine  2.17 H 


 


Est GFR ( Amer)  35.1 


 


Est GFR (Non-Af Amer)  29.0 


 


BUN/Creatinine Ratio  16.1 


 


Glucose  98 


 


Serum Osmolality  


 


Calcium  8.5 L 


 


Phosphorus  4.7 


 


Magnesium  2.0 


 


TSH  


 


Free T4  


 


Cortisol  


 


Urine Color  


 


Urine Appearance  


 


Urine pH  


 


Ur Specific Gravity  


 


Urine Protein  


 


Urine Ketones  


 


Urine Blood  


 


Urine Nitrate  


 


Urine Bilirubin  


 


Urine Urobilinogen  


 


Ur Leukocyte Esterase  


 


Urine WBC (Auto)  


 


Urine RBC (Auto)  


 


Ur Squamous Epith Cells  


 


Urine Bacteria  


 


Hyaline Casts  


 


Urine Osmolality  


 


Ur Random Creatinine  


 


Ur Random Sodium  


 


Ur Random Urea Nitrogn  


 


Urine Glucose  











Studies: 





CXR 10/8/18


IMPRESSION:


CARDIOMEGALY


SMALL BILATERAL PLEURAL EFFUSIONS.





Impression: 





86M with htn, hld, cad s/p cabg s/p mi, CHF ef 35%, afib s/p ppm, ckd, ILD, 

mixed connective tissue disease presents with acute on chronic chf, hyponatremia





Plan: 





Neuro - alert and oriented





CV - htn, hld, cad, chf, afib


- c/w asa/statin/betablocker


- appears mildly volume overloaded


- diuresed 800 yesterday and continues to diurese today


- not on ac for afib





Pulm - dyspnea


- 2/2 chf and ILD


- supplemental o2 prn





ID - no evidence of infection





GI - gerd 


- c/w ppi





Renal - ckd, hyponatremia


- unclear if this is true hypervolemic hyponatremia


- sodium remains low at 115


- FeUrea <35% which is more consistent with pre-renal state


- will hold am diuretics and discuss with nephrology





Heme - monitor cbc





Endo - hyponatremia


- check tsh/coritsol





Rheum - MCTD


- c/w plaquenil





Lines - piv





PPx - gi/dvt





DNR with Trial of intubation if needed





Critical Care Time: 40 mins

## 2018-10-12 NOTE — PN
Subjective


Date of Service: 10/12/18


Interval History: 


HOSPITALIST PROGRESS NOTE


Patient seen and examined at bedside. Care reviewed and d/w Joesph Hamm RN.


He feels "blah" today, has no pep. Dyspnea is unchanged, denies chest pain or 

palpitations.


Family History: Unchanged from Admission


Social History: Unchanged from Admission


Past Medical History: Unchanged from Admission





Objective


Active Medications: 








Aspirin (Aspirin Ec Tab*)  81 mg PO DAILY [facility]


   Last Admin: 10/12/18 09:40 Dose:  81 mg


Atorvastatin Calcium (Lipitor*)  10 mg PO BEDTIME [facility]


   Last Admin: 10/11/18 21:20 Dose:  10 mg


Cholecalciferol (Vitamin D Tab*)  1,000 units PO DAILY [facility]


   Last Admin: 10/12/18 08:00 Dose:  1,000 units


Demeclocycline HCl (Declomycin Tab*)  150 mg PO BID [facility]


   Last Admin: 10/12/18 10:38 Dose:  150 mg


Furosemide (Lasix Iv*)  40 mg IV SLOW PU DAILY [facility]


   Last Admin: 10/12/18 10:38 Dose:  40 mg


Hydroxychloroquine Sulfate (Plaquenil Tab*)  400 mg PO DAILY [facility]


   Last Admin: 10/12/18 09:41 Dose:  400 mg


Magnesium Oxide (Magox 400 Tab*)  800 mg PO BID [facility]


   Last Admin: 10/12/18 09:40 Dose:  800 mg


Metolazone (Zaroxolyn Tab*)  5 mg PO DAILY [facility]


   Last Admin: 10/12/18 09:41 Dose:  5 mg


Metoprolol Succinate (Toprol Xl Tab*)  25 mg PO BID [facility]


   Last Admin: 10/12/18 09:40 Dose:  25 mg


Omeprazole (Prilosec Cap*)  20 mg PO 0730 [facility]


   Last Admin: 10/12/18 08:36 Dose:  20 mg


Psyllium Hydrophilic Mucilloid (Metamucil Oscar*)  1 pkt PO BID [facility]


   Last Admin: 10/12/18 09:41 Dose:  1 pkt


Sodium Chloride (Sodium Chloride Tab*)  1 gm PO TID [facility]


   Last Admin: 10/12/18 10:38 Dose:  1 gm








 Vital Signs - 8 hr











  10/12/18 10/12/18 10/12/18





  06:00 07:00 07:51


 


Temperature   98.5 F


 


Pulse Rate 60 65 


 


Respiratory 16 21 





Rate   


 


Blood Pressure   





(mmHg)   


 


O2 Sat by Pulse 100 100 





Oximetry   














  10/12/18 10/12/18 10/12/18





  08:00 08:41 09:00


 


Temperature   


 


Pulse Rate 60 62 64


 


Respiratory 14 25 31





Rate   


 


Blood Pressure  129/63 140/61





(mmHg)   


 


O2 Sat by Pulse 100 98 99





Oximetry   














  10/12/18 10/12/18 10/12/18





  10:00 11:00 12:00


 


Temperature   


 


Pulse Rate 64 60 61


 


Respiratory 21 21 25





Rate   


 


Blood Pressure 143/72 131/74 129/70





(mmHg)   


 


O2 Sat by Pulse 100 100 100





Oximetry   











Oxygen Devices in Use Now: Nasal Cannula


Appearance: Elderly gentleman sitting up in bed in NAD.


Eyes: No Scleral Icterus


Ears/Nose/Mouth/Throat: Mucous Membranes Moist


Neck: Trachea Midline


Respiratory: Symmetrical Chest Expansion and Respiratory Effort, - - BS+ 

bilaterally, with bibasilar crackles


Cardiovascular: RRR - Normal S1 and S2


Abdominal: NL Sounds; No Tenderness; No Distention


Extremities: - - Bilateral LE pitting edema


Neurological: Alert and Oriented x 3, NL Muscle Strength and Tone


Result Diagrams: 


 10/12/18 05:43





 10/12/18 05:43





Assess/Plan/Problems-Billing


Assessment: 


Mr Vazquez is an 85yo M with PMH of systolic CHF EF 35-40%, recent NSTEMI, CAD s/p 

CABG, ILD, undifferentiated connective tissue disorder, HTN, HLD, CKD stage 3, 

Afib, s/p pacer, who presented to ED with c/o dyspnea, dry cough, found to have 

another episode of CHF exacerbation.





- Patient Problems


(1) Hyponatremia


Comment: - Na down to 115 - he's not confused - d/w Dr Jha - will start 

demeclocycline and sodium tablets.   





(2) Acute on chronic systolic (congestive) heart failure


Comment: - Weight was 152lbs on his outpatient visit with Dr Faria and was up 

to 160lbs on admission.


- Weight up to 162lbs today, despite diuresis.


- Continue Metolazone and Furosemide as per Nephrology recommendation.   





(3) CAD (coronary artery disease)


Comment: - Stable.


- Continue Aspirin, Metoprolol, and Atorvastatin.   





(4) GERD (gastroesophageal reflux disease)


Comment: - Continue PPI.   





(5) CKD (chronic kidney disease) stage 3, GFR 30-59 ml/min


Comment: - Creatinine continues to trend up - dyalisis conversation initiated. 

  





(6) Connective tissue disorder


Comment: - Continue Plaquenil.   





(7) DVT prophylaxis


Comment: - SCDs. Concern for GI bleed with heparin on his recent admission.   





(8) DNR (do not resuscitate)





Status and Disposition: 


Inpatient for management of CHF.

## 2018-10-13 NOTE — PN
Date of Service: 10/13/18


Critical Care Services: 





86M with htn, hld, cad s/p cabg s/p mi, CHF ef 35%, afib s/p ppm, ckd, ILD, 

mixed connective tissue disease presents with acute on chronic chf, hyponatremia





10/12: Given lasix and metolazone yesterday. Diuresed 800. Sodium 115 this am.


10/13: Doing well. Sodium 119 today.





Vital Signs: 











Temp Pulse Resp BP SpO2 FiO2


 


99.4 F 63 20 104/53 99 94


 


10/13/18 07:54 10/13/18 06:00 10/13/18 06:00 10/13/18 06:00 10/13/18 06:00 10/13

/18 04:00











Physical Exam: 





Gen - nad


heent - ncat, eomi, perrl 


neck - no jvd


cv - s1/s2


pulm - cta, dec bs at bases


abd - soft, nt


ext - +pedal edema


neuro - nonfocal





Fluid Balance (Past 24 Hours): 


I=     O=     Net 


 Intake & Output











 10/11/18 10/12/18 10/13/18 10/14/18





 06:59 06:59 06:59 06:59


 


Intake Total 1385 650 838 


 


Output Total 350 1475 1660 


 


Balance 1035 -825 -822 


 


Weight 72.983 kg 73.6 kg 72.711 kg 


 


Intake:    


 


  IV Fluids 60   


 


    Magnesium 60   


 


  Oral 1325 650 838 


 


Output:    


 


  Urine 350 1475 1660 


 


Other:    


 


  Estimated Void  Medium  


 


  # Bowel Movements 0 1 1 


 


  Estimated Stool Amount  Small Small 





 





Labs: 


 Laboratory Results - last 24 hr











  10/12/18 10/13/18 10/13/18





  15:00 05:15 05:15


 


WBC    12.7 H


 


RBC    2.94 L


 


Hgb    9.3 L


 


Hct    27 L


 


MCV    92


 


MCH    32 H


 


MCHC    35


 


RDW    16 H


 


Plt Count    200


 


MPV    9.2


 


Neut % (Auto)    94.4 H


 


Lymph % (Auto)    2.8 L


 


Mono % (Auto)    2.6


 


Eos % (Auto)    0.1


 


Baso % (Auto)    0.1


 


Absolute Neuts (auto)    12.0 H


 


Absolute Lymphs (auto)    0.4 L


 


Absolute Monos (auto)    0.3


 


Absolute Eos (auto)    0


 


Absolute Basos (auto)    0


 


Absolute Nucleated RBC    0


 


Nucleated RBC %    0


 


Sodium  115 L*  119 L* 


 


Potassium  3.9  3.7 


 


Chloride  81 L  81 L 


 


Carbon Dioxide  26  29 


 


Anion Gap  8  9 


 


BUN  35 H  37 H 


 


Creatinine  2.10 H  2.08 H 


 


Est GFR ( Amer)  36.4  36.8 


 


Est GFR (Non-Af Amer)  30.1  30.4 


 


BUN/Creatinine Ratio  16.7  17.8 


 


Glucose  125 H  98 


 


Calcium  8.6  8.8 


 


Phosphorus   4.3 


 


Magnesium   2.0 











Studies: 





CXR 10/8/18


IMPRESSION:


CARDIOMEGALY


SMALL BILATERAL PLEURAL EFFUSIONS.





Impression: 





86M with htn, hld, cad s/p cabg s/p mi, CHF ef 35%, afib s/p ppm, ckd, ILD, 

mixed connective tissue disease presents with acute on chronic chf, hyponatremia





Plan: 





Neuro - alert and oriented





CV - htn, hld, cad, chf, afib


- c/w asa/statin/betablocker


- appears mildly volume overloaded


- continues to diurese today


- not on ac for afib





Pulm - dyspnea


- 2/2 chf and ILD


- supplemental o2 prn





ID - no evidence of infection





GI - gerd 


- c/w ppi





Renal - ckd, hyponatremia


- Sodium improved to 119 today


- c/w salt tabs/demeclocycline


- repeat bmp in afternoon





Heme - monitor cbc





Endo - hyponatremia


- tsh/coritsol normal





Rheum - MCTD


- c/w plaquenil





Lines - piv





PPx - gi/dvt





DNR with Trial of intubation if needed





Critical Care Time: 40 mins

## 2018-10-13 NOTE — PN
Subjective


Date of Service: 10/13/18


Interval History: 


HOSPITALIST PROGRESS NOTE


Patient seen and examined at bedside. Care reviewed and d/w Vicky Vora RN.


He offers no new complaints today. Feels unchanged.


Family History: Unchanged from Admission


Social History: Unchanged from Admission


Past Medical History: Unchanged from Admission





Objective


Active Medications: 








Albuterol/Ipratropium (Duoneb (Albuterol 2.5 Mg/Ipratropium 0.5 Mg))  1 neb INH 

Q4H PRN


   PRN Reason: SOB/WHEEZING


   Last Admin: 10/12/18 16:48 Dose:  1 neb


Aspirin (Aspirin Ec Tab*)  81 mg PO DAILY [facility]


   Last Admin: 10/13/18 09:27 Dose:  81 mg


Atorvastatin Calcium (Lipitor*)  10 mg PO BEDTIME [facility]


   Last Admin: 10/12/18 20:17 Dose:  10 mg


Cholecalciferol (Vitamin D Tab*)  1,000 units PO DAILY [facility]


   Last Admin: 10/13/18 11:02 Dose:  1,000 units


Demeclocycline HCl (Declomycin Tab*)  150 mg PO BID [facility]


   Last Admin: 10/13/18 09:28 Dose:  150 mg


Furosemide (Lasix Iv*)  40 mg IV SLOW PU DAILY [facility]


   Last Admin: 10/13/18 09:27 Dose:  40 mg


Hydroxychloroquine Sulfate (Plaquenil Tab*)  400 mg PO DAILY [facility]


   Last Admin: 10/13/18 09:27 Dose:  400 mg


Magnesium Oxide (Magox 400 Tab*)  800 mg PO BID [facility]


   Last Admin: 10/13/18 09:27 Dose:  800 mg


Metolazone (Zaroxolyn Tab*)  5 mg PO DAILY [facility]


   Last Admin: 10/13/18 09:27 Dose:  5 mg


Metoprolol Succinate (Toprol Xl Tab*)  25 mg PO BID [facility]


   Last Admin: 10/13/18 09:27 Dose:  25 mg


Omeprazole (Prilosec Cap*)  20 mg PO 0730 [facility]


   Last Admin: 10/13/18 09:27 Dose:  20 mg


Psyllium Hydrophilic Mucilloid (Metamucil Oscar*)  1 pkt PO BID [facility]


   Last Admin: 10/13/18 09:26 Dose:  1 pkt


Sodium Chloride (Sodium Chloride Tab*)  1 gm PO TID [facility]


   Last Admin: 10/13/18 13:13 Dose:  1 gm








 Vital Signs - 8 hr











  10/13/18 10/13/18 10/13/18





  10:00 10:01 11:00


 


Temperature   


 


Pulse Rate 67 67 60


 


Respiratory 24 24 14





Rate   


 


Blood Pressure  105/45 92/41





(mmHg)   


 


O2 Sat by Pulse 96 98 91





Oximetry   














  10/13/18 10/13/18 10/13/18





  12:00 12:02 13:00


 


Temperature 99.4 F  


 


Pulse Rate 61 60 60


 


Respiratory 22 17 24





Rate   


 


Blood Pressure  90/55 98/59





(mmHg)   


 


O2 Sat by Pulse 95 97 97





Oximetry   














  10/13/18 10/13/18 10/13/18





  14:00 15:00 16:00


 


Temperature   99.3 F


 


Pulse Rate 61 63 63


 


Respiratory 19 25 26





Rate   


 


Blood Pressure 110/51 95/49 109/60





(mmHg)   


 


O2 Sat by Pulse 98 93 95





Oximetry   














  10/13/18





  17:00


 


Temperature 


 


Pulse Rate 61


 


Respiratory 17





Rate 


 


Blood Pressure 98/49





(mmHg) 


 


O2 Sat by Pulse 90





Oximetry 











Oxygen Devices in Use Now: Nasal Cannula


Appearance: Elderly gentleman lying in bed in NAD.


Eyes: No Scleral Icterus


Ears/Nose/Mouth/Throat: Mucous Membranes Moist


Neck: Trachea Midline


Respiratory: Symmetrical Chest Expansion and Respiratory Effort, - - BS+ 

bilaterally with bibasilar crackles


Cardiovascular: RRR - Normal S1 and S2


Neurological: Alert and Oriented x 3, NL Muscle Strength and Tone


Result Diagrams: 


 10/13/18 05:15





 10/13/18 12:02





Assess/Plan/Problems-Billing


Assessment: 


Mr Vazquez is an 85yo M with PMH of systolic CHF EF 35-40%, recent NSTEMI, CAD s/p 

CABG, ILD, undifferentiated connective tissue disorder, HTN, HLD, CKD stage 3, 

Afib, s/p pacer, who presented to ED with c/o dyspnea, dry cough, found to have 

another episode of CHF exacerbation.





- Patient Problems


(1) Hyponatremia


Comment: - Na up to 199 - continue demeclocycline and sodium tablets.   





(2) Acute on chronic systolic (congestive) heart failure


Comment: - Weight 160lbs today.


- Continue Metolazone and Furosemide as per Nephrology recommendation.   





(3) CAD (coronary artery disease)


Comment: - Stable.


- Continue Aspirin, Metoprolol, and Atorvastatin.   





(4) GERD (gastroesophageal reflux disease)


Comment: - Continue PPI.   





(5) CKD (chronic kidney disease) stage 3, GFR 30-59 ml/min


Comment: - Creatinine continues to trend up - dyalisis conversation initiated. 

  





(6) Connective tissue disorder


Comment: - Continue Plaquenil.   





(7) DVT prophylaxis


Comment: - SCDs. Concern for GI bleed with heparin on his recent admission.   





(8) DNR (do not resuscitate)





Status and Disposition: 


Inpatient for management of CHF.

## 2018-10-14 NOTE — PN
Date of Service: 10/14/18


Critical Care Services: 





86M with htn, hld, cad s/p cabg s/p mi, CHF ef 35%, afib s/p ppm, ckd, ILD, 

mixed connective tissue disease presents with acute on chronic chf, hyponatremia





10/12: Given lasix and metolazone yesterday. Diuresed 800. Sodium 115 this am.


10/13: Doing well. Sodium 119 today.


10/14: Sodium 121 today. 





Vital Signs: 











Temp Pulse Resp BP SpO2 FiO2


 


98.2 F 60 15 108/60 98 98


 


10/14/18 03:06 10/14/18 06:00 10/14/18 06:00 10/14/18 06:00 10/14/18 06:00 10/14

/18 04:00











Physical Exam: 





Gen - nad


heent - ncat, eomi, perrl 


neck - no jvd


cv - s1/s2


pulm - cta, dec bs at bases


abd - soft, nt


ext - +pedal edema


neuro - nonfocal





Fluid Balance (Past 24 Hours): 


I=     O=     Net 


 Intake & Output











 10/12/18 10/13/18 10/14/18 10/15/18





 06:59 06:59 06:59 06:59


 


Intake Total  


 


Output Total 1475 1660 425 


 


Balance -825 -822 1355 


 


Weight 73.6 kg 72.711 kg 71 kg 


 


Intake:    


 


  Oral  


 


Output:    


 


  Urine 1475 1660 425 


 


Other:    


 


  Estimated Void Medium   


 


  # Bowel Movements 1 1 1 


 


  Estimated Stool Amount Small Small Large 





 








Labs: 


 Laboratory Results - last 24 hr











  10/13/18 10/13/18 10/14/18





  12:02 16:00 03:45


 


WBC   


 


RBC   


 


Hgb   


 


Hct   


 


MCV   


 


MCH   


 


MCHC   


 


RDW   


 


Plt Count   


 


MPV   


 


Neut % (Auto)   


 


Lymph % (Auto)   


 


Mono % (Auto)   


 


Eos % (Auto)   


 


Baso % (Auto)   


 


Absolute Neuts (auto)   


 


Absolute Lymphs (auto)   


 


Absolute Monos (auto)   


 


Absolute Eos (auto)   


 


Absolute Basos (auto)   


 


Absolute Nucleated RBC   


 


Nucleated RBC %   


 


Sodium  119 L*  119 L*  121 L


 


Potassium  3.5  4.2  4.5


 


Chloride  83 L  84 L  86 L


 


Carbon Dioxide  27  28  29


 


Anion Gap  9  7  6


 


BUN  37 H  38 H  39 H


 


Creatinine  2.06 H  2.10 H  2.16 H


 


Est GFR ( Amer)  37.2  36.4  35.3


 


Est GFR (Non-Af Amer)  30.8  30.1  29.1


 


BUN/Creatinine Ratio  18.0  18.1  18.1


 


Glucose  135 H  94  102 H


 


Calcium  8.5 L  8.7  8.5 L


 


Magnesium    2.0














  10/14/18





  03:45


 


WBC  13.3 H


 


RBC  2.81 L


 


Hgb  8.8 L


 


Hct  26 L


 


MCV  93


 


MCH  31


 


MCHC  34


 


RDW  17 H


 


Plt Count  176


 


MPV  9.1


 


Neut % (Auto)  88.5 H


 


Lymph % (Auto)  4.8 L


 


Mono % (Auto)  6.3


 


Eos % (Auto)  0.1


 


Baso % (Auto)  0.3


 


Absolute Neuts (auto)  11.8 H


 


Absolute Lymphs (auto)  0.6 L


 


Absolute Monos (auto)  0.8


 


Absolute Eos (auto)  0


 


Absolute Basos (auto)  0


 


Absolute Nucleated RBC  0


 


Nucleated RBC %  0.1


 


Sodium 


 


Potassium 


 


Chloride 


 


Carbon Dioxide 


 


Anion Gap 


 


BUN 


 


Creatinine 


 


Est GFR ( Amer) 


 


Est GFR (Non-Af Amer) 


 


BUN/Creatinine Ratio 


 


Glucose 


 


Calcium 


 


Magnesium 











Studies: 





CXR 10/8/18


IMPRESSION:


CARDIOMEGALY


SMALL BILATERAL PLEURAL EFFUSIONS.





Impression: 





86M with htn, hld, cad s/p cabg s/p mi, CHF ef 35%, afib s/p ppm, ckd, ILD, 

mixed connective tissue disease presents with acute on chronic chf, hyponatremia





Plan: 





Neuro - alert and oriented





CV - htn, hld, cad, chf, afib


- c/w asa/statin/betablocker


- dyspnea improving





Pulm - dyspnea


- 2/2 chf and ILD


- supplemental o2 prn





ID - no evidence of infection





GI - gerd 


- c/w ppi





Renal - ckd, hyponatremia


- Sodium improving


- c/w salt tabs/demeclocycline





Heme - monitor cbc





Endo - hyponatremia


- tsh/coritsol normal





Rheum - MCTD


- c/w plaquenil





Lines - piv





PPx - gi/dvt





DNR with Trial of intubation if needed





Stable for transfer to the floor

## 2018-10-15 NOTE — PN
Subjective


Date of Service: 10/15/18


Interval History: 





Pt is feeling well. He states his breathing is about baseline for how it has 

been recently. He states the breathing is a little squeaky. He was able to lay 

mostly flat for sleep last night. He does not have any significant cough. He 

just had a BM. 


Family History: Unchanged from Admission


Social History: Unchanged from Admission


Past Medical History: Unchanged from Admission





Objective


Active Medications: 








Albuterol/Ipratropium (Duoneb (Albuterol 2.5 Mg/Ipratropium 0.5 Mg))  1 neb INH 

Q4H PRN


   PRN Reason: SOB/WHEEZING


   Last Admin: 10/12/18 16:48 Dose:  1 neb


Aspirin (Aspirin Ec Tab*)  81 mg PO DAILY [facility]


   Last Admin: 10/14/18 09:00 Dose:  81 mg


Atorvastatin Calcium (Lipitor*)  10 mg PO BEDTIME [facility]


   Last Admin: 10/14/18 21:26 Dose:  10 mg


Cholecalciferol (Vitamin D Tab*)  1,000 units PO DAILY [facility]


   Last Admin: 10/14/18 09:00 Dose:  1,000 units


Demeclocycline HCl (Declomycin Tab*)  150 mg PO BID [facility]


   Last Admin: 10/14/18 21:27 Dose:  150 mg


Furosemide (Lasix Iv*)  40 mg IV SLOW PU DAILY [facility]


   Last Admin: 10/14/18 08:59 Dose:  40 mg


Hydroxychloroquine Sulfate (Plaquenil Tab*)  400 mg PO DAILY [facility]


   Last Admin: 10/14/18 09:00 Dose:  400 mg


Magnesium Oxide (Magox 400 Tab*)  800 mg PO BID [facility]


   Last Admin: 10/14/18 21:25 Dose:  800 mg


Metolazone (Zaroxolyn Tab*)  5 mg PO DAILY [facility]


   Last Admin: 10/14/18 09:00 Dose:  5 mg


Metoprolol Succinate (Toprol Xl Tab*)  25 mg PO BID [facility]


   Last Admin: 10/14/18 21:27 Dose:  25 mg


Omeprazole (Prilosec Cap*)  20 mg PO 0730 [facility]


   Last Admin: 10/14/18 08:59 Dose:  20 mg


Psyllium Hydrophilic Mucilloid (Metamucil Oscar*)  1 pkt PO BID [facility]


   Last Admin: 10/14/18 21:28 Dose:  1 pkt


Sodium Chloride (Sodium Chloride Tab*)  1 gm PO TID [facility]


   Last Admin: 10/14/18 21:26 Dose:  1 gm








Oxygen Devices in Use Now: Nasal Cannula - 3L-100%


Appearance: Elderly male lying in bed, NAD


Eyes: No Scleral Icterus


Ears/Nose/Mouth/Throat: Mucous Membranes Moist


Respiratory: Symmetrical Chest Expansion and Respiratory Effort, Clear to 

Auscultation


Cardiovascular: RRR, No Edema, - - III/VI systolic murmur


Abdominal: NL Sounds; No Tenderness; No Distention


Extremities: No Clubbing, Cyanosis


Skin: No Nodules or Sclerosis


Neurological: Alert and Oriented x 3


Result Diagrams: 


 10/15/18 05:53





 10/15/18 05:53


Additional Lab and Data: 


 Lab Results











  10/08/18 Range/Units





  13:36 


 


WBC  8.4  (3.5-10.8)  10^3/ul


 


RBC  3.08 L  (4.00-5.40)  10^6/ul


 


Hgb  9.6 L  (14.0-18.0)  g/dl


 


Hct  29 L  (42-52)  %


 


MCV  95 H  (80-94)  fL


 


MCH  31  (27-31)  pg


 


MCHC  33  (31-36)  g/dl


 


RDW  16 H  (10.5-15)  %


 


Plt Count  215  (150-450)  10^3/ul


 


MPV  8.9  (7.4-10.4)  um3


 


Neut % (Auto)  82.1  (38-83)  %


 


Lymph % (Auto)  7.6 L  (25-47)  %


 


Mono % (Auto)  9.8 H  (0-7)  %


 


Eos % (Auto)  0.1  (0-6)  %


 


Baso % (Auto)  0.4  (0-2)  %


 


Absolute Neuts (auto)  6.9  (1.5-7.7)  10^3/ul


 


Absolute Lymphs (auto)  0.6 L  (1.0-4.8)  10^3/ul


 


Absolute Monos (auto)  0.8  (0-0.8)  10^3/ul


 


Absolute Eos (auto)  0  (0-0.6)  10^3/ul


 


Absolute Basos (auto)  0  (0-0.2)  10^3/ul


 


Absolute Nucleated RBC  0  10^3/ul


 


Nucleated RBC %  0  











Microbiology and Other Data: 


 Microbiology











 10/08/18 15:41 Aerobic Blood Culture - Preliminary





 Blood Venous    No Growth Day 3





 Anaerobic Blood Culture - Preliminary





    No Growth Day 3


 


 10/08/18 14:30 Aerobic Blood Culture - Preliminary





 Blood Venous    No Growth Day 3





 Anaerobic Blood Culture - Preliminary





    No Growth Day 3














Assess/Plan/Problems-Billing


Mr Vazquez is an 85yo M with PMHx of systolic CHF EF 35-40%, recent NSTEMI, CAD s/

p CABG, ILD, undifferentiated connective tissue disorder, HTN, HLD, CKD stage 3

, Afib, s/p pacer, who presented to ED with c/o dyspnea, dry cough, found to 

have another episode of CHF exacerbation.





- Patient Problems


(1) Hyponatremia


Current Visit: Yes   Status: Acute   Code(s): E87.1 - HYPO-OSMOLALITY AND 

HYPONATREMIA   SNOMED Code(s): 16828877


   Comment: On 1L fluid restriction, diuresis, demeclocycline and salt tabs the 

patient's Na is up to 122. He does not appear grossly fluid overloaded at this 

time. Will continue the current regimen and recheck the Na tomorrow AM. 

Initially it was felt the hyponatremia was because of hypovolemia though his 

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     





(2) Acute on chronic systolic (congestive) heart failure


Current Visit: Yes   Status: Acute   Code(s): I50.23 - ACUTE ON CHRONIC 

SYSTOLIC (CONGESTIVE) HEART FAILURE   SNOMED Code(s): 441476146


   Comment: The patient does not have any significant edema on exam, his lungs 

are clear. He continues to receive lasix and metolazone. His weight has been 

fluctuating during this admision but generally up from his dry weight obtained 

after his last hospitalization. Will continue the diuretic regimen for now but 

likely reduce the metolazone in the near future as his renal function is worse 

than his baseline.    





(3) Anemia


Current Visit: Yes   Status: Acute   Code(s): D64.9 - ANEMIA, UNSPECIFIED   

SNOMED Code(s): 739625299


   Comment: The patient's H/H has dropped through the course of this admission. 

Will recheck iron studies and stool guaiac. ? if just secondary to advancing 

CKD. Stool this AM was light brown.    





(4) CKD (chronic kidney disease) stage 3, GFR 30-59 ml/min


Current Visit: Yes   Status: Chronic   Code(s): N18.3 - CHRONIC KIDNEY DISEASE, 

STAGE 3 (MODERATE)   SNOMED Code(s): 233216886


   Comment: The patient's creatinine is worse than his baseline but relatively 

stable around 2.0-2.1. Will need to continue to monitor the renal function with 

the aggressive diuresis.    





(5) CAD (coronary artery disease)


Current Visit: Yes   Status: Acute   Code(s): I25.10 - ATHSCL HEART DISEASE OF 

NATIVE CORONARY ARTERY W/O ANG PCTRS   SNOMED Code(s): 96909418


   Comment: No c/o chest pain. Continue ASA, lipitor and metoprolol.    





(6) Connective tissue disorder


Current Visit: Yes   Status: Acute   Code(s): M35.9 - SYSTEMIC INVOLVEMENT OF 

CONNECTIVE TISSUE, UNSPECIFIED   SNOMED Code(s): 230446282


   Comment: Continue plaquenil.   





(7) GERD (gastroesophageal reflux disease)


Current Visit: Yes   Status: Acute   Code(s): K21.9 - GASTRO-ESOPHAGEAL REFLUX 

DISEASE WITHOUT ESOPHAGITIS   SNOMED Code(s): 469393838


   Comment: Continue omeprazole.   





(8) DVT prophylaxis


Current Visit: Yes   Status: Acute   Code(s): FPH3188 -    SNOMED Code(s): 

268184569


   Comment: SCDs alone as there was concern previously for GI bleed and pt now 

has dropping H/H.   





(9) DNR (do not resuscitate)


Current Visit: Yes   Status: Acute   


Status and Disposition: 


.

## 2018-10-16 NOTE — PN
Subjective


Date of Service: 10/16/18


Interval History: 





Pt is feeling ok today. He states his breathing is comfortable. He is 

frustrated with still being in the hospital. He has done some walking. 


Family History: Unchanged from Admission


Social History: Unchanged from Admission


Past Medical History: Unchanged from Admission





Objective


Active Medications: 








Acetaminophen (Tylenol Tab*)  650 mg PO Q6H PRN


   PRN Reason: PAIN


   Last Admin: 10/15/18 22:05 Dose:  650 mg


Albuterol/Ipratropium (Duoneb (Albuterol 2.5 Mg/Ipratropium 0.5 Mg))  1 neb INH 

Q4H PRN


   PRN Reason: SOB/WHEEZING


   Last Admin: 10/12/18 16:48 Dose:  1 neb


Aspirin (Aspirin Ec Tab*)  81 mg PO DAILY [facility]


   Last Admin: 10/16/18 08:59 Dose:  81 mg


Atorvastatin Calcium (Lipitor*)  10 mg PO BEDTIME [facility]


   Last Admin: 10/15/18 21:32 Dose:  10 mg


Cholecalciferol (Vitamin D Tab*)  1,000 units PO DAILY [facility]


   Last Admin: 10/16/18 08:59 Dose:  1,000 units


Demeclocycline HCl (Declomycin Tab*)  150 mg PO BID [facility]


   Last Admin: 10/16/18 09:23 Dose:  150 mg


Furosemide (Lasix Iv*)  40 mg IV SLOW PU DAILY [facility]


   Last Admin: 10/16/18 08:59 Dose:  40 mg


Hydroxychloroquine Sulfate (Plaquenil Tab*)  400 mg PO DAILY [facility]


   Last Admin: 10/16/18 09:21 Dose:  400 mg


Magnesium Oxide (Magox 400 Tab*)  800 mg PO BID [facility]


   Last Admin: 10/16/18 09:00 Dose:  800 mg


Metolazone (Zaroxolyn Tab*)  5 mg PO DAILY [facility]


   Last Admin: 10/16/18 09:00 Dose:  5 mg


Metoprolol Succinate (Toprol Xl Tab*)  25 mg PO BID [facility]


   Last Admin: 10/16/18 09:06 Dose:  25 mg


Omeprazole (Prilosec Cap*)  20 mg PO 0730 [facility]


   Last Admin: 10/16/18 08:59 Dose:  20 mg


Psyllium Hydrophilic Mucilloid (Metamucil Oscar*)  1 pkt PO BID [facility]


   Last Admin: 10/16/18 09:01 Dose:  1 pkt


Sodium Chloride (Sodium Chloride Tab*)  2 gm PO TID [facility]








 Vital Signs - 8 hr











  10/16/18 10/16/18





  11:26 15:47


 


Temperature 97.4 F 97.3 F


 


Pulse Rate 74 62


 


Respiratory 16 20





Rate  


 


Blood Pressure 132/59 107/51





(mmHg)  


 


O2 Sat by Pulse 100 98





Oximetry  











Oxygen Devices in Use Now: None


Appearance: Elderly male sitting up in bed, NAD


Eyes: No Scleral Icterus


Ears/Nose/Mouth/Throat: Mucous Membranes Moist


Respiratory: Symmetrical Chest Expansion and Respiratory Effort, Clear to 

Auscultation


Cardiovascular: NL Sounds; No Murmurs; No JVD, RRR, - - trace-1+ ankle, lower 

leg edema


Abdominal: NL Sounds; No Tenderness; No Distention


Extremities: No Clubbing, Cyanosis


Skin: No Rash or Ulcers


Neurological: Alert and Oriented x 3


Result Diagrams: 


 10/15/18 05:53





 10/16/18 14:34


Additional Lab and Data: 


 Lab Results











  10/08/18 Range/Units





  13:36 


 


WBC  8.4  (3.5-10.8)  10^3/ul


 


RBC  3.08 L  (4.00-5.40)  10^6/ul


 


Hgb  9.6 L  (14.0-18.0)  g/dl


 


Hct  29 L  (42-52)  %


 


MCV  95 H  (80-94)  fL


 


MCH  31  (27-31)  pg


 


MCHC  33  (31-36)  g/dl


 


RDW  16 H  (10.5-15)  %


 


Plt Count  215  (150-450)  10^3/ul


 


MPV  8.9  (7.4-10.4)  um3


 


Neut % (Auto)  82.1  (38-83)  %


 


Lymph % (Auto)  7.6 L  (25-47)  %


 


Mono % (Auto)  9.8 H  (0-7)  %


 


Eos % (Auto)  0.1  (0-6)  %


 


Baso % (Auto)  0.4  (0-2)  %


 


Absolute Neuts (auto)  6.9  (1.5-7.7)  10^3/ul


 


Absolute Lymphs (auto)  0.6 L  (1.0-4.8)  10^3/ul


 


Absolute Monos (auto)  0.8  (0-0.8)  10^3/ul


 


Absolute Eos (auto)  0  (0-0.6)  10^3/ul


 


Absolute Basos (auto)  0  (0-0.2)  10^3/ul


 


Absolute Nucleated RBC  0  10^3/ul


 


Nucleated RBC %  0  











Microbiology and Other Data: 


 Microbiology











 10/08/18 15:41 Aerobic Blood Culture - Preliminary





 Blood Venous    No Growth Day 3





 Anaerobic Blood Culture - Preliminary





    No Growth Day 3


 


 10/08/18 14:30 Aerobic Blood Culture - Preliminary





 Blood Venous    No Growth Day 3





 Anaerobic Blood Culture - Preliminary





    No Growth Day 3














Assess/Plan/Problems-Billing


Mr Vazquez is an 87yo M with PMHx of systolic CHF EF 35-40%, recent NSTEMI, CAD s/

p CABG, ILD, undifferentiated connective tissue disorder, HTN, HLD, CKD stage 3

, Afib, s/p pacer, who presented to ED with c/o dyspnea, dry cough, found to 

have another episode of CHF exacerbation.





- Patient Problems


(1) Hyponatremia


Current Visit: Yes   Status: Acute   Code(s): E87.1 - HYPO-OSMOLALITY AND 

HYPONATREMIA   SNOMED Code(s): 02358716


   Comment: Na not improved today. Will increase salt tabs to 2g TID and 

monitor Na level. Continue all other interventions without change today. Repeat 

labs tomorrow.    





(2) Acute on chronic systolic (congestive) heart failure


Current Visit: Yes   Status: Acute   Code(s): I50.23 - ACUTE ON CHRONIC 

SYSTOLIC (CONGESTIVE) HEART FAILURE   SNOMED Code(s): 010829791


   Comment: The patient does not have any significant edema on exam, his lungs 

are clear. Continue lasix and metolazone at current doses. His weight is not 

seeming to correlate with his clinical exam.    





(3) Anemia


Current Visit: Yes   Status: Acute   Code(s): D64.9 - ANEMIA, UNSPECIFIED   

SNOMED Code(s): 141593893


   Comment: Repeat H/H tomorrow. Stool guaiac pending. ? if secondary to 

worsened CKD.   





(4) CKD (chronic kidney disease) stage 3, GFR 30-59 ml/min


Current Visit: Yes   Status: Chronic   Code(s): N18.3 - CHRONIC KIDNEY DISEASE, 

STAGE 3 (MODERATE)   SNOMED Code(s): 263478835


   Comment: The patient's creatinine is worse than his baseline but relatively 

stable around 2.0-2.1. Will need to continue to monitor the renal function with 

the aggressive diuresis.    





(5) CAD (coronary artery disease)


Current Visit: Yes   Status: Acute   Code(s): I25.10 - ATHSCL HEART DISEASE OF 

NATIVE CORONARY ARTERY W/O ANG PCTRS   SNOMED Code(s): 51292319


   Comment: No c/o chest pain. Continue ASA, lipitor and metoprolol.    





(6) Connective tissue disorder


Current Visit: Yes   Status: Acute   Code(s): M35.9 - SYSTEMIC INVOLVEMENT OF 

CONNECTIVE TISSUE, UNSPECIFIED   SNOMED Code(s): 991272184


   Comment: Continue plaquenil.   





(7) GERD (gastroesophageal reflux disease)


Current Visit: Yes   Status: Acute   Code(s): K21.9 - GASTRO-ESOPHAGEAL REFLUX 

DISEASE WITHOUT ESOPHAGITIS   SNOMED Code(s): 652935046


   Comment: Continue omeprazole.   





(8) DVT prophylaxis


Current Visit: Yes   Status: Acute   Code(s): NCD9320 -    SNOMED Code(s): 

423624619


   Comment: SCDs alone as there was concern previously for GI bleed and pt now 

has dropping H/H.   





(9) DNR (do not resuscitate)


Current Visit: Yes   Status: Acute   


Status and Disposition: 


.

## 2018-10-17 NOTE — PN
Subjective


Date of Service: 10/17/18


Interval History: 





Pt is feeling well. He denies any significant SOB. He would like to get up to 

walk but he has not done so yet today as he can not get an aide in to help him. 

He thinks his legs are swollen but he describes having ridges related to the 

ACE wraps that have been on. 


Family History: Unchanged from Admission


Social History: Unchanged from Admission


Past Medical History: Unchanged from Admission





Objective


Active Medications: 








Acetaminophen (Tylenol Tab*)  650 mg PO Q6H PRN


   PRN Reason: PAIN


   Last Admin: 10/16/18 22:22 Dose:  650 mg


Albuterol/Ipratropium (Duoneb (Albuterol 2.5 Mg/Ipratropium 0.5 Mg))  1 neb INH 

Q4H PRN


   PRN Reason: SOB/WHEEZING


   Last Admin: 10/12/18 16:48 Dose:  1 neb


Aspirin (Aspirin Ec Tab*)  81 mg PO DAILY [facility]


   Last Admin: 10/17/18 08:40 Dose:  81 mg


Atorvastatin Calcium (Lipitor*)  10 mg PO BEDTIME [facility]


   Last Admin: 10/16/18 22:14 Dose:  10 mg


Cholecalciferol (Vitamin D Tab*)  1,000 units PO DAILY [facility]


   Last Admin: 10/17/18 08:40 Dose:  1,000 units


Demeclocycline HCl (Declomycin Tab*)  150 mg PO BID [facility]


   Last Admin: 10/17/18 08:40 Dose:  150 mg


Furosemide (Lasix Iv*)  40 mg IV SLOW PU DAILY [facility]


   Last Admin: 10/17/18 08:39 Dose:  40 mg


Hydroxychloroquine Sulfate (Plaquenil Tab*)  400 mg PO DAILY [facility]


   Last Admin: 10/17/18 08:40 Dose:  400 mg


Magnesium Oxide (Magox 400 Tab*)  800 mg PO BID [facility]


   Last Admin: 10/17/18 08:40 Dose:  800 mg


Metolazone (Zaroxolyn Tab*)  5 mg PO DAILY [facility]


   Last Admin: 10/17/18 08:40 Dose:  5 mg


Metoprolol Succinate (Toprol Xl Tab*)  25 mg PO BID [facility]


   Last Admin: 10/17/18 08:40 Dose:  25 mg


Omeprazole (Prilosec Cap*)  20 mg PO 0730 [facility]


   Last Admin: 10/17/18 07:48 Dose:  20 mg


Psyllium Hydrophilic Mucilloid (Metamucil Oscar*)  1 pkt PO BID [facility]


   Last Admin: 10/17/18 08:38 Dose:  1 pkt


Sodium Chloride (Sodium Chloride Tab*)  2 gm PO TID [facility]


   Last Admin: 10/17/18 13:26 Dose:  2 gm








 Vital Signs - 8 hr











  10/17/18





  15:24


 


Temperature 97.3 F


 


Pulse Rate 66


 


Respiratory 16





Rate 


 


Blood Pressure 116/50





(mmHg) 


 


O2 Sat by Pulse 95





Oximetry 











Oxygen Devices in Use Now: None


Appearance: Elderly male sitting up in bed, NAD


Eyes: No Scleral Icterus


Ears/Nose/Mouth/Throat: Mucous Membranes Moist


Respiratory: Symmetrical Chest Expansion and Respiratory Effort, Clear to 

Auscultation - few RLL crackles


Cardiovascular: NL Sounds; No Murmurs; No JVD, RRR, - - minimal ankle edema 

bilaterally (ACE wraps are on)


Abdominal: NL Sounds; No Tenderness; No Distention


Extremities: No Clubbing, Cyanosis


Skin: No Nodules or Sclerosis


Neurological: Alert and Oriented x 3


Result Diagrams: 


 10/17/18 08:43





 10/17/18 08:43


Additional Lab and Data: 


 Lab Results











  10/08/18 Range/Units





  13:36 


 


WBC  8.4  (3.5-10.8)  10^3/ul


 


RBC  3.08 L  (4.00-5.40)  10^6/ul


 


Hgb  9.6 L  (14.0-18.0)  g/dl


 


Hct  29 L  (42-52)  %


 


MCV  95 H  (80-94)  fL


 


MCH  31  (27-31)  pg


 


MCHC  33  (31-36)  g/dl


 


RDW  16 H  (10.5-15)  %


 


Plt Count  215  (150-450)  10^3/ul


 


MPV  8.9  (7.4-10.4)  um3


 


Neut % (Auto)  82.1  (38-83)  %


 


Lymph % (Auto)  7.6 L  (25-47)  %


 


Mono % (Auto)  9.8 H  (0-7)  %


 


Eos % (Auto)  0.1  (0-6)  %


 


Baso % (Auto)  0.4  (0-2)  %


 


Absolute Neuts (auto)  6.9  (1.5-7.7)  10^3/ul


 


Absolute Lymphs (auto)  0.6 L  (1.0-4.8)  10^3/ul


 


Absolute Monos (auto)  0.8  (0-0.8)  10^3/ul


 


Absolute Eos (auto)  0  (0-0.6)  10^3/ul


 


Absolute Basos (auto)  0  (0-0.2)  10^3/ul


 


Absolute Nucleated RBC  0  10^3/ul


 


Nucleated RBC %  0  











Microbiology and Other Data: 


 Microbiology











 10/08/18 15:41 Aerobic Blood Culture - Preliminary





 Blood Venous    No Growth Day 3





 Anaerobic Blood Culture - Preliminary





    No Growth Day 3


 


 10/08/18 14:30 Aerobic Blood Culture - Preliminary





 Blood Venous    No Growth Day 3





 Anaerobic Blood Culture - Preliminary





    No Growth Day 3














Assess/Plan/Problems-Billing


Mr Vazquez is an 85yo M with PMHx of systolic CHF EF 35-40%, recent NSTEMI, CAD s/

p CABG, ILD, undifferentiated connective tissue disorder, HTN, HLD, CKD stage 3

, Afib, s/p pacer, who presented to ED with c/o dyspnea, dry cough, found to 

have another episode of CHF exacerbation.





- Patient Problems


(1) Hyponatremia


Current Visit: Yes   Status: Acute   Code(s): E87.1 - HYPO-OSMOLALITY AND 

HYPONATREMIA   SNOMED Code(s): 70092723


   Comment: Na finally improved some today. Continue salt tabs 2g TID, 

demeclocycline and the 1L fluid restriction. With all of the interventions to 

increase the Na, this will have to be watched very closely. If close to 130 

tomorrow will d/c pt home to follow up very closely.    





(2) Acute on chronic systolic (congestive) heart failure


Current Visit: Yes   Status: Acute   Code(s): I50.23 - ACUTE ON CHRONIC 

SYSTOLIC (CONGESTIVE) HEART FAILURE   SNOMED Code(s): 527911186


   Comment: The patient does not have any significant edema on exam, his lungs 

are clear. Continue lasix and metolazone at current doses. His weight is not 

seeming to correlate with his clinical exam.    





(3) Anemia


Current Visit: Yes   Status: Acute   Code(s): D64.9 - ANEMIA, UNSPECIFIED   

SNOMED Code(s): 107179377


   Comment: H/H stable today. Await stool sample to send for guaiac. May need 

GI eval outpatient.    





(4) CKD (chronic kidney disease) stage 3, GFR 30-59 ml/min


Current Visit: Yes   Status: Chronic   Code(s): N18.3 - CHRONIC KIDNEY DISEASE, 

STAGE 3 (MODERATE)   SNOMED Code(s): 848052675


   Comment: Creatinine is slightly improved today from prior. Continue to 

follow.    





(5) CAD (coronary artery disease)


Current Visit: Yes   Status: Acute   Code(s): I25.10 - ATHSCL HEART DISEASE OF 

NATIVE CORONARY ARTERY W/O ANG PCTRS   SNOMED Code(s): 02221474


   Comment: No c/o chest pain. Continue ASA, lipitor and metoprolol.    





(6) Connective tissue disorder


Current Visit: Yes   Status: Acute   Code(s): M35.9 - SYSTEMIC INVOLVEMENT OF 

CONNECTIVE TISSUE, UNSPECIFIED   SNOMED Code(s): 589512980


   Comment: Continue plaquenil.   





(7) GERD (gastroesophageal reflux disease)


Current Visit: Yes   Status: Acute   Code(s): K21.9 - GASTRO-ESOPHAGEAL REFLUX 

DISEASE WITHOUT ESOPHAGITIS   SNOMED Code(s): 403729790


   Comment: Continue omeprazole.   





(8) DVT prophylaxis


Current Visit: Yes   Status: Acute   Code(s): LXF1540 -    SNOMED Code(s): 

615351941


   Comment: SCDs alone as there was concern previously for GI bleed and pt now 

has dropping H/H.   





(9) DNR (do not resuscitate)


Current Visit: Yes   Status: Acute   


Status and Disposition: 


.

## 2018-10-18 NOTE — PN
Subjective


Date of Service: 10/18/18


Interval History: 





Pt is feeling well. He denies any SOB. He states he has not looked at his legs 

today to determine if they are more swollen than usual. It has come up that he 

does not currently have running water at home. He states his wife has called 

someone to look at his well. 


Family History: Unchanged from Admission


Social History: Unchanged from Admission


Past Medical History: Unchanged from Admission





Objective


Active Medications: 








Acetaminophen (Tylenol Tab*)  650 mg PO Q6H PRN


   PRN Reason: PAIN


   Last Admin: 10/17/18 22:06 Dose:  650 mg


Albuterol/Ipratropium (Duoneb (Albuterol 2.5 Mg/Ipratropium 0.5 Mg))  1 neb INH 

Q4H PRN


   PRN Reason: SOB/WHEEZING


   Last Admin: 10/12/18 16:48 Dose:  1 neb


Aspirin (Aspirin Ec Tab*)  81 mg PO DAILY [facility]


   Last Admin: 10/18/18 09:32 Dose:  81 mg


Atorvastatin Calcium (Lipitor*)  10 mg PO BEDTIME [facility]


   Last Admin: 10/17/18 22:07 Dose:  10 mg


Cholecalciferol (Vitamin D Tab*)  1,000 units PO DAILY [facility]


   Last Admin: 10/18/18 09:32 Dose:  1,000 units


Demeclocycline HCl (Declomycin Tab*)  150 mg PO BID [facility]


   Last Admin: 10/18/18 09:33 Dose:  150 mg


Furosemide (Lasix Iv*)  40 mg IV SLOW PU DAILY [facility]


   Last Admin: 10/18/18 09:32 Dose:  40 mg


Hydroxychloroquine Sulfate (Plaquenil Tab*)  400 mg PO DAILY [facility]


   Last Admin: 10/18/18 09:32 Dose:  400 mg


Magnesium Oxide (Magox 400 Tab*)  800 mg PO BID [facility]


   Last Admin: 10/18/18 09:33 Dose:  800 mg


Metolazone (Zaroxolyn Tab*)  5 mg PO DAILY [facility]


   Last Admin: 10/18/18 09:32 Dose:  5 mg


Metoprolol Succinate (Toprol Xl Tab*)  25 mg PO BID [facility]


   Last Admin: 10/18/18 09:33 Dose:  25 mg


Omeprazole (Prilosec Cap*)  20 mg PO 0730 [facility]


   Last Admin: 10/18/18 07:44 Dose:  20 mg


Psyllium Hydrophilic Mucilloid (Metamucil Oscar*)  1 pkt PO BID [facility]


   Last Admin: 10/18/18 09:32 Dose:  1 pkt


Sodium Chloride (Sodium Chloride Tab*)  2 gm PO BID [facility]








Oxygen Devices in Use Now: None


Appearance: Elderly male sitting up in bed, NAD


Eyes: No Scleral Icterus


Ears/Nose/Mouth/Throat: Mucous Membranes Moist


Respiratory: Symmetrical Chest Expansion and Respiratory Effort, - - few 

bibasilar crackles


Cardiovascular: NL Sounds; No Murmurs; No JVD, RRR, - - trace B/L LE edema


Abdominal: NL Sounds; No Tenderness; No Distention


Extremities: No Clubbing, Cyanosis


Skin: No Nodules or Sclerosis


Neurological: Alert and Oriented x 3


Result Diagrams: 


 10/17/18 08:43





 10/18/18 15:31


Additional Lab and Data: 


 Lab Results











  10/08/18 Range/Units





  13:36 


 


WBC  8.4  (3.5-10.8)  10^3/ul


 


RBC  3.08 L  (4.00-5.40)  10^6/ul


 


Hgb  9.6 L  (14.0-18.0)  g/dl


 


Hct  29 L  (42-52)  %


 


MCV  95 H  (80-94)  fL


 


MCH  31  (27-31)  pg


 


MCHC  33  (31-36)  g/dl


 


RDW  16 H  (10.5-15)  %


 


Plt Count  215  (150-450)  10^3/ul


 


MPV  8.9  (7.4-10.4)  um3


 


Neut % (Auto)  82.1  (38-83)  %


 


Lymph % (Auto)  7.6 L  (25-47)  %


 


Mono % (Auto)  9.8 H  (0-7)  %


 


Eos % (Auto)  0.1  (0-6)  %


 


Baso % (Auto)  0.4  (0-2)  %


 


Absolute Neuts (auto)  6.9  (1.5-7.7)  10^3/ul


 


Absolute Lymphs (auto)  0.6 L  (1.0-4.8)  10^3/ul


 


Absolute Monos (auto)  0.8  (0-0.8)  10^3/ul


 


Absolute Eos (auto)  0  (0-0.6)  10^3/ul


 


Absolute Basos (auto)  0  (0-0.2)  10^3/ul


 


Absolute Nucleated RBC  0  10^3/ul


 


Nucleated RBC %  0  











Microbiology and Other Data: 


 Microbiology











 10/08/18 15:41 Aerobic Blood Culture - Preliminary





 Blood Venous    No Growth Day 3





 Anaerobic Blood Culture - Preliminary





    No Growth Day 3


 


 10/08/18 14:30 Aerobic Blood Culture - Preliminary





 Blood Venous    No Growth Day 3





 Anaerobic Blood Culture - Preliminary





    No Growth Day 3














Assess/Plan/Problems-Billing


Mr Vazquez is an 85yo M with PMHx of systolic CHF EF 35-40%, recent NSTEMI, CAD s/

p CABG, ILD, undifferentiated connective tissue disorder, HTN, HLD, CKD stage 3

, Afib, s/p pacer, who presented to ED with c/o dyspnea, dry cough, found to 

have another episode of CHF exacerbation.





- Patient Problems


(1) Hyponatremia


Current Visit: Yes   Status: Acute   Code(s): E87.1 - HYPO-OSMOLALITY AND 

HYPONATREMIA   SNOMED Code(s): 38800382


   Comment: Na level up further today. He has taken a large jump over the last 

couple days so will decrease salt tabs to 2g po BID from TID. Repeat BMP 

tomorrow. May need to add 1g midday but I do not want him to climb too much 

faster than he has. As pt does not have running water at home currently will 

hold off on d/c today.    





(2) Acute on chronic systolic (congestive) heart failure


Current Visit: Yes   Status: Acute   Code(s): I50.23 - ACUTE ON CHRONIC 

SYSTOLIC (CONGESTIVE) HEART FAILURE   SNOMED Code(s): 611302048


   Comment: Pt now with few crackles on exam despite remaining on the same 

diuretic regimen. Continue lasix but change to 60mg po daily.    





(3) Anemia


Current Visit: Yes   Status: Acute   Code(s): D64.9 - ANEMIA, UNSPECIFIED   

SNOMED Code(s): 249339211


   Comment: Repeat CBC tomorrow. ? anemia secondary to worsened CKD.    





(4) CKD (chronic kidney disease) stage 3, GFR 30-59 ml/min


Current Visit: Yes   Status: Chronic   Code(s): N18.3 - CHRONIC KIDNEY DISEASE, 

STAGE 3 (MODERATE)   SNOMED Code(s): 827182788


   Comment: Creatinine currently is up from his baseline but is stable over the 

last several days. Monitor intermittently.    





(5) CAD (coronary artery disease)


Current Visit: Yes   Status: Acute   Code(s): I25.10 - ATHSCL HEART DISEASE OF 

NATIVE CORONARY ARTERY W/O ANG PCTRS   SNOMED Code(s): 13803713


   Comment: No c/o chest pain. Continue ASA, lipitor and metoprolol.    





(6) Connective tissue disorder


Current Visit: Yes   Status: Acute   Code(s): M35.9 - SYSTEMIC INVOLVEMENT OF 

CONNECTIVE TISSUE, UNSPECIFIED   SNOMED Code(s): 523211018


   Comment: Continue plaquenil.   





(7) GERD (gastroesophageal reflux disease)


Current Visit: Yes   Status: Acute   Code(s): K21.9 - GASTRO-ESOPHAGEAL REFLUX 

DISEASE WITHOUT ESOPHAGITIS   SNOMED Code(s): 000766667


   Comment: Continue omeprazole.   





(8) DVT prophylaxis


Current Visit: Yes   Status: Acute   Code(s): XQO6752 -    SNOMED Code(s): 

619881716


   Comment: SCDs alone as there was concern previously for GI bleed and pt now 

has dropping H/H.   





(9) DNR (do not resuscitate)


Current Visit: Yes   Status: Acute   


Status and Disposition: 


.

## 2018-10-19 NOTE — PN
Progress Note





- Progress Note


Date of Service: 10/19/18


Note: 





Time spent on discharge 40- minutes, including exam of the patient, discussion 

with patient, nurse, CM, review of EMR and preparation of discharge documents.

## 2018-10-20 NOTE — DS
CC:  Dr. Boo

 

DISCHARGE SUMMARY:

 

DATE OF ADMISSION:

 

DATE OF DISCHARGE:  10/19/18

 

HISTORY OF PRESENT ILLNESS/HOSPITAL COURSE:  This 86-year-old man presented with shortness of breath.
  He has a long history of systolic congestive heart failure, multiple comorbidities.  History was de
tailed in the admission note.

 

The patient was felt to have dietary noncompliance, not intentional.  He stated he was compliant with
 his medications.

 

He initially was treated with intravenous furosemide.  He eventually was transferred to the intensive
 care unit, I believe for treatment of his hyponatremia.  His sodium got as low as 115.  He was start
ed on demeclocycline and salt tablets.  On the day of discharge, his sodium had risen to 132 up 4 poi
nts from the day before.  I stopped the salt tablets.

 

As he had been on salt tablets, he had been started on metolazone.  He was on his usual dose of furos
emide here.  I am going to cut his metolazone down a little bit and see how he does without the salt 
tablets and the new addition of metolazone. He was doing quite well on the day of discharge, not usin
g oxygen, feeling at his baseline, able to walk adequately.

 

The patient will have a basic metabolic profile on 10/22/18.

 

DISCHARGE DIAGNOSES:

1.  Acute on chronic systolic congestive heart failure.

2.  Hyponatremia.

3.  Chronic anemia.

4.  Chronic kidney disease.

5.  Coronary artery disease with decreased left ventricular function.

6.  Connective tissue disease and interstitial lung disease.

7.  Gastroesophageal reflux disease.

 

DISCHARGE MEDICATIONS:

1.  Demeclocycline 150 mg b.i.d.

2.  Metolazone 2.5 mg daily.

3.  Atorvastatin 10 mg h.s.

4.  Aspirin 81 mg daily.

5.  Omeprazole 20 mg daily.

6.  Psyllium b.i.d.

7.  Hydroxychloroquine 400 mg daily.

8.  Potassium chloride 20 mEq b.i.d.

9.  Vitamin D3 1000 units daily.

10.  Magnesium oxide 800 mg b.i.d.

11.  Metoprolol succinate 25 mg b.i.d.

12.  Furosemide 60 mg daily.

 

DISCHARGE CONDITION:  Improved.

 

DISCHARGE DISPOSITION:  Home.

 

 [id number] #: [id number]

## 2019-08-07 NOTE — ED
Altered Mental Status





- HPI Summary


HPI Summary: 





An 88 y/o male brought in by [facility] ambulance presents to [facility] with a chief 

complaint of AMS today. He is able to answer his name but is having difficulty 

answering his location and age. He denies any pain, SOB, nausea or difficulty 

urinating. He has abdominal pain upon palpation. He has a Hx of UTI. 





- History Of Current Complaint


Chief Complaint: EDAltMentalStatus


Stated Complaint: POSS UTI PER EMS


Time Seen by Provider: 08/07/19 21:39


Hx Obtained From: Patient, EMS


Onset/Duration: Still Present


Timing: Constant, Lasting Hours


Severity Initially: Mild


Severity Currently: Mild


Character: Confusion


Aggravating Factor(s): Nothing


Alleviating Factor(s): Nothing


Associated Signs And Symptoms: Positive: Negative





- Allergies/Home Medications


Allergies/Adverse Reactions: 


 Allergies











Allergy/AdvReac Type Severity Reaction Status Date / Time


 


No Known Allergies Allergy   Verified 08/07/19 21:48














PMH/Surg Hx/FS Hx/Imm Hx


Endocrine/Hematology History: 


   Denies: Hx Diabetes, Hx Thyroid Disease


Cardiovascular History: Reports: Hx Congestive Heart Failure, Hx Coronary 

Artery Disease, Hx Hypercholesterolemia, Hx Hypertension, Hx Pacemaker/ICD


Respiratory History: 


   Denies: Hx Asthma, Hx Chronic Obstructive Pulmonary Disease (COPD)


GI History: Reports: Hx Gastroesophageal Reflux Disease


   Denies: Hx Ulcer


 History: Reports: Hx Renal Disease - abnormal


   Denies: Hx Dialysis


Musculoskeletal History: Reports: Hx Arthritis, Hx Back Problems - spinal 

stenosis


Sensory History: Reports: Hx Cataracts, Hx Contacts or Glasses - reading glasses


   Denies: Hx Hearing Aid


Opthamlomology History: Reports: Hx Cataracts, Hx Contacts or Glasses - reading 

glasses


Neurological History: 


   Denies: Hx Developmental Delay


Psychiatric History: 


   Denies: Hx Panic Disorder





- Surgical History


Surgery Procedure, Year, and Place: HERNIA SURGERY 1965-CATARACTS BILATERAL 

EYES 2011,cardiac bypass, tonsillectomy, status post pacemaker


Infectious Disease History: Unable to Obtain/Confirm


Infectious Disease History: Reports: Hx of Known/Suspected MRSA


   Denies: Hx Hepatitis, Hx Human Immunodeficiency Virus (HIV), Traveled 

Outside the [place] in Last 30 Days





- Family History


Known Family History: 


   Negative: Cardiac Disease





- Social History


Alcohol Use: None


Hx Substance Use: No


Substance Use Type: Reports: None


Hx Tobacco Use: No


Smoking Status (MU): Never Smoked Tobacco


Have You Smoked in the Last Year: No





Review of Systems


Negative: Shortness Of Breath


Positive: Abdominal Pain.  Negative: Nausea


Negative: dysuria


All Other Systems Reviewed And Are Negative: Yes





Physical Exam





- Summary


Physical Exam Summary: 





Constitutional: Well-developed, Well-nourished, Alert. (-) Distressed. Sitting 

comfortably in no acute distress


Skin: Warm, Dry


HENT: Normocephalic; Atraumatic


Eyes: Conjunctiva normal


Neck: Musculoskeletal ROM normal neck. (-) JVD, (-) Stridor, (-) Tracheal 

deviation


Cardio: Rhythm regular, rate normal, Heart sounds normal; Intact distal pulses; 

The pedal pulses are 2+ and symmetric. Radial pulses are 2+ and symmetric. (-) 

Murmur


Pulmonary/Chest wall: Effort normal. (-) Respiratory distress, (-) Wheezes, (-) 

Rales


Abd: Soft, (-) tenderness, (-) Distension, (-) Guarding, (-) Rebound, Large 

palpable bladder


Musculoskeletal: (-) Edema


Lymph: (-) Cervical adenopathy


Neuro: Alert and Oriented only to person. 


Psych: Mood and affect Normal





Triage Information Reviewed: Yes


Vital Signs On Initial Exam: 


 Initial Vitals











Pulse BP Pulse Ox


 


 80   132/76   97 


 


 08/07/19 21:39  08/07/19 21:39  08/07/19 21:39











Vital Signs Reviewed: Yes





Diagnostics





- Vital Signs


 Vital Signs











  Temp Pulse Resp BP Pulse Ox


 


 08/07/19 21:40  99.3 F  78  16  132/76  98


 


 08/07/19 21:39   80   132/76  97














- Laboratory


Result Diagrams: 


 08/07/19 22:58





 08/08/19 01:47


Lab Statement: Any lab studies that have been ordered have been reviewed, and 

results considered in the medical decision making process.





- Radiology


  ** CXR


Radiology Interpretation Completed By: ED Physician


Summary of Radiographic Findings: Right basilar atelectasis.  Pending official 

imaging report.





- EKG


  ** 23:00


Cardiac Rate: NL - 77 bpm


EKG Rhythm: Sinus Rhythm


Summary of EKG Findings: Normal sinus rhythm at 77 bpm, prolonged OR, 1st 

degree AV block, Prolonged QRS, RBBB, borderline prolonged QTCs, nonspecific 

EKG.





Altered Mental Statu Course/Dx





- Course


Course Of Treatment: An 88 y/o male brought in by [facility] ambulance presents to 

[facility] with a chief complaint of AMS today. He is able to answer his name but is 

having difficulty answering his location and age. The physical exam revealed 

that he is Alert and Oriented only to person. Sitting comfortably in stretcher, 

in no acute distress and a large palpable bladder. EKG at 23:00 showed Normal 

sinus rhythm at 77 bpm, prolonged OR, 1st degree AV block, Prolonged QRS, RBBB, 

borderline prolonged QTCs, nonspecific EKG. Blood work, chemistries and urines 

obtained. Sodium of 118 and Troponin of 0.27 at 22:58. Urine Protein 2+, Urine 

Blood 3+, Ur Leukocyte Esterase 2+, Urine WBC 3+, Urine RBC 3+.  In the ED 

course the patient was given Piperacillin/Tazobactam in Sodium Chloride IVPB. 

CXR impression:Right basilar atelectasis. Discussed case with Dr. Jarrett, 

hospitalist, who accepted the patient for admission. The patient is agreeable 

with this plan.





- Diagnoses


Provider Diagnoses: 


 UTI (urinary tract infection), Hyponatremia, Elevated troponin








- Provider Notifications


Discussed Care Of Patient With: Claude Jarrett


Time Discussed With Above Provider: 01:22


Instructed by Provider To: Admit As Inpatient





Discharge





- Sign-Out/Discharge


Documenting (check all that apply): Patient Departure - admit


Patient Received Moderate/Deep Sedation with Procedure: No





- Discharge Plan


Condition: Fair


Disposition: ADMITTED TO [facility]





- Billing Disposition and Condition


Condition: FAIR


Disposition: Admitted to [facility]





- Attestation Statements


Document Initiated by Denise: Yes


Documenting Scribe: Tay Arana


Provider For Whom Scribe is Documenting (Include Credential): Vaishnavi Melendez MD


Scribe Attestation: 


Tay GAMBLE scribed for Vaishnavi Padilla MD on 08/08/19 at 

0646. 


Scribe Documentation Reviewed: Yes


Provider Attestation: 


The documentation as recorded by the Tay aleman accurately 

reflects the service I personally performed and the decisions made by me, 

Vaishnavi Padilla MD


Status of Scribe Document: Viewed

## 2019-08-08 NOTE — PN
Subjective


Date of Service: 08/08/19


Interval History: 








Discussed plan of care and patient's status with Betzy BETANCOURT. Please see nursing 

note, but in short there are multiple concerns about patient's safety at home 

given he is unable to care for urinary cath as evidence by arriving with castellano 

cath clogged and retaining approx 1100 mls of urine (see ED Nurse Notes), 

patient has not been out of bed in several weeks or more and has skin breakdown

, and finally patient's wife spoke to RN and reports she is unable to transfer 

patient to wheelchair and has to wait until children visit to have him 

transferred. 





On assessment patient is sitting in bed. He is feeding himself lunch. He is 

pleasant, bright and cooperative. He is oriented to self, place, time, and 

situation. He reports he has recently been experience lower/suprapubic abd pain/

pressure. He denies fever, chills, nausea, vomiting, diarrhea, cp, sob, fatigue

, palpitations. 





Objective


Active Medications: 








Aspirin (Aspirin Ec Tab*)  81 mg PO DAILY [facility]


   Last Admin: 08/08/19 09:42 Dose:  81 mg


Atorvastatin Calcium (Lipitor*)  10 mg PO BEDTIME [facility]


Cholecalciferol (Vitamin D Tab*)  1,000 units PO DAILY [facility]


   Last Admin: 08/08/19 09:41 Dose:  1,000 units


Heparin Sodium (Porcine) (Heparin Vial(*))  5,000 units SUBCUT Q12HR [facility]


   Last Admin: 08/08/19 09:43 Dose:  5,000 units


Hydroxychloroquine Sulfate (Plaquenil Tab*)  400 mg PO DAILY [facility]


   Last Admin: 08/08/19 09:42 Dose:  400 mg


Piperacillin Sod/Tazobactam (Sod 3.375 gm/ Sodium Chloride)  100 mls @ 25 mls/

hr IVPB Q8H [facility]


   Last Admin: 08/08/19 15:04 Dose:  25 mls/hr


Sodium Chloride (Ns 0.9% 1000 Ml**)  1,000 mls @ 50 mls/hr IV PER RATE [facility]


   Last Admin: 08/08/19 15:04 Dose:  50 mls/hr


Pantoprazole Sodium (Protonix Tab*)  40 mg PO QAM [facility]


   Last Admin: 08/08/19 09:42 Dose:  40 mg








 Vital Signs - 8 hr











  08/08/19 08/08/19 08/08/19





  08:32 11:31 15:15


 


Temperature 97.5 F 97.6 F 97.4 F


 


Pulse Rate 68 64 73


 


Respiratory 16 16 20





Rate   


 


Blood Pressure 100/48 99/44 100/46





(mmHg)   


 


O2 Sat by Pulse 100 100 100





Oximetry   











Oxygen Devices in Use Now: None


Appearance: Comfortable, NAD


Eyes: No Scleral Icterus


Ears/Nose/Mouth/Throat: Mucous Membranes Moist


Neck: NL Appearance and Movements; NL JVP


Respiratory: Symmetrical Chest Expansion and Respiratory Effort, Clear to 

Auscultation


Cardiovascular: NL Sounds; No Murmurs; No JVD, RRR, No Edema


Abdominal: NL Sounds; No Tenderness; No Distention


Lymphatic: No Cervical Adenopathy


Extremities: No Clubbing, Cyanosis


Neurological: Alert and Oriented x 3, NL Muscle Strength and Tone, - - Cranial 

nerves grossly intact. Coordination intact. No drift


Nutrition: Taking PO's


Result Diagrams: 


 08/08/19 07:03





 08/08/19 13:46


Additional Lab and Data: 





 Laboratory Results - last 24 hr











  08/07/19 08/07/19 08/07/19





  22:58 22:58 22:58


 


WBC  19.8 H  


 


RBC  3.38 L  


 


Hgb  10.6 L  


 


Hct  33 L  


 


MCV  97 H  


 


MCH  31  


 


MCHC  33  


 


RDW  14  


 


Plt Count  308  


 


MPV  9.1  


 


Neut % (Auto)  90.6  


 


Lymph % (Auto)  4.1  


 


Mono % (Auto)  5.2  


 


Eos % (Auto)  0.0  


 


Baso % (Auto)  0.1  


 


Absolute Neuts (auto)  17.9 H  


 


Absolute Lymphs (auto)  0.8 L  


 


Absolute Monos (auto)  1.0 H  


 


Absolute Eos (auto)  0.0  


 


Absolute Basos (auto)  0.0  


 


Absolute Nucleated RBC  0.0  


 


Nucleated RBC %  0.0  


 


INR (Anticoag Therapy)   1.12 H 


 


APTT   34.9 


 


Sodium    118 L*


 


Potassium    4.6


 


Chloride    90 L


 


Carbon Dioxide    19 L


 


Anion Gap    9


 


BUN    27 H


 


Creatinine    2.33 H


 


Est GFR ( Amer)    32.2


 


Est GFR (Non-Af Amer)    26.6


 


BUN/Creatinine Ratio    11.6


 


Glucose    141 H


 


Serum Osmolality   


 


Lactic Acid   


 


Calcium    8.4 L


 


Total Bilirubin    0.40


 


AST    25


 


ALT    18


 


Alkaline Phosphatase    184 H


 


Troponin I    0.27 H*


 


Total Protein    7.3


 


Albumin    3.6


 


Globulin    3.7


 


Albumin/Globulin Ratio    1.0


 


Urine Color   


 


Urine Appearance   


 


Urine pH   


 


Ur Specific Gravity   


 


Urine Protein   


 


Urine Ketones   


 


Urine Blood   


 


Urine Nitrate   


 


Urine Bilirubin   


 


Urine Urobilinogen   


 


Ur Leukocyte Esterase   


 


Urine WBC (Auto)   


 


Urine RBC (Auto)   


 


Urine Bacteria   


 


Urine Osmolality   


 


U Sodium Concentration   


 


Urine Potassium   


 


Ur Chloride Concentrat   


 


Urine Glucose   














  08/07/19 08/07/19 08/08/19





  22:58 23:04 01:47


 


WBC   


 


RBC   


 


Hgb   


 


Hct   


 


MCV   


 


MCH   


 


MCHC   


 


RDW   


 


Plt Count   


 


MPV   


 


Neut % (Auto)   


 


Lymph % (Auto)   


 


Mono % (Auto)   


 


Eos % (Auto)   


 


Baso % (Auto)   


 


Absolute Neuts (auto)   


 


Absolute Lymphs (auto)   


 


Absolute Monos (auto)   


 


Absolute Eos (auto)   


 


Absolute Basos (auto)   


 


Absolute Nucleated RBC   


 


Nucleated RBC %   


 


INR (Anticoag Therapy)   


 


APTT   


 


Sodium   


 


Potassium   


 


Chloride   


 


Carbon Dioxide   


 


Anion Gap   


 


BUN   


 


Creatinine   


 


Est GFR ( Amer)   


 


Est GFR (Non-Af Amer)   


 


BUN/Creatinine Ratio   


 


Glucose   


 


Serum Osmolality   


 


Lactic Acid  1.7   1.5


 


Calcium   


 


Total Bilirubin   


 


AST   


 


ALT   


 


Alkaline Phosphatase   


 


Troponin I   


 


Total Protein   


 


Albumin   


 


Globulin   


 


Albumin/Globulin Ratio   


 


Urine Color   Yellow 


 


Urine Appearance   Turbid 


 


Urine pH   5.0 


 


Ur Specific Gravity   1.009 L 


 


Urine Protein   2+(100 mg/dl) A 


 


Urine Ketones   Negative 


 


Urine Blood   3+ A 


 


Urine Nitrate   Negative 


 


Urine Bilirubin   Negative 


 


Urine Urobilinogen   Negative 


 


Ur Leukocyte Esterase   2+ A 


 


Urine WBC (Auto)   3+(>20/hpf) A 


 


Urine RBC (Auto)   3+(>10/hpf) A 


 


Urine Bacteria   Absent 


 


Urine Osmolality   


 


U Sodium Concentration   


 


Urine Potassium   


 


Ur Chloride Concentrat   


 


Urine Glucose   Negative 














  08/08/19 08/08/19 08/08/19





  01:47 01:47 01:50


 


WBC   


 


RBC   


 


Hgb   


 


Hct   


 


MCV   


 


MCH   


 


MCHC   


 


RDW   


 


Plt Count   


 


MPV   


 


Neut % (Auto)   


 


Lymph % (Auto)   


 


Mono % (Auto)   


 


Eos % (Auto)   


 


Baso % (Auto)   


 


Absolute Neuts (auto)   


 


Absolute Lymphs (auto)   


 


Absolute Monos (auto)   


 


Absolute Eos (auto)   


 


Absolute Basos (auto)   


 


Absolute Nucleated RBC   


 


Nucleated RBC %   


 


INR (Anticoag Therapy)   


 


APTT   


 


Sodium   119 L* 


 


Potassium   4.8 


 


Chloride   91 L 


 


Carbon Dioxide   20 L 


 


Anion Gap   8 


 


BUN   29 H 


 


Creatinine   2.33 H 


 


Est GFR ( Amer)   32.2 


 


Est GFR (Non-Af Amer)   26.6 


 


BUN/Creatinine Ratio   12.4 


 


Glucose   122 H 


 


Serum Osmolality  267 L  


 


Lactic Acid   


 


Calcium   8.1 L 


 


Total Bilirubin   


 


AST   


 


ALT   


 


Alkaline Phosphatase   


 


Troponin I   0.29 H* 


 


Total Protein   


 


Albumin   


 


Globulin   


 


Albumin/Globulin Ratio   


 


Urine Color   


 


Urine Appearance   


 


Urine pH   


 


Ur Specific Gravity   


 


Urine Protein   


 


Urine Ketones   


 


Urine Blood   


 


Urine Nitrate   


 


Urine Bilirubin   


 


Urine Urobilinogen   


 


Ur Leukocyte Esterase   


 


Urine WBC (Auto)   


 


Urine RBC (Auto)   


 


Urine Bacteria   


 


Urine Osmolality   


 


U Sodium Concentration    81


 


Urine Potassium    23.9


 


Ur Chloride Concentrat    94


 


Urine Glucose   














  08/08/19 08/08/19 08/08/19





  01:50 07:03 07:03


 


WBC   13.1 H 


 


RBC   3.03 L 


 


Hgb   10.1 L 


 


Hct   29 L 


 


MCV   97 H 


 


MCH   33 H 


 


MCHC   34 


 


RDW   14 


 


Plt Count   275 


 


MPV   8.6 


 


Neut % (Auto)   73.8 


 


Lymph % (Auto)   13.3 


 


Mono % (Auto)   12.4 


 


Eos % (Auto)   0.2 


 


Baso % (Auto)   0.3 


 


Absolute Neuts (auto)   9.7 H 


 


Absolute Lymphs (auto)   1.7 


 


Absolute Monos (auto)   1.6 H 


 


Absolute Eos (auto)   0.0 


 


Absolute Basos (auto)   0.0 


 


Absolute Nucleated RBC   0.0 


 


Nucleated RBC %   0.0 


 


INR (Anticoag Therapy)   


 


APTT   


 


Sodium    122 L


 


Potassium    4.6


 


Chloride    93 L


 


Carbon Dioxide    20 L


 


Anion Gap    9


 


BUN    30 H


 


Creatinine    2.34 H


 


Est GFR ( Amer)    32.1


 


Est GFR (Non-Af Amer)    26.5


 


BUN/Creatinine Ratio    12.8


 


Glucose    102 H


 


Serum Osmolality   


 


Lactic Acid   


 


Calcium    8.0 L


 


Total Bilirubin    0.40


 


AST    22


 


ALT    15


 


Alkaline Phosphatase    155 H


 


Troponin I   


 


Total Protein    6.2 L


 


Albumin    3.1 L


 


Globulin    3.1


 


Albumin/Globulin Ratio    1.0


 


Urine Color   


 


Urine Appearance   


 


Urine pH   


 


Ur Specific Gravity   


 


Urine Protein   


 


Urine Ketones   


 


Urine Blood   


 


Urine Nitrate   


 


Urine Bilirubin   


 


Urine Urobilinogen   


 


Ur Leukocyte Esterase   


 


Urine WBC (Auto)   


 


Urine RBC (Auto)   


 


Urine Bacteria   


 


Urine Osmolality  279  


 


U Sodium Concentration   


 


Urine Potassium   


 


Ur Chloride Concentrat   


 


Urine Glucose   














  08/08/19 08/08/19 08/08/19





  07:16 07:25 13:46


 


WBC   


 


RBC   


 


Hgb   


 


Hct   


 


MCV   


 


MCH   


 


MCHC   


 


RDW   


 


Plt Count   


 


MPV   


 


Neut % (Auto)   


 


Lymph % (Auto)   


 


Mono % (Auto)   


 


Eos % (Auto)   


 


Baso % (Auto)   


 


Absolute Neuts (auto)   


 


Absolute Lymphs (auto)   


 


Absolute Monos (auto)   


 


Absolute Eos (auto)   


 


Absolute Basos (auto)   


 


Absolute Nucleated RBC   


 


Nucleated RBC %   


 


INR (Anticoag Therapy)   


 


APTT   


 


Sodium   123 L  122 L


 


Potassium   4.8  4.2


 


Chloride   93 L  96 L


 


Carbon Dioxide   20 L  16 L


 


Anion Gap   10  10


 


BUN   30 H  31 H


 


Creatinine   2.31 H  2.18 H


 


Est GFR ( Amer)   32.5  34.8


 


Est GFR (Non-Af Amer)   26.9  28.8


 


BUN/Creatinine Ratio   13.0  14.2


 


Glucose   89  122 H


 


Serum Osmolality   


 


Lactic Acid   


 


Calcium   7.9 L  7.9 L


 


Total Bilirubin   0.40  0.40


 


AST   21  21


 


ALT   14  13


 


Alkaline Phosphatase   157 H  138 H


 


Troponin I  0.23 H*  


 


Total Protein   5.9 L  6.3 L


 


Albumin   3.0 L  3.1 L


 


Globulin   2.9  3.2


 


Albumin/Globulin Ratio   1.0  1.0


 


Urine Color   


 


Urine Appearance   


 


Urine pH   


 


Ur Specific Gravity   


 


Urine Protein   


 


Urine Ketones   


 


Urine Blood   


 


Urine Nitrate   


 


Urine Bilirubin   


 


Urine Urobilinogen   


 


Ur Leukocyte Esterase   


 


Urine WBC (Auto)   


 


Urine RBC (Auto)   


 


Urine Bacteria   


 


Urine Osmolality   


 


U Sodium Concentration   


 


Urine Potassium   


 


Ur Chloride Concentrat   


 


Urine Glucose   











Microbiology and Other Data: 





.








Assess/Plan/Problems-Billing


Assessment: 





87 yr old with pmh of systolic heart failure, NSTEMI, CAD s/p CABG, 

interstitial lung disease, connective tissue disorder, htn, hld, ckd, afib; who 

presented to the ED due to EMS





- Patient Problems


(1) Sepsis


Comment: 


- On admission there was concern for sepsis, but this was ruled out as patient 

only met criteria with leukocytosis and source (urine)


- Patient did receive IV Abx, IV fluid, and was pan cultured while in ED


- Leukocytosis improving. Continue to be afebrile and no tachycardia noted   





(2) Urinary tract infection


Comment: 


- Given UA, elevated WBC, and castellano obstruction; concern for UTI on admission 

therefore patient started on Zosyn


- Cont Zosyn for now until urine C&S result   





(3) Urinary obstruction


Comment: 


- Patient has castellano cath that was obstructed and upon changing castellano in ED 

patient had approx 1100 mls out that he was retaining due to obstruction.   





(4) Hyponatremia


Comment: 


- Suspected secondary to hypovolemia, JOSE ARMANDO, and castellano cath obstruction


- Previously on NS 75 ml/hr with improvement in Na+. Given quick improvement 

decrease rate to 50 ml/hr. 


- Cont serial BMPs   





(5) Elevated troponin


Comment: 


- Likely demand


- Asymptomatic


- Peaked at 0.29 and trended down


- T wave initially peaked but resolving on repeat EKG


- Patient is medically managed for HFrEF


- Echo repeated and slight decrease in EF from previous echo   





(6) Acute-on-chronic kidney injury


Comment: 


- Usually elevated given hx of ckd


- Slightly more elevated on admission. Likely secondary to urinary obstruction 

and hypovolemia.    





(7) Hypertension


Comment: 


- Normo to hypotensive. 


- Asymptomatic


- Hold Lasix


- Resume Metoprolol tomorrow   





(8) Systolic heart failure


Comment: 


- Hx of HFrEF


- Echo only mildy changed with EF decrease from 35% - 40% to now 30% - 35%


- Medically managed.   





(9) Afib


Comment: 


- SR on tele


- Resume Metoprolol tomorrow


- Not currenlty on AC   





(10) Connective tissue disorder


Comment: 


- Continue plaquenil.


- Follows with [facility]   





(11) CAD (coronary artery disease)


Comment: 


- No c/o chest pain or other cardiac symptoms


- Continue ASA and lipitor. Resume metoprolol tomorrow   





(12) DVT prophylaxis


Comment: 


- Sub Q Heparin   


Status and Disposition: 





Inpatient. Discharge when medically stable. Maybe [facility]?


Attending: Anita Bassett

## 2019-08-08 NOTE — HP
CC:  Dr. Boo; Dr. Faria; Dr. Castro

 

ADMISSION HISTORY AND PHYSICAL:

 

DATE OF ADMISSION:  08/08/19

 

PRIMARY CARE PROVIDER:  Dr. Boo.

 

CARDIOLOGIST:  Dr. Faria.

 

RHEUMATOLOGIST:  Dr. Castro.

 

CHIEF COMPLAINT:  Altered mental status.

 

HISTORY OF PRESENT ILLNESS:  This is an 87-year-old male with past medical 
history of heart failure; non-ST elevation MI; coronary artery disease, status 
post coronary artery bypass graft, interstitial lung disease, undetermined 
connective tissue disease; hypertension; hyperlipidemia; chronic kidney disease 
stage 3; AFib and status post pacemaker, was brought into the emergency room 
due to altered mental status.  The patient himself could not give much history 
as he was still confused and only oriented to person.  He denied any pain, any 
shortness of breath, any nausea or vomiting.  He did have some initial 
abdominal pain on palpation and was noted to have a Segura which was not 
draining.  So, Segura was replaced at which point, it drained more than 1100 cc 
of urine on the new Segura catheter.  Urinalysis suggested a possible UTI.  So, 
hospitalist team was consulted for admission for possible UTI.

 

PAST MEDICAL HISTORY:

1.  Systolic heart failure with ejection fraction of 35% to 40%.

2.  Recent admission for non-STEMI.

3.  Coronary artery disease status post coronary artery bypass graft.

4.  Interstitial lung disease.

5.  Undifferentiated connective tissue disorder, on Plaquenil, followed by Dr. Castro.

6.  Hypertension.

7.  Dyslipidemia.

8.  Chronic kidney disease stage 3.

9.  AFib, status post permanent pacemaker.

10.  Status post hiatal hernia repair.

 

HOME MEDICATIONS:  The patient is on:

1.  Potassium chloride 20 mEq oral twice a day.

2.  Omeprazole 20 mg every morning.

3.  Metoprolol 25 mg oral twice a day.

4.  Hydroxychloroquine 4 mg oral daily.

5.  Furosemide 60 mg oral twice a day.

6.  Vitamin D 1000 units oral daily.

7.  Atorvastatin 10 mg daily at bedtime.

8.  Aspirin 81 mg oral daily.

 

ALLERGIES:  No known drug allergies.

 

FAMILY HISTORY:  Noncontributory at his age but it is documented that the 
patient's mother had heart failure.

 

SOCIAL HISTORY:  No history of smoking, alcohol or drugs.  Wife is the 
surrogate decision maker.  There is a question whether the patient is full code 
on not.  At this point, the patient is unable to make such decision and just 
replies by saying that is a good question.  I am not sure if I want to be 
resuscitated.  That is what he replied.

 

REVIEW OF SYSTEMS:  A 14-point review of systems was unable to be obtained due 
to his confusion.

 

                               PHYSICAL EXAMINATION

 

GENERAL:  The patient is arousable, oriented to person, but not to time or 
place. He knew that it was President Collette who is the current President, but 
was unable to recall the year or the month and unable to recall where he was 
either.

 

VITAL SIGNS:  BP was noted to be 97/48, heart rate 75, saturating 96% on room 
air, respiratory rate of 16, temperature documented at 99.3 degrees Fahrenheit 
temporally.

 

HEAD AND NECK:  Atraumatic, normocephalic.  Bilateral pupils are reactive.  
Oral mucosa was moist.  Neck supple.  No jugular venous distention.

 

LUNGS:  Clear to auscultation bilaterally.  No wheezing, rhonchi, or rales.

 

HEART:  S1 and S2 with a systolic murmur heard.

 

ABDOMEN:  Soft, nontender, nondistended.

 

EXTREMITIES:  No cyanosis, clubbing, or edema.

 

 DIAGNOSTIC STUDIES/LAB DATA:  CBC was showing elevated white count of 9.8, 
hemoglobin/hematocrit show mild anemia, platelet count was noted to be 308. 
Coagulation profile shows an elevated INR of 1.12.  Comprehensive metabolic 
panel shows low sodium of 118, troponin was elevated at 0.27, creatinine 
elevated from his baseline minimally at 2.33 compared to his last known 
troponin from April being 1.48.  Random glucose minimally elevated at 141.  
Serum osmolality was noted to be low at 267.  Urinalysis was 2+ positive for 
leuk esterase, negative for any nitrite, 3+ positive for blood, absent 
bacteria.  A urine sodium was noted to be 81.  Urine osmolality was 279.

 

Portable chest x-ray did not reveal any pulmonary vascular congestion.  CT 
abdomen and pelvis showed no evidence of hydronephrosis or nephrolithiasis.  
Previously noted small bowel obstruction has resolved.  Right inguinal hernia 
containing fluid and fat.  Bubbles of air located next to the urethral catheter 
at the base of the penis may represent urethral injury.  Brain CT:  No acute 
intracranial hemorrhage, no hydronephrosis.  Dolichoectasia of the 
vertebrobasilar system, age-related cerebral cortical volume loss with 
associated white matter changes consistent with microvascular 
leukoencephalopathy.  Since prior head CT, no acute intracranial findings were 
noted.

 

IMPRESSION:  This is an 87-year-old gentleman with past medical history of 
systolic heart failure; non-ST elevation myocardial infarction; coronary artery 
disease, status post coronary artery bypass graft; interstitial lung disease; 
mixed connective tissue disorder; hypertension; dyslipidemia; chronic kidney 
stage 3; atrial fibrillation here due to altered mental status likely secondary 
to delirium from urinary tract infection and urinary retention.

 

ASSESSMENT/PLAN:

1.  Sepsis secondary to urinary tract infection clearly with elevated white 
count and mildly elevated temperature.  I will start the patient on Zosyn 
extended infusion.

2.  Hyponatremia likely hypoosmolar hyponatremia due to decreased p.o. intake.  
We will start the patient on gentle IV hydration and monitor sodium q.4 hours 
and we will also perform q.4 hours neuro checks to avoid any osmotic 
demyelination syndrome.

3.  Elevated troponin likely due to septic cardiomyopathy.  The patient's EKG 
currently does not show any ST elevation, but does have some T-wave inversion, 
which could be related to sepsis in the leads V1 through V6.  We will get 
serial troponin and get repeat EKG in the morning.  We will consider 
consultation with Cardiology if his troponin worsens, but the patient did have 
a cardiac catheterization in 2017, which showed severe triple vessel disease 
and recent transthoracic echocardiogram from 2018 showed mild concentric left 
ventricular hypertrophy and ejection fraction of 35% to 40%.  We will repeat an 
echocardiogram during this admission as well.

4.  Altered mental status likely due to metabolic encephalopathy from sepsis vs 
hyponatremia.

5.  History of hypertension.  Hold BP medications as current blood pressure is 
low normal.

6.  History of atrial fibrillation, currently not on any anticoagulation.

7.  History of mixed connective tissue disorder.  Restart his Plaquenil.

8.  History of chronic kidney disease stage 3.  Monitor creatinine.

9.  DVT prophylaxis:  We will start the patient on subcu heparin q.12 hours.

10.  Code status.  The patient is currently a full code as he is unsure.  We 
will speak with family in the morning and consider DNR status.

 

 

 

[id number]/CPS #: [id number]

[facility] HospitalHUAN

## 2019-08-08 NOTE — ECHO
*[facility]*

[place] [address]

Phone: [phone]

Fax #: [phone]



-------------------------------------------------------------------

Transthoracic Echocardiogram



Patient: Carmelo Vazquez                        MRN:        [id number]

:     1932                         Study Date: 2019

Age:     87                                 Accession#: [id number]

Gender:  M                                  HR:         66 bpm

Height:  66 in /167.6 cm                    BSA:        1.72 m^2

Weight:  139.7 lb /63.5 kg                  BMI:        22.6 kg/m^2



*Sonographer: * Margarita Kothari RUST

 

*Referring Physician: * Claude Jarrett

*Reading Physician: * Calvin Benítez MD



-------------------------------------------------------------------

Indications:   Myocardial Infarction (new).



-------------------------------------------------------------------

History:   Atrial fibrillation.  Right bundle branch block.  Sinus

node dysfunction.  1degrees AV block.  Coronary artery disease.

Congestive heart failure.  Aortic stenosis.  Risk factors:

Hypertension. Dyslipidemia.



Labs, prior tests, procedures, and surgery:

Permanent pacemaker system implantation.



Coronary artery bypass grafting.



-------------------------------------------------------------------

Conclusions



Summary:



- Impressions:

- Left ventricle: Systolic function is moderately to severely

  reduced. The estimated ejection fraction is 30-35%. Systolic

  function is worse from the previous study. Doppler parameters are

  consistent with abnormal left ventricular relaxation (grade 1

  diastolic dysfunction).

- Right ventricle: Systolic function is mildly reduced.

- Left atrium: The atrium is mildly dilated.

- Atrial septum: The atrial septum appears aneurysmal.

- Mitral valve: Appears moderately calcified. The leaflets are

  mildly thickened and mildly restricted due to annular calcium.

  The findings are consistent with mild stenosis. There is moderate

  regurgitation.

- Aortic valve: The findings are consistent with moderate stenosis.

  Stenosis is probably underestimated due to poor left ventricular

  function. The findings are c/w low gradient moderate aortic

  stenosis. There is moderate regurgitation. The peak systolic

  velocity is 2.4 m/sec. The mean systolic gradient is 13.0 mm Hg.

  The valve area by the velocity-time integral method is 1.25 cm^2.

  The valve area by the peak velocity method is 1.27 cm^2.

- Tricuspid valve: There is mild regurgitation.

- Aorta: There is plaque visualized in the Aortic root.



-------------------------------------------------------------------

Study data:  Transthoracic echocardiogram.  Procedure:

Transthoracic echocardiography was performed. Image quality was

suboptimal. The study was technically limited due to poor acoustic

window availability. Intravenous Definity , 2.5 mlswas

administered.  Complete 2D, spectral Doppler, and color flow

Doppler.  Location:  Bedside.  Patient status:  Inpatient. Patient

room number: [id number]. The previous study was not available, so

comparison is made to the report of 2018.  Rhythm:  Paced

rhythm.



-------------------------------------------------------------------

Findings



Left ventricle:  The cavity size is normal. Wall thickness is

mildly increased. There is a prominent septal knuckle. Systolic

function is moderately to severely reduced. The estimated ejection

fraction is 30-35%. Systolic function is worse from the previous

study and has decreased mildly from 35-40%.  Regional wall motion

abnormalities:   Hypokinesis of the mid-apicalanteroseptal,

anterior, and anterolateral myocardium.  Hypokinesis of the

apicalinferior myocardium.  Hypokinesis of the apicalinferolateral

myocardium. Akinesis of the mid anterior, mid anteroseptal, and

apical lateral myocardium; severe hypokinesis of the apical

myocardium; hypokinesis of the mid anterolateral myocardium;

moderate hypokinesis of the apical anterior, mid inferoseptal,

entire inferior, mid inferolateral, and apical septal myocardium.

Doppler parameters are consistent with abnormal left ventricular

relaxation (grade 1 diastolic dysfunction).

Right ventricle:  The cavity size is mildly dilated. Pacer wire

noted in the right ventricle. Systolic function is mildly reduced.

Ventricular septum:   There is abnormal interventricular septal

wall motion consistent with an RV pacemaker.

Left atrium:  The atrium is mildly dilated.

Right atrium:  The atrium is normal in size. Pacer wire noted in

right atrium.

Atrial septum:  The atrial septum appears aneurysmal.

Mitral valve:  Appears moderately calcified. The leaflets are

mildly thickened and mildly restricted due to annular calcium.  The

findings are consistent with mild stenosis.   There is moderate

regurgitation.

Aortic valve:  The annulus is mildly calcified. The valve is

trileaflet. The leaflets are moderately thickened. Cusp separation

is reduced. The findings are consistent with moderate stenosis.

Stenosis is probably underestimated due to poor left ventricular

function. The findings are c/w low gradient moderate aortic

stenosis. There is moderate regurgitation.

Tricuspid valve:  The leaflets are normal thickness.  There is no

evidence of stenosis.   There is mild regurgitation.

Pulmonic valve:   Not well visualized.  There is no evidence of

stenosis.   There is trace regurgitation.

Aorta:  There is plaque visualized in the Aortic root. Ascending

aorta: The ascending aorta is appears normal.

The aortic root appears normal. The aortic arch appears normal.

Pericardium:  There is no significant pericardial effusion.

Pulmonary arteries:

Not well visualized. Systolic pressure is estimated to be 29 mm Hg.

Pulmonary artery pressure may be underestimated

Systemic veins:

Inferior vena cava: The vessel is dilated. There is (&gt;= 50%)

respiratory change in the IVC dimension.



-------------------------------------------------------------------

Measurements



 Left ventricle            Value        Ref         Aortic valve continued      Value       Ref

 SANJU, LAX                  4.2   cm     4.2 - 5.8   VTI, S                      52.6  cm    -----

 ESD, LAX                  3.6   cm     2.5 - 4.0   Mean grad, S                13.0  mm Hg -----

 FS, LAX           (L)     14    %      25 - 43     Peak grad, S                23.0  mm Hg -----

 PW, ED, LAX       (H)     1.1   cm     0.6 - 1.0   FIONA, VTI                    1.25  cm^2  -----

 FS                (L)     14    %      25 - 43     FIONA, Vmax                   1.27  cm^2  -----

 PW, ED            (H)     1.1   cm     0.6 - 1.0   AR peak v                   3.83  m/sec -----

 E&apos;, lat rafaela, TDI  (L)     7.2   cm/sec &gt;=10.0      AR PHT                      449   ms    -
----

 E/e&apos;, lat rafaela,            9            ----------  AR peak grad                59    mm Hg ----
-

 TDI

 E&apos;, med rafaela, TDI  (L)     5.0   cm/sec &gt;=7.0       Mitral valve                Value       R
ef

 E/e&apos;, med rafaela,            13           ----------  Peak E                      0.63  m/sec ----
-

 TDI                                                Peak A                      1.31  m/sec -----

 E&apos;, avg, TDI              6.1   cm/sec ----------  Decel time                  310   ms    ----
-

 E/e&apos;, avg, TDI            10           &lt;=14        PHT                         77    ms    -
----

                                                    Mean grad, D                3.0   mm Hg -----

 LVOT                      Value        Ref         Peak grad, D                8.0   mm Hg -----

 Diam, S                   2.00  cm     ----------  Peak E/A ratio              0.5         -----

 Area                      3.1   cm^2   ----------  MVA, PHT                    2.3   cm^2  -----

 Peak yuval, S               0.97  m/sec  ----------

 Mean grad, S              2     mm Hg  ----------  Pulmonic valve              Value       Ref

 SV                        60    ml     ----------  Peak v, S                   0.89  m/sec -----

 SV/bsa                    35    ml/m^2 ----------  Peak grad, S                3.0   mm Hg -----

 

 Ventricular septum        Value        Ref         Tricuspid valve             Value       Ref

 IVS, ED           (H)     1.5   cm     0.6 - 1.0   TR peak v                   2.3   m/sec &lt;=2.8

                                                    Peak RV-RA grad, S          21    mm Hg -----

 Right ventricle           Value        Ref

 SANJU, LAX                  3.4   cm     ----------  Aortic root                 Value       Ref

 SANJU minor ax, A4C (H)     4.2   cm     1.9 - 3.5   Root diam                   3.6   cm    &lt;3.9

 mid

 Pressure, S               29    mm Hg  ----------  Ascending aorta             Value       Ref

                                                    AAo AP diam, S              3.7   cm    -----

 Left atrium               Value        Ref

 AP dim, ES        (H)     4.30  cm     3.00 -      Aortic arch                 Value       Ref

                                        4.00        Arch diam                   2.5   cm    -----

 ML dim, A4C               4.2   cm     ----------

 SI dim, A4C               5.3   cm     ----------  Decending aorta             Value       Ref

 Vol/bsa, ES, 1-p          27    ml/m^2 12 - 37     Kevin peak yuval                0.5   m/sec -----

 A4C

 Vol/bsa, ES, A/L  (H)     37    ml/m^2 16 - 34     Pulmonary artery            Value       Ref

                                                    Pressure, S                 26.0  mm Hg -----

 Right atrium              Value        Ref

 SI dim, ES                5.1   cm     3.4 - 5.3   Inferior vena cava          Value       Ref

 ML dim, ES, A4C           3.1   cm     2.6 - 4.4   Diam                        2.1   cm    -----

 SI dim, ES, A4C           5.1   cm     3.4 - 5.3

 Estimated RAP             8     mm Hg  ----------

 

 Aortic valve              Value        Ref

 Rafaela diam, ED              2.0   cm     ----------

 Peak v, S                 2.4   m/sec  ----------

 

Legend:

(L)  and  (H)  jaki values outside specified reference range.



Prepared and electronically signed by



Calvin Benítez MD

2019 09:30

## 2019-08-09 NOTE — PN
Subjective


Date of Service: 08/09/19


Interval History: 








Patient is resting in bed on assessment. Patient is cheerful and stating he 

feels well. He reports lower abd/suprapubic pain has resolved. He denies fever, 

chills, nausea, vomiting, sob, cp. 





Objective


Active Medications: 








Aspirin (Aspirin Ec Tab*)  81 mg PO DAILY [facility]


   Last Admin: 08/09/19 09:53 Dose:  81 mg


Atorvastatin Calcium (Lipitor*)  10 mg PO BEDTIME [facility]


   Last Admin: 08/08/19 21:55 Dose:  10 mg


Cholecalciferol (Vitamin D Tab*)  1,000 units PO DAILY [facility]


   Last Admin: 08/09/19 09:53 Dose:  1,000 units


Heparin Sodium (Porcine) (Heparin Vial(*))  5,000 units SUBCUT Q12HR [facility]


   Last Admin: 08/09/19 09:55 Dose:  5,000 units


Hydroxychloroquine Sulfate (Plaquenil Tab*)  400 mg PO DAILY [facility]


   Last Admin: 08/09/19 09:53 Dose:  400 mg


Piperacillin Sod/Tazobactam (Sod 3.375 gm/ Sodium Chloride)  100 mls @ 25 mls/

hr IVPB Q8H [facility]


   Last Admin: 08/09/19 15:29 Dose:  25 mls/hr


Sodium Chloride (Ns 0.9% 1000 Ml**)  1,000 mls @ 50 mls/hr IV PER RATE [facility]


   Last Admin: 08/09/19 15:29 Dose:  50 mls/hr


Metoprolol Succinate (Toprol Xl Tab*)  12.5 mg PO BID [facility]


   Last Admin: 08/09/19 09:54 Dose:  12.5 mg


Pantoprazole Sodium (Protonix Tab*)  40 mg PO QAM [facility]


   Last Admin: 08/09/19 09:54 Dose:  40 mg








 Vital Signs - 8 hr











  08/09/19





  11:18


 


Temperature 97.5 F


 


Pulse Rate 90


 


Respiratory 18





Rate 


 


Blood Pressure 113/52





(mmHg) 


 


O2 Sat by Pulse 99





Oximetry 











Oxygen Devices in Use Now: None


Appearance: Comfortable, NAD


Eyes: No Scleral Icterus


Ears/Nose/Mouth/Throat: Clear Oropharnyx, Mucous Membranes Moist


Neck: NL Appearance and Movements; NL JVP


Respiratory: Symmetrical Chest Expansion and Respiratory Effort, Clear to 

Auscultation


Cardiovascular: NL Sounds; No Murmurs; No JVD, RRR, No Edema


Abdominal: NL Sounds; No Tenderness; No Distention


Lymphatic: No Cervical Adenopathy


Extremities: No Clubbing, Cyanosis


Skin: No Rash or Ulcers


Neurological: Alert and Oriented x 3, NL Muscle Strength and Tone


Nutrition: Taking PO's


Result Diagrams: 


 08/09/19 08:38





 08/09/19 08:38


Additional Lab and Data: 








 Laboratory Results - last 24 hr











  08/08/19 08/08/19 08/09/19





  17:52 22:19 08:38


 


WBC    10.5


 


RBC    2.93 L


 


Hgb    9.6 L


 


Hct    29 L


 


MCV    98 H


 


MCH    33 H


 


MCHC    34


 


RDW    14


 


Plt Count    262


 


MPV    9.3


 


Neut % (Auto)    73.7


 


Lymph % (Auto)    13.4


 


Mono % (Auto)    11.3


 


Eos % (Auto)    1.0


 


Baso % (Auto)    0.6


 


Absolute Neuts (auto)    7.7


 


Absolute Lymphs (auto)    1.4


 


Absolute Monos (auto)    1.2 H


 


Absolute Eos (auto)    0.1


 


Absolute Basos (auto)    0.1


 


Absolute Nucleated RBC    0.0


 


Nucleated RBC %    0.0


 


Sodium  125 L  124 L 


 


Potassium  4.9  3.9 


 


Chloride  96 L  96 L 


 


Carbon Dioxide  21 L  20 L 


 


Anion Gap  8  8 


 


BUN  31 H  32 H 


 


Creatinine  2.35 H  2.28 H 


 


Est GFR ( Amer)  31.9  33.0 


 


Est GFR (Non-Af Amer)  26.4  27.3 


 


BUN/Creatinine Ratio  13.2  14.0 


 


Glucose  115 H  96 


 


Calcium  7.9 L  7.6 L 


 


Total Bilirubin  0.40  0.40 


 


AST  22  20 


 


ALT  15  15 


 


Alkaline Phosphatase  134 H  126 H 


 


Total Protein  6.3 L  6.0 L 


 


Albumin  3.2  3.0 L 


 


Globulin  3.1  3.0 


 


Albumin/Globulin Ratio  1.0  1.0 














  08/09/19





  08:38


 


WBC 


 


RBC 


 


Hgb 


 


Hct 


 


MCV 


 


MCH 


 


MCHC 


 


RDW 


 


Plt Count 


 


MPV 


 


Neut % (Auto) 


 


Lymph % (Auto) 


 


Mono % (Auto) 


 


Eos % (Auto) 


 


Baso % (Auto) 


 


Absolute Neuts (auto) 


 


Absolute Lymphs (auto) 


 


Absolute Monos (auto) 


 


Absolute Eos (auto) 


 


Absolute Basos (auto) 


 


Absolute Nucleated RBC 


 


Nucleated RBC % 


 


Sodium  128 L


 


Potassium  3.8


 


Chloride  100 L


 


Carbon Dioxide  19 L


 


Anion Gap  9


 


BUN  30 H


 


Creatinine  2.08 H


 


Est GFR ( Amer)  36.7


 


Est GFR (Non-Af Amer)  30.4


 


BUN/Creatinine Ratio  14.4


 


Glucose  91


 


Calcium  8.1 L


 


Total Bilirubin 


 


AST 


 


ALT 


 


Alkaline Phosphatase 


 


Total Protein 


 


Albumin 


 


Globulin 


 


Albumin/Globulin Ratio 
































Microbiology and Other Data: 








 Microbiology











 08/07/19 22:58 Aerobic Blood Culture - Preliminary





 Blood Venous    No Growth Day 1





 Anaerobic Blood Culture - Preliminary





    No Growth Day 1


 


 08/07/19 22:58 Aerobic Blood Culture - Preliminary





 Blood Venous    No Growth Day 1





 Anaerobic Blood Culture - Preliminary





    No Growth Day 1














Assess/Plan/Problems-Billing


Assessment: 





87 yr old with pmh of systolic heart failure, NSTEMI, CAD s/p CABG, 

interstitial lung disease, connective tissue disorder, htn, hld, ckd, afib; who 

presented to the ED due to EMS





- Patient Problems


(1) Urinary tract infection


Comment: 


- Called Microbiology as UC has not results as expected and I was informed it 

is acutally under outpatient visit. Therefore, reviewed and grew Ecoli. Will 

change abx to Cefdinir. 


- Given UA, elevated WBC, and castellano obstruction; concern for UTI on admission 

therefore patient started on Zosyn now changing to Cefdinir   





(2) Urinary obstruction


Comment: 


- Did report suprapubic/lower abd pain previously which has since resolved. 


- Patient has castellano cath that was obstructed and upon changing castellano in ED 

patient had approx 1100 mls out that he was retaining due to obstruction.   





(3) Hyponatremia


Comment: 


- Improving


- Suspected secondary to hypovolemia, JOSE ARMANDO, and castellano cath obstruction


- Admitted on NS 75 ml/hr with improvement in Na+. Given quick improvement 

decrease rate to 50 ml/hr yesterday.


- Cont 50 ml/hr and monitor Na+


- Does have hx of hyponatremia at baseline


- Is on Lasix 60 mg po BID at home which is currently being held. Restart when 

possible   





(4) Elevated troponin


Comment: 


- Likely demand


- Asymptomatic


- Peaked at 0.29 and trended down


- T wave initially peaked but resolving on repeat EKG


- Patient is medically managed for HFrEF


- Echo repeated and slight decrease in EF from previous echo   





(5) Acute-on-chronic kidney injury


Comment: 


- Improving


- Usually elevated given hx of ckd


- Slightly more elevated on admission. Likely secondary to urinary obstruction 

and hypovolemia.    





(6) Hypertension


Comment: 


- Metoprolol resumed yesterday, but at lower dose


- Normo to hypotensive. 


- Asymptomatic


- Hold Lasix   





(7) Systolic heart failure


Comment: 


- Hx of HFrEF


- Echo only mildy changed with EF decrease from 35% - 40% to now 30% - 35%


- Medically managed.   





(8) Afib


Comment: 


- SR on tele


- Cont Metoprolol (at lower dose than home dose)


- Not currenlty on AC   





(9) Connective tissue disorder


Comment: 


- Continue plaquenil.


- Follows with [facility]   





(10) CAD (coronary artery disease)


Comment: 


- No c/o chest pain or other cardiac symptoms


- Continue ASA, lipitor, and metoprolol    





(11) DVT prophylaxis


Comment: 


- Sub Q Heparin   


Status and Disposition: 





Inpatient. Discharge when medically stable. Maybe [facility]?


Attending: Anita Bassett

## 2019-08-11 NOTE — PN
Subjective


Date of Service: 08/11/19


Interval History: 





Mr. Vazquez is feeling well today. He was stiff this morning when he woke, but 

this is typical for him. No further pain r/t Segura. Denies CP, SOB, N/V.





No concerns from nursing.





Family History: Unchanged from Admission


Social History: Unchanged from Admission


Past Medical History: Unchanged from Admission





Objective


Active Medications: 





Aspirin (Aspirin Ec Tab*)  81 mg PO DAILY [facility]


Atorvastatin Calcium (Lipitor*)  10 mg PO BEDTIME [facility]


Cholecalciferol (Vitamin D Tab*)  1,000 units PO DAILY [facility]


Furosemide (Lasix Tab*)  20 mg PO BID [facility]


Heparin Sodium (Porcine) (Heparin Vial(*))  5,000 units SUBCUT Q12HR [facility]


Hydroxychloroquine Sulfate (Plaquenil Tab*)  400 mg PO DAILY [facility]


Metoprolol Succinate (Toprol Xl Tab*)  12.5 mg PO BID [facility]


Pantoprazole Sodium (Protonix Tab*)  40 mg PO QAM [facility]


Potassium Chloride (Klor Con Er Tab*)  20 meq PO BID [facility]





 Vital Signs - 8 hr











  08/11/19 08/11/19





  07:30 09:28


 


Temperature 97.2 F 


 


Pulse Rate 74 


 


Respiratory 20 





Rate  


 


Blood Pressure 133/49 158/70





(mmHg)  


 


O2 Sat by Pulse 100 





Oximetry  











Oxygen Devices in Use Now: None


Appearance: Elderly male sitting in bed in NAD


Eyes: No Scleral Icterus


Ears/Nose/Mouth/Throat: Mucous Membranes Moist


Neck: NL Appearance and Movements; NL JVP, Trachea Midline


Respiratory: Symmetrical Chest Expansion and Respiratory Effort, Clear to 

Auscultation


Cardiovascular: NL Sounds; No Murmurs; No JVD


Abdominal: NL Sounds; No Tenderness; No Distention


Extremities: No Edema


Neurological: Alert and Oriented x 3


Lines/Tubes/Other Access: Clean, Dry and Intact Peripheral IV


Nutrition: Taking PO's


Result Diagrams: 


 08/10/19 06:20





 08/11/19 07:29





Assess/Plan/Problems-Billing





Assessment: 





Mr. Vazquez is an 88 yo M with PMH of systolic CHF, NSTEMI, CAD s/p CABG, 

interstitial lung disease, connective tissue disorder, HTN, HLD, CKD, and afib; 

who presented to the ED due to AMS and was found to have urinary retention in 

the presence of an indwelling catheter.





- Patient Problems


(1) Urinary obstruction


Comment: 


- Previously reported abdominal/suprapubic pain, now resolved


- Segura obstructed on admission with 1,100mL retained; replaced in ED


- No evidence of UTI; culture growing <25k colonies E. coli representing 

colonization


- Segura to drainage   





(2) Hyponatremia


Code(s): E87.1 - HYPO-OSMOLALITY AND HYPONATREMIA   Comment: 


- Down to 115 on admission, now resolved


- Baseline 120-130s


- Suspected secondary to hypovolemia   





(3) Elevated troponin


Code(s): R74.8 - ABNORMAL LEVELS OF OTHER SERUM ENZYMES   Comment: 


- Asymptomatic


- Peaked at 0.29; T wave initially peaked but resolving on repeat EKG


- Suspect demand ischemia   





(4) Acute-on-chronic kidney injury


Code(s): N17.9 - ACUTE KIDNEY FAILURE, UNSPECIFIED; N18.9 - CHRONIC KIDNEY 

DISEASE, UNSPECIFIED   Comment: 


- Creatinine back to baseline


- Baseline CKD stage 3


- Secondary to urinary obstruction   





(5) Chronic combined systolic and diastolic congestive heart failure


Code(s): I50.42 - CHRONIC COMBINED SYSTOLIC AND DIASTOLIC HRT FAIL   Comment: 


- Not in exacerbation


- Echo shows EF 30-35% (previously 35-40%)


- Continue metoprolol, furosemide   





(6) Afib


Code(s): I48.91 - UNSPECIFIED ATRIAL FIBRILLATION   Comment: 


- NSR on tele


- Not on anticoagulation


- Continue metoprolol   





(7) Hypertension


Code(s): I10 - ESSENTIAL (PRIMARY) HYPERTENSION   Comment: 


- Normotensive, -140s


- Continue metoprolol, furosemide   





(8) CAD (coronary artery disease)


Code(s): I25.10 - ATHSCL HEART DISEASE OF NATIVE CORONARY ARTERY W/O ANG PCTRS 

  Comment: 


- Continue aspirin, atrovastatin, metoprolol    





(9) Anemia


Code(s): D64.9 - ANEMIA, UNSPECIFIED   Comment: 


- H&H at baseline


- Suspect secondary to CKD   





(10) Connective tissue disorder


Code(s): M35.9 - SYSTEMIC INVOLVEMENT OF CONNECTIVE TISSUE, UNSPECIFIED   

Comment: 


- Follows with Dr. Castro


- Continue Plaquenil   





(11) DVT prophylaxis


Comment: 


- Heparin SQ   





(12) Full code status


Code(s): Z78.9 - OTHER SPECIFIED HEALTH STATUS   Comment: 


   


Status and Disposition: 





Inpatient. Medically stable. Anticipate d/c to [facility] when bed available.





Attending: Anita Bassett

## 2019-08-12 NOTE — DS
CC:  Dr. Cholo Boo *

 

DATE OF ADMISSION:  08/08/2019.

 

DATE OF DISCHARGE:  08/12/2019.

 

PRIMARY CARE PHYSICIAN:  Dr. Cholo Boo.

 

ATTENDING PHYSICIAN:  Dr. Anita Basstet * (dictated by Rachel Bedoya NP).

 

PRIMARY DIAGNOSES:

1.  Segura catheter obstruction causing urinary retention.

2.  Hyponatremia.

3.  Acute kidney injury superimposed on chronic kidney disease stage three.

4.  Elevated troponin.

 

SECONDARY DIAGNOSES:

1.  Chronic systolic and diastolic congestive heart failure.

2.  Atrial fibrillation.

3.  Hypertension.

4.  Coronary artery disease.

5.  Anemia.

6.  Connective tissue disorder.

 

STUDIES DONE WHILE IN THE HOSPITAL:

1.  Chest x-ray on 08/07/2019, reads as:  Linear airspace opacification along 
the right inferior heart border could be representative of atelectasis in the 
correct clinical context.  Unchanged cardiomegaly.

2.  EKG on 08/07/2019 shows normal sinus rhythm with a rate of 77, , 
first degree AV block, Q-waves in inferior leads, right bundle branch block.  
This is consistent with previous EKG from 2018.

3.  Abdomen/pelvis CT on 08/07/2019, reads as:  No evidence of hydronephrosis 
or nephrolithiasis.  The previously noted small bowel obstruction has resolved.
  Right inguinal hernia containing fluid and fat.  Bubbles of air located next 
to a urethral catheter at the base of the penis.  This may represent a urethral 
injury.

4.  Brain CT on 08/07/2019, reads as:  No acute intracranial hemorrhage.  No 
hydrocephalus.  Dolichoectasia of the vertebrobasilar system.  Age-related 
cerebral cortical volume loss with associated white matter changes consistent 
with microvascular leukoencephalopathy.  Since the prior head CT of 03/18/2018, 
no acute intracranial findings.

5.  Transthoracic echocardiogram on 08/08/2019, reads as:  The left ventricular 
systolic function is moderately to severely reduced.  The estimated ejection 
fraction is 30 to 35 percent.  Systolic function is worse from the previous 
study. Doppler parameters are consistent with abnormal left ventricular 
relaxation (grade 1 diastolic dysfunction).  Right ventricular systolic 
function is mildly reduced. The left atrium is mildly dilated.  The atrial 
septum appears aneurysmal.  The mitral valve appears moderately calcified.  The 
leaflets are mildly thickened and mildly restricted due to annular calcium.  
These findings are consistent with mild mitral stenosis.  There is moderate 
mitral regurgitation.  The findings are consistent with moderate aortic 
stenosis.  Stenosis is probably underestimated due to poor left ventricular 
function. The findings are consistent with low gradient moderate aortic 
stenosis.  There is moderate aortic regurgitation.  There is mild tricuspid 
regurgitation.  There is plaque visualized in the aortic root.

6.  EKG on 08/08/2019 shows normal sinus rhythm with a rate of 64, , 
first degree AV block, right bundle branch block, Q-waves present in inferior 
leads, left posterior fascicular block.  Incomplete trifascicular block since 
the previous EKG.

7.  EKG on 08/08/2019 shows normal sinus rhythm with a rate of 74, , 
normal MA interval, Q-waves in inferior leads, right bundle branch block.

8.  Left wrist x-ray on 08/12/2019, reads as:  Degenerative changes of the 
radiocarpal joint.  No definite fracture is identified, although fusion of the 
pisiform and triquetrum is noted.

 

HISTORY OF PRESENT ILLNESS/HOSPITAL COURSE:  Mr. Vazquez is an 87-year-old male 
with a past medical history of congestive heart failure, NSTEMI, connective 
tissue disorder, hypertension, chronic kidney disease stage 3, and A-fib who 
presented to the emergency room on 08/08/2019 with altered mental status.  
Please see the history and physical by Dr. Jarrett for a complete summary of the 
events leading up to this hospitalization.  In short, the patient was brought 
into the emergency room with altered mental status.  It is not clear who 
brought him into the emergency room or who prompted him to come.  At that point
, he was only oriented to self.  He did have some abdominal pain and Segura was 
noted to be nondraining.  The Segura catheter was changed in the emergency room 
at which point it drained 1,100 ml of urine.  Urinalysis was concerning for a 
urinary tract infection and so the patient was admitted by the Hospitalist 
service.

 

The patient was placed on Zosyn for suspected UTI and then subsequently changed 
to Cefdinir.  However, final micro report showed that the urine grew 10,000 to 
25,000 colonies of E. coli which is representative of colonization only and so 
antibiotic therapy was discontinued at that point.  Mental status improved back 
to baseline with resolution of urinary obstruction.  The patient was noted to 
have hyponatremia on admission with a sodium down to 118.  This resolved with 
IV fluids.  It was suspected this was secondary to hypovolemia and acute kidney 
injury secondary to catheter obstruction.  Additionally, he was noted to have 
an elevated troponin, likely demand ischemia.  There was no concern for any 
acute cardiac process.  He did have a repeat echo which showed a mild decrease 
in ejection fraction down from 35 to 40 percent to 30 to 35 percent, though 
there is no evidence of acute exacerbation.  The patient has remained in the 
hospital over the weekend awaiting a rehab bed.  This morning the patient 
reports feeling well.  He is noted to have a new onset of edema in his left 
wrist which he says he woke with within the last one to two days, though this 
was just noted by nursing staff last night.  There is mild to moderate edema in 
the left wrist, though no significant pain with range of motion or with 
palpation of the area.  I did do an x-ray of the wrist this morning to confirm 
that there is no fracture and as noted above there is no obvious fracture noted 
in the wrist.  It is possible that this could represent a gout or pseudogout, 
though no evidence to necessarily support that at this point, and also possible 
that this may be related to his connective tissue disorder, so at this point I 
think it is reasonable to watch and wait to see if edema resolves.

 

On exam, the patient has no focal neurological deficits.  His heart has a 
regular rate and rhythm.  Grade 2/6 systolic murmur present consistent with 
known aortic stenosis.  Lungs are clear to auscultation.  There is no edema 
noted other than that on the left wrist.  Physical assessment is otherwise 
benign.

 

Mr. Vazquez is stable for discharge today.  Vital signs are as follows:  
Temperature 97.3, heart rate 78, respiratory rate 18, oxygen saturation 100 
percent on room air, blood pressure 114/54.

 

DISCHARGE MEDICATIONS: 

Continued medications:

1.  Aspirin 81 mg p.o. daily.

2.  Atorvastatin 10 mg p.o. at bedtime.

3.  Cholecalciferol 1,000 units p.o. daily.

4.  Furosemide 60 mg p.o. b.i.d.

5.  Plaquenil 400 mg p.o. daily.

6.  Metoprolol Succinate 25 mg p.o. b.i.d.

7.  Omeprazole 20 mg p.o. daily.

8.  Potassium Chloride 20 mEq p.o. b.i.d.

 

DISCHARGE PLAN:  Mr. Vazquez will be discharged to subacute rehab.  Medications 
are noted above.  I will continue the patient's usual home medications and I 
have not made any changes.  He will need appropriate follow-up with his 
cardiologist, Dr. Faria, and his rheumatologist, Dr. Castro.  Again, the left 
wrist will need to be monitored over the next few days to ensure that there is 
resolution of the edema. If there is not resolution of the edema, further work-
up may be necessary to determine the cause of this.  He can otherwise follow-up 
with the provider at [facility] and after discharge should follow-up 
with his primary care provider.

 

ACTIVITY:  As tolerated.

 

DIET:  Heart-healthy.

 

The patient should return to the emergency room or nearest hospital for any 
worsening of symptoms, shortness of breath, lightheadedness, dizziness, chest 
discomfort, high fevers, chills, night sweats, loss of consciousness or any 
other worrisome signs or symptoms.

 

CONDITION ON DISCHARGE:  Stable.

 

DISPOSITION:  Skilled nursing facility, [facility].

 

This is a summarized report of a complex medical history and hospital stay.  
For further details, please see the entire medical record.

 

TIME SPENT:  Approximately 45 minutes were spent on this discharge.

 

____________________________________ 

RACHEL BEDOYA NP

 

[id number]/CPS #: [id number]

MARIO

## 2022-06-12 NOTE — PN
Subjective


Date of Service: 08/10/19


Interval History: 





Mr. Vazquez is feeling better this morning. He does have some neck pain, though 

this frequently occurs in the AM. He does not want pain medication. He feels 

like there is something wrong with his catheter, but unable to elaborate. Feels 

it's "not right" and this has not happened with any previous catheters. Denies 

CP, SOB, N/V.





No concerns from nursing.





Family History: Unchanged from Admission


Social History: Unchanged from Admission


Past Medical History: Unchanged from Admission





Objective


Active Medications: 





Aspirin (Aspirin Ec Tab*)  81 mg PO DAILY ROBB


Atorvastatin Calcium (Lipitor*)  10 mg PO BEDTIME ROBB


Cholecalciferol (Vitamin D Tab*)  1,000 units PO DAILY ROBB


Heparin Sodium (Porcine) (Heparin Vial(*))  5,000 units SUBCUT Q12HR ROBB


Hydroxychloroquine Sulfate (Plaquenil Tab*)  400 mg PO DAILY ROBB


Metoprolol Succinate (Toprol Xl Tab*)  12.5 mg PO BID ROBB


Pantoprazole Sodium (Protonix Tab*)  40 mg PO QAM [facility]





 Vital Signs - 8 hr











  08/10/19 08/10/19





  03:30 07:49


 


Temperature 97.7 F 97.5 F


 


Pulse Rate 66 68


 


Respiratory 18 16





Rate  


 


Blood Pressure 116/49 145/51





(mmHg)  


 


O2 Sat by Pulse 99 100





Oximetry  











Oxygen Devices in Use Now: None


Appearance: Elderly male sitting in bed in NAD


Eyes: No Scleral Icterus


Ears/Nose/Mouth/Throat: Mucous Membranes Moist


Neck: NL Appearance and Movements; NL JVP, Trachea Midline


Respiratory: Symmetrical Chest Expansion and Respiratory Effort, Clear to 

Auscultation


Cardiovascular: NL Sounds; No Murmurs; No JVD, RRR


Abdominal: NL Sounds; No Tenderness; No Distention


Extremities: No Edema


Neurological: Alert and Oriented x 3


Lines/Tubes/Other Access: Clean, Dry and Intact Peripheral IV


Nutrition: Taking PO's


Result Diagrams: 


 08/10/19 06:20





 08/10/19 06:20





Assess/Plan/Problems-Billing





Assessment: 





Mr. Vazquez is an 88 yo M with PMH of systolic CHF, NSTEMI, CAD s/p CABG, 

interstitial lung disease, connective tissue disorder, HTN, HLD, CKD, and afib; 

who presented to the ED due to AMS and was found to have urinary retention in 

the presence of an indwelling catheter.





- Patient Problems


(1) Urinary obstruction


Comment: 


- Previously reported abdominal/suprapubic pain, now resolved


- Segura obstructed on admission with 1,100mL retained; replaced in ED


- No evidence of UTI; culture growing <25k colonies E. coli representing 

colonization


- Segura to drainage   





(2) Hyponatremia


Code(s): E87.1 - HYPO-OSMOLALITY AND HYPONATREMIA   Comment: 


- Down to 115 on admission, now resolved


- Baseline 120-130s


- Suspected secondary to hypovolemia


- D/c IVF   





(3) Elevated troponin


Code(s): R74.8 - ABNORMAL LEVELS OF OTHER SERUM ENZYMES   Comment: 


- Asymptomatic


- Peaked at 0.29; T wave initially peaked but resolving on repeat EKG


- Suspect demand ischemia   





(4) Acute-on-chronic kidney injury


Code(s): N17.9 - ACUTE KIDNEY FAILURE, UNSPECIFIED; N18.9 - CHRONIC KIDNEY 

DISEASE, UNSPECIFIED   Comment: 


- Creatinine back to baseline


- Baseline CKD stage 3


- Secondary to urinary obstruction   





(5) Chronic combined systolic and diastolic congestive heart failure


Code(s): I50.42 - CHRONIC COMBINED SYSTOLIC AND DIASTOLIC HRT FAIL   Comment: 


- Not in exacerbation


- Echo shows EF 30-35% (previously 35-40%)


- Continue metoprolol; resume furosemide (decreased dose)   





(6) Afib


Code(s): I48.91 - UNSPECIFIED ATRIAL FIBRILLATION   Comment: 


- NSR on tele


- Not on anticoagulation


- Continue metoprolol   





(7) Hypertension


Code(s): I10 - ESSENTIAL (PRIMARY) HYPERTENSION   Comment: 


- Normotensive, -140s


- Continue metoprolol; resume furosemide   





(8) CAD (coronary artery disease)


Code(s): I25.10 - ATHSCL HEART DISEASE OF NATIVE CORONARY ARTERY W/O ANG PCTRS 

  Comment: 


- Continue aspirin, atrovastatin, metoprolol    





(9) Anemia


Code(s): D64.9 - ANEMIA, UNSPECIFIED   Comment: 


- H&H at baseline


- Suspect secondary to CKD   





(10) Connective tissue disorder


Code(s): M35.9 - SYSTEMIC INVOLVEMENT OF CONNECTIVE TISSUE, UNSPECIFIED   

Comment: 


- Follows with Dr. Castro


- Continue Plaquenil   





(11) DVT prophylaxis


Comment: 


- Heparin SQ   





(12) Full code status


Code(s): Z78.9 - OTHER SPECIFIED HEALTH STATUS   Comment: 


   


Status and Disposition: 





Inpatient. Medically stable. Anticipate d/c to [facility] when bed available.





Attending: Anita Bassett No Vaccines Administered.
